# Patient Record
Sex: FEMALE | Race: WHITE | NOT HISPANIC OR LATINO | Employment: OTHER | URBAN - METROPOLITAN AREA
[De-identification: names, ages, dates, MRNs, and addresses within clinical notes are randomized per-mention and may not be internally consistent; named-entity substitution may affect disease eponyms.]

---

## 2017-01-05 RX ORDER — SIMVASTATIN 10 MG
TABLET ORAL
Qty: 90 TABLET | Refills: 0 | Status: SHIPPED | OUTPATIENT
Start: 2017-01-05 | End: 2017-01-30 | Stop reason: SDUPTHER

## 2017-01-10 DIAGNOSIS — R60.0 LOCALIZED EDEMA: ICD-10-CM

## 2017-01-10 RX ORDER — BUMETANIDE 2 MG/1
TABLET ORAL
Qty: 60 TABLET | Refills: 1 | Status: SHIPPED | OUTPATIENT
Start: 2017-01-10 | End: 2017-03-10 | Stop reason: SDUPTHER

## 2017-01-13 RX ORDER — ESCITALOPRAM OXALATE 20 MG/1
TABLET ORAL
Qty: 90 TABLET | Refills: 0 | Status: SHIPPED | OUTPATIENT
Start: 2017-01-13 | End: 2017-01-30 | Stop reason: SDUPTHER

## 2017-01-30 RX ORDER — SIMVASTATIN 10 MG
TABLET ORAL
Qty: 90 TABLET | Refills: 0 | Status: SHIPPED | OUTPATIENT
Start: 2017-01-30 | End: 2017-06-16 | Stop reason: SDUPTHER

## 2017-01-30 RX ORDER — CYCLOBENZAPRINE HCL 10 MG
TABLET ORAL
Qty: 30 TABLET | Refills: 0 | Status: SHIPPED | OUTPATIENT
Start: 2017-01-30 | End: 2017-05-08 | Stop reason: SDUPTHER

## 2017-01-30 RX ORDER — LEVOTHYROXINE SODIUM 0.1 MG/1
TABLET ORAL
Qty: 90 TABLET | Refills: 0 | Status: SHIPPED | OUTPATIENT
Start: 2017-01-30 | End: 2017-06-16 | Stop reason: SDUPTHER

## 2017-01-30 RX ORDER — ESCITALOPRAM OXALATE 20 MG/1
TABLET ORAL
Qty: 90 TABLET | Refills: 0 | Status: SHIPPED | OUTPATIENT
Start: 2017-01-30 | End: 2017-06-16 | Stop reason: SDUPTHER

## 2017-02-02 ENCOUNTER — TELEPHONE (OUTPATIENT)
Dept: ORTHOPEDIC SURGERY | Facility: CLINIC | Age: 72
End: 2017-02-02

## 2017-02-02 RX ORDER — MELOXICAM 15 MG/1
15 TABLET ORAL DAILY
Qty: 60 TABLET | Refills: 2 | Status: SHIPPED | OUTPATIENT
Start: 2017-02-02 | End: 2017-07-08 | Stop reason: HOSPADM

## 2017-02-02 NOTE — TELEPHONE ENCOUNTER
Pharmacy faxed a paper saying pt was requesting a 60 day supply of the meloxicam, can you resubmit for me please

## 2017-02-20 ENCOUNTER — DOCUMENTATION (OUTPATIENT)
Dept: PHYSICAL THERAPY | Facility: HOSPITAL | Age: 72
End: 2017-02-20

## 2017-02-20 NOTE — THERAPY DISCHARGE NOTE
Outpatient Physical Therapy Discharge Summary         Patient Name: Maria Ines Zhang  : 1945  MRN: [id number]    Today's Date: 2017    Visit Dx:  No diagnosis found.          PT OP Goals       17 1300          PT Short Term Goals    STG Date to Achieve 10/13/16  -CC      STG 1 Pt independent and able to tolerate HEP R shoulder  -CC      STG 1 Progress Not Met  -CC      STG 2 Improve R knee extension 3-0 degrees  -CC      STG 2 Progress Not Met  -CC      STG 3 Improved quad set recruitment R knee  -CC      STG 3 Progress Not Met  -CC      STG 4 Pt reports decrease disturbed sleep R knee by 25-50 %  -CC      STG 4 Progress Not Met  -CC      STG 5 Decrease soft tissue tenderness on palpation R knee by 25%  -CC      STG 5 Progress Not Met  -CC        User Key  (r) = Recorded By, (t) = Taken By, (c) = Cosigned By    Initials Name Provider Type    CC Lexis Abreu, PT Physical Therapist          OP PT Discharge Summary  Date of Discharge: 17  Reason for Discharge: other (comment) (Pt did not continue with PT appointments)  Outcomes Achieved: Other (Goals not met- pt only attended 2 sessions- cancelled f/u appointment and did not reschedule)  Discharge Destination: Unknown  Discharge Instructions: NA      Time Calculation:                    Lexis Abreu, PT  2017

## 2017-02-27 ENCOUNTER — OFFICE VISIT (OUTPATIENT)
Dept: ORTHOPEDIC SURGERY | Facility: CLINIC | Age: 72
End: 2017-02-27

## 2017-02-27 DIAGNOSIS — M75.01 ADHESIVE CAPSULITIS OF RIGHT SHOULDER: ICD-10-CM

## 2017-02-27 DIAGNOSIS — M75.21 BICEPS TENDINITIS, RIGHT: ICD-10-CM

## 2017-02-27 DIAGNOSIS — M75.50 SUBACROMIAL BURSITIS: Primary | ICD-10-CM

## 2017-02-27 PROCEDURE — 20610 DRAIN/INJ JOINT/BURSA W/O US: CPT | Performed by: NURSE PRACTITIONER

## 2017-02-27 RX ORDER — BUPIVACAINE HYDROCHLORIDE 5 MG/ML
2 INJECTION, SOLUTION EPIDURAL; INTRACAUDAL
Status: COMPLETED | OUTPATIENT
Start: 2017-02-27 | End: 2017-02-27

## 2017-02-27 RX ORDER — LIDOCAINE HYDROCHLORIDE 10 MG/ML
2 INJECTION, SOLUTION INFILTRATION; PERINEURAL
Status: COMPLETED | OUTPATIENT
Start: 2017-02-27 | End: 2017-02-27

## 2017-02-27 RX ORDER — TRIAMCINOLONE ACETONIDE 40 MG/ML
80 INJECTION, SUSPENSION INTRA-ARTICULAR; INTRAMUSCULAR
Status: COMPLETED | OUTPATIENT
Start: 2017-02-27 | End: 2017-02-27

## 2017-02-27 RX ADMIN — BUPIVACAINE HYDROCHLORIDE 2 ML: 5 INJECTION, SOLUTION EPIDURAL; INTRACAUDAL at 12:13

## 2017-02-27 RX ADMIN — LIDOCAINE HYDROCHLORIDE 2 ML: 10 INJECTION, SOLUTION INFILTRATION; PERINEURAL at 12:13

## 2017-02-27 RX ADMIN — TRIAMCINOLONE ACETONIDE 80 MG: 40 INJECTION, SUSPENSION INTRA-ARTICULAR; INTRAMUSCULAR at 12:13

## 2017-02-27 NOTE — PROGRESS NOTES
Subjective:     Patient ID: Maria Ines Zhang is a 71 y.o. female.    Chief Complaint: follow-up right shoulder pain    History of Present Illness   Patient is 71 y.o female who presents for follow-up right shoulder pain. Pain present lateral aspect with all motions including reaching up and out. Also increased pain noted with reaching out in front, reaching behind and with shoulder abduction. Was able to complete one visit with physical therapy before becoming ill in recent months. For the last two months symptoms have significantly increased. Decreased strength present at right upper extremity as well as decreased range of motion. Was last seen by this APRN on 12/06/2016, received corticosteroid injection at that time with mild symptom improvement. Denies known injury, denies presence of numbness and tingling. Increased pain at night when laying on right side. Symptoms relieved with rest. Denies all other concerns present at this time.      Social History     Occupational History   • Not on file.     Social History Main Topics   • Smoking status: Never Smoker   • Smokeless tobacco: Never Used   • Alcohol use No   • Drug use: No   • Sexual activity: Defer      Past Medical History   Diagnosis Date   • Acute sinusitis    • Anxiety    • Asthma    • Backache    • Benign essential hypertension    • Depression    • Dysuria    • Fatigue    • GERD (gastroesophageal reflux disease)    • Hyperlipidemia    • Hypertension    • Hypothyroidism    • Joint pain, knee    • Knee swelling    • Mitral valve insufficiency      nild   • Obesity    • Obstructive sleep apnea    • Osteoarthritis    • Patent foramen ovale    • Pulmonary embolism    • Pulmonary hypertension    • Sciatica    • Stroke syndrome      CRYPTOGENIC STROKE   • Venous stasis      Past Surgical History   Procedure Laterality Date   • Hysterectomy     • Thoracoscopy Right 02/25/2016     RT VATS WEDGE RESECTION   • Total knee arthroplasty Left    • Spine surgery     •  Back surgery  2011       Family History   Problem Relation Age of Onset   • Heart disease Mother    • Diabetes Mother    • Cancer Father    • Heart attack Brother    • Heart disease Brother    • No Known Problems Maternal Grandmother    • No Known Problems Maternal Grandfather    • No Known Problems Paternal Grandmother    • No Known Problems Paternal Grandfather          Review of Systems   Constitutional: Negative for chills, diaphoresis, fever and unexpected weight change.   HENT: Negative for hearing loss, nosebleeds, sore throat and tinnitus.    Eyes: Negative for pain and visual disturbance.   Respiratory: Negative for cough, shortness of breath and wheezing.    Cardiovascular: Negative for chest pain and palpitations.   Gastrointestinal: Negative for abdominal pain, diarrhea, nausea and vomiting.   Endocrine: Negative for cold intolerance, heat intolerance and polydipsia.   Genitourinary: Negative for difficulty urinating, dysuria and hematuria.   Musculoskeletal: Negative for arthralgias, joint swelling and myalgias.   Skin: Negative for rash and wound.   Allergic/Immunologic: Negative for environmental allergies.   Neurological: Negative for dizziness, syncope and numbness.   Hematological: Does not bruise/bleed easily.   Psychiatric/Behavioral: Negative for dysphoric mood and sleep disturbance. The patient is not nervous/anxious.    All other systems reviewed and are negative.          Objective:  Physical Exam    General: No acute distress.  Eyes: conjunctiva clear; pupils equally round and reactive  ENT: external ears and nose atraumatic; oropharynx clear  CV: no peripheral edema  Resp: normal respiratory effort  Skin: no rashes or wounds; normal turgor  Psych: mood and affect appropriate; recent and remote memory intact    There were no vitals filed for this visit.  There were no vitals filed for this visit.  There is no height or weight on file to calculate BMI.     Right Shoulder Exam     Tenderness    The patient is experiencing tenderness in the acromion.    Range of Motion   Forward Flexion: 110     Muscle Strength   Internal Rotation: 4/5   External Rotation: 4/5   Supraspinatus: 4/5   Subscapularis: 4/5   Biceps: 4/5     Tests   Apprehension: negative  Cross Arm: positive  Drop Arm: negative  Hawkin's test: positive  Impingement: positive  Sulcus: absent    Other   Erythema: absent  Scars: absent  Sensation: normal  Pulse: present          Assessment:       1. Subacromial bursitis, right    2. Biceps tendinitis, right    3. Adhesive capsulitis of right shoulder          Plan:  1. Discussed plan of care with patient. Wishes to proceed with corticosteroid injection right shoulder.  2. Will have her follow-up with physical therapy.  3. If not improving, will have her follow-up for repeat x-rays right shoulder.  May consider MRI right shoulder. Will plan to follow-up in four weeks. Patient verbalized understanding of all information and agrees with plan of care. Denies all other concerns present at this time.       Large Joint Arthrocentesis  Date/Time: 2/27/2017 12:13 PM  Consent given by: patient  Site marked: site marked  Timeout: Immediately prior to procedure a time out was called to verify the correct patient, procedure, equipment, support staff and site/side marked as required   Supporting Documentation  Indications: pain   Procedure Details  Location: shoulder - R subacromial bursa  Preparation: Patient was prepped and draped in the usual sterile fashion  Needle size: 22 G  Medications administered: 2 mL bupivacaine (PF) 0.5 %; 2 mL lidocaine 1 %; 80 mg triamcinolone acetonide 40 MG/ML  Patient tolerance: patient tolerated the procedure well with no immediate complications

## 2017-03-02 ENCOUNTER — HOSPITAL ENCOUNTER (OUTPATIENT)
Dept: PHYSICAL THERAPY | Facility: HOSPITAL | Age: 72
Setting detail: THERAPIES SERIES
Discharge: HOME | End: 2017-03-02

## 2017-03-02 DIAGNOSIS — M75.50 SUBACROMIAL BURSITIS: Primary | ICD-10-CM

## 2017-03-02 DIAGNOSIS — G89.29 CHRONIC RIGHT SHOULDER PAIN: ICD-10-CM

## 2017-03-02 DIAGNOSIS — M75.21 BICIPITAL TENDINITIS OF RIGHT SHOULDER: ICD-10-CM

## 2017-03-02 DIAGNOSIS — M75.01 ADHESIVE BURSITIS OF RIGHT SHOULDER: ICD-10-CM

## 2017-03-02 DIAGNOSIS — M25.511 CHRONIC RIGHT SHOULDER PAIN: ICD-10-CM

## 2017-03-02 PROCEDURE — G8985 CARRY GOAL STATUS: HCPCS

## 2017-03-02 PROCEDURE — 97161 PT EVAL LOW COMPLEX 20 MIN: CPT

## 2017-03-02 PROCEDURE — G8984 CARRY CURRENT STATUS: HCPCS

## 2017-03-02 PROCEDURE — G0283 ELEC STIM OTHER THAN WOUND: HCPCS

## 2017-03-02 NOTE — PROGRESS NOTES
Outpatient Physical Therapy Ortho Initial Evaluation  [facility]     Patient Name: Maria Ines Zhang  : 1945  MRN: [id number]  Today's Date: 3/2/2017      Visit Date: 2017    Patient Active Problem List   Diagnosis   • Essential hypertension   • HLD (hyperlipidemia)   • Acquired hypothyroidism   • Depression   • Edema   • Bronchiolitis obliterans organizing pneumonia   • Primary osteoarthritis of right knee   • Shoulder, capsulitis, adhesive   • Laceration of right lower extremity   • Hyperglycemia   • Abrasion of arm, right   • Biceps tendinitis   • Cough   • Bronchitis   • Subacromial bursitis, right        Past Medical History   Diagnosis Date   • Acute sinusitis    • Anxiety    • Asthma    • Backache    • Benign essential hypertension    • Depression    • Dysuria    • Fatigue    • GERD (gastroesophageal reflux disease)    • Hyperlipidemia    • Hypertension    • Hypothyroidism    • Joint pain, knee    • Knee swelling    • Mitral valve insufficiency      nild   • Obesity    • Obstructive sleep apnea    • Osteoarthritis    • Patent foramen ovale    • Pulmonary embolism    • Pulmonary hypertension    • Sciatica    • Stroke syndrome      CRYPTOGENIC STROKE   • Venous stasis         Past Surgical History   Procedure Laterality Date   • Hysterectomy     • Thoracoscopy Right 2016     RT VATS WEDGE RESECTION   • Total knee arthroplasty Left    • Spine surgery     • Back surgery  2011       Visit Dx:     ICD-10-CM ICD-9-CM   1. Subacromial bursitis M75.50 726.19   2. Bicipital tendinitis of right shoulder M75.21 726.12   3. Adhesive bursitis of right shoulder M75.01 726.0   4. Chronic right shoulder pain M25.511 719.41    G89.29 338.29             Patient History       17 1400          History    Chief Complaint Pain;Muscle weakness;Muscle tenderness;Joint stiffness;Difficulty with daily activities  -LN      Type of Pain Shoulder pain;Upper Extremity / Arm   Right  -LN      Date Current  "Problem(s) Began --   1 1/2 years ago  -LN      Brief Description of Current Complaint Patient reports that she fractured her right shoulder in 3 pieces 1 1/2 years ago after a fall. \"The doctor wanted to do surgery but my  was really sick at the time so I said I couldn't do surgery.\"  Patient began having increased pain this past Winter for some reason;  No new injury reported. She had been on steroids for pneumonia for about 11 months and came off them in November 2016, so patient thinks the steroids must have helped the shoulder from hurting.  No testing done recently.   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 Yes   ortho  -LN      How has patient tried to help current problem? aspercreme; Aleve; HP  -LN      Results of Clinical Tests no recent tests done  -LN      Related/Recent Hospitalizations No  -LN      Surgery CPT Code 1 n/a  -LN      History of Previous Related Injuries fall 1 1/2 years ago- fractured right shoulder  -LN      Pain     Pain Location Shoulder;Arm   right  -LN      Pain at Present 6  -LN      Pain at Best 2  -LN      Pain at Worst 10  -LN      Pain Frequency Constant/continuous  -LN      Pain Description Aching;Discomfort;Tightness;Throbbing;Sharp;Dull  -LN      What Performance Factors Make the Current Problem(s) WORSE? lying on right side, lifting right arm  -LN      What Performance Factors Make the Current Problem(s) BETTER? rest, HP, Aleve  -LN      Tolerance Time- Lying pain with lying on right side  -LN      Is your sleep disturbed? Yes  -LN      What position do you sleep in? Left " sidelying  -LN      Difficulties at work? some increased pain after doing computer work  -LN      Difficulties with ADL's? yes- difficulty with doing hair, putting on bra, shirt  -LN      Difficulties with recreational activities? yes  -LN      Fall Risk Assessment    Any falls in the past year: No  -LN      Services    Prior Rehab/Home Health Experiences Yes  -LN      When was the prior experience with Rehab/Home Health last year- for shoulder and knee ( needs TKA).  -LN      [facility]  -LN      Are you currently receiving Home Health services No  -LN      Do you plan to receive Home Health services in the near future No  -LN      Daily Activities    Primary Language English  -LN      Are you able to read Yes  -LN      Are you able to write Yes  -LN      How does patient learn best? Listening;Demonstration  -LN      Teaching needs identified Home Exercise Program   Risks and benefits of treatment explained to patient.  -LN      Patient is concerned about/has problems with Difficulty with self care (i.e. bathing, dressing, toileting:;Flexibility;Performing home management (household chores, shopping, care of dependents);Performing job responsibilities/community activities (work, school,;Performing sports, recreation, and play activities;Reaching over head;Repetitive movements of the hand, arm, shoulder  -LN      Does patient have problems with the following? None  -LN      Barriers to learning None  -LN      Pt Participated in POC and Goals Yes  -LN      Safety    Are you being hurt, hit, or frightened by anyone at home or in your life? No  -LN      Are you being neglected by a caregiver No  -LN        User Key  (r) = Recorded By, (t) = Taken By, (c) = Cosigned By    Initials Name Provider Type    LN Mago Garcia, PT Physical Therapist                PT Ortho       03/02/17 1400    Subjective Comments    Subjective Comments Patient  reports constant pain in right shoulder and upper arm; intensity varies depending on what she does.   -LN    Precautions and Contraindications    Precautions/Limitations no known precautions/limitations  -LN    Subjective Pain    Able to rate subjective pain? yes  -LN    Pre-Treatment Pain Level 6  -LN    Posture/Observations    Alignment Options Forward head;Rounded shoulders  -LN    Forward Head Mild  -LN    Rounded Shoulders Mild  -LN    Sensation    Sensation WNL? WNL  -LN    Additional Comments no c/o N/T in right UE.  -LN    Shoulder Girdle Palpation    Subacromial Space Right:;Tender  -LN    Supraspinatus Insertion Right:;Tender  -LN    AC Joint Right:;Tender  -LN    Long Head of Biceps Right:;Tender  -LN    Short Head of Biceps Right:;Tender  -LN    Deltoid Right:;Tender  -LN    Infraspinatus Right:;Tender  -LN    Teres Minor Right:;Tender  -LN    Pect Minor Right:;Tender  -LN    Upper Trap Right:;Tender;Guarded/taut  -LN    Shoulder Impingement/Rotator Cuff Special Tests    Full Can Test (RC Lesion) Right:;Positive   pain and weakness  -LN    Empty Can Test (RC Lesion) Right:;Positive   pain and weakness  -LN    Drop Arm Test (Full Thickness RC Lesion) --  -LN    Speed's Test (LH of Biceps Lesion) Right:;Negative   weak  -LN    Right Shoulder    Flexion AROM Deficit 110  -LN    Flexion PROM Deficit 124  -LN    ABduction AROM Deficit 80  -LN    ABduction PROM Deficit 125  -LN    External Rotation AROM Deficit 52  -LN    External Rotation PROM Deficit 62  -LN    Internal Rotation AROM Deficit 70  -LN    Internal Rotation PROM Deficit 70  -LN    Right Shoulder    Flexion Gross Movement (3-/5) fair minus  -LN    Extension Gross Movement (4/5) good  -LN    ABduction Gross Movement (3-/5) fair minus  -LN    ADduction Gross Movement (4/5) good  -LN    Int Rotation Gross Movement (4/5) good  -LN    Ext Rotation Gross Movement (4-/5) good minus  -LN    Right Elbow/Forearm    Elbow Extension Gross Movement (4+/5)  good plus  -LN    Upper Extremity Flexibility    Upper Trapezius Right:;Mildly limited  -LN      User Key  (r) = Recorded By, (t) = Taken By, (c) = Cosigned By    Initials Name Provider Type    BRAD Garcia, PT Physical Therapist                            Therapy Education       03/02/17 1743          Therapy Education    Given HEP;Symptoms/condition management;Pain management   Patient to use MH PRN at home.  -LN      Program New- to do exercises 2 x day at home as tolerated.  -LN      How Provided Verbal;Demonstration;Written  -LN      Provided to Patient  -LN      Level of Understanding Teach back education performed;Verbalized;Demonstrated  -LN        User Key  (r) = Recorded By, (t) = Taken By, (c) = Cosigned By    Initials Name Provider Type    BRAD Garcia, PT Physical Therapist                PT OP Goals       03/02/17 1400       PT Short Term Goals    STG Date to Achieve 03/16/17  -LN     STG 1 Patient to verbally report decreased right shoulder pain to <5/10 with ADLs and work/computer activities.   -LN     STG 2 Patient to have improved right shouder AROM to 120 degrees flexion, 100 degrees scaption/abduction, 80 degrees IR, and 65 degrees ER to allow for improved functional use of right UE with ADLs.   -LN     STG 3 Patient to have improved right shoulder PROM to 140 degrees flexion and scaption/abduction, 90 degrees IR, and 80 degrees ER.   -LN     STG 4 Patient able to tolerate lying on right side for short periods without c/o increased shoulder pain.   -LN     STG 5 Patient able to work on computer for 1 hour without c/o increased right shoulder pain.   -LN     Long Term Goals    LTG Date to Achieve 03/30/17  -LN     LTG 1 Patient to verbally report decreased right shoulder pain to <3/10.   -LN     LTG 2 Patient independent with HEP issued by therapist.   -LN     LTG 3 Right shoulder PROM WFL-WNL.  -LN     LTG 4 Right shoulder AROM WFL in all planes to allow for good  functional use of right UE with ADLs.   -LN     LTG 5 Patient to have improved right shoulder strength to 4+/5 all planes.   -LN     LTG 6 Patient able to use right arm to put on her bra and shirt without difficulty or pain.   -LN     Time Calculation    PT Goal Re-Cert Due Date 03/30/17  -LN       User Key  (r) = Recorded By, (t) = Taken By, (c) = Cosigned By    Initials Name Provider Type    LN Mago Garcia, PT Physical Therapist                PT Assessment/Plan       03/02/17 9801       PT Assessment    Functional Limitations Limitation in home management;Performance in self-care ADL;Performance in leisure activities;Limitations in functional capacity and performance;Performance in work activities;Limitations in community activities  -LN     Impairments Range of motion;Joint mobility;Pain;Muscle strength;Impaired flexibility  -LN     Assessment Comments Patient presents 1 1/2 years s/p right shoulder fracture without surgical repair with a 4 month history of increased pain without any new injury. Patient with decreased right shoulder ROM, decreased shoulder strength, pain & tenderness, disturbed sleep, and decreased functional use of right UE with ADLs. Patient with signs of Right shoulder bursitis/tendonitis with adhesive capsulitis & possible RC involvement.  She will benefit from PT for ROM & strengthening exercises and modalities for pain relief to increase functional use of right UE with ADLs/work/home activities.   -LN     Please refer to paper survey for additional self-reported information Yes  -LN     Rehab Potential Good  -LN     Patient/caregiver participated in establishment of treatment plan and goals Yes  -LN     Patient would benefit from skilled therapy intervention Yes  -LN     PT Plan    PT Frequency 2x/week  -LN     Predicted Duration of Therapy Intervention (days/wks) 4-6 weeks  -LN     Planned CPT's? PT EVAL LOW COMPLEXITY: 53946;PT THER PROC EA 15 MIN: 24156;PT HOT OR COLD PACK  TREAT MCARE;PT ELECTRICAL STIM UNATTEND: ;PT MANUAL THERAPY EA 15 MIN: 79811;PT ULTRASOUND EA 15 MIN: 64004  -LN     Physical Therapy Interventions (Optional Details) modalities;strengthening;stretching;ROM (Range of Motion);manual therapy techniques;joint mobilization;patient/family education;home exercise program;taping  -LN     PT Plan Comments Consider trial of Kinesiotaping to right shoulder.   -LN       User Key  (r) = Recorded By, (t) = Taken By, (c) = Cosigned By    Initials Name Provider Type    BRAD Garcia, PT Physical Therapist                Modalities       03/02/17 1400          Moist Heat    MH Applied Yes  -LN      Location right shoulder supine with IFC  -LN      Rx Minutes 15 mins  -LN      MH S/P Rx Yes  -LN      ELECTRICAL STIMULATION    Attended/Unattended Unattended  -LN      Stimulation Type IFC  -LN      Location/Electrode Placement/Other Right shoulder and UT   with MH  -LN      Rx Minutes 15 mins  -LN        User Key  (r) = Recorded By, (t) = Taken By, (c) = Cosigned By    Initials Name Provider Type    BRAD Garcia, PT Physical Therapist              Exercises       03/02/17 1400          Subjective Comments    Subjective Comments Patient reports constant pain in right shoulder and upper arm; intensity varies depending on what she does.   -LN      Subjective Pain    Able to rate subjective pain? yes  -LN      Pre-Treatment Pain Level 6  -LN      Exercise 1    Exercise Name 1 supine cane exercise for flexion  -LN      Reps 1 5   to do 10 reps at home  -LN      Exercise 2    Exercise Name 2 supine cane exercise for ER  -LN      Reps 2 5   to do 10 reps at home  -LN        User Key  (r) = Recorded By, (t) = Taken By, (c) = Cosigned By    Initials Name Provider Type    BRAD Gracia, PT Physical Therapist           Manual Rx (last 36 hours)      Manual Treatments       03/02/17 1400          Manual Rx 1    Manual Rx 1 Location right shoulder  -LN       Manual Rx 1 Type PROM- flexion, abduction/scaption, IR, & ER  -LN      Manual Rx 1 Duration 5-10 reps each to tolerance with patient supine.   -LN        User Key  (r) = Recorded By, (t) = Taken By, (c) = Cosigned By    Initials Name Provider Type    BRAD Garcia, PT Physical Therapist                            Outcome Measures       03/02/17 1700          Quick DASH    Open a tight or new jar. 2  -LN      Do heavy household chores (e.g., wash walls, wash floors) 5  -LN      Carry a shopping bag or briefcase 4  -LN      Wash your back 5  -LN      Use a knife to cut food 3  -LN      Recreational activities in which you take some force or impact through your arm, should or hand (e.g. golf, hammering, tennis, etc.) 5  -LN      During the past week, to what extent has your arm, shoulder, or hand problem interfered with your normal social activites with family, friends, neighbors or groups? 3  -LN      During the past week, were you limited in your work or other regular daily activities as a result of your arm, shoulder or hand problem? 3  -LN      Arm, Shoulder, or hand pain 4  -LN      Tingling (pins and needles) in your arm, shoulder, or hand 2  -LN      During the past week, how much difficulty have you had sleeping because of the pain in your arm, shoulder or hand? 5  -LN      Number of Questions Answered 11  -LN      Quick DASH Score 68.18  -LN      Work Module (Optional)    Using your usual technique for your work? 2  -LN      Doing your usual work because of arm, shoulder or hand pain? 3  -LN      Doing your work as well as you would like? 3  -LN      Spending your usual amount of time doing your work? 3  -LN      Work Module Score 43.75  -LN      Functional Assessment    Outcome Measure Options Quick DASH  -LN        User Key  (r) = Recorded By, (t) = Taken By, (c) = Cosigned By    Initials Name Provider Type    BRAD Garcia, MARIANNE Physical Therapist            Time Calculation:    Start Time: 1410  Stop Time: 1515  Time Calculation (min): 65 min     Therapy Charges for Today     Code Description Service Date Service Provider Modifiers Qty    92365747369 HC PT CARRY MOV HAND OBJ CURRENT 3/2/2017 Mago Garcia, PT GP, CL 1    40893000692 HC PT CARRY MOV HAND OBJ PROJECTED 3/2/2017 Mago Garcia, PT GP, CJ 1    22857477161 HC PT EVAL LOW COMPLEXITY 2 3/2/2017 Mago Garcia, PT GP 1    70241546938 HC ELECTRICAL STIM UNATTENDED 3/2/2017 Mago Garcia, PT  1          PT G-Codes  PT Professional Judgement Used?: Yes  Outcome Measure Options: Quick DASH  Score: score of 68.18; work module- 43.75  Functional Limitation: Carrying, moving and handling objects  Carrying, Moving and Handling Objects Current Status (): At least 60 percent but less than 80 percent impaired, limited or restricted  Carrying, Moving and Handling Objects Goal Status (): At least 20 percent but less than 40 percent impaired, limited or restricted         Mago Garcia, PT  3/2/2017

## 2017-03-06 RX ORDER — CLONAZEPAM 0.5 MG/1
TABLET ORAL
Qty: 60 TABLET | Refills: 1 | Status: SHIPPED | OUTPATIENT
Start: 2017-03-06 | End: 2017-06-16 | Stop reason: SDUPTHER

## 2017-03-07 ENCOUNTER — HOSPITAL ENCOUNTER (OUTPATIENT)
Dept: PHYSICAL THERAPY | Facility: HOSPITAL | Age: 72
Setting detail: THERAPIES SERIES
Discharge: HOME | End: 2017-03-07

## 2017-03-07 PROCEDURE — 97035 APP MDLTY 1+ULTRASOUND EA 15: CPT

## 2017-03-07 PROCEDURE — G0283 ELEC STIM OTHER THAN WOUND: HCPCS

## 2017-03-07 PROCEDURE — 97140 MANUAL THERAPY 1/> REGIONS: CPT

## 2017-03-07 NOTE — PROGRESS NOTES
Outpatient Physical Therapy Ortho Treatment Note  [facility]     Patient Name: Maria Ines Zhang  : 1945  MRN: [id number]  Today's Date: 3/7/2017      Visit Date: 2017    Visit Dx:  No diagnosis found.    Patient Active Problem List   Diagnosis   • Essential hypertension   • HLD (hyperlipidemia)   • Acquired hypothyroidism   • Depression   • Edema   • Bronchiolitis obliterans organizing pneumonia   • Primary osteoarthritis of right knee   • Shoulder, capsulitis, adhesive   • Laceration of right lower extremity   • Hyperglycemia   • Abrasion of arm, right   • Biceps tendinitis   • Cough   • Bronchitis   • Subacromial bursitis, right        Past Medical History   Diagnosis Date   • Acute sinusitis    • Anxiety    • Asthma    • Backache    • Benign essential hypertension    • Depression    • Dysuria    • Fatigue    • GERD (gastroesophageal reflux disease)    • Hyperlipidemia    • Hypertension    • Hypothyroidism    • Joint pain, knee    • Knee swelling    • Mitral valve insufficiency      nild   • Obesity    • Obstructive sleep apnea    • Osteoarthritis    • Patent foramen ovale    • Pulmonary embolism    • Pulmonary hypertension    • Sciatica    • Stroke syndrome      CRYPTOGENIC STROKE   • Venous stasis         Past Surgical History   Procedure Laterality Date   • Hysterectomy     • Thoracoscopy Right 2016     RT VATS WEDGE RESECTION   • Total knee arthroplasty Left    • Spine surgery     • Back surgery               PT Ortho       17 1100    Right Shoulder    Flexion AROM Deficit 127  -CC    ABduction AROM Deficit scaption 115   -CC      User Key  (r) = Recorded By, (t) = Taken By, (c) = Cosigned By    Initials Name Provider Type    LIZ Abreu, PT Physical Therapist                            PT Assessment/Plan       17 1130       PT Assessment    Assessment Comments Pt tolerated RX-added US to R shoulder at onset of session and issued additional  AArom TE  with cane. Pt with improved Arom at end of session - degrees and scaption 115 degrees. Pt appears motivated to participate in PT  program  -CC     PT Plan    PT Plan Comments Will see 2x/week per pt's schedule per POC.  -CC       User Key  (r) = Recorded By, (t) = Taken By, (c) = Cosigned By    Initials Name Provider Type    LIZ Abreu, PT Physical Therapist                Modalities       03/07/17 1100          Subjective Comments    Subjective Comments Pt relates doing HEP and R shoulder moving a little better since 1st therapy session  -CC      Subjective Pain    Able to rate subjective pain? yes  -CC      Pre-Treatment Pain Level 2  -CC      Moist Heat    Location right shoulder supine with IFC  -CC      Rx Minutes 15 mins  -CC      MH S/P Rx Yes  -CC      ELECTRICAL STIMULATION    Attended/Unattended Unattended  -CC      Stimulation Type IFC  -CC      Location/Electrode Placement/Other Right shoulder and UT   with MH  -CC      Rx Minutes 15 mins  -CC      Ultrasound 93398    Location R shoulder joint A-P  -CC      Rx Minutes  8 min  -CC      Frequency 1.0 MHz  -CC      Intensity - Wts/cm 1.2  -CC        User Key  (r) = Recorded By, (t) = Taken By, (c) = Cosigned By    Initials Name Provider Type    CC Lexis Abreu, PT Physical Therapist                Exercises       03/07/17 1100          Subjective Comments    Subjective Comments Pt relates doing HEP and R shoulder moving a little better since 1st therapy session  -CC      Subjective Pain    Able to rate subjective pain? yes  -CC      Pre-Treatment Pain Level 2  -CC      Exercise 1    Exercise Name 1 supine cane exercise for flexion  -CC      Reps 1 10  -CC      Exercise 2    Exercise Name 2 supine cane exercise for ER  -CC      Reps 2 10  -CC      Exercise 3    Exercise Name 3 Standing scaption with cane  -CC      Cueing 3 Demo  -CC      Equipment 3 Dowel  -CC      Reps 3 10  -CC      Exercise 4    Exercise Name 4 FF wall  climb  -CC      Cueing 4 Demo  -CC      Reps 4 10  -CC      Exercise 5    Exercise Name 5 Pulley FF/Scaption  -CC      Cueing 5 Demo  -CC      Reps 5 10  -CC        User Key  (r) = Recorded By, (t) = Taken By, (c) = Cosigned By    Initials Name Provider Type    LIZ Abreu PT Physical Therapist                        Manual Rx (last 36 hours)      Manual Treatments       03/07/17 1100          Manual Rx 1    Manual Rx 1 Location right shoulder  -CC      Manual Rx 1 Type PROM- flexion, abduction/scaption, IR, & ER  -CC      Manual Rx 1 Duration 10 reps each to tolerance with patient supine.   -CC      Manual Rx 2    Manual Rx 2 Location R shoulder joint  -CC      Manual Rx 2 Type A-P mobs /distraction   -CC      Manual Rx 2 Grade as tolerated  -CC        User Key  (r) = Recorded By, (t) = Taken By, (c) = Cosigned By    Initials Name Provider Type    LIZ Abreu PT Physical Therapist                    Therapy Education       03/07/17 1119          Therapy Education    Given --   Pt issued handout for new exercises including shoulder rolls and scap squeezes-pt to do for HEP  -CC        User Key  (r) = Recorded By, (t) = Taken By, (c) = Cosigned By    Initials Name Provider Type    LIZ Abreu PT Physical Therapist                Time Calculation:   Start Time: 1000  Stop Time: 1110  Time Calculation (min): 70 min    Therapy Charges for Today     Code Description Service Date Service Provider Modifiers Qty    41223266988 HC ELECTRICAL STIM UNATTENDED 3/7/2017 Lexis Abreu, PT  1    15259791079 HC PT HOT OR COLD PACK TREAT MCARE 3/7/2017 Lexis Abreu, PT GP 1    19730908670 HC PT MANUAL THERAPY EA 15 MIN 3/7/2017 Lexis Abreu, PT GP 1    33754147008 HC PT ULTRASOUND EA 15 MIN 3/7/2017 Lexis Abreu, PT GP 1                    Lexis Abreu, PT  3/7/2017

## 2017-03-09 ENCOUNTER — APPOINTMENT (OUTPATIENT)
Dept: PREADMISSION TESTING | Facility: HOSPITAL | Age: 72
End: 2017-03-09

## 2017-03-09 ENCOUNTER — HOSPITAL ENCOUNTER (OUTPATIENT)
Dept: PHYSICAL THERAPY | Facility: HOSPITAL | Age: 72
Setting detail: THERAPIES SERIES
Discharge: HOME | End: 2017-03-09

## 2017-03-09 ENCOUNTER — APPOINTMENT (OUTPATIENT)
Dept: PHYSICAL THERAPY | Facility: HOSPITAL | Age: 72
End: 2017-03-09

## 2017-03-09 DIAGNOSIS — M75.21 BICIPITAL TENDINITIS OF RIGHT SHOULDER: ICD-10-CM

## 2017-03-09 DIAGNOSIS — M25.511 CHRONIC RIGHT SHOULDER PAIN: ICD-10-CM

## 2017-03-09 DIAGNOSIS — M75.50 SUBACROMIAL BURSITIS: Primary | ICD-10-CM

## 2017-03-09 DIAGNOSIS — G89.29 CHRONIC RIGHT SHOULDER PAIN: ICD-10-CM

## 2017-03-09 DIAGNOSIS — M75.01 ADHESIVE BURSITIS OF RIGHT SHOULDER: ICD-10-CM

## 2017-03-09 PROCEDURE — 97110 THERAPEUTIC EXERCISES: CPT

## 2017-03-09 PROCEDURE — 97140 MANUAL THERAPY 1/> REGIONS: CPT

## 2017-03-09 PROCEDURE — 97035 APP MDLTY 1+ULTRASOUND EA 15: CPT

## 2017-03-09 PROCEDURE — G0283 ELEC STIM OTHER THAN WOUND: HCPCS

## 2017-03-09 NOTE — PROGRESS NOTES
Outpatient Physical Therapy Ortho Treatment Note  [facility]     Patient Name: Maria Ines Zhang  : 1945  MRN: [id number]  Today's Date: 3/9/2017      Visit Date: 2017    Visit Dx:    ICD-10-CM ICD-9-CM   1. Subacromial bursitis M75.50 726.19   2. Adhesive bursitis of right shoulder M75.01 726.0   3. Chronic right shoulder pain M25.511 719.41    G89.29 338.29   4. Bicipital tendinitis of right shoulder M75.21 726.12       Patient Active Problem List   Diagnosis   • Essential hypertension   • HLD (hyperlipidemia)   • Acquired hypothyroidism   • Depression   • Edema   • Bronchiolitis obliterans organizing pneumonia   • Primary osteoarthritis of right knee   • Shoulder, capsulitis, adhesive   • Laceration of right lower extremity   • Hyperglycemia   • Abrasion of arm, right   • Biceps tendinitis   • Cough   • Bronchitis   • Subacromial bursitis, right        Past Medical History   Diagnosis Date   • Acute sinusitis    • Anxiety    • Asthma    • Backache    • Benign essential hypertension    • Depression    • Dysuria    • Fatigue    • GERD (gastroesophageal reflux disease)    • Hyperlipidemia    • Hypertension    • Hypothyroidism    • Joint pain, knee    • Knee swelling    • Mitral valve insufficiency      nild   • Obesity    • Obstructive sleep apnea    • Osteoarthritis    • Patent foramen ovale    • Pulmonary embolism    • Pulmonary hypertension    • Sciatica    • Stroke syndrome      CRYPTOGENIC STROKE   • Venous stasis         Past Surgical History   Procedure Laterality Date   • Hysterectomy     • Thoracoscopy Right 2016     RT VATS WEDGE RESECTION   • Total knee arthroplasty Left    • Spine surgery     • Back surgery               PT Ortho       17 1000    Subjective Comments    Subjective Comments Pt reports her shoulder is feeling much better today  -    Subjective Pain    Able to rate subjective pain? yes  -    Pre-Treatment Pain Level 1  -    Initials Name Provider Type     CC Lexis Abreu, PT Physical Therapist     Luisito Navarro PTA Physical Therapy Assistant                            PT Assessment/Plan       03/09/17 1316       PT Assessment    Assessment Comments Pt with good tolerance to PROM and jt glides; decreased symtpoms reported following treatment; needing cues with ex's  -     PT Plan    PT Plan Comments Cont per POC and progress ex's as pt tolerates  -       User Key  (r) = Recorded By, (t) = Taken By, (c) = Cosigned By    Initials Name Provider Type     Luisito Navarro PTA Physical Therapy Assistant                Modalities       03/09/17 1000          Moist Heat    Location (R) shoulder w/ IFC - pt seated with pillow support  -      Rx Minutes 15 mins  -      MH S/P Rx Yes  -      ELECTRICAL STIMULATION    Attended/Unattended Unattended  -      Stimulation Type IFC  -      Location/Electrode Placement/Other (R) shoulder w/ MHP  -MH      Rx Minutes 15 mins  -      Ultrasound 54314    Location (R) shoulder - pt seated w/pillow support  -      Rx Minutes 8  -      Duty Cycle 100  -      Frequency 1.0 MHz  -      Intensity - Wts/cm 1.2  -        User Key  (r) = Recorded By, (t) = Taken By, (c) = Cosigned By    Initials Name Provider Type     Luisito Navarro PTA Physical Therapy Assistant                Exercises       03/09/17 1000          Subjective Comments    Subjective Comments Pt reports her shoulder is feeling much better today  -      Subjective Pain    Able to rate subjective pain? yes  -      Pre-Treatment Pain Level 1  -      Exercise 1    Exercise Name 1 supine cane exercise for flexion  -MH      Reps 1 10  -      Exercise 2    Exercise Name 2 seated cane exercise for ER  -MH      Cueing 2 Verbal  -MH      Reps 2 10  -MH      Exercise 3    Exercise Name 3 Standing scaption with cane  -      Cueing 3 Demo  -MH      Reps 3 10  -MH      Exercise 4    Exercise Name 4 FF wall climb  -      Cueing 4 Verbal   -MH      Reps 4 10  -MH      Exercise 5    Exercise Name 5 Pulley FF/Scaption  -MH      Cueing 5 Verbal  -MH      Time (Minutes) 5 2   each  -MH        User Key  (r) = Recorded By, (t) = Taken By, (c) = Cosigned By    Initials Name Provider Type     Luisito Navarro PTA Physical Therapy Assistant                        Manual Rx (last 36 hours)      Manual Treatments       03/09/17 1000          Manual Rx 1    Manual Rx 1 Location (R) shoulder  -MH      Manual Rx 1 Type PROM:  flex, abd, ER, IR  -MH      Manual Rx 1 Duration 10 each  -MH      Manual Rx 2    Manual Rx 2 Location (R) shoulder  -MH      Manual Rx 2 Type inf and post GH jt glides  -MH      Manual Rx 2 Duration 3 x 20 osc each  -MH        User Key  (r) = Recorded By, (t) = Taken By, (c) = Cosigned By    Initials Name Provider Type     Luisiot Navarro PTA Physical Therapy Assistant                    Therapy Education       03/09/17 1316          Therapy Education    Given HEP  -MH      Program Reinforced  -MH      How Provided Verbal  -MH      Provided to Patient  -MH      Level of Understanding Teach back education performed;Verbalized;Demonstrated  -MH        User Key  (r) = Recorded By, (t) = Taken By, (c) = Cosigned By    Initials Name Provider Type     Luisito Navarro PTA Physical Therapy Assistant                Time Calculation:   Start Time: 1000  Stop Time: 1112  Time Calculation (min): 72 min    Therapy Charges for Today     Code Description Service Date Service Provider Modifiers Qty    32305771190 HC PT MANUAL THERAPY EA 15 MIN 3/9/2017 Luisito Navarro, PTA GP 1    34334639098 HC PT THER PROC EA 15 MIN 3/9/2017 Luisito Navarro PTA GP 1    67033734402 HC PT ULTRASOUND EA 15 MIN 3/9/2017 Luisito Navarro PTA GP 1    67916934487 HC ELECTRICAL STIM UNATTENDED 3/9/2017 Luisito Navarro PTA  1                    Luisito Navarro PTA  3/9/2017

## 2017-03-10 DIAGNOSIS — R60.0 LOCALIZED EDEMA: ICD-10-CM

## 2017-03-10 RX ORDER — BUMETANIDE 2 MG/1
TABLET ORAL
Qty: 60 TABLET | Refills: 0 | Status: SHIPPED | OUTPATIENT
Start: 2017-03-10 | End: 2017-04-05 | Stop reason: SDUPTHER

## 2017-03-13 RX ORDER — AMLODIPINE BESYLATE 10 MG/1
TABLET ORAL
Qty: 90 TABLET | Refills: 0 | Status: SHIPPED | OUTPATIENT
Start: 2017-03-13 | End: 2017-06-07 | Stop reason: SDUPTHER

## 2017-03-14 ENCOUNTER — APPOINTMENT (OUTPATIENT)
Dept: PHYSICAL THERAPY | Facility: HOSPITAL | Age: 72
End: 2017-03-14

## 2017-03-14 ENCOUNTER — HOSPITAL ENCOUNTER (OUTPATIENT)
Dept: PHYSICAL THERAPY | Facility: HOSPITAL | Age: 72
Setting detail: THERAPIES SERIES
Discharge: HOME | End: 2017-03-14

## 2017-03-14 PROCEDURE — 97110 THERAPEUTIC EXERCISES: CPT

## 2017-03-14 PROCEDURE — 97140 MANUAL THERAPY 1/> REGIONS: CPT

## 2017-03-14 PROCEDURE — G0283 ELEC STIM OTHER THAN WOUND: HCPCS

## 2017-03-14 PROCEDURE — 97035 APP MDLTY 1+ULTRASOUND EA 15: CPT

## 2017-03-14 NOTE — PROGRESS NOTES
Outpatient Physical Therapy Ortho Treatment Note  [facility]     Patient Name: Maria Ines Zhang  : 1945  MRN: [id number]  Today's Date: 3/14/2017      Visit Date: 2017    Visit Dx:  No diagnosis found.    Patient Active Problem List   Diagnosis   • Essential hypertension   • HLD (hyperlipidemia)   • Acquired hypothyroidism   • Depression   • Edema   • Bronchiolitis obliterans organizing pneumonia   • Primary osteoarthritis of right knee   • Shoulder, capsulitis, adhesive   • Laceration of right lower extremity   • Hyperglycemia   • Abrasion of arm, right   • Biceps tendinitis   • Cough   • Bronchitis   • Subacromial bursitis, right        Past Medical History   Diagnosis Date   • Acute sinusitis    • Anxiety    • Asthma    • Backache    • Benign essential hypertension    • Depression    • Dysuria    • Fatigue    • GERD (gastroesophageal reflux disease)    • Hyperlipidemia    • Hypertension    • Hypothyroidism    • Joint pain, knee    • Knee swelling    • Mitral valve insufficiency      nild   • Obesity    • Obstructive sleep apnea    • Osteoarthritis    • Patent foramen ovale    • Pulmonary embolism    • Pulmonary hypertension    • Sciatica    • Stroke syndrome      CRYPTOGENIC STROKE   • Venous stasis         Past Surgical History   Procedure Laterality Date   • Hysterectomy     • Thoracoscopy Right 2016     RT VATS WEDGE RESECTION   • Total knee arthroplasty Left    • Spine surgery     • Back surgery                               PT Assessment/Plan       17 1628       PT Assessment    Assessment Comments Pt had more discomfort during session today and stiffer with AROM end of RX but likely due to flared sx in multiple joints with OA. Pt trial of TB exercises and if reports tolerated post session will add to HEP.  -CC     PT Plan    PT Plan Comments Cont per POC and progress TE for RC strengthening  -CC       User Key  (r) = Recorded By, (t) = Taken By, (c) = Cosigned By     Initials Name Provider Type     Lexis Abreu, MARIANNE Physical Therapist                Modalities       03/14/17 1300          Subjective Comments    Subjective Comments Pt having increase stiffness/soreness to R shoulder with colder weather today-reports her other joints are also more sore today  -CC      Subjective Pain    Able to rate subjective pain? yes  -CC      Pre-Treatment Pain Level 4  -CC      Moist Heat    Location (R) shoulder w/ IFC - pt seated with pillow support  -CC      Rx Minutes 15 mins  -CC      MH S/P Rx Yes  -CC      ELECTRICAL STIMULATION    Attended/Unattended Unattended  -CC      Stimulation Type IFC  -CC      Location/Electrode Placement/Other (R) shoulder w/ MHP  -CC      Rx Minutes 15 mins  -CC      Ultrasound 82296    Location (R) shoulder - pt seated w/pillow support  -CC      Rx Minutes 8  -CC      Duty Cycle 100  -CC      Frequency 1.0 MHz  -CC      Intensity - Wts/cm 1.2  -CC        User Key  (r) = Recorded By, (t) = Taken By, (c) = Cosigned By    Initials Name Provider Type    CC Lexis Abreu PT Physical Therapist                Exercises       03/14/17 1300          Subjective Comments    Subjective Comments Pt having increase stiffness/soreness to R shoulder with colder weather today-reports her other joints are also more sore today  -CC      Subjective Pain    Able to rate subjective pain? yes  -CC      Pre-Treatment Pain Level 4  -CC      Exercise 1    Exercise Name 1 supine cane exercise for flexion  -CC      Reps 1 10  -CC      Exercise 2    Exercise Name 2 supine cane exercise for ER  -CC      Cueing 2 Verbal  -CC      Reps 2 10  -CC      Exercise 3    Exercise Name 3 Standing scaption with cane  -CC      Cueing 3 Demo  -CC      Reps 3 10  -CC      Exercise 4    Exercise Name 4 FF wall climb  -CC      Cueing 4 Verbal  -CC      Reps 4 10  -CC      Exercise 5    Exercise Name 5 Pulley FF/Scaption  -CC      Cueing 5 Verbal  -CC      Sets 5 2  -CC      Reps  5 10  -CC      Time (Minutes) 5 --   each  -CC      Exercise 6    Exercise Name 6 Bilateral  rows vs TB  -CC      Cueing 6 Demo  -CC      Equipment 6 Theraband  -CC      Resistance 6 Red  -CC      Reps 6 10  -CC      Exercise 7    Exercise Name 7 ER/IR  -CC      Cueing 7 Demo  -CC      Equipment 7 Theraband  -CC      Resistance 7 Red  -CC      Reps 7 10  -CC      Time (Minutes) 7 --   ea  -CC      Exercise 8    Exercise Name 8 IR towel stretch  -CC      Cueing 8 Demo  -CC      Equipment 8 --   towel  -CC      Reps 8 10  -CC      Time (Seconds) 8 5 sec  -CC        User Key  (r) = Recorded By, (t) = Taken By, (c) = Cosigned By    Initials Name Provider Type    LIZ Abreu PT Physical Therapist                        Manual Rx (last 36 hours)      Manual Treatments       03/14/17 1300          Manual Rx 1    Manual Rx 1 Location (R) shoulder  -CC      Manual Rx 1 Type PROM:  flex, abd, ER, IR  -CC      Manual Rx 1 Duration 10 each  -CC      Manual Rx 2    Manual Rx 2 Location (R) shoulder  -CC      Manual Rx 2 Type inf and post GH jt glides  -CC      Manual Rx 2 Duration  10 sec x 10 osc each  -CC        User Key  (r) = Recorded By, (t) = Taken By, (c) = Cosigned By    Initials Name Provider Type    LIZ Abreu PT Physical Therapist                    Therapy Education       03/14/17 1620 03/14/17 1617       Therapy Education    Given --   Add IR towel stretch to HEP  -CC --   Add IR towel stretch to HEP  -CC       User Key  (r) = Recorded By, (t) = Taken By, (c) = Cosigned By    Initials Name Provider Type    LIZ Abreu PT Physical Therapist                Time Calculation:   Start Time: 1300  Stop Time: 1410  Time Calculation (min): 70 min    Therapy Charges for Today     Code Description Service Date Service Provider Modifiers Qty    97138276312  PT MANUAL THERAPY EA 15 MIN 3/14/2017 Lexis Abreu PT GP 1    82728130403  PT THER PROC EA 15 MIN 3/14/2017  Lexis Abreu, PT GP 1    58692975041 HC PT ULTRASOUND EA 15 MIN 3/14/2017 Lexis Abreu, PT GP 1    97716411208 HC ELECTRICAL STIM UNATTENDED 3/14/2017 Lexis bAreu, PT  1    31723935178 HC PT HOT OR COLD PACK TREAT MCARE 3/14/2017 Lexis Abreu, PT GP 1                    Lexis Abreu, PT  3/14/2017

## 2017-03-16 ENCOUNTER — APPOINTMENT (OUTPATIENT)
Dept: PHYSICAL THERAPY | Facility: HOSPITAL | Age: 72
End: 2017-03-16

## 2017-03-16 ENCOUNTER — HOSPITAL ENCOUNTER (OUTPATIENT)
Dept: PHYSICAL THERAPY | Facility: HOSPITAL | Age: 72
Setting detail: THERAPIES SERIES
Discharge: HOME | End: 2017-03-16

## 2017-03-16 DIAGNOSIS — M75.50 SUBACROMIAL BURSITIS: Primary | ICD-10-CM

## 2017-03-16 DIAGNOSIS — M75.01 ADHESIVE BURSITIS OF RIGHT SHOULDER: ICD-10-CM

## 2017-03-16 PROCEDURE — G0283 ELEC STIM OTHER THAN WOUND: HCPCS

## 2017-03-16 PROCEDURE — 97140 MANUAL THERAPY 1/> REGIONS: CPT

## 2017-03-16 PROCEDURE — 97110 THERAPEUTIC EXERCISES: CPT

## 2017-03-16 PROCEDURE — 97035 APP MDLTY 1+ULTRASOUND EA 15: CPT

## 2017-03-16 NOTE — PROGRESS NOTES
Outpatient Physical Therapy Ortho Treatment Note  [facility]     Patient Name: Maria Ines Zhang  : 1945  MRN: [id number]  Today's Date: 3/16/2017      Visit Date: 2017    Visit Dx:    ICD-10-CM ICD-9-CM   1. Subacromial bursitis M75.50 726.19   2. Adhesive bursitis of right shoulder M75.01 726.0       Patient Active Problem List   Diagnosis   • Essential hypertension   • HLD (hyperlipidemia)   • Acquired hypothyroidism   • Depression   • Edema   • Bronchiolitis obliterans organizing pneumonia   • Primary osteoarthritis of right knee   • Shoulder, capsulitis, adhesive   • Laceration of right lower extremity   • Hyperglycemia   • Abrasion of arm, right   • Biceps tendinitis   • Cough   • Bronchitis   • Subacromial bursitis, right        Past Medical History   Diagnosis Date   • Acute sinusitis    • Anxiety    • Asthma    • Backache    • Benign essential hypertension    • Depression    • Dysuria    • Fatigue    • GERD (gastroesophageal reflux disease)    • Hyperlipidemia    • Hypertension    • Hypothyroidism    • Joint pain, knee    • Knee swelling    • Mitral valve insufficiency      nild   • Obesity    • Obstructive sleep apnea    • Osteoarthritis    • Patent foramen ovale    • Pulmonary embolism    • Pulmonary hypertension    • Sciatica    • Stroke syndrome      CRYPTOGENIC STROKE   • Venous stasis         Past Surgical History   Procedure Laterality Date   • Hysterectomy     • Thoracoscopy Right 2016     RT VATS WEDGE RESECTION   • Total knee arthroplasty Left    • Spine surgery     • Back surgery               PT Ortho       17 1000    Subjective Comments    Subjective Comments Pt states she is feeling much better today  -    Subjective Pain    Able to rate subjective pain? yes  -    Pre-Treatment Pain Level 2  -      User Key  (r) = Recorded By, (t) = Taken By, (c) = Cosigned By    Initials Name Provider Type    TEX Navarro PTA Physical Therapy Assistant                             PT Assessment/Plan       03/16/17 1336       PT Assessment    Assessment Comments Pt tolerated today's treatment well with fatigue but no complaints of pain  -     PT Plan    PT Plan Comments Cont per POC and progress as pt tolerates  -       User Key  (r) = Recorded By, (t) = Taken By, (c) = Cosigned By    Initials Name Provider Type     Luisito Navarro PTA Physical Therapy Assistant                Modalities       03/16/17 1000          Moist Heat    Location (R) shoulder w/ IFC - pt seated with pillow support  -      Rx Minutes 15 mins  -      MH S/P Rx Yes  -      ELECTRICAL STIMULATION    Attended/Unattended Unattended  -      Stimulation Type IFC  -      Location/Electrode Placement/Other (R) shoulder w/ MHP  -MH      Rx Minutes 15 mins  -      Ultrasound 52745    Location (R) shoulder - pt seated w/pillow support  -      Rx Minutes 8  -MH      Duty Cycle 100  -      Frequency 1.0 MHz  -      Intensity - Wts/cm 1.2  -        User Key  (r) = Recorded By, (t) = Taken By, (c) = Cosigned By    Initials Name Provider Type     Luisito Navarro PTA Physical Therapy Assistant                Exercises       03/16/17 1000          Subjective Comments    Subjective Comments Pt states she is feeling much better today  -      Subjective Pain    Able to rate subjective pain? yes  -      Pre-Treatment Pain Level 2  -      Exercise 1    Exercise Name 1 supine cane exercise for flexion  -      Reps 1 10  -MH      Exercise 2    Exercise Name 2 seated cane exercise for ER  -      Cueing 2 Verbal  -      Reps 2 10  -MH      Exercise 3    Exercise Name 3 Standing scaption with cane  -      Cueing 3 Verbal  -      Reps 3 10  -MH      Exercise 4    Exercise Name 4 FF wall climb  -      Reps 4 10  -MH      Exercise 5    Exercise Name 5 Pulley FF/Scaption  -      Cueing 5 Verbal  -      Sets 5 2  -MH      Reps 5 10   each  -      Time (Minutes) 5 --  -       Exercise 6    Exercise Name 6 Bilateral  rows vs TB  -MH      Cueing 6 Demo  -MH      Equipment 6 Theraband  -MH      Resistance 6 Red  -MH      Reps 6 15  -MH      Exercise 7    Exercise Name 7 ER/IR  -MH      Cueing 7 Demo  -MH      Equipment 7 Theraband  -MH      Resistance 7 Red  -MH      Reps 7 15   each  -MH      Exercise 8    Exercise Name 8 IR towel stretch  -MH      Cueing 8 Verbal   tactile  -MH      Equipment 8 --   towel  -MH      Reps 8 5  -MH      Time (Seconds) 8 10  -MH        User Key  (r) = Recorded By, (t) = Taken By, (c) = Cosigned By    Initials Name Provider Type     Luisito Navarro PTA Physical Therapy Assistant                        Manual Rx (last 36 hours)      Manual Treatments       03/16/17 1000          Manual Rx 1    Manual Rx 1 Location (R) shoulder  -MH      Manual Rx 1 Type PROM:  flex, abd, ER, IR  -MH      Manual Rx 1 Duration 10 each  -MH      Manual Rx 2    Manual Rx 2 Location (R) shoulder  -MH      Manual Rx 2 Type inf and post GH jt glides  -MH      Manual Rx 2 Duration  10 sec x 10 osc each  -MH        User Key  (r) = Recorded By, (t) = Taken By, (c) = Cosigned By    Initials Name Provider Type     Luisito Navarro PTA Physical Therapy Assistant                    Therapy Education       03/16/17 1336          Therapy Education    Given HEP  -MH      Program Reinforced  -MH      How Provided Verbal  -MH      Provided to Patient  -MH      Level of Understanding Teach back education performed;Verbalized;Demonstrated  -MH        User Key  (r) = Recorded By, (t) = Taken By, (c) = Cosigned By    Initials Name Provider Type     Luisito Navarro PTA Physical Therapy Assistant                Time Calculation:   Start Time: 1006  Stop Time: 1112  Time Calculation (min): 66 min    Therapy Charges for Today     Code Description Service Date Service Provider Modifiers Qty    96032249365 HC PT MANUAL THERAPY EA 15 MIN 3/16/2017 Luisito Navarro PTA GP 1    51350577455 HC PT THER PROC  EA 15 MIN 3/16/2017 Luisito Navarro, PTA GP 1    83298624775 HC PT ULTRASOUND EA 15 MIN 3/16/2017 Luisito Navarro, PTA GP 1    10270992403 HC ELECTRICAL STIM UNATTENDED 3/16/2017 Luisito Navarro, PTA  1                    Luisito Navarro, PTA  3/16/2017

## 2017-03-17 ENCOUNTER — APPOINTMENT (OUTPATIENT)
Dept: PHYSICAL THERAPY | Facility: HOSPITAL | Age: 72
End: 2017-03-17

## 2017-03-22 ENCOUNTER — HOSPITAL ENCOUNTER (OUTPATIENT)
Dept: PHYSICAL THERAPY | Facility: HOSPITAL | Age: 72
Setting detail: THERAPIES SERIES
Discharge: HOME | End: 2017-03-22

## 2017-03-22 PROCEDURE — G0283 ELEC STIM OTHER THAN WOUND: HCPCS

## 2017-03-22 PROCEDURE — 97035 APP MDLTY 1+ULTRASOUND EA 15: CPT

## 2017-03-22 PROCEDURE — 97140 MANUAL THERAPY 1/> REGIONS: CPT

## 2017-03-22 PROCEDURE — 97110 THERAPEUTIC EXERCISES: CPT

## 2017-03-22 NOTE — PROGRESS NOTES
Outpatient Physical Therapy Ortho Treatment Note  [facility]     Patient Name: Maria Ines Zhang  : 1945  MRN: [id number]  Today's Date: 3/22/2017      Visit Date: 2017    Visit Dx:  No diagnosis found.    Patient Active Problem List   Diagnosis   • Essential hypertension   • HLD (hyperlipidemia)   • Acquired hypothyroidism   • Depression   • Edema   • Bronchiolitis obliterans organizing pneumonia   • Primary osteoarthritis of right knee   • Shoulder, capsulitis, adhesive   • Laceration of right lower extremity   • Hyperglycemia   • Abrasion of arm, right   • Biceps tendinitis   • Cough   • Bronchitis   • Subacromial bursitis, right        Past Medical History:   Diagnosis Date   • Acute sinusitis    • Anxiety    • Asthma    • Backache    • Benign essential hypertension    • Depression    • Dysuria    • Fatigue    • GERD (gastroesophageal reflux disease)    • Hyperlipidemia    • Hypertension    • Hypothyroidism    • Joint pain, knee    • Knee swelling    • Mitral valve insufficiency     nild   • Obesity    • Obstructive sleep apnea    • Osteoarthritis    • Patent foramen ovale    • Pulmonary embolism    • Pulmonary hypertension    • Sciatica    • Stroke syndrome     CRYPTOGENIC STROKE   • Venous stasis         Past Surgical History:   Procedure Laterality Date   • BACK SURGERY     • HYSTERECTOMY     • SPINE SURGERY     • THORACOSCOPY Right 2016    RT VATS WEDGE RESECTION   • TOTAL KNEE ARTHROPLASTY Left              PT Ortho       17 1000    Right Shoulder    Flexion AROM Deficit post    -CC    ABduction AROM Deficit post    -CC      User Key  (r) = Recorded By, (t) = Taken By, (c) = Cosigned By    Initials Name Provider Type    LIZ Abreu, PT Physical Therapist                            PT Assessment/Plan       17 1331       PT Assessment    Assessment Comments Pt progressing toward goals with gains Arom FF/scaption 130 degrees/120 post  stretching today- also improving with tolerance to TE -increase reps using red TB. Palpated less tenderness R shoulder.   -CC     PT Plan    PT Plan Comments Continue per POC and progress TE as tolerates  -CC       User Key  (r) = Recorded By, (t) = Taken By, (c) = Cosigned By    Initials Name Provider Type    LIZ Abreu PT Physical Therapist                Modalities       03/22/17 1000          Moist Heat    Location (R) shoulder w/ IFC - pt seated with pillow support  -CC      Rx Minutes 15 mins  -CC      [facility] S/P Rx Yes  -CC      ELECTRICAL STIMULATION    Attended/Unattended Unattended  -CC      Stimulation Type IFC  -CC      Location/Electrode Placement/Other (R) shoulder w/ MHP  -CC      Rx Minutes 15 mins  -CC      Ultrasound 51156    Location (R) shoulder - pt seated w/pillow support  -CC      Rx Minutes 8  -CC      Duty Cycle 100  -CC      Frequency 1.0 MHz  -CC      Intensity - Wts/cm 1.2  -CC        User Key  (r) = Recorded By, (t) = Taken By, (c) = Cosigned By    Initials Name Provider Type    LIZ Abreu, PT Physical Therapist                Exercises       03/22/17 1000          Subjective Comments    Subjective Comments Pt continues to report less pain in use R shoulder  -CC      Subjective Pain    Able to rate subjective pain? yes  -CC      Pre-Treatment Pain Level 2  -CC      Exercise 1    Exercise Name 1 supine cane exercise for flexion  -CC      Reps 1 10  -CC      Exercise 2    Exercise Name 2 seated cane exercise for ER  -CC      Cueing 2 Verbal  -CC      Reps 2 10  -CC      Exercise 3    Exercise Name 3 Standing scaption with cane  -CC      Cueing 3 Verbal  -CC      Reps 3 10  -CC      Exercise 4    Exercise Name 4 FF wall climb  -CC      Reps 4 10  -CC      Exercise 5    Exercise Name 5 Pulley FF/Scaption  -CC      Cueing 5 Verbal  -CC      Sets 5 2  -CC      Reps 5 10   each  -CC      Exercise 6    Exercise Name 6 Bilateral  rows vs TB  -CC      Cueing 6 Demo   -CC      Equipment 6 Theraband  -CC      Resistance 6 Red  -CC      Sets 6 2  -CC      Reps 6 10  -CC      Exercise 7    Exercise Name 7 ER/IR  -CC      Cueing 7 Demo  -CC      Equipment 7 Theraband  -CC      Resistance 7 Red  -CC      Sets 7 2  -CC      Reps 7 10   each  -CC      Exercise 8    Exercise Name 8 IR towel stretch  -CC      Cueing 8 Verbal   tactile  -CC      Equipment 8 --   towel  -CC      Reps 8 5  -CC      Time (Seconds) 8 10  -CC      Exercise 9    Exercise Name 9 Scaption up  -CC      Cueing 9 Demo  -CC      Reps 9 10   raise UE to 90 degrees  -CC        User Key  (r) = Recorded By, (t) = Taken By, (c) = Cosigned By    Initials Name Provider Type    LIZ Abreu PT Physical Therapist                        Manual Rx (last 36 hours)      Manual Treatments       03/22/17 1000          Manual Rx 1    Manual Rx 1 Location (R) shoulder  -CC      Manual Rx 1 Type PROM:  flex, abd, ER, IR  -CC      Manual Rx 1 Duration 10 each  -CC      Manual Rx 2    Manual Rx 2 Location (R) shoulder  -CC      Manual Rx 2 Type inf and post GH jt glides  -CC      Manual Rx 2 Duration  10 sec x 10 osc each  -CC        User Key  (r) = Recorded By, (t) = Taken By, (c) = Cosigned By    Initials Name Provider Type    LIZ Abreu PT Physical Therapist                    Therapy Education       03/22/17 1324          Therapy Education    Given --   Issued handout for shoulder strengthening along with red TB for HEP  -CC        User Key  (r) = Recorded By, (t) = Taken By, (c) = Cosigned By    Initials Name Provider Type    LIZ Abreu PT Physical Therapist                Time Calculation:   Start Time: 1000  Stop Time: 1100  Time Calculation (min): 60 min    Therapy Charges for Today     Code Description Service Date Service Provider Modifiers Qty    13694122097 HC PT HOT OR COLD PACK TREAT MCARE 3/22/2017 Lexis Abreu PT GP 2    71208028366 HC ELECTRICAL STIM UNATTENDED  3/22/2017 Lexis Abreu, PT  1    19204595815 HC PT THER PROC EA 15 MIN 3/22/2017 Lexis Abreu, PT GP 1    11404574081 HC PT MANUAL THERAPY EA 15 MIN 3/22/2017 Lexis Abreu, PT GP 1    65215081326 HC PT ULTRASOUND EA 15 MIN 3/22/2017 Lexis Abreu, PT GP 1                    Lexis Abreu, PT  3/22/2017

## 2017-03-24 ENCOUNTER — APPOINTMENT (OUTPATIENT)
Dept: PHYSICAL THERAPY | Facility: HOSPITAL | Age: 72
End: 2017-03-24

## 2017-03-24 ENCOUNTER — DOCUMENTATION (OUTPATIENT)
Dept: PHYSICAL THERAPY | Facility: HOSPITAL | Age: 72
End: 2017-03-24

## 2017-03-28 ENCOUNTER — HOSPITAL ENCOUNTER (OUTPATIENT)
Dept: PHYSICAL THERAPY | Facility: HOSPITAL | Age: 72
Setting detail: THERAPIES SERIES
Discharge: HOME | End: 2017-03-28

## 2017-03-28 ENCOUNTER — APPOINTMENT (OUTPATIENT)
Dept: PHYSICAL THERAPY | Facility: HOSPITAL | Age: 72
End: 2017-03-28

## 2017-03-28 DIAGNOSIS — M25.511 CHRONIC RIGHT SHOULDER PAIN: ICD-10-CM

## 2017-03-28 DIAGNOSIS — M75.01 ADHESIVE BURSITIS OF RIGHT SHOULDER: ICD-10-CM

## 2017-03-28 DIAGNOSIS — M75.50 SUBACROMIAL BURSITIS: Primary | ICD-10-CM

## 2017-03-28 DIAGNOSIS — G89.29 CHRONIC RIGHT SHOULDER PAIN: ICD-10-CM

## 2017-03-28 PROCEDURE — 97140 MANUAL THERAPY 1/> REGIONS: CPT

## 2017-03-28 PROCEDURE — 97110 THERAPEUTIC EXERCISES: CPT

## 2017-03-28 PROCEDURE — G0283 ELEC STIM OTHER THAN WOUND: HCPCS

## 2017-03-28 NOTE — PROGRESS NOTES
Outpatient Physical Therapy Ortho Treatment Note  [facility]     Patient Name: Maria Ines Zhang  : 1945  MRN: [id number]  Today's Date: 3/28/2017      Visit Date: 2017    Visit Dx:    ICD-10-CM ICD-9-CM   1. Subacromial bursitis M75.50 726.19   2. Adhesive bursitis of right shoulder M75.01 726.0   3. Chronic right shoulder pain M25.511 719.41    G89.29 338.29       Patient Active Problem List   Diagnosis   • Essential hypertension   • HLD (hyperlipidemia)   • Acquired hypothyroidism   • Depression   • Edema   • Bronchiolitis obliterans organizing pneumonia   • Primary osteoarthritis of right knee   • Shoulder, capsulitis, adhesive   • Laceration of right lower extremity   • Hyperglycemia   • Abrasion of arm, right   • Biceps tendinitis   • Cough   • Bronchitis   • Subacromial bursitis, right        Past Medical History:   Diagnosis Date   • Acute sinusitis    • Anxiety    • Asthma    • Backache    • Benign essential hypertension    • Depression    • Dysuria    • Fatigue    • GERD (gastroesophageal reflux disease)    • Hyperlipidemia    • Hypertension    • Hypothyroidism    • Joint pain, knee    • Knee swelling    • Mitral valve insufficiency     nild   • Obesity    • Obstructive sleep apnea    • Osteoarthritis    • Patent foramen ovale    • Pulmonary embolism    • Pulmonary hypertension    • Sciatica    • Stroke syndrome     CRYPTOGENIC STROKE   • Venous stasis         Past Surgical History:   Procedure Laterality Date   • BACK SURGERY     • HYSTERECTOMY     • SPINE SURGERY     • THORACOSCOPY Right 2016    RT VATS WEDGE RESECTION   • TOTAL KNEE ARTHROPLASTY Left              PT Ortho       17 1000    Subjective Comments    Subjective Comments Pt reports overall her shoulder is feeling better but still feels some popping with movement; reports increased ability to hang things in the closet and using her (R) UE while showering  -    Subjective Pain    Able to rate subjective pain?  "yes  -    Pre-Treatment Pain Level 0  -    Subjective Pain Comment Reports her shoulder \"just feels a little sore\"  -      User Key  (r) = Recorded By, (t) = Taken By, (c) = Cosigned By    Initials Name Provider Type     Luisito Navarro PTA Physical Therapy Assistant                            PT Assessment/Plan       03/28/17 1332       PT Assessment    Assessment Comments Pt tolerated PROM and increased reps of theraband ex's without complaints; theraband ex's completed in sitting today due to complaints of knee pain  -     PT Plan    PT Plan Comments Cont per POC and progress as pt tolerates  -       User Key  (r) = Recorded By, (t) = Taken By, (c) = Cosigned By    Initials Name Provider Type     Luisito Navarro PTA Physical Therapy Assistant                Modalities       03/28/17 1000          Moist Heat    Location (R) shoulder w/ IFC - pt seated with pillow support  -      Rx Minutes 15 mins  -[facility] Prior to Rx Yes   Also rec'd MHP 10 min pre Rx (R) shoulder - 12 min  -[facility] S/P Rx Yes  -      ELECTRICAL STIMULATION    Attended/Unattended Unattended  -      Stimulation Type IFC  -      Location/Electrode Placement/Other (R) shoulder w/ MHP  -      Rx Minutes 15 mins  -        User Key  (r) = Recorded By, (t) = Taken By, (c) = Cosigned By    Initials Name Provider Type     Luisito Navarro PTA Physical Therapy Assistant                Exercises       03/28/17 1000          Subjective Comments    Subjective Comments Pt reports overall her shoulder is feeling better but still feels some popping with movement; reports increased ability to hang things in the closet and using her (R) UE while showering  -      Subjective Pain    Able to rate subjective pain? yes  -      Pre-Treatment Pain Level 0  -      Subjective Pain Comment Reports her shoulder \"just feels a little sore\"  -      Exercise 1    Exercise Name 1 supine cane exercise for flexion  -      Reps 1 10  -  "     Exercise 2    Exercise Name 2 seated cane exercise for ER  -MH      Cueing 2 Verbal  -MH      Reps 2 10  -MH      Exercise 3    Exercise Name 3 Standing scaption with cane  -MH      Cueing 3 Verbal  -MH      Reps 3 10  -MH      Exercise 4    Exercise Name 4 FF wall climb  -MH      Exercise 5    Exercise Name 5 Pulley FF/Scaption  -MH      Cueing 5 Verbal  -MH      Reps 5 20   each  -MH      Exercise 6    Exercise Name 6 Bilateral  rows vs TB  -MH      Equipment 6 Theraband  -MH      Resistance 6 Red  -MH      Reps 6 30  -MH      Exercise 7    Exercise Name 7 ER/IR  -MH      Equipment 7 Theraband  -MH      Resistance 7 Red  -MH      Reps 7 30   each  -MH      Exercise 8    Exercise Name 8 IR towel stretch  -MH      Cueing 8 Verbal  -MH      Reps 8 5  -MH      Time (Seconds) 8 10  -MH      Exercise 9    Exercise Name 9 Scaption up  -MH      Cueing 9 Demo  -MH      Reps 9 15   raise UE to 90 degrees  -MH      Exercise 10    Exercise Name 10 Scaption down  -MH      Cueing 10 Demo   verbal  -MH      Reps 10 15   < 90 degrees  -MH        User Key  (r) = Recorded By, (t) = Taken By, (c) = Cosigned By    Initials Name Provider Type    MH Luisito Navarro PTA Physical Therapy Assistant                        Manual Rx (last 36 hours)      Manual Treatments       03/28/17 1000          Manual Rx 1    Manual Rx 1 Location (R) shoulder  -MH      Manual Rx 1 Type PROM:  flex, abd, ER, IR  -MH      Manual Rx 1 Duration 10 each  -MH      Manual Rx 2    Manual Rx 2 Location (R) shoulder  -MH      Manual Rx 2 Type inf and post GH jt glides  -MH      Manual Rx 2 Duration  10 sec x 10 osc each  -MH        User Key  (r) = Recorded By, (t) = Taken By, (c) = Cosigned By    Initials Name Provider Type    MH Luisito Navarro PTA Physical Therapy Assistant                    Therapy Education       03/28/17 1332          Therapy Education    Given HEP  -MH      Program Reinforced  -MH      How Provided Verbal  -MH      Provided to Patient   -      Level of Understanding Demonstrated;Verbalized;Teach back education performed  -        User Key  (r) = Recorded By, (t) = Taken By, (c) = Cosigned By    Initials Name Provider Type     Luisito Navarro PTA Physical Therapy Assistant                Time Calculation:   Start Time: 1020  Stop Time: 1141  Time Calculation (min): 81 min    Therapy Charges for Today     Code Description Service Date Service Provider Modifiers Qty    13520275227 HC PT HOT OR COLD PACK TREAT MCARE 3/28/2017 Luisito Navarro, EVETTE GP 1    13977988641 HC ELECTRICAL STIM UNATTENDED 3/28/2017 Luisito Navarro PTA  1    36579153806 HC PT MANUAL THERAPY EA 15 MIN 3/28/2017 Luisito Navarro PTA GP 1    29223314336 HC PT THER PROC EA 15 MIN 3/28/2017 Luisito Navarro PTA GP 1                    Luisito Navarro PTA  3/28/2017

## 2017-03-30 ENCOUNTER — DOCUMENTATION (OUTPATIENT)
Dept: PHYSICAL THERAPY | Facility: HOSPITAL | Age: 72
End: 2017-03-30

## 2017-03-30 ENCOUNTER — APPOINTMENT (OUTPATIENT)
Dept: PHYSICAL THERAPY | Facility: HOSPITAL | Age: 72
End: 2017-03-30

## 2017-03-31 ENCOUNTER — TRANSCRIBE ORDERS (OUTPATIENT)
Dept: ADMINISTRATIVE | Facility: HOSPITAL | Age: 72
End: 2017-03-31

## 2017-03-31 ENCOUNTER — LAB (OUTPATIENT)
Dept: LAB | Facility: HOSPITAL | Age: 72
End: 2017-03-31
Attending: ORTHOPAEDIC SURGERY

## 2017-03-31 ENCOUNTER — HOSPITAL ENCOUNTER (OUTPATIENT)
Dept: CARDIOLOGY | Facility: HOSPITAL | Age: 72
Discharge: HOME | End: 2017-03-31
Attending: ORTHOPAEDIC SURGERY | Admitting: ORTHOPAEDIC SURGERY

## 2017-03-31 ENCOUNTER — HOSPITAL ENCOUNTER (OUTPATIENT)
Dept: PHYSICAL THERAPY | Facility: HOSPITAL | Age: 72
Setting detail: THERAPIES SERIES
Discharge: HOME | End: 2017-03-31

## 2017-03-31 DIAGNOSIS — Z01.818 PRE-OP TESTING: ICD-10-CM

## 2017-03-31 DIAGNOSIS — M75.50 SUBACROMIAL BURSITIS: Primary | ICD-10-CM

## 2017-03-31 DIAGNOSIS — G89.29 CHRONIC RIGHT SHOULDER PAIN: ICD-10-CM

## 2017-03-31 DIAGNOSIS — M20.40 HAMMER TOE, UNSPECIFIED LATERALITY: Primary | ICD-10-CM

## 2017-03-31 DIAGNOSIS — M20.40 HAMMER TOE, UNSPECIFIED LATERALITY: ICD-10-CM

## 2017-03-31 DIAGNOSIS — M25.511 CHRONIC RIGHT SHOULDER PAIN: ICD-10-CM

## 2017-03-31 DIAGNOSIS — M75.01 ADHESIVE BURSITIS OF RIGHT SHOULDER: ICD-10-CM

## 2017-03-31 LAB
ALBUMIN SERPL-MCNC: 4.1 G/DL (ref 3.5–5.2)
ALBUMIN/GLOB SERPL: 1.4 G/DL
ALP SERPL-CCNC: 74 U/L (ref 39–117)
ALT SERPL W P-5'-P-CCNC: 18 U/L (ref 1–33)
ANION GAP SERPL CALCULATED.3IONS-SCNC: 14.4 MMOL/L
AST SERPL-CCNC: 21 U/L (ref 1–32)
BASOPHILS # BLD AUTO: 0 10*3/MM3 (ref 0–0.2)
BASOPHILS NFR BLD AUTO: 0 % (ref 0–1.5)
BILIRUB SERPL-MCNC: 0.3 MG/DL (ref 0.1–1.2)
BUN BLD-MCNC: 14 MG/DL (ref 8–23)
BUN/CREAT SERPL: 19.7 (ref 7–25)
CALCIUM SPEC-SCNC: 9.1 MG/DL (ref 8.6–10.5)
CHLORIDE SERPL-SCNC: 102 MMOL/L (ref 98–107)
CO2 SERPL-SCNC: 24.6 MMOL/L (ref 22–29)
CREAT BLD-MCNC: 0.71 MG/DL (ref 0.57–1)
DEPRECATED RDW RBC AUTO: 54.8 FL (ref 37–54)
EOSINOPHIL # BLD AUTO: 0 10*3/MM3 (ref 0–0.7)
EOSINOPHIL NFR BLD AUTO: 0 % (ref 0.3–6.2)
ERYTHROCYTE [DISTWIDTH] IN BLOOD BY AUTOMATED COUNT: 16.3 % (ref 11.7–13)
GFR SERPL CREATININE-BSD FRML MDRD: 82 ML/MIN/1.73
GLOBULIN UR ELPH-MCNC: 2.9 GM/DL
GLUCOSE BLD-MCNC: 125 MG/DL (ref 65–99)
HCT VFR BLD AUTO: 39.2 % (ref 35.6–45.5)
HGB BLD-MCNC: 12.1 G/DL (ref 11.9–15.5)
IMM GRANULOCYTES # BLD: 0.02 10*3/MM3 (ref 0–0.03)
IMM GRANULOCYTES NFR BLD: 0.3 % (ref 0–0.5)
LYMPHOCYTES # BLD AUTO: 0.98 10*3/MM3 (ref 0.9–4.8)
LYMPHOCYTES NFR BLD AUTO: 14.6 % (ref 19.6–45.3)
MCH RBC QN AUTO: 27.9 PG (ref 26.9–32)
MCHC RBC AUTO-ENTMCNC: 30.9 G/DL (ref 32.4–36.3)
MCV RBC AUTO: 90.5 FL (ref 80.5–98.2)
MONOCYTES # BLD AUTO: 0.16 10*3/MM3 (ref 0.2–1.2)
MONOCYTES NFR BLD AUTO: 2.4 % (ref 5–12)
NEUTROPHILS # BLD AUTO: 5.55 10*3/MM3 (ref 1.9–8.1)
NEUTROPHILS NFR BLD AUTO: 82.7 % (ref 42.7–76)
PLATELET # BLD AUTO: 390 10*3/MM3 (ref 140–500)
PMV BLD AUTO: 9.8 FL (ref 6–12)
POTASSIUM BLD-SCNC: 4.2 MMOL/L (ref 3.5–5.2)
PROT SERPL-MCNC: 7 G/DL (ref 6–8.5)
RBC # BLD AUTO: 4.33 10*6/MM3 (ref 3.9–5.2)
SODIUM BLD-SCNC: 141 MMOL/L (ref 136–145)
WBC NRBC COR # BLD: 6.71 10*3/MM3 (ref 4.5–10.7)

## 2017-03-31 PROCEDURE — 93010 ELECTROCARDIOGRAM REPORT: CPT | Performed by: INTERNAL MEDICINE

## 2017-03-31 PROCEDURE — 36415 COLL VENOUS BLD VENIPUNCTURE: CPT

## 2017-03-31 PROCEDURE — 93005 ELECTROCARDIOGRAM TRACING: CPT | Performed by: ORTHOPAEDIC SURGERY

## 2017-03-31 PROCEDURE — 97140 MANUAL THERAPY 1/> REGIONS: CPT

## 2017-03-31 PROCEDURE — 85025 COMPLETE CBC W/AUTO DIFF WBC: CPT

## 2017-03-31 PROCEDURE — 80053 COMPREHEN METABOLIC PANEL: CPT

## 2017-03-31 PROCEDURE — 97110 THERAPEUTIC EXERCISES: CPT

## 2017-04-04 ENCOUNTER — APPOINTMENT (OUTPATIENT)
Dept: PHYSICAL THERAPY | Facility: HOSPITAL | Age: 72
End: 2017-04-04

## 2017-04-04 ENCOUNTER — DOCUMENTATION (OUTPATIENT)
Dept: PHYSICAL THERAPY | Facility: HOSPITAL | Age: 72
End: 2017-04-04

## 2017-04-05 DIAGNOSIS — R60.0 LOCALIZED EDEMA: ICD-10-CM

## 2017-04-05 RX ORDER — BUMETANIDE 2 MG/1
TABLET ORAL
Qty: 60 TABLET | Refills: 2 | Status: SHIPPED | OUTPATIENT
Start: 2017-04-05 | End: 2017-06-16 | Stop reason: SDUPTHER

## 2017-04-06 ENCOUNTER — DOCUMENTATION (OUTPATIENT)
Dept: PHYSICAL THERAPY | Facility: HOSPITAL | Age: 72
End: 2017-04-06

## 2017-04-06 ENCOUNTER — APPOINTMENT (OUTPATIENT)
Dept: PHYSICAL THERAPY | Facility: HOSPITAL | Age: 72
End: 2017-04-06

## 2017-04-13 ENCOUNTER — HOSPITAL ENCOUNTER (OUTPATIENT)
Dept: OTHER | Facility: HOSPITAL | Age: 72
Setting detail: SPECIMEN
Discharge: HOME | End: 2017-04-13
Attending: ORTHOPAEDIC SURGERY | Admitting: ORTHOPAEDIC SURGERY

## 2017-05-03 ENCOUNTER — APPOINTMENT (OUTPATIENT)
Dept: LAB | Facility: HOSPITAL | Age: 72
End: 2017-05-03
Attending: ORTHOPAEDIC SURGERY

## 2017-05-03 ENCOUNTER — TRANSCRIBE ORDERS (OUTPATIENT)
Dept: ADMINISTRATIVE | Facility: HOSPITAL | Age: 72
End: 2017-05-03

## 2017-05-03 DIAGNOSIS — L08.9 RIGHT FOOT INFECTION: Primary | ICD-10-CM

## 2017-05-03 PROCEDURE — 87186 SC STD MICRODIL/AGAR DIL: CPT | Performed by: ORTHOPAEDIC SURGERY

## 2017-05-03 PROCEDURE — 87070 CULTURE OTHR SPECIMN AEROBIC: CPT | Performed by: ORTHOPAEDIC SURGERY

## 2017-05-03 PROCEDURE — 87075 CULTR BACTERIA EXCEPT BLOOD: CPT | Performed by: ORTHOPAEDIC SURGERY

## 2017-05-03 PROCEDURE — 87205 SMEAR GRAM STAIN: CPT | Performed by: ORTHOPAEDIC SURGERY

## 2017-05-05 LAB
BACTERIA SPEC AEROBE CULT: ABNORMAL
GRAM STN SPEC: ABNORMAL

## 2017-05-08 LAB — BACTERIA SPEC ANAEROBE CULT: NORMAL

## 2017-05-08 RX ORDER — CYCLOBENZAPRINE HCL 10 MG
TABLET ORAL
Qty: 30 TABLET | Refills: 0 | Status: SHIPPED | OUTPATIENT
Start: 2017-05-08 | End: 2017-06-09 | Stop reason: SDUPTHER

## 2017-06-07 RX ORDER — AMLODIPINE BESYLATE 10 MG/1
TABLET ORAL
Qty: 90 TABLET | Refills: 0 | Status: SHIPPED | OUTPATIENT
Start: 2017-06-07 | End: 2017-06-16

## 2017-06-09 RX ORDER — CYCLOBENZAPRINE HCL 10 MG
TABLET ORAL
Qty: 30 TABLET | Refills: 0 | Status: SHIPPED | OUTPATIENT
Start: 2017-06-09 | End: 2017-06-16 | Stop reason: SDUPTHER

## 2017-06-16 ENCOUNTER — HOSPITAL ENCOUNTER (OUTPATIENT)
Dept: GENERAL RADIOLOGY | Facility: HOSPITAL | Age: 72
Discharge: HOME | End: 2017-06-16

## 2017-06-16 ENCOUNTER — HOSPITAL ENCOUNTER (OUTPATIENT)
Dept: GENERAL RADIOLOGY | Facility: HOSPITAL | Age: 72
Discharge: HOME | End: 2017-06-16
Admitting: ORTHOPAEDIC SURGERY

## 2017-06-16 ENCOUNTER — APPOINTMENT (OUTPATIENT)
Dept: PREADMISSION TESTING | Facility: HOSPITAL | Age: 72
End: 2017-06-16

## 2017-06-16 VITALS
BODY MASS INDEX: 39.49 KG/M2 | HEART RATE: 80 BPM | RESPIRATION RATE: 16 BRPM | WEIGHT: 245.75 LBS | HEIGHT: 66 IN | DIASTOLIC BLOOD PRESSURE: 89 MMHG | OXYGEN SATURATION: 95 % | TEMPERATURE: 98.6 F | SYSTOLIC BLOOD PRESSURE: 178 MMHG

## 2017-06-16 LAB
ALBUMIN SERPL-MCNC: 3.9 G/DL (ref 3.5–5.2)
ALBUMIN/GLOB SERPL: 1.3 G/DL
ALP SERPL-CCNC: 68 U/L (ref 39–117)
ALT SERPL W P-5'-P-CCNC: 16 U/L (ref 1–33)
ANION GAP SERPL CALCULATED.3IONS-SCNC: 11.4 MMOL/L
APTT PPP: 26.4 SECONDS (ref 22.7–35.4)
AST SERPL-CCNC: 17 U/L (ref 1–32)
BACTERIA UR QL AUTO: ABNORMAL /HPF
BASOPHILS # BLD AUTO: 0.01 10*3/MM3 (ref 0–0.2)
BASOPHILS NFR BLD AUTO: 0.2 % (ref 0–1.5)
BILIRUB SERPL-MCNC: 0.3 MG/DL (ref 0.1–1.2)
BILIRUB UR QL STRIP: NEGATIVE
BUN BLD-MCNC: 13 MG/DL (ref 8–23)
BUN/CREAT SERPL: 18.8 (ref 7–25)
CALCIUM SPEC-SCNC: 9 MG/DL (ref 8.6–10.5)
CHLORIDE SERPL-SCNC: 98 MMOL/L (ref 98–107)
CLARITY UR: CLEAR
CO2 SERPL-SCNC: 27.6 MMOL/L (ref 22–29)
COLOR UR: YELLOW
CREAT BLD-MCNC: 0.69 MG/DL (ref 0.57–1)
DEPRECATED RDW RBC AUTO: 50.2 FL (ref 37–54)
EOSINOPHIL # BLD AUTO: 0.06 10*3/MM3 (ref 0–0.7)
EOSINOPHIL NFR BLD AUTO: 1 % (ref 0.3–6.2)
ERYTHROCYTE [DISTWIDTH] IN BLOOD BY AUTOMATED COUNT: 15 % (ref 11.7–13)
GFR SERPL CREATININE-BSD FRML MDRD: 84 ML/MIN/1.73
GLOBULIN UR ELPH-MCNC: 3.1 GM/DL
GLUCOSE BLD-MCNC: 101 MG/DL (ref 65–99)
GLUCOSE UR STRIP-MCNC: NEGATIVE MG/DL
HCT VFR BLD AUTO: 37.3 % (ref 35.6–45.5)
HGB BLD-MCNC: 11.7 G/DL (ref 11.9–15.5)
HGB UR QL STRIP.AUTO: NEGATIVE
HYALINE CASTS UR QL AUTO: ABNORMAL /LPF
IMM GRANULOCYTES # BLD: 0.02 10*3/MM3 (ref 0–0.03)
IMM GRANULOCYTES NFR BLD: 0.3 % (ref 0–0.5)
INR PPP: 0.97 (ref 0.9–1.1)
KETONES UR QL STRIP: NEGATIVE
LEUKOCYTE ESTERASE UR QL STRIP.AUTO: ABNORMAL
LYMPHOCYTES # BLD AUTO: 1.92 10*3/MM3 (ref 0.9–4.8)
LYMPHOCYTES NFR BLD AUTO: 32.4 % (ref 19.6–45.3)
MCH RBC QN AUTO: 28.5 PG (ref 26.9–32)
MCHC RBC AUTO-ENTMCNC: 31.4 G/DL (ref 32.4–36.3)
MCV RBC AUTO: 91 FL (ref 80.5–98.2)
MONOCYTES # BLD AUTO: 0.5 10*3/MM3 (ref 0.2–1.2)
MONOCYTES NFR BLD AUTO: 8.4 % (ref 5–12)
NEUTROPHILS # BLD AUTO: 3.41 10*3/MM3 (ref 1.9–8.1)
NEUTROPHILS NFR BLD AUTO: 57.7 % (ref 42.7–76)
NITRITE UR QL STRIP: NEGATIVE
PH UR STRIP.AUTO: 6.5 [PH] (ref 5–8)
PLATELET # BLD AUTO: 323 10*3/MM3 (ref 140–500)
PMV BLD AUTO: 10.5 FL (ref 6–12)
POTASSIUM BLD-SCNC: 4.2 MMOL/L (ref 3.5–5.2)
PROT SERPL-MCNC: 7 G/DL (ref 6–8.5)
PROT UR QL STRIP: NEGATIVE
PROTHROMBIN TIME: 12.5 SECONDS (ref 11.7–14.2)
RBC # BLD AUTO: 4.1 10*6/MM3 (ref 3.9–5.2)
RBC # UR: ABNORMAL /HPF
REF LAB TEST METHOD: ABNORMAL
SODIUM BLD-SCNC: 137 MMOL/L (ref 136–145)
SP GR UR STRIP: 1.01 (ref 1–1.03)
SQUAMOUS #/AREA URNS HPF: ABNORMAL /HPF
UROBILINOGEN UR QL STRIP: ABNORMAL
WBC NRBC COR # BLD: 5.92 10*3/MM3 (ref 4.5–10.7)
WBC UR QL AUTO: ABNORMAL /HPF

## 2017-06-16 PROCEDURE — 80053 COMPREHEN METABOLIC PANEL: CPT | Performed by: ORTHOPAEDIC SURGERY

## 2017-06-16 PROCEDURE — 73560 X-RAY EXAM OF KNEE 1 OR 2: CPT

## 2017-06-16 PROCEDURE — 81001 URINALYSIS AUTO W/SCOPE: CPT | Performed by: ORTHOPAEDIC SURGERY

## 2017-06-16 PROCEDURE — 85730 THROMBOPLASTIN TIME PARTIAL: CPT | Performed by: ORTHOPAEDIC SURGERY

## 2017-06-16 PROCEDURE — 71020 HC CHEST PA AND LATERAL: CPT

## 2017-06-16 PROCEDURE — 36415 COLL VENOUS BLD VENIPUNCTURE: CPT

## 2017-06-16 PROCEDURE — 87086 URINE CULTURE/COLONY COUNT: CPT | Performed by: ORTHOPAEDIC SURGERY

## 2017-06-16 PROCEDURE — 85610 PROTHROMBIN TIME: CPT | Performed by: ORTHOPAEDIC SURGERY

## 2017-06-16 PROCEDURE — 85025 COMPLETE CBC W/AUTO DIFF WBC: CPT | Performed by: ORTHOPAEDIC SURGERY

## 2017-06-16 RX ORDER — HYDROCODONE BITARTRATE AND ACETAMINOPHEN 7.5; 325 MG/1; MG/1
1 TABLET ORAL EVERY 6 HOURS PRN
COMMUNITY
End: 2017-07-08 | Stop reason: HOSPADM

## 2017-06-16 RX ORDER — ESCITALOPRAM OXALATE 20 MG/1
20 TABLET ORAL DAILY
COMMUNITY
End: 2017-11-06 | Stop reason: SDUPTHER

## 2017-06-16 RX ORDER — BUMETANIDE 2 MG/1
2 TABLET ORAL AS NEEDED
COMMUNITY
End: 2017-06-28 | Stop reason: ALTCHOICE

## 2017-06-16 RX ORDER — SIMVASTATIN 10 MG
10 TABLET ORAL NIGHTLY
COMMUNITY
End: 2017-06-28 | Stop reason: ALTCHOICE

## 2017-06-16 RX ORDER — CYCLOBENZAPRINE HCL 10 MG
10 TABLET ORAL 3 TIMES DAILY PRN
COMMUNITY
End: 2017-08-03 | Stop reason: SDUPTHER

## 2017-06-16 RX ORDER — LEVOTHYROXINE SODIUM 88 UG/1
88 TABLET ORAL DAILY
COMMUNITY
End: 2017-10-16 | Stop reason: SDUPTHER

## 2017-06-16 RX ORDER — CLONAZEPAM 0.5 MG/1
0.5 TABLET ORAL NIGHTLY
COMMUNITY
End: 2018-05-14 | Stop reason: SDUPTHER

## 2017-06-16 RX ORDER — BUDESONIDE AND FORMOTEROL FUMARATE DIHYDRATE 160; 4.5 UG/1; UG/1
2 AEROSOL RESPIRATORY (INHALATION)
COMMUNITY
End: 2017-06-28 | Stop reason: ALTCHOICE

## 2017-06-16 NOTE — DISCHARGE INSTRUCTIONS
PLEASE ARRIVE AT 8:00 AM ON 7/6/2017      Take the following medications the morning of surgery with a small sip of water:  SYMBICORT, NEXIUM, AND NORCO IF NEEDED FOR PAIN        General Instructions:  • Do not eat solid food after midnight the night before surgery.  • You may drink clear liquids day of surgery but must stop at least one hour before your hospital arrival time.  • It is beneficial for you to have a clear drink that contains carbohydrates the day of surgery.  We suggest a 20 ounce bottle of Gatorade or Powerade for non-diabetic patients or a 20 ounce bottle of G2 or Powerade Zero for diabetic patients. (Pediatric patients, are not advised to drink a 20 ounce carbohydrate drink)    Clear liquids are liquids you can see through.  Nothing red in color.     Plain water                               Sports drinks  Sodas                                   Gelatin (Jell-O)  Fruit juices without pulp such as white grape juice and apple juice  Popsicles that contain no fruit or yogurt  Tea or coffee (no cream or milk added)  Gatorade / Powerade  G2 / Powerade Zero    • Infants may have breast milk up to four hours before surgery.  • Infants drinking formula may drink formula up to six hours before surgery.   • Patients who avoid smoking, chewing tobacco and alcohol for 4 weeks prior to surgery have a reduced risk of post-operative complications.  Quit smoking as many days before surgery as you can.  • Do not smoke, use chewing tobacco or drink alcohol the day of surgery.   • If applicable bring your C-PAP/ BI-PAP machine.  • Bring any papers given to you in the doctor’s office.  • Wear clean comfortable clothes and socks.  • Do not wear contact lenses or make-up.  Bring a case for your glasses.   • Bring crutches or walker if applicable.  • Leave all other valuables and jewelry at home.  • The Pre-Admission Testing nurse will instruct you to bring medications if unable to obtain an accurate list in  Pre-Admission Testing.          Preventing a Surgical Site Infection:  • For 2 to 3 days before surgery, avoid shaving with a razor because the razor can irritate skin and make it easier to develop an infection.  • The night prior to surgery sleep in a clean bed with clean clothing.  Do not allow pets to sleep with you.  • Shower on the morning of surgery using a fresh bar of anti-bacterial soap (such as Dial) and clean washcloth.  Dry with a clean towel and dress in clean clothing.  • Ask your surgeon if you will be receiving antibiotics prior to surgery.  • Make sure you, your family, and all healthcare providers clean their hands with soap and water or an alcohol based hand  before caring for you or your wound.    Day of surgery:  Upon arrival, a Pre-op nurse and Anesthesiologist will review your health history, obtain vital signs, and answer questions you may have.  The only belongings needed at this time will be your home medications and if applicable your C-PAP/BI-PAP machine.  If you are staying overnight your family can leave the rest of your belongings in the car and bring them to your room later.  A Pre-op nurse will start an IV and you may receive medication in preparation for surgery, including something to help you relax.  Your family will be able to see you in the Pre-op area.  While you are in surgery your family should notify the waiting room  if they leave the waiting room area and provide a contact phone number.    Please be aware that surgery does come with discomfort.  We want to make every effort to control your discomfort so please discuss any uncontrolled symptoms with your nurse.   Your doctor will most likely have prescribed pain medications.      If you are going home after surgery you will receive individualized written care instructions before being discharged.  A responsible adult must drive you to and from the hospital on the day of your surgery and stay with you  for 24 hours.    If you are staying overnight following surgery, you will be transported to your hospital room following the recovery period.  [facility] has all private rooms.    If you have any questions please call Pre-Admission Testing at [phone].  Deductibles and co-payments are collected on the day of service. Please be prepared to pay the required co-pay, deductible or deposit on the day of service as defined by your plan.    2% CHLORAHEXIDINE GLUCONATE* CLOTH  Preparing or “prepping” skin before surgery can reduce the risk of infection at the surgical site. To make the process easier, [facility] has chosen disposable cloths moistened with a rinse-free, 2% Chlorhexidine Gluconate (CHG) antiseptic solution. The steps below outline the prepping process and should be carefully followed.        Use the prep cloth on the area that is circled in the diagram             Directions Night before Surgery  1) Shower using a fresh bar of anti-bacterial soap (such as Dial) and clean washcloth.  Use a clean towel to completely dry your skin.  2) Do not use any lotions, oils or creams on your skin.  3) Open the package and remove 1 cloth, wipe your skin for 30 seconds in a circular motion.  Allow to dry for 3 minutes.  4) Repeat #3 with second cloth.  5) Do not touch your eyes, ears, or mouth with the prep cloth.  6) Allow the wet prep solution to air dry.  7) Discard the prep cloth and wash your hands with soap and water.   8) Dress in clean bed clothes and sleep on fresh clean bed sheets.   9) You may experience some temporary itching after the prep.    Directions Day of Surgery  1) Repeat steps 1,2,3,4,5,6,7, and 9.   2) Dress in clean clothes before coming to the hospital.    BACTROBAN NASAL OINTMENT  There are many germs normally in your nose. Bactroban is an ointment that will help reduce these germs. Please follow these instructions for Bactroban use:        ____The day before surgery  in the morning  Date________    ____The day before surgery in the evening              Date________    ____The day of surgery in the morning    Date________    **Squirt ½ package of Bactroban Ointment onto a cotton applicator and apply to inside of 1st nostril.  Squirt the remaining Bactroban and apply to the inside of the other nostril.

## 2017-06-18 LAB — BACTERIA SPEC AEROBE CULT: NO GROWTH

## 2017-06-19 RX ORDER — SIMVASTATIN 10 MG
TABLET ORAL
Qty: 90 TABLET | Refills: 0 | Status: SHIPPED | OUTPATIENT
Start: 2017-06-19 | End: 2017-11-20 | Stop reason: SDUPTHER

## 2017-06-26 RX ORDER — BUDESONIDE AND FORMOTEROL FUMARATE DIHYDRATE 160; 4.5 UG/1; UG/1
AEROSOL RESPIRATORY (INHALATION)
Qty: 10.2 G | Refills: 4 | Status: ON HOLD | OUTPATIENT
Start: 2017-06-26 | End: 2018-01-10 | Stop reason: SDUPTHER

## 2017-06-28 ENCOUNTER — OFFICE VISIT (OUTPATIENT)
Dept: CARDIOLOGY | Facility: CLINIC | Age: 72
End: 2017-06-28

## 2017-06-28 VITALS
SYSTOLIC BLOOD PRESSURE: 132 MMHG | HEART RATE: 74 BPM | WEIGHT: 241 LBS | HEIGHT: 66 IN | DIASTOLIC BLOOD PRESSURE: 72 MMHG | BODY MASS INDEX: 38.73 KG/M2

## 2017-06-28 DIAGNOSIS — I26.99 OTHER PULMONARY EMBOLISM WITHOUT ACUTE COR PULMONALE, UNSPECIFIED CHRONICITY (HCC): Primary | ICD-10-CM

## 2017-06-28 PROCEDURE — 93000 ELECTROCARDIOGRAM COMPLETE: CPT | Performed by: INTERNAL MEDICINE

## 2017-06-28 PROCEDURE — 99213 OFFICE O/P EST LOW 20 MIN: CPT | Performed by: INTERNAL MEDICINE

## 2017-07-03 RX ORDER — LEVOTHYROXINE SODIUM 0.1 MG/1
TABLET ORAL
Qty: 90 TABLET | Refills: 0 | Status: ON HOLD | OUTPATIENT
Start: 2017-07-03 | End: 2017-07-06 | Stop reason: DRUGHIGH

## 2017-07-06 ENCOUNTER — HOSPITAL ENCOUNTER (INPATIENT)
Facility: HOSPITAL | Age: 72
LOS: 2 days | Discharge: HOME HEALTH | End: 2017-07-08
Attending: ORTHOPAEDIC SURGERY | Admitting: ORTHOPAEDIC SURGERY

## 2017-07-06 ENCOUNTER — APPOINTMENT (OUTPATIENT)
Dept: GENERAL RADIOLOGY | Facility: HOSPITAL | Age: 72
End: 2017-07-06

## 2017-07-06 ENCOUNTER — ANESTHESIA EVENT (OUTPATIENT)
Dept: PERIOP | Facility: HOSPITAL | Age: 72
End: 2017-07-06

## 2017-07-06 ENCOUNTER — ANESTHESIA (OUTPATIENT)
Dept: PERIOP | Facility: HOSPITAL | Age: 72
End: 2017-07-06

## 2017-07-06 DIAGNOSIS — R26.2 DIFFICULTY WALKING: Primary | ICD-10-CM

## 2017-07-06 PROBLEM — M17.9 OSTEOARTHRITIS, KNEE: Status: ACTIVE | Noted: 2017-07-06

## 2017-07-06 PROCEDURE — 94799 UNLISTED PULMONARY SVC/PX: CPT

## 2017-07-06 PROCEDURE — 25010000002 HYDROMORPHONE PER 4 MG: Performed by: NURSE ANESTHETIST, CERTIFIED REGISTERED

## 2017-07-06 PROCEDURE — 25010000002 FENTANYL CITRATE (PF) 100 MCG/2ML SOLUTION: Performed by: NURSE ANESTHETIST, CERTIFIED REGISTERED

## 2017-07-06 PROCEDURE — 0SRC0J9 REPLACEMENT OF RIGHT KNEE JOINT WITH SYNTHETIC SUBSTITUTE, CEMENTED, OPEN APPROACH: ICD-10-PCS | Performed by: ORTHOPAEDIC SURGERY

## 2017-07-06 PROCEDURE — 25010000002 VANCOMYCIN 10 G RECONSTITUTED SOLUTION: Performed by: ORTHOPAEDIC SURGERY

## 2017-07-06 PROCEDURE — 25010000003 CEFAZOLIN IN DEXTROSE 2-4 GM/100ML-% SOLUTION: Performed by: NURSE ANESTHETIST, CERTIFIED REGISTERED

## 2017-07-06 PROCEDURE — C1713 ANCHOR/SCREW BN/BN,TIS/BN: HCPCS | Performed by: ORTHOPAEDIC SURGERY

## 2017-07-06 PROCEDURE — 25010000002 KETOROLAC TROMETHAMINE PER 15 MG: Performed by: ORTHOPAEDIC SURGERY

## 2017-07-06 PROCEDURE — 73560 X-RAY EXAM OF KNEE 1 OR 2: CPT

## 2017-07-06 PROCEDURE — 25010000002 ONDANSETRON PER 1 MG: Performed by: NURSE ANESTHETIST, CERTIFIED REGISTERED

## 2017-07-06 PROCEDURE — 25010000003 CEFAZOLIN IN DEXTROSE 2-4 GM/100ML-% SOLUTION: Performed by: ORTHOPAEDIC SURGERY

## 2017-07-06 PROCEDURE — C1776 JOINT DEVICE (IMPLANTABLE): HCPCS | Performed by: ORTHOPAEDIC SURGERY

## 2017-07-06 PROCEDURE — 25010000002 MIDAZOLAM PER 1 MG: Performed by: ANESTHESIOLOGY

## 2017-07-06 PROCEDURE — 25010000002 DEXAMETHASONE PER 1 MG: Performed by: NURSE ANESTHETIST, CERTIFIED REGISTERED

## 2017-07-06 PROCEDURE — 25010000002 SUCCINYLCHOLINE PER 20 MG: Performed by: NURSE ANESTHETIST, CERTIFIED REGISTERED

## 2017-07-06 PROCEDURE — 97110 THERAPEUTIC EXERCISES: CPT

## 2017-07-06 PROCEDURE — 25010000002 NEOSTIGMINE PER 0.5 MG: Performed by: NURSE ANESTHETIST, CERTIFIED REGISTERED

## 2017-07-06 PROCEDURE — 97161 PT EVAL LOW COMPLEX 20 MIN: CPT

## 2017-07-06 PROCEDURE — 25010000002 PROPOFOL 10 MG/ML EMULSION: Performed by: NURSE ANESTHETIST, CERTIFIED REGISTERED

## 2017-07-06 DEVICE — STEM TIB/KN PERSONA CMT 5D SZD RT: Type: IMPLANTABLE DEVICE | Site: KNEE | Status: FUNCTIONAL

## 2017-07-06 DEVICE — COMP FEM/KN PERSONA CR CMT COCR STD SZ6 RT: Type: IMPLANTABLE DEVICE | Site: KNEE | Status: FUNCTIONAL

## 2017-07-06 DEVICE — IMPLANTABLE DEVICE: Type: IMPLANTABLE DEVICE | Site: KNEE | Status: FUNCTIONAL

## 2017-07-06 DEVICE — CMT BONE PALACOS 120001: Type: IMPLANTABLE DEVICE | Site: KNEE | Status: FUNCTIONAL

## 2017-07-06 DEVICE — CAP TOTL KN CMT PRIMARY: Type: IMPLANTABLE DEVICE | Site: KNEE | Status: FUNCTIONAL

## 2017-07-06 RX ORDER — CEFAZOLIN SODIUM 2 G/100ML
2 INJECTION, SOLUTION INTRAVENOUS EVERY 8 HOURS
Status: DISCONTINUED | OUTPATIENT
Start: 2017-07-06 | End: 2017-07-06

## 2017-07-06 RX ORDER — ONDANSETRON 2 MG/ML
INJECTION INTRAMUSCULAR; INTRAVENOUS AS NEEDED
Status: DISCONTINUED | OUTPATIENT
Start: 2017-07-06 | End: 2017-07-06 | Stop reason: SURG

## 2017-07-06 RX ORDER — DIPHENHYDRAMINE HCL 25 MG
25 CAPSULE ORAL EVERY 6 HOURS PRN
Status: DISCONTINUED | OUTPATIENT
Start: 2017-07-06 | End: 2017-07-08 | Stop reason: HOSPADM

## 2017-07-06 RX ORDER — MIDAZOLAM HYDROCHLORIDE 1 MG/ML
1 INJECTION INTRAMUSCULAR; INTRAVENOUS
Status: DISCONTINUED | OUTPATIENT
Start: 2017-07-06 | End: 2017-07-06 | Stop reason: HOSPADM

## 2017-07-06 RX ORDER — SUCCINYLCHOLINE CHLORIDE 20 MG/ML
INJECTION INTRAMUSCULAR; INTRAVENOUS AS NEEDED
Status: DISCONTINUED | OUTPATIENT
Start: 2017-07-06 | End: 2017-07-06 | Stop reason: SURG

## 2017-07-06 RX ORDER — MIDAZOLAM HYDROCHLORIDE 1 MG/ML
2 INJECTION INTRAMUSCULAR; INTRAVENOUS
Status: DISCONTINUED | OUTPATIENT
Start: 2017-07-06 | End: 2017-07-06 | Stop reason: HOSPADM

## 2017-07-06 RX ORDER — DOCUSATE SODIUM 100 MG/1
100 CAPSULE, LIQUID FILLED ORAL 2 TIMES DAILY PRN
Status: DISCONTINUED | OUTPATIENT
Start: 2017-07-06 | End: 2017-07-08 | Stop reason: HOSPADM

## 2017-07-06 RX ORDER — FENTANYL CITRATE 50 UG/ML
INJECTION, SOLUTION INTRAMUSCULAR; INTRAVENOUS AS NEEDED
Status: DISCONTINUED | OUTPATIENT
Start: 2017-07-06 | End: 2017-07-06 | Stop reason: SURG

## 2017-07-06 RX ORDER — HYDROCODONE BITARTRATE AND ACETAMINOPHEN 7.5; 325 MG/1; MG/1
1 TABLET ORAL ONCE AS NEEDED
Status: DISCONTINUED | OUTPATIENT
Start: 2017-07-06 | End: 2017-07-06 | Stop reason: HOSPADM

## 2017-07-06 RX ORDER — BISACODYL 5 MG/1
10 TABLET, DELAYED RELEASE ORAL DAILY PRN
Status: DISCONTINUED | OUTPATIENT
Start: 2017-07-06 | End: 2017-07-08 | Stop reason: HOSPADM

## 2017-07-06 RX ORDER — ESCITALOPRAM OXALATE 20 MG/1
20 TABLET ORAL DAILY
Status: DISCONTINUED | OUTPATIENT
Start: 2017-07-06 | End: 2017-07-08 | Stop reason: HOSPADM

## 2017-07-06 RX ORDER — OXYCODONE HYDROCHLORIDE AND ACETAMINOPHEN 5; 325 MG/1; MG/1
1 TABLET ORAL EVERY 4 HOURS PRN
Status: DISCONTINUED | OUTPATIENT
Start: 2017-07-06 | End: 2017-07-08 | Stop reason: HOSPADM

## 2017-07-06 RX ORDER — SODIUM CHLORIDE 0.9 % (FLUSH) 0.9 %
1-10 SYRINGE (ML) INJECTION AS NEEDED
Status: DISCONTINUED | OUTPATIENT
Start: 2017-07-06 | End: 2017-07-08 | Stop reason: HOSPADM

## 2017-07-06 RX ORDER — ALBUTEROL SULFATE 90 UG/1
2 AEROSOL, METERED RESPIRATORY (INHALATION) EVERY 4 HOURS PRN
Status: DISCONTINUED | OUTPATIENT
Start: 2017-07-06 | End: 2017-07-06 | Stop reason: CLARIF

## 2017-07-06 RX ORDER — PROMETHAZINE HYDROCHLORIDE 25 MG/1
25 TABLET ORAL ONCE AS NEEDED
Status: DISCONTINUED | OUTPATIENT
Start: 2017-07-06 | End: 2017-07-06 | Stop reason: HOSPADM

## 2017-07-06 RX ORDER — TRANEXAMIC ACID 100 MG/ML
INJECTION, SOLUTION INTRAVENOUS AS NEEDED
Status: DISCONTINUED | OUTPATIENT
Start: 2017-07-06 | End: 2017-07-06 | Stop reason: HOSPADM

## 2017-07-06 RX ORDER — PANTOPRAZOLE SODIUM 40 MG/1
40 TABLET, DELAYED RELEASE ORAL EVERY MORNING
Status: DISCONTINUED | OUTPATIENT
Start: 2017-07-07 | End: 2017-07-08 | Stop reason: HOSPADM

## 2017-07-06 RX ORDER — SODIUM CHLORIDE 0.9 % (FLUSH) 0.9 %
1-10 SYRINGE (ML) INJECTION AS NEEDED
Status: DISCONTINUED | OUTPATIENT
Start: 2017-07-06 | End: 2017-07-06 | Stop reason: HOSPADM

## 2017-07-06 RX ORDER — ROCURONIUM BROMIDE 10 MG/ML
INJECTION, SOLUTION INTRAVENOUS AS NEEDED
Status: DISCONTINUED | OUTPATIENT
Start: 2017-07-06 | End: 2017-07-06 | Stop reason: SURG

## 2017-07-06 RX ORDER — FERROUS SULFATE 325(65) MG
325 TABLET ORAL
Status: DISCONTINUED | OUTPATIENT
Start: 2017-07-07 | End: 2017-07-08 | Stop reason: HOSPADM

## 2017-07-06 RX ORDER — ASPIRIN 325 MG
325 TABLET, DELAYED RELEASE (ENTERIC COATED) ORAL DAILY
Status: DISCONTINUED | OUTPATIENT
Start: 2017-07-06 | End: 2017-07-07

## 2017-07-06 RX ORDER — FAMOTIDINE 10 MG/ML
20 INJECTION, SOLUTION INTRAVENOUS ONCE
Status: COMPLETED | OUTPATIENT
Start: 2017-07-06 | End: 2017-07-06

## 2017-07-06 RX ORDER — PROMETHAZINE HYDROCHLORIDE 25 MG/ML
6.25 INJECTION, SOLUTION INTRAMUSCULAR; INTRAVENOUS ONCE AS NEEDED
Status: DISCONTINUED | OUTPATIENT
Start: 2017-07-06 | End: 2017-07-06 | Stop reason: HOSPADM

## 2017-07-06 RX ORDER — FENTANYL CITRATE 50 UG/ML
50 INJECTION, SOLUTION INTRAMUSCULAR; INTRAVENOUS
Status: DISCONTINUED | OUTPATIENT
Start: 2017-07-06 | End: 2017-07-06 | Stop reason: HOSPADM

## 2017-07-06 RX ORDER — BUDESONIDE AND FORMOTEROL FUMARATE DIHYDRATE 160; 4.5 UG/1; UG/1
2 AEROSOL RESPIRATORY (INHALATION)
Status: DISCONTINUED | OUTPATIENT
Start: 2017-07-06 | End: 2017-07-08 | Stop reason: HOSPADM

## 2017-07-06 RX ORDER — LISINOPRIL 5 MG/1
5 TABLET ORAL DAILY
Status: DISCONTINUED | OUTPATIENT
Start: 2017-07-06 | End: 2017-07-08 | Stop reason: HOSPADM

## 2017-07-06 RX ORDER — CEFAZOLIN SODIUM 2 G/100ML
2 INJECTION, SOLUTION INTRAVENOUS EVERY 8 HOURS
Status: COMPLETED | OUTPATIENT
Start: 2017-07-06 | End: 2017-07-07

## 2017-07-06 RX ORDER — PROMETHAZINE HYDROCHLORIDE 25 MG/1
12.5 TABLET ORAL ONCE AS NEEDED
Status: DISCONTINUED | OUTPATIENT
Start: 2017-07-06 | End: 2017-07-06 | Stop reason: HOSPADM

## 2017-07-06 RX ORDER — DEXAMETHASONE SODIUM PHOSPHATE 10 MG/ML
INJECTION INTRAMUSCULAR; INTRAVENOUS AS NEEDED
Status: DISCONTINUED | OUTPATIENT
Start: 2017-07-06 | End: 2017-07-06 | Stop reason: SURG

## 2017-07-06 RX ORDER — HYDRALAZINE HYDROCHLORIDE 20 MG/ML
5 INJECTION INTRAMUSCULAR; INTRAVENOUS
Status: DISCONTINUED | OUTPATIENT
Start: 2017-07-06 | End: 2017-07-06 | Stop reason: HOSPADM

## 2017-07-06 RX ORDER — LABETALOL HYDROCHLORIDE 5 MG/ML
5 INJECTION, SOLUTION INTRAVENOUS
Status: DISCONTINUED | OUTPATIENT
Start: 2017-07-06 | End: 2017-07-06 | Stop reason: HOSPADM

## 2017-07-06 RX ORDER — SODIUM CHLORIDE 9 MG/ML
INJECTION, SOLUTION INTRAVENOUS AS NEEDED
Status: DISCONTINUED | OUTPATIENT
Start: 2017-07-06 | End: 2017-07-06 | Stop reason: HOSPADM

## 2017-07-06 RX ORDER — OXYCODONE HYDROCHLORIDE AND ACETAMINOPHEN 5; 325 MG/1; MG/1
2 TABLET ORAL EVERY 4 HOURS PRN
Status: DISCONTINUED | OUTPATIENT
Start: 2017-07-06 | End: 2017-07-08 | Stop reason: HOSPADM

## 2017-07-06 RX ORDER — ONDANSETRON 4 MG/1
4 TABLET, ORALLY DISINTEGRATING ORAL EVERY 6 HOURS PRN
Status: DISCONTINUED | OUTPATIENT
Start: 2017-07-06 | End: 2017-07-08 | Stop reason: HOSPADM

## 2017-07-06 RX ORDER — EPHEDRINE SULFATE 50 MG/ML
INJECTION, SOLUTION INTRAVENOUS AS NEEDED
Status: DISCONTINUED | OUTPATIENT
Start: 2017-07-06 | End: 2017-07-06 | Stop reason: SURG

## 2017-07-06 RX ORDER — ONDANSETRON 4 MG/1
4 TABLET, FILM COATED ORAL EVERY 6 HOURS PRN
Status: DISCONTINUED | OUTPATIENT
Start: 2017-07-06 | End: 2017-07-08 | Stop reason: HOSPADM

## 2017-07-06 RX ORDER — ONDANSETRON 2 MG/ML
4 INJECTION INTRAMUSCULAR; INTRAVENOUS EVERY 6 HOURS PRN
Status: DISCONTINUED | OUTPATIENT
Start: 2017-07-06 | End: 2017-07-08 | Stop reason: HOSPADM

## 2017-07-06 RX ORDER — PROPOFOL 10 MG/ML
VIAL (ML) INTRAVENOUS AS NEEDED
Status: DISCONTINUED | OUTPATIENT
Start: 2017-07-06 | End: 2017-07-06 | Stop reason: SURG

## 2017-07-06 RX ORDER — LEVOTHYROXINE SODIUM 88 UG/1
88 TABLET ORAL EVERY MORNING
Status: DISCONTINUED | OUTPATIENT
Start: 2017-07-07 | End: 2017-07-08 | Stop reason: HOSPADM

## 2017-07-06 RX ORDER — NALOXONE HCL 0.4 MG/ML
0.2 VIAL (ML) INJECTION AS NEEDED
Status: DISCONTINUED | OUTPATIENT
Start: 2017-07-06 | End: 2017-07-06 | Stop reason: HOSPADM

## 2017-07-06 RX ORDER — GLYCOPYRROLATE 0.2 MG/ML
INJECTION INTRAMUSCULAR; INTRAVENOUS AS NEEDED
Status: DISCONTINUED | OUTPATIENT
Start: 2017-07-06 | End: 2017-07-06 | Stop reason: SURG

## 2017-07-06 RX ORDER — ACETAMINOPHEN 325 MG/1
325 TABLET ORAL EVERY 4 HOURS PRN
Status: DISCONTINUED | OUTPATIENT
Start: 2017-07-06 | End: 2017-07-08 | Stop reason: HOSPADM

## 2017-07-06 RX ORDER — CEFAZOLIN SODIUM 2 G/100ML
INJECTION, SOLUTION INTRAVENOUS AS NEEDED
Status: DISCONTINUED | OUTPATIENT
Start: 2017-07-06 | End: 2017-07-06 | Stop reason: SURG

## 2017-07-06 RX ORDER — ACETAMINOPHEN 325 MG/1
650 TABLET ORAL EVERY 4 HOURS PRN
Status: DISCONTINUED | OUTPATIENT
Start: 2017-07-06 | End: 2017-07-08 | Stop reason: HOSPADM

## 2017-07-06 RX ORDER — ATORVASTATIN CALCIUM 10 MG/1
10 TABLET, FILM COATED ORAL DAILY
Status: DISCONTINUED | OUTPATIENT
Start: 2017-07-06 | End: 2017-07-08 | Stop reason: HOSPADM

## 2017-07-06 RX ORDER — ONDANSETRON 2 MG/ML
4 INJECTION INTRAMUSCULAR; INTRAVENOUS ONCE AS NEEDED
Status: DISCONTINUED | OUTPATIENT
Start: 2017-07-06 | End: 2017-07-06 | Stop reason: HOSPADM

## 2017-07-06 RX ORDER — ACETAMINOPHEN 500 MG
1000 TABLET ORAL ONCE
Status: COMPLETED | OUTPATIENT
Start: 2017-07-06 | End: 2017-07-06

## 2017-07-06 RX ORDER — BISACODYL 10 MG
10 SUPPOSITORY, RECTAL RECTAL DAILY PRN
Status: DISCONTINUED | OUTPATIENT
Start: 2017-07-06 | End: 2017-07-08 | Stop reason: HOSPADM

## 2017-07-06 RX ORDER — SODIUM CHLORIDE, SODIUM LACTATE, POTASSIUM CHLORIDE, CALCIUM CHLORIDE 600; 310; 30; 20 MG/100ML; MG/100ML; MG/100ML; MG/100ML
9 INJECTION, SOLUTION INTRAVENOUS CONTINUOUS
Status: DISCONTINUED | OUTPATIENT
Start: 2017-07-06 | End: 2017-07-08 | Stop reason: HOSPADM

## 2017-07-06 RX ORDER — EPHEDRINE SULFATE 50 MG/ML
5 INJECTION, SOLUTION INTRAVENOUS ONCE AS NEEDED
Status: DISCONTINUED | OUTPATIENT
Start: 2017-07-06 | End: 2017-07-06 | Stop reason: HOSPADM

## 2017-07-06 RX ORDER — PROMETHAZINE HYDROCHLORIDE 12.5 MG/1
6.25 SUPPOSITORY RECTAL ONCE AS NEEDED
Status: DISCONTINUED | OUTPATIENT
Start: 2017-07-06 | End: 2017-07-06 | Stop reason: HOSPADM

## 2017-07-06 RX ORDER — KETOROLAC TROMETHAMINE 30 MG/ML
30 INJECTION, SOLUTION INTRAMUSCULAR; INTRAVENOUS EVERY 6 HOURS
Status: COMPLETED | OUTPATIENT
Start: 2017-07-06 | End: 2017-07-07

## 2017-07-06 RX ORDER — PROMETHAZINE HYDROCHLORIDE 25 MG/ML
6.25 INJECTION, SOLUTION INTRAMUSCULAR; INTRAVENOUS
Status: DISCONTINUED | OUTPATIENT
Start: 2017-07-06 | End: 2017-07-06 | Stop reason: HOSPADM

## 2017-07-06 RX ORDER — HYDROMORPHONE HCL 110MG/55ML
PATIENT CONTROLLED ANALGESIA SYRINGE INTRAVENOUS AS NEEDED
Status: DISCONTINUED | OUTPATIENT
Start: 2017-07-06 | End: 2017-07-06 | Stop reason: SURG

## 2017-07-06 RX ORDER — LABETALOL HYDROCHLORIDE 5 MG/ML
INJECTION, SOLUTION INTRAVENOUS AS NEEDED
Status: DISCONTINUED | OUTPATIENT
Start: 2017-07-06 | End: 2017-07-06 | Stop reason: SURG

## 2017-07-06 RX ORDER — DIPHENHYDRAMINE HYDROCHLORIDE 50 MG/ML
12.5 INJECTION INTRAMUSCULAR; INTRAVENOUS
Status: DISCONTINUED | OUTPATIENT
Start: 2017-07-06 | End: 2017-07-06 | Stop reason: HOSPADM

## 2017-07-06 RX ORDER — FLUMAZENIL 0.1 MG/ML
0.2 INJECTION INTRAVENOUS AS NEEDED
Status: DISCONTINUED | OUTPATIENT
Start: 2017-07-06 | End: 2017-07-06 | Stop reason: HOSPADM

## 2017-07-06 RX ORDER — ALBUTEROL SULFATE 2.5 MG/3ML
2.5 SOLUTION RESPIRATORY (INHALATION) EVERY 4 HOURS PRN
Status: DISCONTINUED | OUTPATIENT
Start: 2017-07-06 | End: 2017-07-08 | Stop reason: HOSPADM

## 2017-07-06 RX ORDER — SODIUM CHLORIDE, SODIUM LACTATE, POTASSIUM CHLORIDE, CALCIUM CHLORIDE 600; 310; 30; 20 MG/100ML; MG/100ML; MG/100ML; MG/100ML
100 INJECTION, SOLUTION INTRAVENOUS CONTINUOUS
Status: DISCONTINUED | OUTPATIENT
Start: 2017-07-06 | End: 2017-07-08 | Stop reason: HOSPADM

## 2017-07-06 RX ORDER — CLONAZEPAM 1 MG/1
0.5 TABLET ORAL NIGHTLY
Status: DISCONTINUED | OUTPATIENT
Start: 2017-07-06 | End: 2017-07-08 | Stop reason: HOSPADM

## 2017-07-06 RX ORDER — NALOXONE HCL 0.4 MG/ML
0.1 VIAL (ML) INJECTION
Status: DISCONTINUED | OUTPATIENT
Start: 2017-07-06 | End: 2017-07-08 | Stop reason: HOSPADM

## 2017-07-06 RX ORDER — HYDROMORPHONE HYDROCHLORIDE 1 MG/ML
0.5 INJECTION, SOLUTION INTRAMUSCULAR; INTRAVENOUS; SUBCUTANEOUS
Status: DISCONTINUED | OUTPATIENT
Start: 2017-07-06 | End: 2017-07-06 | Stop reason: HOSPADM

## 2017-07-06 RX ADMIN — HYDROMORPHONE HYDROCHLORIDE 0.5 MG: 1 INJECTION, SOLUTION INTRAMUSCULAR; INTRAVENOUS; SUBCUTANEOUS at 14:49

## 2017-07-06 RX ADMIN — NEOSTIGMINE METHYLSULFATE 3 MG: 1 INJECTION INTRAMUSCULAR; INTRAVENOUS; SUBCUTANEOUS at 13:49

## 2017-07-06 RX ADMIN — FENTANYL CITRATE 50 MCG: 50 INJECTION INTRAMUSCULAR; INTRAVENOUS at 14:19

## 2017-07-06 RX ADMIN — FENTANYL CITRATE 50 MCG: 50 INJECTION INTRAMUSCULAR; INTRAVENOUS at 14:37

## 2017-07-06 RX ADMIN — SODIUM CHLORIDE, POTASSIUM CHLORIDE, SODIUM LACTATE AND CALCIUM CHLORIDE 9 ML/HR: 600; 310; 30; 20 INJECTION, SOLUTION INTRAVENOUS at 09:54

## 2017-07-06 RX ADMIN — FENTANYL CITRATE 50 MCG: 50 INJECTION INTRAMUSCULAR; INTRAVENOUS at 12:35

## 2017-07-06 RX ADMIN — SODIUM CHLORIDE, POTASSIUM CHLORIDE, SODIUM LACTATE AND CALCIUM CHLORIDE 100 ML/HR: 600; 310; 30; 20 INJECTION, SOLUTION INTRAVENOUS at 16:30

## 2017-07-06 RX ADMIN — CLONAZEPAM 0.5 MG: 1 TABLET ORAL at 20:41

## 2017-07-06 RX ADMIN — SODIUM CHLORIDE, POTASSIUM CHLORIDE, SODIUM LACTATE AND CALCIUM CHLORIDE: 600; 310; 30; 20 INJECTION, SOLUTION INTRAVENOUS at 13:20

## 2017-07-06 RX ADMIN — DEXAMETHASONE SODIUM PHOSPHATE 8 MG: 10 INJECTION INTRAMUSCULAR; INTRAVENOUS at 13:02

## 2017-07-06 RX ADMIN — KETOROLAC TROMETHAMINE 30 MG: 30 INJECTION, SOLUTION INTRAMUSCULAR at 20:41

## 2017-07-06 RX ADMIN — PROPOFOL 100 MG: 10 INJECTION, EMULSION INTRAVENOUS at 12:50

## 2017-07-06 RX ADMIN — CEFAZOLIN SODIUM 2 G: 2 INJECTION, SOLUTION INTRAVENOUS at 21:38

## 2017-07-06 RX ADMIN — ONDANSETRON 4 MG: 2 INJECTION INTRAMUSCULAR; INTRAVENOUS at 13:19

## 2017-07-06 RX ADMIN — OXYCODONE HYDROCHLORIDE AND ACETAMINOPHEN 2 TABLET: 5; 325 TABLET ORAL at 21:29

## 2017-07-06 RX ADMIN — OXYCODONE HYDROCHLORIDE AND ACETAMINOPHEN 2 TABLET: 5; 325 TABLET ORAL at 17:22

## 2017-07-06 RX ADMIN — HYDROMORPHONE HYDROCHLORIDE 0.5 MG: 1 INJECTION, SOLUTION INTRAMUSCULAR; INTRAVENOUS; SUBCUTANEOUS at 14:19

## 2017-07-06 RX ADMIN — FAMOTIDINE 20 MG: 10 INJECTION INTRAVENOUS at 10:23

## 2017-07-06 RX ADMIN — LABETALOL HYDROCHLORIDE 10 MG: 5 INJECTION, SOLUTION INTRAVENOUS at 12:54

## 2017-07-06 RX ADMIN — MIDAZOLAM 1 MG: 1 INJECTION INTRAMUSCULAR; INTRAVENOUS at 10:23

## 2017-07-06 RX ADMIN — CEFAZOLIN SODIUM 2 G: 2 INJECTION, SOLUTION INTRAVENOUS at 13:36

## 2017-07-06 RX ADMIN — VANCOMYCIN HYDROCHLORIDE 1500 MG: 10 INJECTION, POWDER, LYOPHILIZED, FOR SOLUTION INTRAVENOUS at 09:55

## 2017-07-06 RX ADMIN — ASPIRIN 325 MG: 325 TABLET, COATED ORAL at 17:22

## 2017-07-06 RX ADMIN — ESCITALOPRAM 20 MG: 20 TABLET, FILM COATED ORAL at 17:22

## 2017-07-06 RX ADMIN — HYDROMORPHONE HYDROCHLORIDE 0.5 MG: 2 INJECTION, SOLUTION INTRAMUSCULAR; INTRAVENOUS; SUBCUTANEOUS at 12:59

## 2017-07-06 RX ADMIN — ACETAMINOPHEN 1000 MG: 500 TABLET ORAL at 09:54

## 2017-07-06 RX ADMIN — LABETALOL HYDROCHLORIDE 5 MG: 5 INJECTION, SOLUTION INTRAVENOUS at 14:13

## 2017-07-06 RX ADMIN — FENTANYL CITRATE 50 MCG: 50 INJECTION INTRAMUSCULAR; INTRAVENOUS at 12:24

## 2017-07-06 RX ADMIN — MIDAZOLAM 1 MG: 1 INJECTION INTRAMUSCULAR; INTRAVENOUS at 11:59

## 2017-07-06 RX ADMIN — EPHEDRINE SULFATE 10 MG: 50 INJECTION INTRAMUSCULAR; INTRAVENOUS; SUBCUTANEOUS at 13:25

## 2017-07-06 RX ADMIN — FENTANYL CITRATE 50 MCG: 50 INJECTION INTRAMUSCULAR; INTRAVENOUS at 12:50

## 2017-07-06 RX ADMIN — BUDESONIDE AND FORMOTEROL FUMARATE DIHYDRATE 2 PUFF: 160; 4.5 AEROSOL RESPIRATORY (INHALATION) at 20:46

## 2017-07-06 RX ADMIN — ATORVASTATIN CALCIUM 10 MG: 10 TABLET, FILM COATED ORAL at 17:22

## 2017-07-06 RX ADMIN — KETOROLAC TROMETHAMINE 30 MG: 30 INJECTION, SOLUTION INTRAMUSCULAR at 14:19

## 2017-07-06 RX ADMIN — GLYCOPYRROLATE 0.4 MG: 0.2 INJECTION INTRAMUSCULAR; INTRAVENOUS at 13:48

## 2017-07-06 RX ADMIN — SODIUM CHLORIDE, POTASSIUM CHLORIDE, SODIUM LACTATE AND CALCIUM CHLORIDE 9 ML/HR: 600; 310; 30; 20 INJECTION, SOLUTION INTRAVENOUS at 10:23

## 2017-07-06 RX ADMIN — SUCCINYLCHOLINE CHLORIDE 160 MG: 20 INJECTION, SOLUTION INTRAMUSCULAR; INTRAVENOUS; PARENTERAL at 12:24

## 2017-07-06 RX ADMIN — LISINOPRIL 5 MG: 5 TABLET ORAL at 17:22

## 2017-07-06 RX ADMIN — DOCUSATE SODIUM 100 MG: 100 CAPSULE, LIQUID FILLED ORAL at 17:22

## 2017-07-06 RX ADMIN — FENTANYL CITRATE 50 MCG: 50 INJECTION INTRAMUSCULAR; INTRAVENOUS at 14:56

## 2017-07-06 RX ADMIN — ROCURONIUM BROMIDE 30 MG: 10 INJECTION INTRAVENOUS at 12:37

## 2017-07-06 RX ADMIN — FENTANYL CITRATE 50 MCG: 50 INJECTION INTRAMUSCULAR; INTRAVENOUS at 15:23

## 2017-07-06 RX ADMIN — PROPOFOL 200 MG: 10 INJECTION, EMULSION INTRAVENOUS at 12:24

## 2017-07-06 NOTE — ANESTHESIA PROCEDURE NOTES
Airway  Urgency: elective    Airway not difficult    General Information and Staff    Patient location during procedure: OR  Anesthesiologist: BESS JURADO  CRNA: LOUIS LEMON    Indications and Patient Condition  Indications for airway management: airway protection    Preoxygenated: yes  MILS maintained throughout  Mask difficulty assessment: 1 - vent by mask    Final Airway Details  Final airway type: endotracheal airway      Successful airway: ETT  Cuffed: yes   Successful intubation technique: direct laryngoscopy  Facilitating devices/methods: intubating stylet  Endotracheal tube insertion site: oral  Blade: Joyce  Blade size: #2  ETT size: 7.0 mm  Cormack-Lehane Classification: grade I - full view of glottis  Placement verified by: chest auscultation and capnometry   Measured from: lips  ETT to lips (cm): 20  Number of attempts at approach: 1    Additional Comments  Smooth iv induction with no complications

## 2017-07-06 NOTE — ANESTHESIA PREPROCEDURE EVALUATION
Anesthesia Evaluation     Patient summary reviewed and Nursing notes reviewed   NPO Solid Status: > 8 hours  NPO Liquid Status: > 2 hours     Airway   Mallampati: II  TM distance: >3 FB  Neck ROM: full  no difficulty expected  Dental      Comment: Multiple upper front crowns    Pulmonary     breath sounds clear to auscultation  (+) pneumonia resolved , pulmonary embolism, asthma, sleep apnea on CPAP, decreased breath sounds (clear  but decreased on R),   (-) COPD  Cardiovascular - normal exam    ECG reviewed  Rhythm: regular  Rate: normal    (+) hypertension, valvular problems/murmurs, hyperlipidemia    ROS comment: PFO per chart  Pulmonary HTN    Neuro/Psych  (+) CVA, numbness, psychiatric history Anxiety and Depression,    GI/Hepatic/Renal/Endo    (+) obesity,  GERD, hypothyroidism,   (-) morbid obesity    Musculoskeletal     (+) joint swelling,   Abdominal   (+) obese,    Substance History      OB/GYN          Other   (+) arthritis                                     Anesthesia Plan    ASA 3     general     intravenous induction   Anesthetic plan and risks discussed with patient.

## 2017-07-06 NOTE — PERIOPERATIVE NURSING NOTE
DR MOE HERE, PT REMINDED HIM SHE'S HAD 3 PULMONARY EMBOLISMS IN THE PAST. HE STATES YES, HE HAS MADE NOTE OF IT IN HER CHART.

## 2017-07-06 NOTE — PERIOPERATIVE NURSING NOTE
CALLED OR DESK, CALLED PACU SPOKE WITH ORLY, CALLED INFECTION CONTROL- SPOKE WITH MARLENE LYLES, LET ALL OF THEM KNOW PT IS IN CONTACT ISOLATION.

## 2017-07-06 NOTE — ANESTHESIA POSTPROCEDURE EVALUATION
Patient: Maria Ines Zhang    Procedure Summary     Date Anesthesia Start Anesthesia Stop Room / Location    07/06/17 1211 1410  [facility] /  [facility]       Procedure Diagnosis Surgeon Provider    TOTAL KNEE ARTHROPLASTY (Right Knee) No diagnosis on file. MD Fabio Ames MD          Anesthesia Type: general  Last vitals  /75 (07/06/17 1445)    Temp 36.9 °C (98.4 °F) (07/06/17 1402)    Pulse 61 (07/06/17 1445)   Resp 16 (07/06/17 1445)    SpO2 100 % (07/06/17 1445)      Post Anesthesia Care and Evaluation    Patient location during evaluation: PACU  Patient participation: complete - patient participated  Level of consciousness: awake and alert  Pain management: adequate  Airway patency: patent  Anesthetic complications: No anesthetic complications  PONV Status: none  Cardiovascular status: acceptable  Respiratory status: acceptable  Hydration status: acceptable

## 2017-07-06 NOTE — THERAPY EVALUATION
Acute Care - Physical Therapy Initial Evaluation  [facility]     Patient Name: Maria Ines Zhang  : 1945  MRN: [id number]  Today's Date: 2017   Onset of Illness/Injury or Date of Surgery Date: 17  Date of Referral to PT: 17  Referring Physician: Dragan Barraza      Admit Date: 2017     Visit Dx:    ICD-10-CM ICD-9-CM   1. Difficulty walking R26.2 719.7     Patient Active Problem List   Diagnosis   • Essential hypertension   • HLD (hyperlipidemia)   • Acquired hypothyroidism   • Depression   • Edema   • Bronchiolitis obliterans organizing pneumonia   • Primary osteoarthritis of right knee   • Shoulder, capsulitis, adhesive   • Laceration of right lower extremity   • Hyperglycemia   • Abrasion of arm, right   • Biceps tendinitis   • Cough   • Bronchitis   • Subacromial bursitis, right   • Osteoarthritis, knee     Past Medical History:   Diagnosis Date   • Acute sinusitis    • Anxiety    • Asthma    • Backache    • Benign essential hypertension    • Bruises easily    • Depression    • Dysuria    • Fatigue    • GERD (gastroesophageal reflux disease)    • Hyperlipidemia    • Hypertension    • Hypothyroidism    • Joint pain, knee    • Knee swelling    • Mitral valve insufficiency     nild   • MRSA infection within last 3 months 2017    RIGHT FOOT   • Obesity    • Obstructive sleep apnea    • Osteoarthritis    • Patent foramen ovale    • Pulmonary embolism     3 SEPARATE OCCASIONS   • Pulmonary hypertension    • Sciatica    • Stroke syndrome     CRYPTOGENIC STROKE   • Venous stasis      Past Surgical History:   Procedure Laterality Date   • BACK SURGERY     • CATARACT EXTRACTION W/ INTRAOCULAR LENS IMPLANT Bilateral 2016   • HYSTERECTOMY     • SPINE SURGERY     • THORACOSCOPY Right 2016    RT VATS WEDGE RESECTION   • TOE AMPUTATION Right 2017    SECOND TOE   • TOTAL KNEE ARTHROPLASTY Left           PT ASSESSMENT (last 72 hours)      PT Evaluation       17 [id number]  07/06/17 0946    Rehab Evaluation    Document Type evaluation  -MS     Subjective Information agree to therapy;complains of;weakness;fatigue;pain  -MS     Patient Effort, Rehab Treatment adequate  -MS     Symptoms Noted Comment Pt. very groggy this PM. Difficulty keeping her eyes open during therapy session.  -MS     General Information    Onset of Illness/Injury or Date of Surgery Date 07/06/17  -MS     Referring Physician Dragan Barraza  -MS     Pertinent History Of Current Problem Pt. s/p Right TKR  -MS     Precautions/Limitations fall precautions   Contact Isolation  -MS     Prior Level of Function independent:  -MS     Equipment Currently Used at Home  cane, straight;bipap/ cpap;oxygen  -SS    Plans/Goals Discussed With patient and family;agreed upon  -MS     Risks Reviewed patient and family:  -MS     Benefits Reviewed patient and family:  -MS     Barriers to Rehab none identified  -MS     Living Environment    Lives With  spouse  -SS    Living Arrangements  house  -SS    Home Accessibility  bed and bath on same level;stairs to enter home  -SS    Number of Stairs to Enter Home  0  -SS    Stair Railings at Home  none  -SS    Type of Financial/Environmental Concern  none  -SS    Transportation Available  car  -SS    Clinical Impression    Date of Referral to PT 07/06/17  -MS     Criteria for Skilled Therapeutic Interventions Met treatment indicated  -MS     Rehab Potential good, to achieve stated therapy goals  -MS     Pain Assessment    Pain Assessment 0-10  -MS     Pain Score 3  -MS     Post Pain Score 3  -MS     Pain Type Acute pain;Surgical pain  -MS     Pain Location Knee  -MS     Pain Orientation Right  -MS     Cognitive Assessment/Intervention    Current Cognitive/Communication Assessment functional  -MS     Orientation Status oriented to;person;place  -MS     Follows Commands/Answers Questions 100% of the time;able to follow single-step instructions;needs cueing;needs increased time;needs repetition    Pt. is groggy; Slow to respond at times  -MS     Personal Safety Interventions fall prevention program maintained;gait belt;nonskid shoes/slippers when out of bed;supervised activity  -MS     ROM (Range of Motion)    General ROM --   BUE/LLE (WFL's)  -MS     MMT (Manual Muscle Testing)    General MMT Assessment --   BUE/LLE (3+/5)  -MS     Mobility Assessment/Training    Extremity Weight-Bearing Status right lower extremity  -MS     Right Lower Extremity Weight-Bearing weight-bearing as tolerated  -MS     Bed Mobility, Assessment/Treatment    Bed Mob, Supine to Sit, [place] minimum assist (75% patient effort);2 person assist required  -MS     Transfer Assessment/Treatment    Transfers, Sit-Stand [place] minimum assist (75% patient effort);2 person assist required  -MS     Transfers, Stand-Sit [place] minimum assist (75% patient effort);2 person assist required  -MS     Transfers, Sit-Stand-Sit, Assist Device rolling walker  -MS     Gait Assessment/Treatment    Gait, [place] Level minimum assist (75% patient effort);2 person assist required  -MS     Gait, Assistive Device rolling walker  -MS     Gait, Distance (Feet) 5  -MS     Gait, Gait Deviations right:;antalgic;jesús decreased;forward flexed posture;narrow base;step length decreased  -MS     Gait, Safety Issues balance decreased during turns;step length decreased;supplemental O2  -MS     Gait, Comment Right knee sandra easily with wt. bearing this PM.  Pt. requires mod. verbal/tactile cues for posture correction, walker guidance, and for proper gait sequencing with use of walker.  -MS     Therapy Exercises    Exercise Protocols total knee  -MS     Total Knee Exercises right:;10 reps;completed protocol  -MS     Positioning and Restraints    Pre-Treatment Position in bed  -MS     Post Treatment Position chair  -MS     In Chair notified nsg;reclined;sitting;call light within reach;encouraged to call for assist;exit alarm on;with  family/caregiver   All lines intact. Ice pack to Right knee.  -MS       User Key  (r) = Recorded By, (t) = Taken By, (c) = Cosigned By    Initials Name Provider Type    SS Augusta Reno, RN Registered Nurse    MS Josesito Padgett, PT Physical Therapist          Physical Therapy Education     Title: PT OT SLP Therapies (Done)     Topic: Physical Therapy (Done)     Point: Mobility training (Done)    Learning Progress Summary    Learner Readiness Method Response Comment Documented by Status   Patient Acceptance E,D VU,NR  MS 07/06/17 1622 Done               Point: Home exercise program (Done)    Learning Progress Summary    Learner Readiness Method Response Comment Documented by Status   Patient Acceptance E,D MARY,NR  MS 07/06/17 1622 Done               Point: Body mechanics (Done)    Learning Progress Summary    Learner Readiness Method Response Comment Documented by Status   Patient Acceptance E,D MARY,NR  MS 07/06/17 1622 Done               Point: Precautions (Done)    Learning Progress Summary    Learner Readiness Method Response Comment Documented by Status   Patient Acceptance E,D MARY,NR  MS 07/06/17 1622 Done                      User Key     Initials Effective Dates Name Provider Type Discipline    MS 12/01/15 -  Josesito Padgett, MARIANNE Physical Therapist PT                PT Recommendation and Plan  Anticipated Equipment Needs At Discharge: front wheeled walker  Anticipated Discharge Disposition: home with assist, home with home health  Planned Therapy Interventions: balance training, bed mobility training, gait training, home exercise program, patient/family education, postural re-education, ROM (Range of Motion), strengthening, transfer training  PT Frequency: 2 times/day  Plan of Care Review  Plan Of Care Reviewed With: patient  Outcome Summary/Follow up Plan: Pt. will benefit from skilled inpt. P.T. to address her functional deficits and to assist pt. in regaining her independence with functional  mobility.          IP PT Goals       07/06/17 1623          Bed Mobility PT LTG    Bed Mobility PT LTG, Date Established 07/06/17  -MS      Bed Mobility PT LTG, Time to Achieve 3 days  -MS      Bed Mobility PT LTG, Activity Type all bed mobility  -MS      Bed Mobility PT LTG, [place] Level independent  -MS      Transfer Training PT LTG    Transfer Training PT LTG, Date Established 07/06/17  -MS      Transfer Training PT LTG, Time to Achieve 3 days  -MS      Transfer Training PT LTG, Activity Type all transfers  -MS      Transfer Training PT LTG, [place] Level independent  -MS      Transfer Training PT LTG, Assist Device walker, rolling  -MS      Gait Training PT LTG    Gait Training Goal PT LTG, Date Established 07/06/17  -MS      Gait Training Goal PT LTG, Time to Achieve 3 days  -MS      Gait Training Goal PT LTG, [place] Level independent  -MS      Gait Training Goal PT LTG, Assist Device walker, rolling  -MS      Gait Training Goal PT LTG, Distance to Achieve 100 feet  -MS      Range of Motion PT LTG    Range of Motion Goal PT LTG, Date Established 07/06/17  -MS      Range of Motion Goal PT LTG, Time to Achieve 3 days  -MS      Range fo Motion Goal PT LTG, Joint R knee  -MS      Range of Motion Goal PT LTG, AROM Measure (5, 85)  -MS        User Key  (r) = Recorded By, (t) = Taken By, (c) = Cosigned By    Initials Name Provider Type    MS Josesito Padgett, PT Physical Therapist                Outcome Measures       07/06/17 1600          How much help from another person do you currently need...    Turning from your back to your side while in flat bed without using bedrails? 3  -MS      Moving from lying on back to sitting on the side of a flat bed without bedrails? 2  -MS      Moving to and from a bed to a chair (including a wheelchair)? 3  -MS      Standing up from a chair using your arms (e.g., wheelchair, bedside chair)? 3  -MS      Climbing 3-5 steps with a railing? 2  -MS      To walk in  hospital room? 3  -MS      AM-PAC 6 Clicks Score 16  -MS      Functional Assessment    Outcome Measure Options AM-PAC 6 Clicks Basic Mobility (PT)  -MS        User Key  (r) = Recorded By, (t) = Taken By, (c) = Cosigned By    Initials Name Provider Type    MS Josesito Padgett, PT Physical Therapist           Time Calculation:         PT Charges       07/06/17 1625          Time Calculation    Start Time 1558  -MS      Stop Time 1614  -MS      Time Calculation (min) 16 min  -MS      PT Received On 07/06/17  -MS      PT - Next Appointment 07/07/17  -MS      PT Goal Re-Cert Due Date 07/08/17  -MS        User Key  (r) = Recorded By, (t) = Taken By, (c) = Cosigned By    Initials Name Provider Type    MS Josesito Padgett PT Physical Therapist          Therapy Charges for Today     Code Description Service Date Service Provider Modifiers Qty    22711236423 HC PT EVAL LOW COMPLEXITY 1 7/6/2017 Josesito Padgett, PT GP 1    99204179549 HC PT THER PROC EA 15 MIN 7/6/2017 Josesito Padgett, PT GP 1    18049882946 HC PT THER SUPP EA 15 MIN 7/6/2017 Josesito Padgett, PT GP 1          PT G-Codes  Outcome Measure Options: AM-PAC 6 Clicks Basic Mobility (PT)      Josesito Padgett, PT  7/6/2017

## 2017-07-06 NOTE — OP NOTE
OP NOTE   Dragan De La Vega M.D. - [facility]      DATE OF PROCEDURE: 7/6/2017     PREOPERATIVE DIAGNOSIS: DJD Right Knee    POSTOPERATIVE DIAGNOSIS: DJD Right Knee     ANESTHESIA: General, supplemented by a periarticular Exparel/bupivacaine injection.      SURGEON: Dragan De La Vega MD     ASSISTANT: Babatunde Holliday MD; note that as part of the surgical procedure, I utilized service of an assistant surgeon, specifically Babatunde Holliday MD. Assistant surgeon participated in crucial portion of the operation. Use of the assistant surgeon greatly reduced overall operative time, thus significantly reducing overall morbidity for the patient.      PROCEDURE PERFORMED: Right total knee replacement.    IMPLANTS:      Implant Name Type Inv. Item Serial No.  Lot No. LRB No. Used Action   CMT BONE PALACOS [id number] - [id number] Implant CMT BONE PALACOS [id number]  [employer] [id number] Right 2 Implanted   STEM TIB PERSONA CMT 5D SZD RT - [id number] Implant STEM TIB PERSONA CMT 5D SZD RT  [employer] [facility] [id number] Right 1 Implanted   PAT PERSONA ALLPOLY CMT 35MM - [id number] Implant PAT PERSONA ALLPOLY CMT 35MM  [employer] [id number] Right 1 Implanted   COMP FEM PERSONA CR CMT COCR STD SZ6 RT - [id number] Implant COMP FEM PERSONA CR CMT COCR STD SZ6 RT  [employer] [facility] [id number] Right 1 Implanted   MEDIAL CONGRUENT       [employer] [id number] Right 1 Implanted       TOURNIQUET TIME: Was 42 minutes.      MEDICATIONS: Note that we gave 1 g Topical tranexamic acid when the cement was mixed.      ESTIMATED BLOOD LOSS: *No blood loss documented*     COMPLICATIONS: None.      QUALITY MEASURES: I have documented the patient's current medications including dosage, frequency, and route of administration in medical record today. Conservative alternatives were discussed with the patient as part of the decision-making process prior to the procedure. The patient was evaluated immediately preoperatively for  cardiovascular and thromboembolic risk factors. She has a history of PE in the past.  Prophylactic IV antibiotics were administered before the tourniquet went up.      DESCRIPTION OF PROCEDURE: After prep and drape, Right knee was approached via midline longitudinal anterior incision. Medial parapatellar arthrotomy was extended to the vastus medialis obliquus which was split in line with the fibers near the medial border, in keeping with minimally invasive technique. Patella was osteotomized proper depth for a 35 patella implant. [place] holes are created. Patella was subluxed and protected. Intramedullary instrumentation was used on each side of the joint; careful and thorough canal content irrigation. I cut the femur 10 mm depth, 5° valgus controlling rotation. It was sized to a 6 implant. Anterior, posterior, and chamfer cuts are made. Tibia is cut 10 mm depth, 5° of posterior slope. Meniscectomies are completed. Trial reduction is performed. Rotation is marked and keel slot was created.      Bony surface was thoroughly cleaned and dried. I injected the posterior capsule and medial lateral gutter with Exparel solution containing 20 mL Exparel, 25 mL 0.25% bupivacaine with epinephrine, 20 mL saline. Care was taken to protect popliteal neurovascular structures and the peroneal nerve. I cemented the size D tibial baseplate, size 6 femur, and a 35 patella.  Trial reduction revealed a 10 mm MC polyethylene insert best balanced stability and range of motion, and is locked in the tibial tray.      Tourniquet was released; hemostasis obtained. Wound was closed in layers with #1 Vicryl on the capsule, 2-0 Vicryl in subcutaneous tissue, and staples in the skin over a 1/8-inch drain. Note that I injected my capsule with my Exparel solution during closure.         Dragan De La Vega MD  7/6/2017  1:51 PM

## 2017-07-06 NOTE — PERIOPERATIVE NURSING NOTE
AFTER REVIEW OF PT'S LABWORK DONE ON 6/16/2017, IT IS NOTED THE PT HAD A UTI. PT STATES SHE WAS PLACED ON AN ORAL ANTIBIOTIC PER DR MOE'S OFFICE. PT STATES SHE FINISHED THE MED 3 DAYS AGO.

## 2017-07-07 LAB
ANION GAP SERPL CALCULATED.3IONS-SCNC: 13.1 MMOL/L
BUN BLD-MCNC: 14 MG/DL (ref 8–23)
BUN/CREAT SERPL: 17.1 (ref 7–25)
CALCIUM SPEC-SCNC: 8.1 MG/DL (ref 8.6–10.5)
CHLORIDE SERPL-SCNC: 99 MMOL/L (ref 98–107)
CO2 SERPL-SCNC: 24.9 MMOL/L (ref 22–29)
CREAT BLD-MCNC: 0.82 MG/DL (ref 0.57–1)
GFR SERPL CREATININE-BSD FRML MDRD: 69 ML/MIN/1.73
GLUCOSE BLD-MCNC: 177 MG/DL (ref 65–99)
HCT VFR BLD AUTO: 30.3 % (ref 35.6–45.5)
HGB BLD-MCNC: 9.7 G/DL (ref 11.9–15.5)
POTASSIUM BLD-SCNC: 4.1 MMOL/L (ref 3.5–5.2)
SODIUM BLD-SCNC: 137 MMOL/L (ref 136–145)

## 2017-07-07 PROCEDURE — 94799 UNLISTED PULMONARY SVC/PX: CPT

## 2017-07-07 PROCEDURE — 25010000003 CEFAZOLIN IN DEXTROSE 2-4 GM/100ML-% SOLUTION: Performed by: ORTHOPAEDIC SURGERY

## 2017-07-07 PROCEDURE — 25010000002 KETOROLAC TROMETHAMINE PER 15 MG: Performed by: ORTHOPAEDIC SURGERY

## 2017-07-07 PROCEDURE — 85014 HEMATOCRIT: CPT | Performed by: ORTHOPAEDIC SURGERY

## 2017-07-07 PROCEDURE — 97110 THERAPEUTIC EXERCISES: CPT

## 2017-07-07 PROCEDURE — 85018 HEMOGLOBIN: CPT | Performed by: ORTHOPAEDIC SURGERY

## 2017-07-07 PROCEDURE — 80048 BASIC METABOLIC PNL TOTAL CA: CPT | Performed by: ORTHOPAEDIC SURGERY

## 2017-07-07 RX ADMIN — OXYCODONE HYDROCHLORIDE AND ACETAMINOPHEN 2 TABLET: 5; 325 TABLET ORAL at 10:23

## 2017-07-07 RX ADMIN — OXYCODONE HYDROCHLORIDE AND ACETAMINOPHEN 2 TABLET: 5; 325 TABLET ORAL at 01:25

## 2017-07-07 RX ADMIN — CLONAZEPAM 0.5 MG: 1 TABLET ORAL at 21:54

## 2017-07-07 RX ADMIN — PANTOPRAZOLE SODIUM 40 MG: 40 TABLET, DELAYED RELEASE ORAL at 06:13

## 2017-07-07 RX ADMIN — BUDESONIDE AND FORMOTEROL FUMARATE DIHYDRATE 2 PUFF: 160; 4.5 AEROSOL RESPIRATORY (INHALATION) at 07:21

## 2017-07-07 RX ADMIN — RIVAROXABAN 15 MG: 15 TABLET, FILM COATED ORAL at 10:23

## 2017-07-07 RX ADMIN — LISINOPRIL 5 MG: 5 TABLET ORAL at 08:24

## 2017-07-07 RX ADMIN — FERROUS SULFATE TAB 325 MG (65 MG ELEMENTAL FE) 325 MG: 325 (65 FE) TAB at 08:24

## 2017-07-07 RX ADMIN — LEVOTHYROXINE SODIUM 88 MCG: 88 TABLET ORAL at 06:24

## 2017-07-07 RX ADMIN — OXYCODONE HYDROCHLORIDE AND ACETAMINOPHEN 2 TABLET: 5; 325 TABLET ORAL at 05:31

## 2017-07-07 RX ADMIN — APIXABAN 2.5 MG: 2.5 TABLET, FILM COATED ORAL at 08:24

## 2017-07-07 RX ADMIN — KETOROLAC TROMETHAMINE 30 MG: 30 INJECTION, SOLUTION INTRAMUSCULAR at 03:51

## 2017-07-07 RX ADMIN — OXYCODONE HYDROCHLORIDE AND ACETAMINOPHEN 2 TABLET: 5; 325 TABLET ORAL at 14:52

## 2017-07-07 RX ADMIN — ATORVASTATIN CALCIUM 10 MG: 10 TABLET, FILM COATED ORAL at 21:56

## 2017-07-07 RX ADMIN — ESCITALOPRAM 20 MG: 20 TABLET, FILM COATED ORAL at 08:24

## 2017-07-07 RX ADMIN — KETOROLAC TROMETHAMINE 30 MG: 30 INJECTION, SOLUTION INTRAMUSCULAR at 08:24

## 2017-07-07 RX ADMIN — OXYCODONE HYDROCHLORIDE AND ACETAMINOPHEN 2 TABLET: 5; 325 TABLET ORAL at 18:52

## 2017-07-07 RX ADMIN — CEFAZOLIN SODIUM 2 G: 2 INJECTION, SOLUTION INTRAVENOUS at 05:30

## 2017-07-07 RX ADMIN — BUDESONIDE AND FORMOTEROL FUMARATE DIHYDRATE 2 PUFF: 160; 4.5 AEROSOL RESPIRATORY (INHALATION) at 23:09

## 2017-07-07 RX ADMIN — OXYCODONE HYDROCHLORIDE AND ACETAMINOPHEN 2 TABLET: 5; 325 TABLET ORAL at 23:13

## 2017-07-07 NOTE — THERAPY TREATMENT NOTE
Acute Care - Physical Therapy Treatment Note  [facility]     Patient Name: Maria Ines Zhang  : 1945  MRN: [id number]  Today's Date: 2017  Onset of Illness/Injury or Date of Surgery Date: 17  Date of Referral to PT: 17  Referring Physician: Dragan Barraza    Admit Date: 2017    Visit Dx:    ICD-10-CM ICD-9-CM   1. Difficulty walking R26.2 719.7     Patient Active Problem List   Diagnosis   • Essential hypertension   • HLD (hyperlipidemia)   • Acquired hypothyroidism   • Depression   • Edema   • Bronchiolitis obliterans organizing pneumonia   • Primary osteoarthritis of right knee   • Shoulder, capsulitis, adhesive   • Laceration of right lower extremity   • Hyperglycemia   • Abrasion of arm, right   • Biceps tendinitis   • Cough   • Bronchitis   • Subacromial bursitis, right   • Osteoarthritis, knee               Adult Rehabilitation Note       17 1537 17 0915       Rehab Assessment/Intervention    Discipline physical therapist  -PC physical therapist  -MS     Document Type therapy note (daily note)  -PC therapy note (daily note)  -MS     Subjective Information agree to therapy;complains of;pain  -PC agree to therapy;complains of;fatigue;pain  -MS     Patient Effort, Rehab Treatment good  -PC good  -MS     Symptoms Noted Comment  Pt. reports only minor pain this AM with overall fatigue. Eager to work with P.T.  -MS     Precautions/Limitations fall precautions   contact isolation  -PC fall precautions   Contact Isolation  -MS     Equipment Issued to Patient  front wheeled walker  -MS     Recorded by [PC] Marta Saab, PT [MS] Josesito Padgett PT     Pain Assessment    Pain Assessment 0-10  -PC 0-10  -MS     Pain Score 5  -PC 1  -MS     Post Pain Score  1  -MS     Pain Type Acute pain;Surgical pain  -PC Acute pain;Surgical pain  -MS     Pain Location Knee  -PC Knee  -MS     Pain Orientation Right  -PC Right  -MS     Recorded by [PC] Marta Saab, PT [MS] Josesito BLANCO  Lorin PT     Cognitive Assessment/Intervention    Current Cognitive/Communication Assessment functional  -PC functional  -MS     Orientation Status oriented x 4  -PC oriented x 4  -MS     Follows Commands/Answers Questions  100% of the time  -MS     Personal Safety  WNL/WFL  -MS     Personal Safety Interventions  fall prevention program maintained;gait belt;nonskid shoes/slippers when out of bed;supervised activity  -MS     Recorded by [PC] Marta Saab, PT [MS] Josesito Padgett PT     ROM (Range of Motion)    General ROM Detail  Right knee AROM (4, 100)  -MS     Recorded by  [MS] Josesito Padgett PT     Mobility Assessment/Training    Extremity Weight-Bearing Status right lower extremity  -PC right lower extremity  -MS     Right Lower Extremity Weight-Bearing weight-bearing as tolerated  -PC weight-bearing as tolerated  -MS     Recorded by [PC] Marta Saab, PT [MS] Josesito Padgett PT     Bed Mobility, Assessment/Treatment    Bed Mobility, Comment  Pt. up in chair this AM.  -MS     Recorded by  [MS] Josesito Padgett PT     Transfer Assessment/Treatment    Transfers, Sit-Stand [place] contact guard assist  -PC contact guard assist  -MS     Transfers, Stand-Sit [place] contact guard assist  -PC contact guard assist  -MS     Transfers, Sit-Stand-Sit, Assist Device rolling walker  -PC rolling walker  -MS     Recorded by [PC] Marta Saab PT [MS] Josesito Padgett PT     Gait Assessment/Treatment    Gait, [place] Level  contact guard assist  -MS     Gait, Assistive Device rolling walker  -PC rolling walker  -MS     Gait, Distance (Feet)  90  -MS     Gait, Gait Deviations  right:;antalgic;jesús decreased;forward flexed posture  -MS     Gait, Comment  Pt. requires verbal/tactile cues for posture correction. Pt. able to begin reciprocating her gait pattern this AM with use of RWX.  -MS     Recorded by [PC] Marta Saab, PT [MS] Josesito Padgett PT     Therapy Exercises    Exercise  Protocols total knee  -PC total knee  -MS     Total Knee Exercises right:;10 reps;completed protocol  -PC right:;10 reps;completed protocol  -MS     Recorded by [PC] Marta Saab, PT [MS] Josesito Padgett PT     Positioning and Restraints    Pre-Treatment Position in bed  -PC sitting in chair/recliner  -MS     Post Treatment Position  chair  -MS     In Chair  notified nsg;reclined;sitting;call light within reach;encouraged to call for assist;exit alarm on;with family/caregiver   Ice pack to Right knee.  -MS     Recorded by [PC] Marta Saab, PT [MS] Josesito Padgett PT       User Key  (r) = Recorded By, (t) = Taken By, (c) = Cosigned By    Initials Name Effective Dates     Marta Saab, PT 12/01/15 -     MS Josesito Padgett, PT 12/01/15 -                 IP PT Goals       07/06/17 1623          Bed Mobility PT LTG    Bed Mobility PT LTG, Date Established 07/06/17  -MS      Bed Mobility PT LTG, Time to Achieve 3 days  -MS      Bed Mobility PT LTG, Activity Type all bed mobility  -MS      Bed Mobility PT LTG, [place] Level independent  -MS      Transfer Training PT LTG    Transfer Training PT LTG, Date Established 07/06/17  -MS      Transfer Training PT LTG, Time to Achieve 3 days  -MS      Transfer Training PT LTG, Activity Type all transfers  -MS      Transfer Training PT LTG, [place] Level independent  -MS      Transfer Training PT LTG, Assist Device walker, rolling  -MS      Gait Training PT LTG    Gait Training Goal PT LTG, Date Established 07/06/17  -MS      Gait Training Goal PT LTG, Time to Achieve 3 days  -MS      Gait Training Goal PT LTG, [place] Level independent  -MS      Gait Training Goal PT LTG, Assist Device walker, rolling  -MS      Gait Training Goal PT LTG, Distance to Achieve 100 feet  -MS      Range of Motion PT LTG    Range of Motion Goal PT LTG, Date Established 07/06/17  -MS      Range of Motion Goal PT LTG, Time to Achieve 3 days  -MS      Range fo Motion Goal PT LTG,  Joint R knee  -MS      Range of Motion Goal PT LTG, AROM Measure (5, 85)  -MS        User Key  (r) = Recorded By, (t) = Taken By, (c) = Cosigned By    Initials Name Provider Type    MS Josesito Padgett, PT Physical Therapist          Physical Therapy Education     Title: PT OT SLP Therapies (Done)     Topic: Physical Therapy (Done)     Point: Mobility training (Done)    Learning Progress Summary    Learner Readiness Method Response Comment Documented by Status   Patient Acceptance E,D VU,NR  MS 07/07/17 0918 Done    Acceptance E,D VU,NR  MS 07/06/17 1622 Done               Point: Home exercise program (Done)    Learning Progress Summary    Learner Readiness Method Response Comment Documented by Status   Patient Acceptance E,D VU,NR  MS 07/07/17 0918 Done    Acceptance E,D VU,NR  MS 07/06/17 1622 Done               Point: Body mechanics (Done)    Learning Progress Summary    Learner Readiness Method Response Comment Documented by Status   Patient Acceptance E,D VU,NR  MS 07/07/17 0918 Done    Acceptance E,D VU,NR  MS 07/06/17 1622 Done               Point: Precautions (Done)    Learning Progress Summary    Learner Readiness Method Response Comment Documented by Status   Patient Acceptance E,D VU,NR  MS 07/07/17 0918 Done    Acceptance E,D VU,NR  MS 07/06/17 1622 Done                      User Key     Initials Effective Dates Name Provider Type Discipline    MS 12/01/15 -  Josesito Padgett, PT Physical Therapist PT                    PT Recommendation and Plan  Anticipated Equipment Needs At Discharge: front wheeled walker  Anticipated Discharge Disposition: home with assist, home with home health  Planned Therapy Interventions: balance training, bed mobility training, gait training, home exercise program, patient/family education, postural re-education, ROM (Range of Motion), strengthening, transfer training  PT Frequency: 2 times/day             Outcome Measures       07/07/17 0900 07/06/17 1600       How much help  from another person do you currently need...    Turning from your back to your side while in flat bed without using bedrails? 3  -MS 3  -MS     Moving from lying on back to sitting on the side of a flat bed without bedrails? 3  -MS 2  -MS     Moving to and from a bed to a chair (including a wheelchair)? 3  -MS 3  -MS     Standing up from a chair using your arms (e.g., wheelchair, bedside chair)? 3  -MS 3  -MS     Climbing 3-5 steps with a railing? 3  -MS 2  -MS     To walk in hospital room? 3  -MS 3  -MS     AM-PAC 6 Clicks Score 18  -MS 16  -MS     Functional Assessment    Outcome Measure Options AM-PAC 6 Clicks Basic Mobility (PT)  -MS AM-PAC 6 Clicks Basic Mobility (PT)  -MS       User Key  (r) = Recorded By, (t) = Taken By, (c) = Cosigned By    Initials Name Provider Type    MS Josesito Padgett, PT Physical Therapist           Time Calculation:         PT Charges       07/07/17 1554 07/07/17 0920       Time Calculation    Start Time 1526  -PC 0854  -MS     Stop Time 1550  -PC 0914  -MS     Time Calculation (min) 24 min  -PC 20 min  -MS     PT Received On 07/07/17  -PC 07/07/17  -MS     PT - Next Appointment 07/08/17  -PC 07/07/17  -MS       User Key  (r) = Recorded By, (t) = Taken By, (c) = Cosigned By    Initials Name Provider Type     Marta Saab, PT Physical Therapist    MS Josesito Padgett, PT Physical Therapist          Therapy Charges for Today     Code Description Service Date Service Provider Modifiers Qty    62715338386  PT THER PROC EA 15 MIN 7/7/2017 Marta Saab, PT GP 2          PT G-Codes  Outcome Measure Options: AM-PAC 6 Clicks Basic Mobility (PT)    Marta Saab PT  7/7/2017

## 2017-07-07 NOTE — PROGRESS NOTES
Discharge Planning Assessment  [facility]     Patient Name: Maria Ines Zhang  MRN: [id number]  Today's Date: 7/7/2017    Admit Date: 7/6/2017          Discharge Needs Assessment       07/07/17 0943    Living Environment    Lives With spouse    Living Arrangements house    Transportation Available car;family or friend will provide    Discharge Needs Assessment    Concerns To Be Addressed basic needs concerns    Readmission Within The Last 30 Days no previous admission in last 30 days    Anticipated Changes Related to Illness none    Equipment Currently Used at Home cane, straight;bipap/ cpap;oxygen    Discharge Facility/Level Of Care Needs home with home health            Discharge Plan       07/07/17 0943    Case Management/Social Work Plan    Plan [facility]    Patient/Family In Agreement With Plan yes    Additional Comments Spoke with pt, verified correct information on facesheet and explained the role of CCP. Pt would like to d/c home with [facility], referral given to Sarah with [facility] who states they are able to accept. Plan will be to d/c home with HH and family support.        Discharge Placement     Facility/Agency Request Status Selected? Address Phone Number Fax Number    [facility] Accepted    Yes [address] [address] [phone]                Demographic Summary     None            Functional Status     None            Psychosocial     None            Abuse/Neglect     None            Legal     None            Substance Abuse     None            Patient Forms     None          Celeste Dawn RN

## 2017-07-07 NOTE — THERAPY TREATMENT NOTE
Acute Care - Physical Therapy Treatment Note  [facility]     Patient Name: Maria Ines Zhang  : 1945  MRN: [id number]  Today's Date: 2017  Onset of Illness/Injury or Date of Surgery Date: 17  Date of Referral to PT: 17  Referring Physician: Dragan Barraza    Admit Date: 2017    Visit Dx:    ICD-10-CM ICD-9-CM   1. Difficulty walking R26.2 719.7     Patient Active Problem List   Diagnosis   • Essential hypertension   • HLD (hyperlipidemia)   • Acquired hypothyroidism   • Depression   • Edema   • Bronchiolitis obliterans organizing pneumonia   • Primary osteoarthritis of right knee   • Shoulder, capsulitis, adhesive   • Laceration of right lower extremity   • Hyperglycemia   • Abrasion of arm, right   • Biceps tendinitis   • Cough   • Bronchitis   • Subacromial bursitis, right   • Osteoarthritis, knee               Adult Rehabilitation Note       17 0915          Rehab Assessment/Intervention    Discipline physical therapist  -MS      Document Type therapy note (daily note)  -MS      Subjective Information agree to therapy;complains of;fatigue;pain  -MS      Patient Effort, Rehab Treatment good  -MS      Symptoms Noted Comment Pt. reports only minor pain this AM with overall fatigue. Eager to work with P.T.  -MS      Precautions/Limitations fall precautions   Contact Isolation  -MS      Equipment Issued to Patient front wheeled walker  -MS      Recorded by [MS] Josesito Padgett, PT      Pain Assessment    Pain Assessment 0-10  -MS      Pain Score 1  -MS      Post Pain Score 1  -MS      Pain Type Acute pain;Surgical pain  -MS      Pain Location Knee  -MS      Pain Orientation Right  -MS      Recorded by [MS] Josesito Padgett, PT      Cognitive Assessment/Intervention    Current Cognitive/Communication Assessment functional  -MS      Orientation Status oriented x 4  -MS      Follows Commands/Answers Questions 100% of the time  -MS      Personal Safety WNL/WFL  -MS      Personal  Safety Interventions fall prevention program maintained;gait belt;nonskid shoes/slippers when out of bed;supervised activity  -MS      Recorded by [MS] Josesito Padgett PT      ROM (Range of Motion)    General ROM Detail Right knee AROM (4, 100)  -MS      Recorded by [MS] Josesito Padgett PT      Mobility Assessment/Training    Extremity Weight-Bearing Status right lower extremity  -MS      Right Lower Extremity Weight-Bearing weight-bearing as tolerated  -MS      Recorded by [MS] Josesito Padgett PT      Bed Mobility, Assessment/Treatment    Bed Mobility, Comment Pt. up in chair this AM.  -MS      Recorded by [MS] Josesito Padgett PT      Transfer Assessment/Treatment    Transfers, Sit-Stand [place] contact guard assist  -MS      Transfers, Stand-Sit [place] contact guard assist  -MS      Transfers, Sit-Stand-Sit, Assist Device rolling walker  -MS      Recorded by [MS] Josesito Padgett PT      Gait Assessment/Treatment    Gait, [place] Level contact guard assist  -MS      Gait, Assistive Device rolling walker  -MS      Gait, Distance (Feet) 90  -MS      Gait, Gait Deviations right:;antalgic;jesús decreased;forward flexed posture  -MS      Gait, Comment Pt. requires verbal/tactile cues for posture correction. Pt. able to begin reciprocating her gait pattern this AM with use of RWX.  -MS      Recorded by [MS] Josesito Padgett PT      Therapy Exercises    Exercise Protocols total knee  -MS      Total Knee Exercises right:;10 reps;completed protocol  -MS      Recorded by [MS] Josesito Padgett PT      Positioning and Restraints    Pre-Treatment Position sitting in chair/recliner  -MS      Post Treatment Position chair  -MS      In Chair notified nsg;reclined;sitting;call light within reach;encouraged to call for assist;exit alarm on;with family/caregiver   Ice pack to Right knee.  -MS      Recorded by [MS] Josesito Padgett PT        User Key  (r) = Recorded By, (t) = Taken By, (c) = Cosigned By     Initials Name Effective Dates    MS Josesito Padgett, PT 12/01/15 -                 IP PT Goals       07/06/17 1623          Bed Mobility PT LTG    Bed Mobility PT LTG, Date Established 07/06/17  -MS      Bed Mobility PT LTG, Time to Achieve 3 days  -MS      Bed Mobility PT LTG, Activity Type all bed mobility  -MS      Bed Mobility PT LTG, [place] Level independent  -MS      Transfer Training PT LTG    Transfer Training PT LTG, Date Established 07/06/17  -MS      Transfer Training PT LTG, Time to Achieve 3 days  -MS      Transfer Training PT LTG, Activity Type all transfers  -MS      Transfer Training PT LTG, [place] Level independent  -MS      Transfer Training PT LTG, Assist Device walker, rolling  -MS      Gait Training PT LTG    Gait Training Goal PT LTG, Date Established 07/06/17  -MS      Gait Training Goal PT LTG, Time to Achieve 3 days  -MS      Gait Training Goal PT LTG, [place] Level independent  -MS      Gait Training Goal PT LTG, Assist Device walker, rolling  -MS      Gait Training Goal PT LTG, Distance to Achieve 100 feet  -MS      Range of Motion PT LTG    Range of Motion Goal PT LTG, Date Established 07/06/17  -MS      Range of Motion Goal PT LTG, Time to Achieve 3 days  -MS      Range fo Motion Goal PT LTG, Joint R knee  -MS      Range of Motion Goal PT LTG, AROM Measure (5, 85)  -MS        User Key  (r) = Recorded By, (t) = Taken By, (c) = Cosigned By    Initials Name Provider Type    MS Josesito Padgett, PT Physical Therapist          Physical Therapy Education     Title: PT OT SLP Therapies (Done)     Topic: Physical Therapy (Done)     Point: Mobility training (Done)    Learning Progress Summary    Learner Readiness Method Response Comment Documented by Status   Patient Acceptance DEIRDRE MADDOX NR  MS 07/07/17 0918 Done    Acceptance DEIRDRE MADDOX NR  MS 07/06/17 1622 Done               Point: Home exercise program (Done)    Learning Progress Summary    Learner Readiness Method Response  Comment Documented by Status   Patient Acceptance EDEIRDRE VU,NR  MS 07/07/17 0918 Done    Acceptance ED VU,NR  MS 07/06/17 1622 Done               Point: Body mechanics (Done)    Learning Progress Summary    Learner Readiness Method Response Comment Documented by Status   Patient Acceptance EDEIRDRE,NR  MS 07/07/17 0918 Done    Acceptance EDEIRDRE VU,NR  MS 07/06/17 1622 Done               Point: Precautions (Done)    Learning Progress Summary    Learner Readiness Method Response Comment Documented by Status   Patient Acceptance ED VU,NR  MS 07/07/17 0918 Done    Acceptance EDEIRDRE VU,NR  MS 07/06/17 1622 Done                      User Key     Initials Effective Dates Name Provider Type Discipline    MS 12/01/15 -  Josesito Padgett, PT Physical Therapist PT                    PT Recommendation and Plan  Anticipated Equipment Needs At Discharge: front wheeled walker  Anticipated Discharge Disposition: home with assist, home with home health  Planned Therapy Interventions: balance training, bed mobility training, gait training, home exercise program, patient/family education, postural re-education, ROM (Range of Motion), strengthening, transfer training  PT Frequency: 2 times/day  Plan of Care Review  Plan Of Care Reviewed With: patient  Progress: improving  Outcome Summary/Follow up Plan: Improved tolerance to functional activity this AM with an increase in gait distance and decreased assist required for overall functional mobility.  Pt. able to initiate swing through gait pattern with use of RWX this AM as well.          Outcome Measures       07/07/17 0900 07/06/17 1600       How much help from another person do you currently need...    Turning from your back to your side while in flat bed without using bedrails? 3  -MS 3  -MS     Moving from lying on back to sitting on the side of a flat bed without bedrails? 3  -MS 2  -MS     Moving to and from a bed to a chair (including a wheelchair)? 3  -MS 3  -MS     Standing up from a  chair using your arms (e.g., wheelchair, bedside chair)? 3  -MS 3  -MS     Climbing 3-5 steps with a railing? 3  -MS 2  -MS     To walk in hospital room? 3  -MS 3  -MS     AM-PAC 6 Clicks Score 18  -MS 16  -MS     Functional Assessment    Outcome Measure Options AM-PAC 6 Clicks Basic Mobility (PT)  -MS AM-PAC 6 Clicks Basic Mobility (PT)  -MS       User Key  (r) = Recorded By, (t) = Taken By, (c) = Cosigned By    Initials Name Provider Type    MS Josesito Padgett, PT Physical Therapist           Time Calculation:         PT Charges       07/07/17 0920          Time Calculation    Start Time 0854  -MS      Stop Time 0914  -MS      Time Calculation (min) 20 min  -MS      PT Received On 07/07/17  -MS      PT - Next Appointment 07/07/17  -MS        User Key  (r) = Recorded By, (t) = Taken By, (c) = Cosigned By    Initials Name Provider Type    MS Josesito Padgett, PT Physical Therapist          Therapy Charges for Today     Code Description Service Date Service Provider Modifiers Qty    77894903870 HC PT EVAL LOW COMPLEXITY 1 7/6/2017 Josesito Padgett, PT GP 1    24579523039 HC PT THER PROC EA 15 MIN 7/6/2017 Josesito Padgett, PT GP 1    02215191380 HC PT THER SUPP EA 15 MIN 7/6/2017 Josesito Padgett, PT GP 1    96933584432 HC PT THER PROC EA 15 MIN 7/7/2017 Josesito Padgett, PT GP 1          PT G-Codes  Outcome Measure Options: AM-PAC 6 Clicks Basic Mobility (PT)    Josesito Padgett, PT  7/7/2017

## 2017-07-07 NOTE — PROGRESS NOTES
"/57 (BP Location: Right arm, Patient Position: Lying)  Pulse 79  Temp 98.5 °F (36.9 °C) (Oral)   Resp 18  Ht 66\" (167.6 cm)  Wt 238 lb 3 oz (108 kg)  SpO2 100%  BMI 38.44 kg/m2    Lab Results (last 24 hours)     Procedure Component Value Units Date/Time    Hemoglobin & Hematocrit, Blood [[id number]]  (Abnormal) Collected:  07/07/17 0356    Specimen:  Blood Updated:  07/07/17 0430     Hemoglobin 9.7 (L) g/dL      Hematocrit 30.3 (L) %     Basic Metabolic Panel [[id number]]  (Abnormal) Collected:  07/07/17 0356    Specimen:  Blood Updated:  07/07/17 0455     Glucose 177 (H) mg/dL      BUN 14 mg/dL      Creatinine 0.82 mg/dL      Sodium 137 mmol/L      Potassium 4.1 mmol/L      Chloride 99 mmol/L      CO2 24.9 mmol/L      Calcium 8.1 (L) mg/dL      eGFR Non African Amer 69 mL/min/1.73      BUN/Creatinine Ratio 17.1     Anion Gap 13.1 mmol/L     Narrative:       GFR Normal >60  Chronic Kidney Disease <60  Kidney Failure <15          Imaging Results (last 24 hours)     Procedure Component Value Units Date/Time    XR Knee 1 or 2 View Right [[id number]] Collected:  07/06/17 1456     Updated:  07/06/17 1500    Narrative:       XR KNEE 1 OR 2 VW RIGHT-     INDICATIONS: Postoperative evaluation.     TECHNIQUE:     Frontal and lateral views of the right knee     COMPARISON: 06/16/2017     FINDINGS:      Intact appearing knee arthroplasty hardware is seen with adjacent  surgical soft tissue gas, drain, overlying skin staples. No acute  fracture is identified.          Impression:          Postsurgical changes.           This report was finalized on 7/6/2017 2:56 PM by Dr. Louie Chavez MD.             Patient Care Team:  Joao Hardy MD as PCP - General  Andreina Heck MD as Consulting Physician (Pulmonary Disease)  Kelly Wynne MD as Consulting Physician (Cardiology)    SUBJECTIVE    PHYSICAL EXAM     Active Problems:    Osteoarthritis, knee  History of pulmonary embolism    PLAN / " DISPOSITION:  Start Xarelto today  HH discharge in am  Dragan De La Vega MD  07/07/17  6:38 AM

## 2017-07-08 VITALS
OXYGEN SATURATION: 99 % | BODY MASS INDEX: 38.28 KG/M2 | RESPIRATION RATE: 18 BRPM | HEIGHT: 66 IN | WEIGHT: 238.19 LBS | HEART RATE: 80 BPM | SYSTOLIC BLOOD PRESSURE: 125 MMHG | DIASTOLIC BLOOD PRESSURE: 60 MMHG | TEMPERATURE: 98.3 F

## 2017-07-08 PROCEDURE — 97116 GAIT TRAINING THERAPY: CPT | Performed by: PHYSICAL THERAPIST

## 2017-07-08 PROCEDURE — 97110 THERAPEUTIC EXERCISES: CPT | Performed by: PHYSICAL THERAPIST

## 2017-07-08 PROCEDURE — 94799 UNLISTED PULMONARY SVC/PX: CPT

## 2017-07-08 RX ORDER — OXYCODONE HYDROCHLORIDE AND ACETAMINOPHEN 5; 325 MG/1; MG/1
1-2 TABLET ORAL EVERY 4 HOURS PRN
Qty: 100 TABLET | Refills: 0 | Status: ON HOLD | OUTPATIENT
Start: 2017-07-08 | End: 2017-07-19

## 2017-07-08 RX ADMIN — BUDESONIDE AND FORMOTEROL FUMARATE DIHYDRATE 2 PUFF: 160; 4.5 AEROSOL RESPIRATORY (INHALATION) at 08:06

## 2017-07-08 RX ADMIN — LEVOTHYROXINE SODIUM 88 MCG: 88 TABLET ORAL at 06:30

## 2017-07-08 RX ADMIN — APIXABAN 2.5 MG: 2.5 TABLET, FILM COATED ORAL at 09:00

## 2017-07-08 RX ADMIN — RIVAROXABAN 15 MG: 15 TABLET, FILM COATED ORAL at 09:00

## 2017-07-08 RX ADMIN — PANTOPRAZOLE SODIUM 40 MG: 40 TABLET, DELAYED RELEASE ORAL at 06:30

## 2017-07-08 RX ADMIN — OXYCODONE HYDROCHLORIDE AND ACETAMINOPHEN 2 TABLET: 5; 325 TABLET ORAL at 07:44

## 2017-07-08 RX ADMIN — FERROUS SULFATE TAB 325 MG (65 MG ELEMENTAL FE) 325 MG: 325 (65 FE) TAB at 09:00

## 2017-07-08 RX ADMIN — ESCITALOPRAM 20 MG: 20 TABLET, FILM COATED ORAL at 09:00

## 2017-07-08 RX ADMIN — OXYCODONE HYDROCHLORIDE AND ACETAMINOPHEN 2 TABLET: 5; 325 TABLET ORAL at 03:27

## 2017-07-08 NOTE — THERAPY TREATMENT NOTE
Acute Care - Physical Therapy Treatment Note  [facility]     Patient Name: Maria Ines Zhang  : 1945  MRN: [id number]  Today's Date: 2017  Onset of Illness/Injury or Date of Surgery Date: 17  Date of Referral to PT: 17  Referring Physician: Dragan Barraza    Admit Date: 2017    Visit Dx:    ICD-10-CM ICD-9-CM   1. Difficulty walking R26.2 719.7     Patient Active Problem List   Diagnosis   • Essential hypertension   • HLD (hyperlipidemia)   • Acquired hypothyroidism   • Depression   • Edema   • Bronchiolitis obliterans organizing pneumonia   • Primary osteoarthritis of right knee   • Shoulder, capsulitis, adhesive   • Laceration of right lower extremity   • Hyperglycemia   • Abrasion of arm, right   • Biceps tendinitis   • Cough   • Bronchitis   • Subacromial bursitis, right   • Osteoarthritis, knee               Adult Rehabilitation Note       17 1000 17 1537 17 0915    Rehab Assessment/Intervention    Discipline physical therapist  -LC physical therapist  -PC physical therapist  -MS    Document Type therapy note (daily note)  -LC therapy note (daily note)  -PC therapy note (daily note)  -MS    Subjective Information agree to therapy;complains of;pain  -LC agree to therapy;complains of;pain  -PC agree to therapy;complains of;fatigue;pain  -MS    Patient Effort, Rehab Treatment good  -LC good  -PC good  -MS    Symptoms Noted Comment   Pt. reports only minor pain this AM with overall fatigue. Eager to work with P.T.  -MS    Precautions/Limitations fall precautions   contact isolation  -LC fall precautions   contact isolation  -PC fall precautions   Contact Isolation  -MS    Equipment Issued to Patient   front wheeled walker  -MS    Recorded by [LC] Felipe Henning, PT DPT [PC] Marta Saab, PT [MS] Josesito Padgett, PT    Pain Assessment    Pain Assessment 0-10  -LC 0-10  -PC 0-10  -MS    Pain Score 4  -LC 5  -PC 1  -MS    Post Pain Score   1  -MS    Pain Type  Acute pain;Surgical pain  -LC Acute pain;Surgical pain  -PC Acute pain;Surgical pain  -MS    Pain Location Knee  -LC Knee  -PC Knee  -MS    Pain Orientation Right  -LC Right  -PC Right  -MS    Recorded by [LC] Felipe Henning, PT DPT [PC] Marta Saab, PT [MS] Josesito Padgett, PT    Cognitive Assessment/Intervention    Current Cognitive/Communication Assessment functional  -LC functional  -PC functional  -MS    Orientation Status oriented x 4  -LC oriented x 4  -PC oriented x 4  -MS    Follows Commands/Answers Questions   100% of the time  -MS    Personal Safety   WNL/WFL  -MS    Personal Safety Interventions   fall prevention program maintained;gait belt;nonskid shoes/slippers when out of bed;supervised activity  -MS    Recorded by [LC] Felipe Henning, PT DPT [PC] Matra Saab, PT [MS] Josesito Padgett, PT    ROM (Range of Motion)    General ROM Detail   Right knee AROM (4, 100)  -MS    Recorded by   [MS] Josesito Padgett PT    Mobility Assessment/Training    Extremity Weight-Bearing Status  right lower extremity  -PC right lower extremity  -MS    Right Lower Extremity Weight-Bearing  weight-bearing as tolerated  -PC weight-bearing as tolerated  -MS    Recorded by  [PC] Marta Saab, PT [MS] Josesito Padgett, PT    Bed Mobility, Assessment/Treatment    Bed Mobility, Comment up in chair  -  Pt. up in chair this AM.  -MS    Recorded by [LC] Felipe Henning, PT DPT  [MS] Josesito Padgett, PT    Transfer Assessment/Treatment    Transfers, Sit-Stand [place] contact guard assist  - contact guard assist  -PC contact guard assist  -MS    Transfers, Stand-Sit [place] contact guard assist  - contact guard assist  -PC contact guard assist  -MS    Transfers, Sit-Stand-Sit, Assist Device rolling walker  -LC rolling walker  -PC rolling walker  -MS    Recorded by [LC] Felipe Henning, PT DPT [PC] Marta Saab, PT [MS] Josesito Padgett, PT    Gait Assessment/Treatment    Gait, [place] Level stand by  assist  -LC  contact guard assist  -MS    Gait, Assistive Device rolling walker  -LC rolling walker  -PC rolling walker  -MS    Gait, Distance (Feet) 150  -LC  90  -MS    Gait, Gait Deviations right:;antalgic  -LC  right:;antalgic;jesús decreased;forward flexed posture  -MS    Gait, Comment   Pt. requires verbal/tactile cues for posture correction. Pt. able to begin reciprocating her gait pattern this AM with use of RWX.  -MS    Recorded by [LC] Felipe Henning, PT DPT [PC] Marta Saab, PT [MS] Josesito Padgett, PT    Therapy Exercises    Exercise Protocols total knee  -LC total knee  -PC total knee  -MS    Total Knee Exercises right:;10 reps;completed protocol  -LC right:;10 reps;completed protocol  -PC right:;10 reps;completed protocol  -MS    Recorded by [LC] Felipe Henning, PT DPT [PC] Marta Saab, PT [MS] Josesito Padgett, PT    Positioning and Restraints    Pre-Treatment Position sitting in chair/recliner  -LC in bed  -PC sitting in chair/recliner  -MS    Post Treatment Position chair  -  chair  -MS    In Chair encouraged to call for assist;with family/caregiver;notified nsg;sitting  -  notified nsg;reclined;sitting;call light within reach;encouraged to call for assist;exit alarm on;with family/caregiver   Ice pack to Right knee.  -MS    Recorded by [LC] Felipe Henning, PT DPT [PC] Marta Saab, PT [MS] Josesito Padgett, PT      User Key  (r) = Recorded By, (t) = Taken By, (c) = Cosigned By    Initials Name Effective Dates     Marta Saab, PT 12/01/15 -     MS Josesito Padgett, PT 12/01/15 -     LC Felipe Henning, PT DPT 08/02/16 -                 IP PT Goals       07/06/17 1623          Bed Mobility PT LTG    Bed Mobility PT LTG, Date Established 07/06/17  -MS      Bed Mobility PT LTG, Time to Achieve 3 days  -MS      Bed Mobility PT LTG, Activity Type all bed mobility  -MS      Bed Mobility PT LTG, [place] Level independent  -MS      Transfer Training PT LTG    Transfer Training PT  LTG, Date Established 07/06/17  -MS      Transfer Training PT LTG, Time to Achieve 3 days  -MS      Transfer Training PT LTG, Activity Type all transfers  -MS      Transfer Training PT LTG, [place] Level independent  -MS      Transfer Training PT LTG, Assist Device walker, rolling  -MS      Gait Training PT LTG    Gait Training Goal PT LTG, Date Established 07/06/17  -MS      Gait Training Goal PT LTG, Time to Achieve 3 days  -MS      Gait Training Goal PT LTG, [place] Level independent  -MS      Gait Training Goal PT LTG, Assist Device walker, rolling  -MS      Gait Training Goal PT LTG, Distance to Achieve 100 feet  -MS      Range of Motion PT LTG    Range of Motion Goal PT LTG, Date Established 07/06/17  -MS      Range of Motion Goal PT LTG, Time to Achieve 3 days  -MS      Range fo Motion Goal PT LTG, Joint R knee  -MS      Range of Motion Goal PT LTG, AROM Measure (5, 85)  -MS        User Key  (r) = Recorded By, (t) = Taken By, (c) = Cosigned By    Initials Name Provider Type    MS Josesito BLANCO Lorin, PT Physical Therapist          Physical Therapy Education     Title: PT OT SLP Therapies (Done)     Topic: Physical Therapy (Done)     Point: Mobility training (Done)    Learning Progress Summary    Learner Readiness Method Response Comment Documented by Status   Patient Acceptance E,D VU,DU safety during ambulation, therex  07/08/17 1037 Done    Acceptance TBTAN VU   07/07/17 1809 Done    Acceptance E,D VU,NR  MS 07/07/17 0918 Done    Acceptance E,D VU,NR  MS 07/06/17 1622 Done   Family Acceptance E,D VU,DU safety during ambulation, therex  07/08/17 1037 Done               Point: Home exercise program (Done)    Learning Progress Summary    Learner Readiness Method Response Comment Documented by Status   Patient Acceptance TB,E VU  KB 07/07/17 1809 Done    Acceptance E,D VU,NR  MS 07/07/17 0918 Done    Acceptance E,D VU,NR  MS 07/06/17 1622 Done               Point: Body mechanics (Done)    Learning  Progress Summary    Learner Readiness Method Response Comment Documented by Status   Patient Acceptance TB,E VU  KB 07/07/17 1809 Done    Acceptance E,D VU,NR  MS 07/07/17 0918 Done    Acceptance E,D VU,NR  MS 07/06/17 1622 Done               Point: Precautions (Done)    Learning Progress Summary    Learner Readiness Method Response Comment Documented by Status   Patient Acceptance TB,E VU  KB 07/07/17 1809 Done    Acceptance E,D VU,NR  MS 07/07/17 0918 Done    Acceptance E,D VU,NR  MS 07/06/17 1622 Done                      User Key     Initials Effective Dates Name Provider Type Discipline    MS 12/01/15 -  Josesito Padgett, PT Physical Therapist PT     08/02/16 -  Felipe Henning, PT DPT Physical Therapist PT     05/11/17 -  Sherrie Nicole, RN Extern Registered Nurse Nurse                    PT Recommendation and Plan  Anticipated Equipment Needs At Discharge: front wheeled walker  Anticipated Discharge Disposition: home with assist, home with home health  Planned Therapy Interventions: balance training, bed mobility training, gait training, home exercise program, patient/family education, postural re-education, ROM (Range of Motion), strengthening, transfer training  PT Frequency: 2 times/day  Plan of Care Review  Plan Of Care Reviewed With: patient, family  Progress: improving  Outcome Summary/Follow up Plan: Pt transferred sit to stand with CGA  x 1 and RW. She ambulated 150 ft with RW and SBA.Pt returned to sit in chair and performed TKA therex protocol 2 x 10 reps.          Outcome Measures       07/07/17 0900 07/06/17 1600       How much help from another person do you currently need...    Turning from your back to your side while in flat bed without using bedrails? 3  -MS 3  -MS     Moving from lying on back to sitting on the side of a flat bed without bedrails? 3  -MS 2  -MS     Moving to and from a bed to a chair (including a wheelchair)? 3  -MS 3  -MS     Standing up from a chair using your arms  (e.g., wheelchair, bedside chair)? 3  -MS 3  -MS     Climbing 3-5 steps with a railing? 3  -MS 2  -MS     To walk in hospital room? 3  -MS 3  -MS     AM-PAC 6 Clicks Score 18  -MS 16  -MS     Functional Assessment    Outcome Measure Options AM-PAC 6 Clicks Basic Mobility (PT)  -MS AM-PAC 6 Clicks Basic Mobility (PT)  -MS       User Key  (r) = Recorded By, (t) = Taken By, (c) = Cosigned By    Initials Name Provider Type    MS Josesito BLACNO Lorin, PT Physical Therapist           Time Calculation:         PT Charges       07/08/17 1039          Time Calculation    Start Time 1011  -LC      Stop Time 1035  -      Time Calculation (min) 24 min  -LC      PT Received On 07/08/17  -      PT - Next Appointment 07/09/17  -      Time Calculation- PT    Total Timed Code Minutes- PT 24 minute(s)  -        User Key  (r) = Recorded By, (t) = Taken By, (c) = Cosigned By    Initials Name Provider Type     Felipe Henning, PT DPT Physical Therapist          Therapy Charges for Today     Code Description Service Date Service Provider Modifiers Qty    19278118287 HC PT THER PROC EA 15 MIN 7/8/2017 Felipe Henning PT DPT GP 1    [id number] HC GAIT TRAINING EA 15 MIN 7/8/2017 Felipe Henning, PT DPT GP 1          PT G-Codes  Outcome Measure Options: AM-PAC 6 Clicks Basic Mobility (PT)    Felipe Henning, PT DPT  7/8/2017

## 2017-07-08 NOTE — PROGRESS NOTES
"/60 (BP Location: Left arm, Patient Position: Lying)  Pulse 80  Temp 98.3 °F (36.8 °C) (Oral)   Resp 18  Ht 66\" (167.6 cm)  Wt 238 lb 3 oz (108 kg)  SpO2 99%  BMI 38.44 kg/m2    Lab Results (last 24 hours)     ** No results found for the last 24 hours. **          Imaging Results (last 24 hours)     ** No results found for the last 24 hours. **          Patient Care Team:  Joao Hardy MD as PCP - General  Andreina Heck MD as Consulting Physician (Pulmonary Disease)  Kelly Wynne MD as Consulting Physician (Cardiology)    SUBJECTIVE  Doing well  PHYSICAL EXAM   Wound fine  Active Problems:    Osteoarthritis, knee      PLAN / DISPOSITION:   discharge today  Dragan De La Vega MD  07/08/17  8:31 AM      "

## 2017-07-08 NOTE — DISCHARGE INSTR - LAB
Total Knee Joint Replacement Discharge Instructions:     I. ACTIVITIES:     1. Exercises:  · Complete exercise program as taught by the hospital physical therapist 2 times per day  · Exercise program will be advanced by the physical therapist  · During the day be up ambulating every 2 hours (while awake) for short distances  · Complete the ankle pump exercises at least 10 times per hour (while awake)  · Elevate legs most of the day the first week post operatively and thereafter elevate legs when in bed and for at least 30 minutes during the day. Caution must be taken to avoid pillow placement under the bend of the knee as this can led to flexion contractures of the knee.  · Use cold packs 20-30 minutes approximately 5 times per day. This should be done before and after completing your exercises and at any time you are experiencing pain/ stiffness in your operative extremity.     2. Activities of Daily Living:  · No tub baths, hot tubs, or swimming pools for 4 weeks  · May shower and let water run over the incision on post-operative day #7 if no drainage. Do not scrub or rub the incision. Simply let the water run over the incision and pat dry.     II. Precautions:     · Everyone that comes near you should wash their hands  · No elective dental, genital-urinary, or colon procedures or surgical procedures for 12 weeks after surgery unless absolutely necessary.  · If dental work or surgical procedure is deemed absolutely necessary during the first 12 weeks, you will need to contact your surgeon as you will need to take antibiotics 1 hour prior to any dental work (including teeth cleanings).  · Please discuss with your surgeon prophylactic antibiotics as the length of time this intervention will be necessary for you varies with each patient’s health history and situation.  · Avoid sick people. If you must be around someone who is ill, they should wear a mask.  · Avoid visits to the Emergency Room or Urgent Care unless  you are having a life threatening event.      III. INCISION CARE:     · Wash your hands prior to dressing changes  · Change the dressing as needed to keep incision clean and dry. Utilize dry gauze and paper tape. Avoid touching the side of the gauze that goes against the incision with your hands.  · No creams or ointments to the incision  · May remove dressing once the incision is free of drainage  · Do not touch or pick at the incision  · Check incision every day and notify surgeon immediately if any of the following signs or symptoms are noted:  ¨ Increase in redness  ¨ Increase in swelling around the incision and of the entire extremity  ¨ Increase in pain  ¨ Drainage oozing from the incision  ¨ Pulling apart of the edges of the incision  ¨ Increase in overall body temperature (greater than 100.5 degrees)  · Your surgeon will instruct you regarding suture or staple removal     IV. Medications:      1. Anticoagulants: You will be discharged on an anticoagulant. This is a prophylactic medication that helps prevent blood clots during your post-operative period. The type and length of dosage varies based on your individual needs, procedure performed, and surgeon’s preference.     · While taking the anticoagulant, you should avoid taking any additional aspirin, ibuprofen (Advil or Motrin), Aleve (Naprosyn) or other non-steroidal anti-inflammatory medications.   · Notify surgeon immediately if any bo bleeding is noted in the urine, stool, emesis, or from the nose or the incision. Blood in the stool will often appear as black rather than red. Blood in urine may appear as pink. Blood in emesis may appear as brown/black like coffee grounds.  · You will need to apply pressure for longer periods of time to any cuts or abrasions to stop bleeding  · Avoid alcohol while taking anticoagulants     2. Stool Softeners: You will be at greater risk of constipation after surgery due to being less mobile and the pain medications.    · Take stool softeners as instructed by your surgeon while on pain medications. Bran cereal is most effective. Over the counter Colace 100 mg 1-2 capsules twice daily.   · Drink plenty of fluids, and eat fruits and vegetables during your recovery time     3. Pain Medications utilized after surgery are narcotics and the law requires that the following information be given to all patients that are prescribed narcotics:  · CLASSIFICATION: Pain medications are called Opioids and are narcotics  · LEGALITIES: It is illegal to share narcotics with others and to drive within 24 hours of taking narcotics  · POTENTIAL SIDE EFFECTS: Potential side effects of opioids include: nausea, vomiting, itching, dizziness, drowsiness, dry mouth, constipation, and difficulty urinating.  · POTENTIAL ADVERSE EFFECTS:   ¨ Opioid tolerance can develop with use of pain medications and this simply means that it requires more and more of the medication to control pain; however, this is seen more in patients that use opioids for longer periods of time.  ¨ Opioid dependence can develop with use of Opioids and this simply means that to stop the medication can cause withdrawal symptoms; however, this is seen with patients that use Opioids for longer periods of time.  ¨ Opioid addiction can develop with use of Opioids and the incidence of this is very unlikely in patients who take the medications as ordered and stop the medications as instructed.  ¨ Opioid overdose can be dangerous, but is unlikely when the medication is taken as ordered and stopped when ordered. It is important not to mix opioids with alcohol or with and type of sedative such as Benadryl as this can lead to over sedation and respiratory difficulty.  · DOSAGE:   ¨ Pain medications will need to be taken consistently for the first week to decrease pain and promote adequate pain relief and participation in physical therapy.  ¨ After the initial surgical pain begins to resolve, you may  begin to decrease the pain medication. By the end of 6-8 weeks, you should be off of pain medications.  ¨ Refills will not be given by the office during evening hours, on weekends, or after 6-8 weeks post-op.  ¨ To seek refills on pain medications during the initial 6 week post-operative period, you must call the office 48 hours in advance to request the refill. The office will then notify you when to  the prescription. DO NOT wait until you are out of the medication to request a refill.     V. FOLLOW-UP VISITS:  · You will need to follow up in the office with your surgeon in 3 weeks. Please call this number [phone] to schedule this appointment.  If you have any concerns or suspected complications prior to your follow up visit, please call your surgeons office. Do not wait until your appointment time if you suspect complications. These will need to be addressed in the office promptly.        Discharge Medications:   1) Percocet 5/325 1-2 po q 4-6 hours for pain control  2) Eliquis 2.5 mg every 12 hours for 3 weeks then restart ASA.

## 2017-07-11 NOTE — PROGRESS NOTES
Continued Stay Note  [facility]     Patient Name: Maria Ines Zhang  MRN: [id number]  Today's Date: 7/11/2017    Admit Date: 7/6/2017          Discharge Plan       07/11/17 1434    Case Management/Social Work Plan    Plan Home with [facility] to follow.    Patient/Family In Agreement With Plan yes    Final Note    Final Note Patient was DC'd home 7/8 with [facility] to follow.              Discharge Codes       07/11/17 1434    Discharge Codes    Discharge Codes 06  Discharged/transferred to home under care of organized home health service organization in anticipation of skilled care        Expected Discharge Date and Time     Expected Discharge Date Expected Discharge Time    Jul 8, 2017             Becky S. [place], RN

## 2017-07-11 NOTE — PROGRESS NOTES
Subjective:     Encounter Date:06/28/2017      Patient ID: Maria Ines Zhang is a 71 y.o. female.    Chief Complaint: history of stroke and PE    History of Present Illness    Dear Dr. Hardy,    I had the pleasure of seeing Maria Ines Zhang in cardiac follow-up.  As you well know, she is a govind 71-year-old woman with history of prior cryptogenic stroke.  She was thought to have a small PFO but I could not detect this on catheterization.  She had a DVT and pulmonary embolus that was treated with rivaroxaban.  She has since discontinued this medicine.    She comes in for preop surgical assessment.  She will have a knee arthroscopy on July 6 by Dr. De La Vega.    She's done quite well from a cardiac standpoint.  She has no complaints.  She does have some chronic dyspnea which is due to lung disease.    Review of Systems   All other systems reviewed and are negative.        ECG 12 Lead  Date/Time: 7/11/2017 2:40 PM  Performed by: ROBBI RICHARDS  Authorized by: ROBBI RICHARDS   Comparison: compared with previous ECG   Similar to previous ECG  Rhythm: sinus rhythm  BPM: 74  Clinical impression: normal ECG               Objective:     Physical Exam   Constitutional: She is oriented to person, place, and time. She appears well-developed and well-nourished.   HENT:   Head: Normocephalic and atraumatic.   Neck: Normal range of motion. Neck supple.   Cardiovascular: Normal rate, regular rhythm and normal heart sounds.    Pulmonary/Chest: Effort normal and breath sounds normal.   Abdominal: Soft. Bowel sounds are normal.   Musculoskeletal: Normal range of motion.   Neurological: She is alert and oriented to person, place, and time.   Skin: Skin is warm and dry.   Psychiatric: She has a normal mood and affect. Her behavior is normal. Thought content normal.   Vitals reviewed.      Lab Review:       Assessment:          Diagnosis Plan   1. Other pulmonary embolism without acute cor pulmonale, unspecified chronicity            Plan:        There was a pleasure to see Maria Ines Zhang in cardiac follow-up today.  She is doing quite well from a cardiac standpoint.  We talked about the risks of recurrent DVT and pulmonary embolus after knee surgery.  We have both decided that prophylactic placement of an IVC filter is not warranted.  She will see me again at her previously scheduled appointment or sooner if she develops any difficulties.

## 2017-07-14 RX ORDER — ESCITALOPRAM OXALATE 20 MG/1
TABLET ORAL
Qty: 90 TABLET | Refills: 0 | Status: SHIPPED | OUTPATIENT
Start: 2017-07-14 | End: 2017-07-19 | Stop reason: HOSPADM

## 2017-07-16 ENCOUNTER — HOSPITAL ENCOUNTER (INPATIENT)
Facility: HOSPITAL | Age: 72
LOS: 2 days | Discharge: HOME HEALTH | End: 2017-07-19
Attending: EMERGENCY MEDICINE | Admitting: INTERNAL MEDICINE

## 2017-07-16 DIAGNOSIS — R79.89 ELEVATED LACTIC ACID LEVEL: ICD-10-CM

## 2017-07-16 DIAGNOSIS — R26.89 IMPAIRED GAIT AND MOBILITY: ICD-10-CM

## 2017-07-16 DIAGNOSIS — R50.9 FEVER IN ADULT: Primary | ICD-10-CM

## 2017-07-16 DIAGNOSIS — M17.11 PRIMARY OSTEOARTHRITIS OF RIGHT KNEE: ICD-10-CM

## 2017-07-16 DIAGNOSIS — D62 ACUTE BLOOD LOSS ANEMIA: ICD-10-CM

## 2017-07-16 DIAGNOSIS — R11.2 NON-INTRACTABLE VOMITING WITH NAUSEA, UNSPECIFIED VOMITING TYPE: ICD-10-CM

## 2017-07-16 PROBLEM — N39.0 UTI (URINARY TRACT INFECTION): Status: ACTIVE | Noted: 2017-07-16

## 2017-07-16 LAB
ALBUMIN SERPL-MCNC: 3.9 G/DL (ref 3.5–5.2)
ALBUMIN/GLOB SERPL: 1.1 G/DL
ALP SERPL-CCNC: 76 U/L (ref 39–117)
ALT SERPL W P-5'-P-CCNC: 12 U/L (ref 1–33)
ANION GAP SERPL CALCULATED.3IONS-SCNC: 15.9 MMOL/L
AST SERPL-CCNC: 16 U/L (ref 1–32)
B PERT DNA SPEC QL NAA+PROBE: NOT DETECTED
BACTERIA UR QL AUTO: ABNORMAL /HPF
BASOPHILS # BLD AUTO: 0.02 10*3/MM3 (ref 0–0.2)
BASOPHILS NFR BLD AUTO: 0.2 % (ref 0–1.5)
BILIRUB SERPL-MCNC: 0.3 MG/DL (ref 0.1–1.2)
BILIRUB UR QL STRIP: NEGATIVE
BUN BLD-MCNC: 10 MG/DL (ref 8–23)
BUN/CREAT SERPL: 15.2 (ref 7–25)
C PNEUM DNA NPH QL NAA+NON-PROBE: NOT DETECTED
CALCIUM SPEC-SCNC: 9.6 MG/DL (ref 8.6–10.5)
CHLORIDE SERPL-SCNC: 98 MMOL/L (ref 98–107)
CLARITY UR: CLEAR
CO2 SERPL-SCNC: 24.1 MMOL/L (ref 22–29)
COLOR UR: ABNORMAL
CREAT BLD-MCNC: 0.66 MG/DL (ref 0.57–1)
D-LACTATE SERPL-SCNC: 1.2 MMOL/L (ref 0.5–2)
D-LACTATE SERPL-SCNC: 2.7 MMOL/L (ref 0.5–2)
DEPRECATED RDW RBC AUTO: 47.3 FL (ref 37–54)
EOSINOPHIL # BLD AUTO: 0.1 10*3/MM3 (ref 0–0.7)
EOSINOPHIL NFR BLD AUTO: 1.2 % (ref 0.3–6.2)
ERYTHROCYTE [DISTWIDTH] IN BLOOD BY AUTOMATED COUNT: 14.6 % (ref 11.7–13)
FLUAV H1 2009 PAND RNA NPH QL NAA+PROBE: NOT DETECTED
FLUAV H1 HA GENE NPH QL NAA+PROBE: NOT DETECTED
FLUAV H3 RNA NPH QL NAA+PROBE: NOT DETECTED
FLUAV SUBTYP SPEC NAA+PROBE: NOT DETECTED
FLUBV RNA ISLT QL NAA+PROBE: NOT DETECTED
GFR SERPL CREATININE-BSD FRML MDRD: 89 ML/MIN/1.73
GLOBULIN UR ELPH-MCNC: 3.5 GM/DL
GLUCOSE BLD-MCNC: 104 MG/DL (ref 65–99)
GLUCOSE UR STRIP-MCNC: NEGATIVE MG/DL
HADV DNA SPEC NAA+PROBE: NOT DETECTED
HCOV 229E RNA SPEC QL NAA+PROBE: NOT DETECTED
HCOV HKU1 RNA SPEC QL NAA+PROBE: NOT DETECTED
HCOV NL63 RNA SPEC QL NAA+PROBE: NOT DETECTED
HCOV OC43 RNA SPEC QL NAA+PROBE: NOT DETECTED
HCT VFR BLD AUTO: 29.4 % (ref 35.6–45.5)
HGB BLD-MCNC: 9.3 G/DL (ref 11.9–15.5)
HGB UR QL STRIP.AUTO: NEGATIVE
HMPV RNA NPH QL NAA+NON-PROBE: NOT DETECTED
HOLD SPECIMEN: NORMAL
HPIV1 RNA SPEC QL NAA+PROBE: NOT DETECTED
HPIV2 RNA SPEC QL NAA+PROBE: NOT DETECTED
HPIV3 RNA NPH QL NAA+PROBE: NOT DETECTED
HPIV4 P GENE NPH QL NAA+PROBE: NOT DETECTED
HYALINE CASTS UR QL AUTO: ABNORMAL /LPF
IMM GRANULOCYTES # BLD: 0.02 10*3/MM3 (ref 0–0.03)
IMM GRANULOCYTES NFR BLD: 0.2 % (ref 0–0.5)
KETONES UR QL STRIP: ABNORMAL
LEUKOCYTE ESTERASE UR QL STRIP.AUTO: ABNORMAL
LIPASE SERPL-CCNC: 22 U/L (ref 13–60)
LYMPHOCYTES # BLD AUTO: 1.79 10*3/MM3 (ref 0.9–4.8)
LYMPHOCYTES NFR BLD AUTO: 21.8 % (ref 19.6–45.3)
M PNEUMO IGG SER IA-ACNC: NOT DETECTED
MCH RBC QN AUTO: 28 PG (ref 26.9–32)
MCHC RBC AUTO-ENTMCNC: 31.6 G/DL (ref 32.4–36.3)
MCV RBC AUTO: 88.6 FL (ref 80.5–98.2)
MONOCYTES # BLD AUTO: 0.61 10*3/MM3 (ref 0.2–1.2)
MONOCYTES NFR BLD AUTO: 7.4 % (ref 5–12)
NEUTROPHILS # BLD AUTO: 5.68 10*3/MM3 (ref 1.9–8.1)
NEUTROPHILS NFR BLD AUTO: 69.2 % (ref 42.7–76)
NITRITE UR QL STRIP: NEGATIVE
PH UR STRIP.AUTO: 7.5 [PH] (ref 5–8)
PLATELET # BLD AUTO: 510 10*3/MM3 (ref 140–500)
PMV BLD AUTO: 9.3 FL (ref 6–12)
POTASSIUM BLD-SCNC: 3.6 MMOL/L (ref 3.5–5.2)
PROT SERPL-MCNC: 7.4 G/DL (ref 6–8.5)
PROT UR QL STRIP: ABNORMAL
RBC # BLD AUTO: 3.32 10*6/MM3 (ref 3.9–5.2)
RBC # UR: ABNORMAL /HPF
REF LAB TEST METHOD: ABNORMAL
RENAL EPI CELLS #/AREA URNS HPF: ABNORMAL /HPF
RHINOVIRUS RNA SPEC NAA+PROBE: NOT DETECTED
RSV RNA NPH QL NAA+NON-PROBE: NOT DETECTED
SODIUM BLD-SCNC: 138 MMOL/L (ref 136–145)
SP GR UR STRIP: 1.02 (ref 1–1.03)
SQUAMOUS #/AREA URNS HPF: ABNORMAL /HPF
UROBILINOGEN UR QL STRIP: ABNORMAL
WBC NRBC COR # BLD: 8.22 10*3/MM3 (ref 4.5–10.7)
WBC UR QL AUTO: ABNORMAL /HPF
WHOLE BLOOD HOLD SPECIMEN: NORMAL

## 2017-07-16 PROCEDURE — 83605 ASSAY OF LACTIC ACID: CPT | Performed by: PHYSICIAN ASSISTANT

## 2017-07-16 PROCEDURE — G0378 HOSPITAL OBSERVATION PER HR: HCPCS

## 2017-07-16 PROCEDURE — 87581 M.PNEUMON DNA AMP PROBE: CPT | Performed by: INTERNAL MEDICINE

## 2017-07-16 PROCEDURE — 99284 EMERGENCY DEPT VISIT MOD MDM: CPT

## 2017-07-16 PROCEDURE — 83605 ASSAY OF LACTIC ACID: CPT | Performed by: EMERGENCY MEDICINE

## 2017-07-16 PROCEDURE — 94799 UNLISTED PULMONARY SVC/PX: CPT

## 2017-07-16 PROCEDURE — 87086 URINE CULTURE/COLONY COUNT: CPT | Performed by: EMERGENCY MEDICINE

## 2017-07-16 PROCEDURE — 87798 DETECT AGENT NOS DNA AMP: CPT | Performed by: INTERNAL MEDICINE

## 2017-07-16 PROCEDURE — 80053 COMPREHEN METABOLIC PANEL: CPT | Performed by: EMERGENCY MEDICINE

## 2017-07-16 PROCEDURE — 85025 COMPLETE CBC W/AUTO DIFF WBC: CPT | Performed by: EMERGENCY MEDICINE

## 2017-07-16 PROCEDURE — 83690 ASSAY OF LIPASE: CPT | Performed by: EMERGENCY MEDICINE

## 2017-07-16 PROCEDURE — 94640 AIRWAY INHALATION TREATMENT: CPT

## 2017-07-16 PROCEDURE — 25010000002 ONDANSETRON PER 1 MG: Performed by: PHYSICIAN ASSISTANT

## 2017-07-16 PROCEDURE — 87486 CHLMYD PNEUM DNA AMP PROBE: CPT | Performed by: INTERNAL MEDICINE

## 2017-07-16 PROCEDURE — 81001 URINALYSIS AUTO W/SCOPE: CPT | Performed by: EMERGENCY MEDICINE

## 2017-07-16 PROCEDURE — 25010000002 CEFTRIAXONE PER 250 MG: Performed by: INTERNAL MEDICINE

## 2017-07-16 PROCEDURE — 87633 RESP VIRUS 12-25 TARGETS: CPT | Performed by: INTERNAL MEDICINE

## 2017-07-16 RX ORDER — ACETAMINOPHEN 325 MG/1
650 TABLET ORAL EVERY 6 HOURS PRN
Status: DISCONTINUED | OUTPATIENT
Start: 2017-07-16 | End: 2017-07-19 | Stop reason: HOSPADM

## 2017-07-16 RX ORDER — ACETAMINOPHEN 500 MG
1000 TABLET ORAL ONCE
Status: COMPLETED | OUTPATIENT
Start: 2017-07-16 | End: 2017-07-16

## 2017-07-16 RX ORDER — BUDESONIDE AND FORMOTEROL FUMARATE DIHYDRATE 160; 4.5 UG/1; UG/1
2 AEROSOL RESPIRATORY (INHALATION)
Status: DISCONTINUED | OUTPATIENT
Start: 2017-07-16 | End: 2017-07-19 | Stop reason: HOSPADM

## 2017-07-16 RX ORDER — ALBUTEROL SULFATE 2.5 MG/3ML
2.5 SOLUTION RESPIRATORY (INHALATION) EVERY 6 HOURS PRN
Status: DISCONTINUED | OUTPATIENT
Start: 2017-07-16 | End: 2017-07-19 | Stop reason: HOSPADM

## 2017-07-16 RX ORDER — ATORVASTATIN CALCIUM 10 MG/1
10 TABLET, FILM COATED ORAL DAILY
Status: DISCONTINUED | OUTPATIENT
Start: 2017-07-17 | End: 2017-07-19 | Stop reason: HOSPADM

## 2017-07-16 RX ORDER — ESCITALOPRAM OXALATE 20 MG/1
20 TABLET ORAL DAILY
Status: DISCONTINUED | OUTPATIENT
Start: 2017-07-17 | End: 2017-07-19 | Stop reason: HOSPADM

## 2017-07-16 RX ORDER — CYCLOBENZAPRINE HCL 10 MG
10 TABLET ORAL 3 TIMES DAILY PRN
Status: DISCONTINUED | OUTPATIENT
Start: 2017-07-16 | End: 2017-07-19 | Stop reason: HOSPADM

## 2017-07-16 RX ORDER — LEVOTHYROXINE SODIUM 88 UG/1
88 TABLET ORAL DAILY
Status: DISCONTINUED | OUTPATIENT
Start: 2017-07-17 | End: 2017-07-19 | Stop reason: HOSPADM

## 2017-07-16 RX ORDER — SODIUM CHLORIDE 0.9 % (FLUSH) 0.9 %
1-10 SYRINGE (ML) INJECTION AS NEEDED
Status: DISCONTINUED | OUTPATIENT
Start: 2017-07-16 | End: 2017-07-19 | Stop reason: HOSPADM

## 2017-07-16 RX ORDER — PANTOPRAZOLE SODIUM 40 MG/1
40 TABLET, DELAYED RELEASE ORAL EVERY MORNING
Status: DISCONTINUED | OUTPATIENT
Start: 2017-07-17 | End: 2017-07-19 | Stop reason: HOSPADM

## 2017-07-16 RX ORDER — SODIUM CHLORIDE 9 MG/ML
50 INJECTION, SOLUTION INTRAVENOUS CONTINUOUS
Status: DISCONTINUED | OUTPATIENT
Start: 2017-07-16 | End: 2017-07-18

## 2017-07-16 RX ORDER — ONDANSETRON 2 MG/ML
4 INJECTION INTRAMUSCULAR; INTRAVENOUS ONCE
Status: COMPLETED | OUTPATIENT
Start: 2017-07-16 | End: 2017-07-16

## 2017-07-16 RX ORDER — UREA 10 %
54 LOTION (ML) TOPICAL DAILY
Status: DISCONTINUED | OUTPATIENT
Start: 2017-07-17 | End: 2017-07-19 | Stop reason: HOSPADM

## 2017-07-16 RX ORDER — SODIUM CHLORIDE 0.9 % (FLUSH) 0.9 %
10 SYRINGE (ML) INJECTION AS NEEDED
Status: DISCONTINUED | OUTPATIENT
Start: 2017-07-16 | End: 2017-07-19 | Stop reason: HOSPADM

## 2017-07-16 RX ORDER — LISINOPRIL 5 MG/1
5 TABLET ORAL DAILY
Status: DISCONTINUED | OUTPATIENT
Start: 2017-07-17 | End: 2017-07-19 | Stop reason: HOSPADM

## 2017-07-16 RX ORDER — OXYCODONE HYDROCHLORIDE AND ACETAMINOPHEN 5; 325 MG/1; MG/1
2 TABLET ORAL EVERY 4 HOURS PRN
Status: DISCONTINUED | OUTPATIENT
Start: 2017-07-16 | End: 2017-07-19 | Stop reason: HOSPADM

## 2017-07-16 RX ORDER — CEFTRIAXONE SODIUM 1 G/50ML
1 INJECTION, SOLUTION INTRAVENOUS EVERY 24 HOURS
Status: DISCONTINUED | OUTPATIENT
Start: 2017-07-16 | End: 2017-07-19 | Stop reason: HOSPADM

## 2017-07-16 RX ORDER — CLONAZEPAM 0.5 MG/1
0.5 TABLET ORAL NIGHTLY
Status: DISCONTINUED | OUTPATIENT
Start: 2017-07-16 | End: 2017-07-19 | Stop reason: HOSPADM

## 2017-07-16 RX ADMIN — SODIUM CHLORIDE 2000 ML: 9 INJECTION, SOLUTION INTRAVENOUS at 15:33

## 2017-07-16 RX ADMIN — OXYCODONE HYDROCHLORIDE AND ACETAMINOPHEN 1 TABLET: 5; 325 TABLET ORAL at 22:24

## 2017-07-16 RX ADMIN — CYCLOBENZAPRINE HYDROCHLORIDE 10 MG: 10 TABLET, FILM COATED ORAL at 22:25

## 2017-07-16 RX ADMIN — SODIUM CHLORIDE 50 ML/HR: 9 INJECTION, SOLUTION INTRAVENOUS at 18:19

## 2017-07-16 RX ADMIN — CEFTRIAXONE SODIUM 1 G: 1 INJECTION, SOLUTION INTRAVENOUS at 21:56

## 2017-07-16 RX ADMIN — ONDANSETRON 4 MG: 2 INJECTION INTRAMUSCULAR; INTRAVENOUS at 14:08

## 2017-07-16 RX ADMIN — ACETAMINOPHEN 650 MG: 325 TABLET ORAL at 21:15

## 2017-07-16 RX ADMIN — ACETAMINOPHEN 1000 MG: 500 TABLET ORAL at 15:31

## 2017-07-16 RX ADMIN — BUDESONIDE AND FORMOTEROL FUMARATE DIHYDRATE 2 PUFF: 160; 4.5 AEROSOL RESPIRATORY (INHALATION) at 23:11

## 2017-07-16 RX ADMIN — SODIUM CHLORIDE 1000 ML: 9 INJECTION, SOLUTION INTRAVENOUS at 14:08

## 2017-07-16 RX ADMIN — CLONAZEPAM 0.5 MG: 0.5 TABLET ORAL at 21:14

## 2017-07-16 NOTE — ED PROVIDER NOTES
EMERGENCY DEPARTMENT ENCOUNTER    CHIEF COMPLAINT  Chief Complaint: nausea and vomiting  History given by:patient, family  History limited by:nothing  Room Number: 19/19  PMD: Joao Hardy MD      HPI:  Pt is a 71 y.o. female who presents with nausea and vomiting onset 2 days ago. Pt also c/o fever, chills, diaphoresis at night, mild dysuria but denies difficulty ambulating, chest pain, SOA, missing any doses of Xarelto, diarrhea, abd pain. Per family pt had UTI prior to R knee replacement surgery 10 days ago but was not given abx.    Duration: 2 days  Timing:constant  Location:n/a  Radiation:n/a  Quality:not described  Intensity/Severity:moderate  Progression:unchanged  Associated Symptoms:fever, chills, diaphoresis, mild dysuria  Aggravating Factors:none  Alleviating Factors:none  Previous Episodes:none  Treatment before arrival:none    MEDICAL RECORD REVIEW    Pt had total knee replacement 7/6/17 by Dr. De La Vega. Pt c/o vomiting and fever and has hx of htn, MR insufficiency. Pt has had a previous PE on Xarelto.    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   • Primary osteoarthritis of right knee 05/19/2016   • Shoulder, capsulitis, adhesive 05/19/2016   • Laceration of right lower extremity 07/08/2016   • Hyperglycemia 07/08/2016   • Abrasion of arm, right 08/24/2016   • Biceps tendinitis 08/31/2016   • Cough 11/02/2016   • Bronchitis 11/02/2016   • Subacromial bursitis, right 12/06/2016   • Osteoarthritis, knee 07/06/2017     Resolved Ambulatory Problems     Diagnosis Date Noted   • Follow-up examination, following other surgery 03/21/2016   • Acute cystitis without hematuria 04/27/2016     Past Medical History:   Diagnosis Date   • Acute sinusitis    • Anxiety     • Asthma    • Backache    • Benign essential hypertension    • Bruises easily    • Depression    • Dysuria    • Fatigue    • GERD (gastroesophageal reflux disease)    • Hyperlipidemia    • Hypertension    • Hypothyroidism    • Joint pain, knee    • Knee swelling    • Mitral valve insufficiency    • MRSA infection within last 3 months 05/2017   • Obesity    • Obstructive sleep apnea    • Osteoarthritis    • Patent foramen ovale    • Pulmonary embolism    • Pulmonary hypertension    • Sciatica    • Stroke syndrome    • Venous stasis        PAST SURGICAL HISTORY  Past Surgical History:   Procedure Laterality Date   • BACK SURGERY  2011   • CATARACT EXTRACTION W/ INTRAOCULAR LENS IMPLANT Bilateral 2016   • HYSTERECTOMY     • MA TOTAL KNEE ARTHROPLASTY Right 7/6/2017    Procedure: TOTAL KNEE ARTHROPLASTY;  Surgeon: Dragan De La Vega MD;  Location: [facility] OR;  Service: Orthopedics   • SPINE SURGERY     • THORACOSCOPY Right 02/25/2016    RT VATS WEDGE RESECTION   • TOE AMPUTATION Right 05/2017    SECOND TOE   • TOTAL KNEE ARTHROPLASTY Left        FAMILY HISTORY  Family History   Problem Relation Age of Onset   • Heart disease Mother    • Diabetes Mother    • Cancer Father    • Heart attack Brother    • Heart disease Brother    • No Known Problems Maternal Grandmother    • No Known Problems Maternal Grandfather    • No Known Problems Paternal Grandmother    • No Known Problems Paternal Grandfather    • Malig Hyperthermia Neg Hx        SOCIAL HISTORY  Social History     Social History   • Marital status:      Spouse name: N/A   • Number of children: N/A   • Years of education: N/A     Occupational History   • Not on file.     Social History Main Topics   • Smoking status: Never Smoker   • Smokeless tobacco: Never Used   • Alcohol use No   • Drug use: No   • Sexual activity: Defer     Other Topics Concern   • Not on file     Social History Narrative       ALLERGIES  Fish allergy; Shellfish allergy; and Sulfa  antibiotics    REVIEW OF SYSTEMS  Review of Systems   Constitutional: Positive for chills, diaphoresis and fever.   HENT: Negative for sore throat.    Respiratory: Negative for shortness of breath.    Cardiovascular: Negative for chest pain.   Gastrointestinal: Positive for nausea and vomiting.   Genitourinary: Positive for dysuria (mild).   Musculoskeletal: Negative for back pain.   Skin: Negative for rash.   Neurological: Negative for dizziness.   Psychiatric/Behavioral: The patient is not nervous/anxious.        PHYSICAL EXAM  ED Triage Vitals   Temp Heart Rate Resp BP SpO2   07/16/17 1243 07/16/17 1243 07/16/17 1243 07/16/17 1259 07/16/17 1243   98.6 °F (37 °C) 96 18 192/79 100 %      Temp src Heart Rate Source Patient Position BP Location FiO2 (%)   07/16/17 1243 07/16/17 1243 07/16/17 1259 07/16/17 1259 --   Tympanic Monitor Sitting Right arm        Physical Exam   Constitutional: She is oriented to person, place, and time and well-developed, well-nourished, and in no distress. No distress.   HENT:   Head: Normocephalic and atraumatic.   Mouth/Throat: Oropharynx is clear and moist.   Eyes: EOM are normal.   Neck: Normal range of motion. Neck supple.   Cardiovascular: Normal rate and regular rhythm.    Pulmonary/Chest: Effort normal and breath sounds normal. No respiratory distress. She has no wheezes. She exhibits no tenderness.   Abdominal: Soft. She exhibits no distension. There is tenderness (mild) in the right upper quadrant. There is no rebound.   Musculoskeletal: Normal range of motion. She exhibits no edema.   Lymphadenopathy:     She has no cervical adenopathy.   Neurological: She is alert and oriented to person, place, and time.   Skin: Skin is warm and dry. No rash noted. No pallor.   Incision over R patella is stapled closed. No dehiscence, redness, bleeding, or drainage.    Psychiatric: Mood, memory, affect and judgment normal.   Nursing note and vitals reviewed.      LAB RESULTS  Recent Results  (from the past 24 hour(s))   Comprehensive Metabolic Panel    Collection Time: 07/16/17  1:49 PM   Result Value Ref Range    Glucose 104 (H) 65 - 99 mg/dL    BUN 10 8 - 23 mg/dL    Creatinine 0.66 0.57 - 1.00 mg/dL    Sodium 138 136 - 145 mmol/L    Potassium 3.6 3.5 - 5.2 mmol/L    Chloride 98 98 - 107 mmol/L    CO2 24.1 22.0 - 29.0 mmol/L    Calcium 9.6 8.6 - 10.5 mg/dL    Total Protein 7.4 6.0 - 8.5 g/dL    Albumin 3.90 3.50 - 5.20 g/dL    ALT (SGPT) 12 1 - 33 U/L    AST (SGOT) 16 1 - 32 U/L    Alkaline Phosphatase 76 39 - 117 U/L    Total Bilirubin 0.3 0.1 - 1.2 mg/dL    eGFR Non African Amer 89 >60 mL/min/1.73    Globulin 3.5 gm/dL    A/G Ratio 1.1 g/dL    BUN/Creatinine Ratio 15.2 7.0 - 25.0    Anion Gap 15.9 mmol/L   Lipase    Collection Time: 07/16/17  1:49 PM   Result Value Ref Range    Lipase 22 13 - 60 U/L   Light Blue Top    Collection Time: 07/16/17  1:49 PM   Result Value Ref Range    Extra Tube hold for add-on    Gold Top - SST    Collection Time: 07/16/17  1:49 PM   Result Value Ref Range    Extra Tube Hold for add-ons.    CBC Auto Differential    Collection Time: 07/16/17  1:49 PM   Result Value Ref Range    WBC 8.22 4.50 - 10.70 10*3/mm3    RBC 3.32 (L) 3.90 - 5.20 10*6/mm3    Hemoglobin 9.3 (L) 11.9 - 15.5 g/dL    Hematocrit 29.4 (L) 35.6 - 45.5 %    MCV 88.6 80.5 - 98.2 fL    MCH 28.0 26.9 - 32.0 pg    MCHC 31.6 (L) 32.4 - 36.3 g/dL    RDW 14.6 (H) 11.7 - 13.0 %    RDW-SD 47.3 37.0 - 54.0 fl    MPV 9.3 6.0 - 12.0 fL    Platelets 510 (H) 140 - 500 10*3/mm3    Neutrophil % 69.2 42.7 - 76.0 %    Lymphocyte % 21.8 19.6 - 45.3 %    Monocyte % 7.4 5.0 - 12.0 %    Eosinophil % 1.2 0.3 - 6.2 %    Basophil % 0.2 0.0 - 1.5 %    Immature Grans % 0.2 0.0 - 0.5 %    Neutrophils, Absolute 5.68 1.90 - 8.10 10*3/mm3    Lymphocytes, Absolute 1.79 0.90 - 4.80 10*3/mm3    Monocytes, Absolute 0.61 0.20 - 1.20 10*3/mm3    Eosinophils, Absolute 0.10 0.00 - 0.70 10*3/mm3    Basophils, Absolute 0.02 0.00 - 0.20 10*3/mm3     Immature Grans, Absolute 0.02 0.00 - 0.03 10*3/mm3   Urinalysis With / Culture If Indicated    Collection Time: 07/16/17  2:04 PM   Result Value Ref Range    Color, UA Dark Yellow (A) Yellow, Straw    Appearance, UA Clear Clear    pH, UA 7.5 5.0 - 8.0    Specific Gravity, UA 1.022 1.005 - 1.030    Glucose, UA Negative Negative    Ketones, UA 15 mg/dL (1+) (A) Negative    Bilirubin, UA Negative Negative    Blood, UA Negative Negative    Protein, UA 30 mg/dL (1+) (A) Negative    Leuk Esterase, UA Moderate (2+) (A) Negative    Nitrite, UA Negative Negative    Urobilinogen, UA 1.0 E.U./dL 0.2 - 1.0 E.U./dL   Urinalysis, Microscopic Only    Collection Time: 07/16/17  2:04 PM   Result Value Ref Range    RBC, UA None Seen None Seen, 0-2 /HPF    WBC, UA 13-20 (A) None Seen, 0-2 /HPF    Bacteria, UA None Seen None Seen /HPF    Squamous Epithelial Cells, UA 3-6 (A) None Seen, 0-2 /HPF    Renal Epithelial Cells, UA 3-6 (A) 0 - 2 /HPF    Hyaline Casts, UA None Seen None Seen /LPF    Methodology Manual Light Microscopy    Lactic Acid, Plasma    Collection Time: 07/16/17  2:07 PM   Result Value Ref Range    Lactate 2.7 (C) 0.5 - 2.0 mmol/L     I ordered the above labs and reviewed the results      COURSE & MEDICAL DECISION MAKING  Pertinent Labs that were ordered and reviewed are noted above.  Results were reviewed/discussed with the patient and they were also made aware of online assess.  Pt also made aware that some labs, such as cultures, will not be resulted during ER visit and follow up with PMD is necessary.       PROGRESS AND CONSULTS    Progress Notes:    1438 Hgb and hematocrit are slightly reduced from 9 days ago, but pt is s/p surgery. Placed call to Dr. De La Vega, orthopedic surgery.    1508 Call returned from Dr. Wang, orthopedic surgery, who recommends no additional imaging and agrees to consult. He will come see pt in ED.    1518 Placed call to A to admit pt.    1524 Rechecked pt who is resting comfortably.  "Dr. Wang is at bedside. Nausea has improved but pt is febrile. D/w pt and family plan to admit pt for further evaluation and tx. Pt and family understand and agree with the plan. All questions answered. Ordered tylenol for fever.    1547 Call returned from Dr. Vargas ([facility]) who will admit pt t med/surg.    1626 Reviewed pt's history and workup with Dr. Benito.  After a bedside evaluation; Dr Benito agrees with the plan of care    Based on the patient's lab findings and presenting symptoms, the doctor and I feel it is appropriate to admit the patient for further management, evaluation, and treatment.  I have discussed this with the admitting team.  I have also discussed this with the patient/family.  They are in agreement with admission.        MEDICATIONS GIVEN IN ER  Medications   sodium chloride 0.9 % flush 10 mL (not administered)   sodium chloride 0.9 % bolus 1,000 mL (0 mL Intravenous Stopped 7/16/17 1526)   ondansetron (ZOFRAN) injection 4 mg (4 mg Intravenous Given 7/16/17 1408)   sodium chloride 0.9 % bolus 2,000 mL (2,000 mL Intravenous New Bag 7/16/17 1533)   acetaminophen (TYLENOL) tablet 1,000 mg (1,000 mg Oral Given 7/16/17 1531)       BP (!) 192/79 (BP Location: Right arm, Patient Position: Sitting)  Pulse 96  Temp 100.3 °F (37.9 °C) (Tympanic)   Resp 18  Ht 66\" (167.6 cm)  Wt 230 lb (104 kg)  SpO2 100%  BMI 37.12 kg/m2      DIAGNOSIS  Final diagnoses:   Fever in adult   Non-intractable vomiting with nausea, unspecified vomiting type   Elevated lactic acid level       I personally scribed for Lolis Lockwood PA-C on 7/16/2017 at 4:02 PM.  Electronically signed by Josesito Schmitt on 7/16/2017 at time 4:02 PM     Documentation assistance provided by carrington Schmitt.  Information recorded by the scribe was done at my direction and has been verified and validated by me.      Josesito Schmitt  07/16/17 1626       Lolis Lockwood PA-C  07/16/17 1748       Lolis Lockwood PA-C  08/28/17 " [id number]

## 2017-07-16 NOTE — ED TRIAGE NOTES
Chief Complaint   Patient presents with   • Vomiting     for two days   • Chills     Pt had low grade temp at home 100. Pt has had night sweats and chills for three nights.

## 2017-07-16 NOTE — ED TRIAGE NOTES
Pt states had right knee replacement nine days ago, c/o chills, night sweats, n/v onset Friday. States had UTI prior to having knee replacement. States took 1000mg Tylenol at 0930 this am

## 2017-07-16 NOTE — ED PROVIDER NOTES
Pt presents complaining of nausea and vomiting onset 2 days ago. Pt recently had R knee replacement surgery. Pt reports vomiting, fever, loss of appetite, diaphoresis, chills.  Pt denies chest pain, SOA, cold, burning with urination, cough, diarrhea, rash, tick bites, worsening knee tenderness, and exposure to sick people/spoiled food.    Physical Exam:   Pt's  lungs are CTAB, heart is RRR with no obvious murmur,   abdomen is nontender, negative Bonilla's, positive bowel sounds,  RLE approx 25-30 cm vertical surgical incision ant knee with staples in place that looks clean dry and  Intact, mild surrounding erythema and warmth appropriate post operative, no obvious cellulitis, distal ROM, pulse, sensations intact, well healed surgical scar in R foot.     I supervised care provided by the midlevel provider.    We have discussed this patient's history, physical exam, and treatment plan.   I have reviewed the note and personally saw and examined the patient and agree with the plan of care.    Documentation assistance provided by carrington Bernstein for Dr. Benito.  Information recorded by the carrington was done at my direction and has been verified and validated by me.     Yokasta Bernstein  07/16/17 7105       Thalia Benito MD  07/17/17 1129

## 2017-07-17 LAB
ALBUMIN SERPL-MCNC: 2.9 G/DL (ref 3.5–5.2)
ALBUMIN/GLOB SERPL: 1 G/DL
ALP SERPL-CCNC: 62 U/L (ref 39–117)
ALT SERPL W P-5'-P-CCNC: 9 U/L (ref 1–33)
ANION GAP SERPL CALCULATED.3IONS-SCNC: 11.2 MMOL/L
AST SERPL-CCNC: 14 U/L (ref 1–32)
BILIRUB SERPL-MCNC: <0.2 MG/DL (ref 0.1–1.2)
BUN BLD-MCNC: 7 MG/DL (ref 8–23)
BUN/CREAT SERPL: 12.1 (ref 7–25)
CALCIUM SPEC-SCNC: 8.6 MG/DL (ref 8.6–10.5)
CHLORIDE SERPL-SCNC: 109 MMOL/L (ref 98–107)
CO2 SERPL-SCNC: 23.8 MMOL/L (ref 22–29)
CREAT BLD-MCNC: 0.58 MG/DL (ref 0.57–1)
DEPRECATED RDW RBC AUTO: 49.1 FL (ref 37–54)
ERYTHROCYTE [DISTWIDTH] IN BLOOD BY AUTOMATED COUNT: 14.7 % (ref 11.7–13)
GFR SERPL CREATININE-BSD FRML MDRD: 103 ML/MIN/1.73
GLOBULIN UR ELPH-MCNC: 3 GM/DL
GLUCOSE BLD-MCNC: 102 MG/DL (ref 65–99)
HCT VFR BLD AUTO: 25 % (ref 35.6–45.5)
HCT VFR BLD AUTO: 26.3 % (ref 35.6–45.5)
HGB BLD-MCNC: 7.6 G/DL (ref 11.9–15.5)
HGB BLD-MCNC: 8.3 G/DL (ref 11.9–15.5)
MCH RBC QN AUTO: 27.4 PG (ref 26.9–32)
MCHC RBC AUTO-ENTMCNC: 30.4 G/DL (ref 32.4–36.3)
MCV RBC AUTO: 90.3 FL (ref 80.5–98.2)
PLATELET # BLD AUTO: 445 10*3/MM3 (ref 140–500)
PMV BLD AUTO: 9.4 FL (ref 6–12)
POTASSIUM BLD-SCNC: 3.9 MMOL/L (ref 3.5–5.2)
PROT SERPL-MCNC: 5.9 G/DL (ref 6–8.5)
RBC # BLD AUTO: 2.77 10*6/MM3 (ref 3.9–5.2)
SODIUM BLD-SCNC: 144 MMOL/L (ref 136–145)
WBC NRBC COR # BLD: 5.76 10*3/MM3 (ref 4.5–10.7)

## 2017-07-17 PROCEDURE — 85027 COMPLETE CBC AUTOMATED: CPT | Performed by: INTERNAL MEDICINE

## 2017-07-17 PROCEDURE — 25010000002 CEFTRIAXONE PER 250 MG: Performed by: INTERNAL MEDICINE

## 2017-07-17 PROCEDURE — 94799 UNLISTED PULMONARY SVC/PX: CPT

## 2017-07-17 PROCEDURE — 85014 HEMATOCRIT: CPT | Performed by: HOSPITALIST

## 2017-07-17 PROCEDURE — 85018 HEMOGLOBIN: CPT | Performed by: HOSPITALIST

## 2017-07-17 PROCEDURE — 80053 COMPREHEN METABOLIC PANEL: CPT | Performed by: INTERNAL MEDICINE

## 2017-07-17 RX ORDER — CALCIUM CARBONATE 200(500)MG
2 TABLET,CHEWABLE ORAL 4 TIMES DAILY PRN
Status: DISCONTINUED | OUTPATIENT
Start: 2017-07-17 | End: 2017-07-19 | Stop reason: HOSPADM

## 2017-07-17 RX ADMIN — OXYCODONE HYDROCHLORIDE AND ACETAMINOPHEN 2 TABLET: 5; 325 TABLET ORAL at 23:12

## 2017-07-17 RX ADMIN — BUDESONIDE AND FORMOTEROL FUMARATE DIHYDRATE 2 PUFF: 160; 4.5 AEROSOL RESPIRATORY (INHALATION) at 20:25

## 2017-07-17 RX ADMIN — Medication 2 TABLET: at 23:12

## 2017-07-17 RX ADMIN — PANTOPRAZOLE SODIUM 40 MG: 40 TABLET, DELAYED RELEASE ORAL at 06:10

## 2017-07-17 RX ADMIN — CYCLOBENZAPRINE HYDROCHLORIDE 10 MG: 10 TABLET, FILM COATED ORAL at 21:01

## 2017-07-17 RX ADMIN — LEVOTHYROXINE SODIUM 88 MCG: 88 TABLET ORAL at 09:35

## 2017-07-17 RX ADMIN — ESCITALOPRAM 20 MG: 20 TABLET, FILM COATED ORAL at 09:21

## 2017-07-17 RX ADMIN — ATORVASTATIN CALCIUM 10 MG: 10 TABLET, FILM COATED ORAL at 09:21

## 2017-07-17 RX ADMIN — CEFTRIAXONE SODIUM 1 G: 1 INJECTION, SOLUTION INTRAVENOUS at 20:19

## 2017-07-17 RX ADMIN — BUDESONIDE AND FORMOTEROL FUMARATE DIHYDRATE 2 PUFF: 160; 4.5 AEROSOL RESPIRATORY (INHALATION) at 09:37

## 2017-07-17 RX ADMIN — CLONAZEPAM 0.5 MG: 0.5 TABLET ORAL at 20:27

## 2017-07-17 RX ADMIN — Medication 54 MG: at 09:21

## 2017-07-17 RX ADMIN — OXYCODONE HYDROCHLORIDE AND ACETAMINOPHEN 1 TABLET: 5; 325 TABLET ORAL at 12:11

## 2017-07-17 RX ADMIN — OXYCODONE HYDROCHLORIDE AND ACETAMINOPHEN 1 TABLET: 5; 325 TABLET ORAL at 19:11

## 2017-07-17 RX ADMIN — LISINOPRIL 5 MG: 5 TABLET ORAL at 09:21

## 2017-07-17 RX ADMIN — SODIUM CHLORIDE 50 ML/HR: 9 INJECTION, SOLUTION INTRAVENOUS at 14:23

## 2017-07-17 RX ADMIN — OXYCODONE HYDROCHLORIDE AND ACETAMINOPHEN 2 TABLET: 5; 325 TABLET ORAL at 02:22

## 2017-07-17 RX ADMIN — CYCLOBENZAPRINE HYDROCHLORIDE 10 MG: 10 TABLET, FILM COATED ORAL at 12:11

## 2017-07-17 RX ADMIN — OXYCODONE HYDROCHLORIDE AND ACETAMINOPHEN 2 TABLET: 5; 325 TABLET ORAL at 06:10

## 2017-07-17 RX ADMIN — FLUCONAZOLE 250 MG: 150 TABLET ORAL at 20:18

## 2017-07-17 NOTE — PLAN OF CARE
Problem: Patient Care Overview (Adult)  Goal: Plan of Care Review  Outcome: Ongoing (interventions implemented as appropriate)    07/17/17 0342   Coping/Psychosocial Response Interventions   Plan Of Care Reviewed With patient   Patient Care Overview   Progress improving   Outcome Evaluation   Outcome Summary/Follow up Plan Afebrile. Pain well controlled. Slept with CPAP with O2 3L. IV Ceftriaxone initiated. Up with assist to the bathroom with walker . Voided adequately.        Goal: Adult Individualization and Mutuality  Outcome: Ongoing (interventions implemented as appropriate)  Goal: Discharge Needs Assessment  Outcome: Ongoing (interventions implemented as appropriate)    Problem: Pain, Acute (Adult)  Goal: Identify Related Risk Factors and Signs and Symptoms  Outcome: Outcome(s) achieved Date Met:  07/17/17  Goal: Acceptable Pain Control/Comfort Level  Outcome: Ongoing (interventions implemented as appropriate)

## 2017-07-17 NOTE — H&P
Internal medicine history and physical    INTERNAL MEDICINE   [facility]       Patient Identification:  Name: Maria Ines Zhang  Age: 71 y.o.  Sex: female  :  1945  MRN: [id number]                   Primary Care Physician: Joao Hardy MD                                   Chief Complaint:  Feeling poorly and weak since Friday.    History of Present Illness:   Patient is a 71-year-old female with past medical history as listed below underwent uneventful right total knee replacement on 2017.  Postoperatively she did fine and was discharged on 2017 and participating in physical therapy without any problem.  She says that every day is a better day for her and her knee feels great.  In that background Friday afternoon she started feeling weak had nausea and vomiting and then progressively getting weaker since then.  Yesterday she didn't have any energy and today she couldn't even get up.  Also having low-grade temperature decrease appetite and night sweats and chills.  On Friday night she does remember going to the bathroom quite often but not too much since then.  She says that her appetite is not good.  She recalls having urinary tract infection found in the preop evaluation 10 days prior to surgery but no treatment was offered in 6 weeks prior to that she had another episode of infection in the urine and has not felt great since then.  She denies any flank pain.      Past Medical History:  Past Medical History:   Diagnosis Date   • Acute sinusitis    • Anxiety    • Asthma    • Backache    • Benign essential hypertension    • Bruises easily    • Depression    • Dysuria    • Fatigue    • GERD (gastroesophageal reflux disease)    • Hyperlipidemia    • Hypertension    • Hypothyroidism    • Joint pain, knee    • Knee swelling    • Mitral valve insufficiency     nild   • MRSA infection within last 3 months 2017    RIGHT FOOT   • Obesity    • Obstructive sleep apnea    • Osteoarthritis     • Patent foramen ovale    • Pulmonary embolism     3 SEPARATE OCCASIONS   • Pulmonary hypertension    • Sciatica    • Stroke syndrome     CRYPTOGENIC STROKE   • Venous stasis      Past Surgical History:  Past Surgical History:   Procedure Laterality Date   • BACK SURGERY  2011   • CATARACT EXTRACTION W/ INTRAOCULAR LENS IMPLANT Bilateral 2016   • HYSTERECTOMY     • OK TOTAL KNEE ARTHROPLASTY Right 7/6/2017    Procedure: TOTAL KNEE ARTHROPLASTY;  Surgeon: Dragan De La Vega MD;  Location: [facility] OR;  Service: Orthopedics   • SPINE SURGERY     • THORACOSCOPY Right 02/25/2016    RT VATS WEDGE RESECTION   • TOE AMPUTATION Right 05/2017    SECOND TOE   • TOTAL KNEE ARTHROPLASTY Left       Home Meds:  Prescriptions Prior to Admission   Medication Sig Dispense Refill Last Dose   • albuterol (PROVENTIL HFA;VENTOLIN HFA) 108 (90 BASE) MCG/ACT inhaler Inhale 2 puffs every 4 (four) hours as needed for wheezing.   7/6/2017 at 0700   • clonazePAM (KlonoPIN) 0.5 MG tablet Take 0.5 mg by mouth Every Night.   7/5/2017 at 2100   • cyclobenzaprine (FLEXERIL) 10 MG tablet Take 10 mg by mouth 3 (Three) Times a Day As Needed for Muscle Spasms.   Past Month at Unknown time   • escitalopram (LEXAPRO) 20 MG tablet Take 20 mg by mouth Daily.   7/5/2017 at 2100   • esomeprazole (NexIUM) 20 MG capsule Take 40 mg by mouth every morning before breakfast.   7/6/2017 at 0700   • levothyroxine (SYNTHROID, LEVOTHROID) 88 MCG tablet Take 88 mcg by mouth Daily.   7/6/2017 at 0700   • lisinopril (PRINIVIL,ZESTRIL) 5 MG tablet Take 1 tablet by mouth Daily. (Patient taking differently: Take 10 mg by mouth Daily With Breakfast.) 30 tablet 11 7/5/2017 at 0900   • Magnesium 250 MG tablet Take 250 mg by mouth Daily.   More than a month at Unknown time   • oxyCODONE-acetaminophen (PERCOCET) 5-325 MG per tablet Take 1-2 tablets by mouth Every 4 (Four) Hours As Needed for Moderate Pain (4-6) for up to 8 days. 100 tablet 0 7/15/2017 at Unknown time   •  rivaroxaban (XARELTO) 20 MG tablet Take 20 mg by mouth Daily.      • simvastatin (ZOCOR) 10 MG tablet TAKE ONE TABLET BY MOUTH DAILY 90 tablet 0 7/5/2017 at 0900   • SYMBICORT 160-4.5 MCG/ACT inhaler INHALE TWO PUFFS BY MOUTH TWICE A DAY (RINSE MOUTH AFTER USE) 10.2 g 4 7/6/2017 at 0700   • Vitamin Mixture (ROCKY-C PO) Take 500 mg by mouth Daily.   Past Month at Unknown time   • apixaban (ELIQUIS) 2.5 MG tablet tablet Take 1 tablet by mouth Every 12 (Twelve) Hours for 21 days. 50 tablet 0    • escitalopram (LEXAPRO) 20 MG tablet TAKE ONE TABLET BY MOUTH DAILY 90 tablet 0      Current Meds:     Current Facility-Administered Medications:   •  acetaminophen (TYLENOL) tablet 650 mg, 650 mg, Oral, Q6H PRN, Lore Vargas MD  •  sodium chloride 0.9 % flush 10 mL, 10 mL, Intravenous, PRN, Thalia Benito MD  •  sodium chloride 0.9 % infusion, 50 mL/hr, Intravenous, Continuous, Lore Vargas MD, Last Rate: 50 mL/hr at 07/16/17 1819, 50 mL/hr at 07/16/17 1819  Allergies:  Allergies   Allergen Reactions   • Fish Allergy Nausea And Vomiting   • Shellfish Allergy Nausea And Vomiting   • Sulfa Antibiotics      Social History:   Social History   Substance Use Topics   • Smoking status: Never Smoker   • Smokeless tobacco: Never Used   • Alcohol use No      Family History:  Family History   Problem Relation Age of Onset   • Heart disease Mother    • Diabetes Mother    • Cancer Father    • Heart attack Brother    • Heart disease Brother    • No Known Problems Maternal Grandmother    • No Known Problems Maternal Grandfather    • No Known Problems Paternal Grandmother    • No Known Problems Paternal Grandfather    • Malig Hyperthermia Neg Hx           Review of Systems  See history of present illness and past medical history.  Constitutional: Positive for chills, diaphoresis and fever.   HENT: Negative for sore throat.   Respiratory: Negative for shortness of breath.   Cardiovascular: Negative for chest pain.   Gastrointestinal: Positive  "for nausea and vomiting.   Genitourinary: Positive for dysuria (mild).   Musculoskeletal: Negative for back pain.   Skin: Negative for rash.   Neurological: Negative for dizziness.   Psychiatric/Behavioral: The patient is not nervous/anxious.   Vitals:   /63 (BP Location: Right arm, Patient Position: Lying)  Pulse 84  Temp 97.1 °F (36.2 °C) (Oral)   Resp 16  Ht 66\" (167.6 cm)  Wt 239 lb (108 kg)  SpO2 96%  BMI 38.58 kg/m2  I/O:   Intake/Output Summary (Last 24 hours) at 07/16/17 2041  Last data filed at 07/16/17 2002   Gross per 24 hour   Intake             2000 ml   Output              200 ml   Net             1800 ml     Exam:  General Appearance:    Alert, cooperative, no distress, appears stated age   Head:    Normocephalic, without obvious abnormality, atraumatic   Eyes:    PERRL, conjunctiva/corneas clear, EOM's intact, both eyes   Ears:    Normal external ear canals, both ears   Nose:   Nares normal, septum midline, mucosa normal, no drainage    or sinus tenderness   Throat:   Lips, tongue, gums normal; oral mucosa pink and moist   Neck:   Supple, symmetrical, trachea midline, no adenopathy;     thyroid:  no enlargement/tenderness/nodules; no carotid    bruit or JVD   Back:     Symmetric, no curvature, ROM normal, no CVA tenderness   Lungs:     Clear to auscultation bilaterally, respirations unlabored   Chest Wall:    No tenderness or deformity    Heart:    Regular rate and rhythm, S1 and S2 normal, no murmur, rub   or gallop   Abdomen:     Soft, non-tender, bowel sounds active all four quadrants,     no masses, no hepatomegaly, no splenomegaly   Extremities:   Right knee surgical site is clean stapled in place no erythema or redness or tenderness noted    Pulses:   Pulses palpable in all extremities; symmetric all extremities   Skin:   Skin color normal, Skin is warm and dry,  no rashes or palpable lesions   Neurologic:   CNII-XII intact, motor strength grossly intact, sensation grossly intact " to light touch, no focal deficits noted       Data Review:      I reviewed the patient's new clinical results.    Results from last 7 days  Lab Units 07/16/17  1349   WBC 10*3/mm3 8.22   HEMOGLOBIN g/dL 9.3*   PLATELETS 10*3/mm3 510*       Results from last 7 days  Lab Units 07/16/17  1349   SODIUM mmol/L 138   POTASSIUM mmol/L 3.6   CHLORIDE mmol/L 98   CO2 mmol/L 24.1   BUN mg/dL 10   CREATININE mg/dL 0.66   CALCIUM mg/dL 9.6   GLUCOSE mg/dL 104*   Urinalysis reviewed and consistent with probable urinary tract infection    Assessment:  Active Hospital Problems (** Indicates Principal Problem)    Diagnosis Date Noted   • **Fever in adult [R50.9] 07/16/2017   • UTI (urinary tract infection) [N39.0] 07/16/2017   • Primary osteoarthritis of right knee [M17.11] 05/19/2016   • Essential hypertension [I10] 04/27/2016   • HLD (hyperlipidemia) [E78.5] 04/27/2016   • Acquired hypothyroidism [E03.9] 04/27/2016   Postop anemia    Resolved Hospital Problems    Diagnosis Date Noted Date Resolved   No resolved problems to display.       Plan:  Admit the patient, follow-up on urine culture results, empirically start her on IV Rocephin and continue her home regimen directed towards high blood pressure hypothyroidism and DVT prophylaxis in the setting of recent knee surgery.    Lore Vargas MD   7/16/2017  8:41 PM  Much of this encounter note is an electronic transcription/translation of spoken language to printed text. The electronic translation of spoken language may permit erroneous, or at times, nonsensical words or phrases to be inadvertently transcribed; Although I have reviewed the note for such errors, some may still exist

## 2017-07-17 NOTE — PROGRESS NOTES
Continued Stay Note  [facility]     Patient Name: Maria Ines Zhang  MRN: [id number]  Today's Date: 7/17/2017    Admit Date: 7/16/2017          Discharge Plan       07/17/17 1651    Case Management/Social Work Plan    Additional Comments Sarah with [facility] notified of admit.              Discharge Codes     None            Loida Quan RN

## 2017-07-17 NOTE — PROGRESS NOTES
LOS: 0 days     Name: Maria Ines Zhang  Age/Sex: 71 y.o. female  :  1945        PCP: Joao Hardy MD    Subjective   Failed multiple abx outpatient.  Feeling better no fever over night  General: No Fever or Chills, Cardiac: No Chest Pain or Palpitations, Resp: No Cough or SOA, GI: No Nausea, Vomiting, or Diarrhea and Other: No bleeding      atorvastatin 10 mg Oral Daily   budesonide-formoterol 2 puff Inhalation BID - RT   ceftriaxone 1 g Intravenous Q24H   clonazePAM 0.5 mg Oral Nightly   escitalopram 20 mg Oral Daily   levothyroxine 88 mcg Oral Daily   lisinopril 5 mg Oral Daily   magnesium (as) gluconate 54 mg Oral Daily   pantoprazole 40 mg Oral QAM   rivaroxaban 20 mg Oral Daily       sodium chloride 50 mL/hr Last Rate: 50 mL/hr (17 0740)       Objective   Vital Signs  Temp:  [97.1 °F (36.2 °C)-100.3 °F (37.9 °C)] 98.1 °F (36.7 °C)  Heart Rate:  [76-98] 76  Resp:  [16-18] 16  BP: (119-192)/(60-85) 119/60  Body mass index is 38.58 kg/(m^2).    Intake/Output Summary (Last 24 hours) at 17 0752  Last data filed at 17 0223   Gross per 24 hour   Intake             2450 ml   Output              800 ml   Net             1650 ml       Physical Exam   Constitutional: She is oriented to person, place, and time. She appears well-developed and well-nourished.   HENT:   Head: Normocephalic and atraumatic.   Mouth/Throat: No oropharyngeal exudate.   Eyes: EOM are normal. No scleral icterus.   Neck: Neck supple. No JVD present.   Cardiovascular: Normal rate, regular rhythm and normal heart sounds.    Pulmonary/Chest: Effort normal and breath sounds normal.   Abdominal: Soft. Bowel sounds are normal.   Musculoskeletal: Normal range of motion.   Neurological: She is alert and oriented to person, place, and time.   Skin: Skin is warm and dry.   Psychiatric: She has a normal mood and affect. Her behavior is normal.   Nursing note and vitals reviewed.        Results Review:       I reviewed the  patient's new clinical results.    Results from last 7 days  Lab Units 07/17/17  0616 07/16/17  1349   WBC 10*3/mm3 5.76 8.22   HEMOGLOBIN g/dL 7.6* 9.3*   PLATELETS 10*3/mm3 445 510*     Results from last 7 days  Lab Units 07/17/17  0616 07/16/17  1349   SODIUM mmol/L 144 138   POTASSIUM mmol/L 3.9 3.6   CHLORIDE mmol/L 109* 98   CO2 mmol/L 23.8 24.1   BUN mg/dL 7* 10   CREATININE mg/dL 0.58 0.66   CALCIUM mg/dL 8.6 9.6   Estimated Creatinine Clearance: 82.6 mL/min (by C-G formula based on Cr of 0.58).      Results from last 7 days  Lab Units 07/16/17  1404   NITRITE UA  Negative   WBC UA /HPF 13-20*   BACTERIA UA /HPF None Seen   SQUAM EPITHEL UA /HPF 3-6*   URINECX  Growth present, too young to evaluate       Assessment/Plan   Principal Problem:    Fever in adult  Active Problems:    Essential hypertension    HLD (hyperlipidemia)    Acquired hypothyroidism    Primary osteoarthritis of right knee    UTI (urinary tract infection)      PLAN  - decreased hgb is likely dilutional   - follow up the UA and cx continue empiric abx  - ok to continue xarelto but will check stool for blood      Disposition        Marcos Obregon MD  [facility]  07/17/17  7:52 AM

## 2017-07-18 PROBLEM — A41.9 SEPSIS (HCC): Status: ACTIVE | Noted: 2017-07-18

## 2017-07-18 PROBLEM — D62 ACUTE BLOOD LOSS ANEMIA: Status: ACTIVE | Noted: 2017-07-18

## 2017-07-18 LAB
ANION GAP SERPL CALCULATED.3IONS-SCNC: 11 MMOL/L
BACTERIA SPEC AEROBE CULT: NORMAL
BASOPHILS # BLD AUTO: 0.02 10*3/MM3 (ref 0–0.2)
BASOPHILS NFR BLD AUTO: 0.4 % (ref 0–1.5)
BUN BLD-MCNC: 7 MG/DL (ref 8–23)
BUN/CREAT SERPL: 11.5 (ref 7–25)
CALCIUM SPEC-SCNC: 8.2 MG/DL (ref 8.6–10.5)
CHLORIDE SERPL-SCNC: 106 MMOL/L (ref 98–107)
CO2 SERPL-SCNC: 25 MMOL/L (ref 22–29)
CREAT BLD-MCNC: 0.61 MG/DL (ref 0.57–1)
DEPRECATED RDW RBC AUTO: 49.9 FL (ref 37–54)
EOSINOPHIL # BLD AUTO: 0.14 10*3/MM3 (ref 0–0.7)
EOSINOPHIL NFR BLD AUTO: 2.5 % (ref 0.3–6.2)
ERYTHROCYTE [DISTWIDTH] IN BLOOD BY AUTOMATED COUNT: 14.9 % (ref 11.7–13)
GFR SERPL CREATININE-BSD FRML MDRD: 97 ML/MIN/1.73
GLUCOSE BLD-MCNC: 94 MG/DL (ref 65–99)
HCT VFR BLD AUTO: 24.5 % (ref 35.6–45.5)
HGB BLD-MCNC: 7.6 G/DL (ref 11.9–15.5)
IMM GRANULOCYTES # BLD: 0 10*3/MM3 (ref 0–0.03)
IMM GRANULOCYTES NFR BLD: 0 % (ref 0–0.5)
LYMPHOCYTES # BLD AUTO: 2.1 10*3/MM3 (ref 0.9–4.8)
LYMPHOCYTES NFR BLD AUTO: 36.8 % (ref 19.6–45.3)
MCH RBC QN AUTO: 28.3 PG (ref 26.9–32)
MCHC RBC AUTO-ENTMCNC: 31 G/DL (ref 32.4–36.3)
MCV RBC AUTO: 91.1 FL (ref 80.5–98.2)
MONOCYTES # BLD AUTO: 0.58 10*3/MM3 (ref 0.2–1.2)
MONOCYTES NFR BLD AUTO: 10.2 % (ref 5–12)
NEUTROPHILS # BLD AUTO: 2.86 10*3/MM3 (ref 1.9–8.1)
NEUTROPHILS NFR BLD AUTO: 50.1 % (ref 42.7–76)
PLATELET # BLD AUTO: 448 10*3/MM3 (ref 140–500)
PMV BLD AUTO: 9.1 FL (ref 6–12)
POTASSIUM BLD-SCNC: 3.9 MMOL/L (ref 3.5–5.2)
RBC # BLD AUTO: 2.69 10*6/MM3 (ref 3.9–5.2)
SODIUM BLD-SCNC: 142 MMOL/L (ref 136–145)
WBC NRBC COR # BLD: 5.7 10*3/MM3 (ref 4.5–10.7)

## 2017-07-18 PROCEDURE — 25010000002 CEFTRIAXONE PER 250 MG: Performed by: INTERNAL MEDICINE

## 2017-07-18 PROCEDURE — 97161 PT EVAL LOW COMPLEX 20 MIN: CPT

## 2017-07-18 PROCEDURE — 80048 BASIC METABOLIC PNL TOTAL CA: CPT | Performed by: HOSPITALIST

## 2017-07-18 PROCEDURE — 94799 UNLISTED PULMONARY SVC/PX: CPT

## 2017-07-18 PROCEDURE — 85025 COMPLETE CBC W/AUTO DIFF WBC: CPT | Performed by: HOSPITALIST

## 2017-07-18 RX ORDER — POLYETHYLENE GLYCOL 3350 17 G/17G
17 POWDER, FOR SOLUTION ORAL DAILY
Status: DISCONTINUED | OUTPATIENT
Start: 2017-07-18 | End: 2017-07-19 | Stop reason: HOSPADM

## 2017-07-18 RX ADMIN — SODIUM CHLORIDE 50 ML/HR: 9 INJECTION, SOLUTION INTRAVENOUS at 11:53

## 2017-07-18 RX ADMIN — ATORVASTATIN CALCIUM 10 MG: 10 TABLET, FILM COATED ORAL at 08:17

## 2017-07-18 RX ADMIN — CEFTRIAXONE SODIUM 1 G: 1 INJECTION, SOLUTION INTRAVENOUS at 20:44

## 2017-07-18 RX ADMIN — RIVAROXABAN 20 MG: 20 TABLET, FILM COATED ORAL at 08:17

## 2017-07-18 RX ADMIN — BUDESONIDE AND FORMOTEROL FUMARATE DIHYDRATE 2 PUFF: 160; 4.5 AEROSOL RESPIRATORY (INHALATION) at 08:27

## 2017-07-18 RX ADMIN — OXYCODONE HYDROCHLORIDE AND ACETAMINOPHEN 1 TABLET: 5; 325 TABLET ORAL at 20:44

## 2017-07-18 RX ADMIN — POLYETHYLENE GLYCOL 3350 17 G: 17 POWDER, FOR SOLUTION ORAL at 20:44

## 2017-07-18 RX ADMIN — OXYCODONE HYDROCHLORIDE AND ACETAMINOPHEN 1 TABLET: 5; 325 TABLET ORAL at 15:52

## 2017-07-18 RX ADMIN — BUDESONIDE AND FORMOTEROL FUMARATE DIHYDRATE 2 PUFF: 160; 4.5 AEROSOL RESPIRATORY (INHALATION) at 20:28

## 2017-07-18 RX ADMIN — ESCITALOPRAM 20 MG: 20 TABLET, FILM COATED ORAL at 08:17

## 2017-07-18 RX ADMIN — Medication 54 MG: at 08:17

## 2017-07-18 RX ADMIN — LISINOPRIL 5 MG: 5 TABLET ORAL at 08:17

## 2017-07-18 RX ADMIN — OXYCODONE HYDROCHLORIDE AND ACETAMINOPHEN 1 TABLET: 5; 325 TABLET ORAL at 10:57

## 2017-07-18 RX ADMIN — PANTOPRAZOLE SODIUM 40 MG: 40 TABLET, DELAYED RELEASE ORAL at 07:42

## 2017-07-18 RX ADMIN — CLONAZEPAM 0.5 MG: 0.5 TABLET ORAL at 20:44

## 2017-07-18 RX ADMIN — LEVOTHYROXINE SODIUM 88 MCG: 88 TABLET ORAL at 07:45

## 2017-07-18 NOTE — THERAPY DISCHARGE NOTE
Acute Care - Physical Therapy Initial Eval/Discharge  [facility]     Patient Name: Maria Ines Zhang  : 1945  MRN: [id number]  Today's Date: 2017   Onset of Illness/Injury or Date of Surgery Date: (P) 17 (POD 11 R TKR)     Referring Physician: Obregon (P)      Admit Date: 2017    Visit Dx:    ICD-10-CM ICD-9-CM   1. Fever in adult R50.9 780.60   2. Non-intractable vomiting with nausea, unspecified vomiting type R11.2 787.01   3. Elevated lactic acid level E87.2 276.2   4. Impaired gait and mobility R26.89 781.2     Patient Active Problem List   Diagnosis   • Essential hypertension   • HLD (hyperlipidemia)   • Acquired hypothyroidism   • Depression   • Edema   • Bronchiolitis obliterans organizing pneumonia   • Primary osteoarthritis of right knee   • Shoulder, capsulitis, adhesive   • Laceration of right lower extremity   • Hyperglycemia   • Abrasion of arm, right   • Biceps tendinitis   • Cough   • Bronchitis   • Subacromial bursitis, right   • Osteoarthritis, knee   • Fever in adult   • UTI (urinary tract infection)     Past Medical History:   Diagnosis Date   • Acute sinusitis    • Anxiety    • Asthma    • Backache    • Benign essential hypertension    • Bruises easily    • Depression    • Dysuria    • Fatigue    • GERD (gastroesophageal reflux disease)    • Hyperlipidemia    • Hypertension    • Hypothyroidism    • Joint pain, knee    • Knee swelling    • Mitral valve insufficiency     nild   • MRSA infection within last 3 months 2017    RIGHT FOOT   • Obesity    • Obstructive sleep apnea    • Osteoarthritis    • Patent foramen ovale    • Pulmonary embolism     3 SEPARATE OCCASIONS   • Pulmonary hypertension    • Sciatica    • Stroke syndrome     CRYPTOGENIC STROKE   • Venous stasis      Past Surgical History:   Procedure Laterality Date   • BACK SURGERY     • CATARACT EXTRACTION W/ INTRAOCULAR LENS IMPLANT Bilateral 2016   • HYSTERECTOMY     • MI TOTAL KNEE ARTHROPLASTY Right  7/6/2017    Procedure: TOTAL KNEE ARTHROPLASTY;  Surgeon: Dragan De La Vega MD;  Location: [facility] MAIN OR;  Service: Orthopedics   • SPINE SURGERY     • THORACOSCOPY Right 02/25/2016    RT VATS WEDGE RESECTION   • TOE AMPUTATION Right 05/2017    SECOND TOE   • TOTAL KNEE ARTHROPLASTY Left           PT ASSESSMENT (last 72 hours)      PT Evaluation       07/18/17 1000 07/16/17 1747    Rehab Evaluation    Document Type (P)  evaluation;discharge summary  -KS     Subjective Information (P)  agree to therapy;no complaints  -KS     Patient Effort, Rehab Treatment (P)  excellent  -KS     Symptoms Noted During/After Treatment (P)  none  -KS     General Information    Patient Profile Review (P)  yes  -KS     Onset of Illness/Injury or Date of Surgery Date (P)  07/16/17   POD 11 R TKR  -KS     Referring Physician (P)  Sabas  -KS     General Observations (P)  pt sitting in chair w/ daughter in room  -KS     Pertinent History Of Current Problem (P)  UTI and POD 11 R TKR  -KS     Precautions/Limitations (P)  fall precautions  -KS     Prior Level of Function (P)  independent:  -KS     Equipment Currently Used at Home (P)  walker, rolling  -KS     Plans/Goals Discussed With (P)  patient and family  -KS     Risks Reviewed (P)  patient and family:  -KS     Benefits Reviewed (P)  patient and family:  -KS     Living Environment    Lives With (P)  spouse  -KS spouse  -TE    Living Arrangements (P)  house  -KS house  -TE    Home Accessibility (P)  no concerns  -KS no concerns  -TE    Stair Railings at Home  none  -TE    Type of Financial/Environmental Concern  none  -TE    Transportation Available  none  -TE    Clinical Impression    Criteria for Skilled Therapeutic Interventions Met (P)  yes  -KS     Pathology/Pathophysiology Noted (Describe Specifically for Each System) (P)  musculoskeletal  -KS     Impairments Found (describe specific impairments) (P)  gait, locomotion, and balance  -KS     Rehab Potential (P)  good, to achieve  stated therapy goals  -KS     Pain Assessment    Pain Assessment (P)  0-10  -KS     Pain Score (P)  2  -KS     Vision Assessment/Intervention    Visual Impairment (P)  WFL  -KS     Cognitive Assessment/Intervention    Current Cognitive/Communication Assessment (P)  functional  -KS     Orientation Status (P)  oriented x 4  -KS     Follows Commands/Answers Questions (P)  able to follow multi-step instructions;100% of the time  -KS     ROM (Range of Motion)    General ROM (P)  no range of motion deficits identified  -KS     General ROM Detail (P)  except R knee  -KS     MMT (Manual Muscle Testing)    General MMT Assessment (P)  no strength deficits identified  -KS     General MMT Assessment Detail (P)  except L knee  -KS     Bed Mobility, Assessment/Treatment    Bed Mobility, Comment (P)  in chair  -KS     Transfer Assessment/Treatment    Transfer, Comment (P)  in chair, has been safely amb w/ assist FWW  -KS     Gait Assessment/Treatment    Gait, Comment (P)  in chair, has been amb w/ assist and FWW  -KS     Therapy Exercises    Exercise Protocols (P)  total knee  -KS     Total Knee Exercises (P)  right:;10 reps;completed protocol  -KS     Positioning and Restraints    Pre-Treatment Position (P)  sitting in chair/recliner  -KS     Post Treatment Position (P)  chair  -KS     In Chair (P)  reclined;call light within reach;encouraged to call for assist;with family/caregiver  -KS       07/16/17 0886       General Information    Equipment Currently Used at Home walker, rolling;raised toilet  -TE       User Key  (r) = Recorded By, (t) = Taken By, (c) = Cosigned By    Initials Name Provider Type    SAMMY Mckeon RN Registered Nurse    LATISHA Mclaughlin, PT Student PT Student          Physical Therapy Education     Title: PT OT SLP Therapies (Done)     Topic: Physical Therapy (Done)     Point: Mobility training (Done)    Learning Progress Summary    Learner Readiness Method Response Comment Documented by Status    Patient Acceptance E [facility] 07/18/17 1044 Done               Point: Home exercise program (Done)    Learning Progress Summary    Learner Readiness Method Response Comment Documented by Status   Patient Acceptance E [facility] 07/18/17 1044 Done               Point: Body mechanics (Done)    Learning Progress Summary    Learner Readiness Method Response Comment Documented by Status   Patient Acceptance E [facility] 07/18/17 1044 Done               Point: Precautions (Done)    Learning Progress Summary    Learner Readiness Method Response Comment Documented by Status   Patient Acceptance E [facility] 07/18/17 1044 Done                      User Key     Initials Effective Dates Name Provider Type Discipline    KS 05/08/17 -  Adele Mclaughlin, PT Student PT Student PT                PT Recommendation and Plan  Anticipated Discharge Disposition: (P) home with home health  Plan of Care Review  Plan Of Care Reviewed With: (P) patient, daughter  Outcome Summary/Follow up Plan: (P) pt presents w/ impaired gait and mobility POD 11 R TKR, current admit UTI. Denies any s/s, educated and performed TKR ex protocol, has been safely amb w/ FWW and assist, does not voice any concerns to return home w/ HHC. PT will sign off.               Outcome Measures       07/18/17 1000          How much help from another person do you currently need...    Turning from your back to your side while in flat bed without using bedrails? (P)  4  -KS      Moving from lying on back to sitting on the side of a flat bed without bedrails? (P)  4  -KS      Moving to and from a bed to a chair (including a wheelchair)? (P)  4  -KS      Standing up from a chair using your arms (e.g., wheelchair, bedside chair)? (P)  3  -KS      Climbing 3-5 steps with a railing? (P)  3  -KS      To walk in hospital room? (P)  3  -KS      AM-PAC 6 Clicks Score (P)  21  -KS      Functional Assessment    Outcome Measure Options (P)  AM-PAC 6 Clicks Basic Mobility (PT)  -KS        User  Key  (r) = Recorded By, (t) = Taken By, (c) = Cosigned By    Initials Name Provider Type    LATISHA Mclaughlin PT Student PT Student           Time Calculation:         PT Charges       07/18/17 1047          Time Calculation    Start Time (P)  1010  -KS      Stop Time (P)  1030  -KS      Time Calculation (min) (P)  20 min  -KS      PT Received On (P)  07/18/17  -KS        User Key  (r) = Recorded By, (t) = Taken By, (c) = Cosigned By    Initials Name Provider Type    LATISHA Mclaughlin PT Student PT Student          Therapy Charges for Today     Code Description Service Date Service Provider Modifiers Qty    37936590007 HC PT EVAL LOW COMPLEXITY 2 7/18/2017 Adele Mclaughlin PT Student GP 1          PT G-Codes  Outcome Measure Options: (P) AM-PAC 6 Clicks Basic Mobility (PT)    PT Discharge Summary  Anticipated Discharge Disposition: (P) home with home health  Reason for Discharge: (P) Independent    Adele Mclaughlin PT Student  7/18/2017

## 2017-07-18 NOTE — PLAN OF CARE
Problem: Patient Care Overview (Adult)  Goal: Plan of Care Review  Outcome: Ongoing (interventions implemented as appropriate)    07/17/17 1959   Coping/Psychosocial Response Interventions   Plan Of Care Reviewed With patient   Outcome Evaluation   Outcome Summary/Follow up Plan pain well controlled. vss. ambulating w/ walker. Encouraged patient to continue PT exercises she was doing at home.        Goal: Adult Individualization and Mutuality  Outcome: Ongoing (interventions implemented as appropriate)  Goal: Discharge Needs Assessment  Outcome: Ongoing (interventions implemented as appropriate)    Problem: Pain, Acute (Adult)  Goal: Acceptable Pain Control/Comfort Level  Outcome: Ongoing (interventions implemented as appropriate)

## 2017-07-18 NOTE — PLAN OF CARE
Problem: Patient Care Overview (Adult)  Goal: Plan of Care Review  Outcome: Ongoing (interventions implemented as appropriate)    07/18/17 0427   Coping/Psychosocial Response Interventions   Plan Of Care Reviewed With patient   Patient Care Overview   Progress improving   Outcome Evaluation   Outcome Summary/Follow up Plan ivf's and antibiotic continue. rested quietly after po percocet and flexeril given for discomfort. ambulating with walker and assist.        Goal: Adult Individualization and Mutuality  Outcome: Ongoing (interventions implemented as appropriate)  Goal: Discharge Needs Assessment  Outcome: Ongoing (interventions implemented as appropriate)    Problem: Pain, Acute (Adult)  Goal: Acceptable Pain Control/Comfort Level  Outcome: Ongoing (interventions implemented as appropriate)

## 2017-07-18 NOTE — PLAN OF CARE
Problem: Patient Care Overview (Adult)  Goal: Plan of Care Review    07/18/17 1044   Coping/Psychosocial Response Interventions   Plan Of Care Reviewed With patient;daughter   Outcome Evaluation   Outcome Summary/Follow up Plan pt presents w/ impaired gait and mobility POD 11 R TKR, current admit UTI. Denies any s/s, educated and performed TKR ex protocol, has been safely amb w/ FWW and assist, does not voice any concerns to return home w/ HHC. PT will sign off.

## 2017-07-18 NOTE — PROGRESS NOTES
"/51 (BP Location: Left arm, Patient Position: Lying)  Pulse 102  Temp 99 °F (37.2 °C) (Oral)   Resp 18  Ht 66\" (167.6 cm)  Wt 239 lb (108 kg)  SpO2 98%  BMI 38.58 kg/m2    Lab Results (last 24 hours)     Procedure Component Value Units Date/Time    Hemoglobin & Hematocrit, Blood [[id number]]  (Abnormal) Collected:  07/17/17 1228    Specimen:  Blood Updated:  07/17/17 1255     Hemoglobin 8.3 (L) g/dL      Hematocrit 26.3 (L) %           Imaging Results (last 24 hours)     ** No results found for the last 24 hours. **          Patient Care Team:  Joao Hardy MD as PCP - General (Family Medicine)  Andreina Heck MD as PCP - Claims Attributed    SUBJECTIVE  TKR is doing well  PHYSICAL EXAM   Wound fine  Principal Problem:    Fever in adult  Active Problems:    Essential hypertension    HLD (hyperlipidemia)    Acquired hypothyroidism    Primary osteoarthritis of right knee    UTI (urinary tract infection)      PLAN / DISPOSITION:  Discharge when medically stable  Dragan De La Vega MD  07/18/17  7:37 AM      "

## 2017-07-18 NOTE — PROGRESS NOTES
Discharge Planning Assessment  [facility]     Patient Name: Maria Ines Zhang  MRN: [id number]  Today's Date: 7/18/2017    Admit Date: 7/16/2017          Discharge Needs Assessment       07/18/17 1540    Living Environment    Lives With spouse    Living Arrangements house    Home Accessibility no concerns    Stair Railings at Home none    Type of Financial/Environmental Concern none    Transportation Available car;family or friend will provide    Living Environment    Provides Primary Care For no one    Quality Of Family Relationships supportive    Able to Return to Prior Living Arrangements yes    Discharge Needs Assessment    Concerns To Be Addressed basic needs concerns;utilization management concerns;discharge planning concerns    Readmission Within The Last 30 Days current reason for admission unrelated to previous admission    Anticipated Changes Related to Illness none    Equipment Currently Used at Home commode;walker, rolling;oxygen;bipap/ cpap    Equipment Needed After Discharge none    Discharge Facility/Level Of Care Needs home with home health            Discharge Plan       07/18/17 1526    Case Management/Social Work Plan    Plan Home with [facility]    Patient/Family In Agreement With Plan yes    Additional Comments I met with pt and her dtr Олег Rey ([phone]), pt confirmed the address and pharmacy are correct, she said she ambulates with a walker after a total knee earlier this month, uses oxygen at night from [place], also has a BSC, CPAP and grab bars.  Pt said Dr Hardy is no longer her PCP and she needs a new one, she said todays attending suggested Dr Lala, I spoke with Kandice at Dr Lala office, she said due to pt has been followed by Dr Joao Hardy in the office she can not make a new pt appointment for pt with Dr Lala.  I updated pt and called Rina with A appointment Liaison, she made a new pt appointment with Dr Firo Blunt for Aug 22 at 0900, pt wanted me to make  her  Rob a new pt appointment with the same dr, I made his appointment for Aug 25 at 1300, pt's dtr provided with the appointment reminders and I noted on the reminder to Arrive 15 minutes prior to appointment with picture ID and insurance cards. Pt said she was recently started on Xarelto, she said she has never used a coupon for a free 30 day supply, coupon given. Pt said she is current with [facility] and plans to continue with them at discharge.  At pt's request her IMM Letter was signed by her dtr Олег Rey, copy of letter given to her.        Discharge Placement     Facility/Agency Request Status Selected? Address Phone Number Fax Number    [facility] Pending - No Request Sent     [address] [address] [phone]                Demographic Summary       07/18/17 1539    Referral Information    Admission Type inpatient    Arrived From admitted as an inpatient;home or self-care    Referral Source admission list    Record Reviewed medical record    Contact Information    Permission Granted to Share Information With facility ;family/designee            Functional Status       07/18/17 1539    Functional Status Current    Ambulation 3-->assistive equipment and person    Transferring 3-->assistive equipment and person    Toileting 3-->assistive equipment and person    Bathing 2-->assistive person    Dressing 2-->assistive person    Eating 0-->independent    Communication 0-->understands/communicates without difficulty    Swallowing (if score 2 or more for any item, consult Rehab Services) 0-->swallows foods/liquids without difficulty    Change in Functional Status Since Onset of Current Illness/Injury no         Patient Forms       07/18/17 1542    Patient Forms    Provider Choice List Delivered    Method of delivery In person    Important Message from Medicare (IMM) Delivered    Delivered to Support person     Method of delivery In person          Loida Quan, RN

## 2017-07-18 NOTE — PROGRESS NOTES
LOS: 1 day     Name: Maria Ines Zhang  Age/Sex: 71 y.o. female  :  1945        PCP: Joao Hardy MD    Subjective   Failed multiple abx outpatient.  Feeling better no fever over night.  Up ambulating and doing much better.  General: No Fever or Chills, Cardiac: No Chest Pain or Palpitations, Resp: No Cough or SOA, GI: No Nausea, Vomiting, or Diarrhea and Other: No bleeding      atorvastatin 10 mg Oral Daily   budesonide-formoterol 2 puff Inhalation BID - RT   ceftriaxone 1 g Intravenous Q24H   clonazePAM 0.5 mg Oral Nightly   escitalopram 20 mg Oral Daily   levothyroxine 88 mcg Oral Daily   lisinopril 5 mg Oral Daily   magnesium (as) gluconate 54 mg Oral Daily   pantoprazole 40 mg Oral QAM   rivaroxaban 20 mg Oral Daily       sodium chloride 50 mL/hr Last Rate: 50 mL/hr (17)       Objective   Vital Signs  Temp:  [97.1 °F (36.2 °C)-99 °F (37.2 °C)] 99 °F (37.2 °C)  Heart Rate:  [] 102  Resp:  [16-18] 18  BP: (111-141)/(51-71) 111/51  Body mass index is 38.58 kg/(m^2).    Intake/Output Summary (Last 24 hours) at 17 0651  Last data filed at 17 2100   Gross per 24 hour   Intake          2051.67 ml   Output             1700 ml   Net           351.67 ml       Physical Exam   Constitutional: She is oriented to person, place, and time. She appears well-developed and well-nourished.   HENT:   Head: Normocephalic and atraumatic.   Mouth/Throat: No oropharyngeal exudate.   Eyes: EOM are normal. No scleral icterus.   Neck: Neck supple. No JVD present.   Cardiovascular: Normal rate, regular rhythm and normal heart sounds.    Pulmonary/Chest: Effort normal and breath sounds normal.   Abdominal: Soft. Bowel sounds are normal.   Musculoskeletal: Normal range of motion.   Neurological: She is alert and oriented to person, place, and time.   Skin: Skin is warm and dry.   Psychiatric: She has a normal mood and affect. Her behavior is normal.   Nursing note and vitals  reviewed.        Results Review:       I reviewed the patient's new clinical results.    Results from last 7 days  Lab Units 07/17/17  1228 07/17/17  0616 07/16/17  1349   WBC 10*3/mm3  --  5.76 8.22   HEMOGLOBIN g/dL 8.3* 7.6* 9.3*   PLATELETS 10*3/mm3  --  445 510*       Results from last 7 days  Lab Units 07/17/17  0616 07/16/17  1349   SODIUM mmol/L 144 138   POTASSIUM mmol/L 3.9 3.6   CHLORIDE mmol/L 109* 98   CO2 mmol/L 23.8 24.1   BUN mg/dL 7* 10   CREATININE mg/dL 0.58 0.66   CALCIUM mg/dL 8.6 9.6   Estimated Creatinine Clearance: 82.6 mL/min (by C-G formula based on Cr of 0.58).      Results from last 7 days  Lab Units 07/16/17  1404   NITRITE UA  Negative   WBC UA /HPF 13-20*   BACTERIA UA /HPF None Seen   SQUAM EPITHEL UA /HPF 3-6*   URINECX  Growth present, too young to evaluate       Assessment/Plan   Principal Problem:    Fever in adult  Active Problems:    Essential hypertension    HLD (hyperlipidemia)    Acquired hypothyroidism    Primary osteoarthritis of right knee    UTI (urinary tract infection)      PLAN  - decreased hgb is likely dilutional But could also be related to acute blood loss.  I mentioned it's probably some form of multifactorial anemia.  She probably has some blood loss from surgery as well as maybe some GI blood loss.  Were checking fecal occult blood to evaluate in the meantime will continue Xarelto.  If hemoglobin remains low tomorrow we'll consider transfusion at that time.  - He is failed outpatient treatment with 2 rounds of antibiotics outside the hospital over the last month and a half.  She also was septic on presentation with elevated lactic acid and heart rate.  She's rebounded wonderfully on IV antibiotics and looks amazing today.  - follow up the UA and cx continue empiric abx  - ok to continue xarelto but will check stool for blood    Disposition  Probably home tomorrow or Thursday      Marcos Obregon MD  [facility]  07/18/17  6:51  AM

## 2017-07-19 VITALS
WEIGHT: 239 LBS | BODY MASS INDEX: 38.41 KG/M2 | OXYGEN SATURATION: 95 % | HEIGHT: 66 IN | RESPIRATION RATE: 16 BRPM | HEART RATE: 76 BPM | DIASTOLIC BLOOD PRESSURE: 65 MMHG | SYSTOLIC BLOOD PRESSURE: 135 MMHG | TEMPERATURE: 98.4 F

## 2017-07-19 LAB
ANION GAP SERPL CALCULATED.3IONS-SCNC: 10.8 MMOL/L
ANISOCYTOSIS BLD QL: ABNORMAL
BASOPHILS # BLD MANUAL: 0.06 10*3/MM3 (ref 0–0.2)
BASOPHILS NFR BLD AUTO: 1 % (ref 0–1.5)
BUN BLD-MCNC: 7 MG/DL (ref 8–23)
BUN/CREAT SERPL: 11.5 (ref 7–25)
CALCIUM SPEC-SCNC: 8.5 MG/DL (ref 8.6–10.5)
CHLORIDE SERPL-SCNC: 105 MMOL/L (ref 98–107)
CO2 SERPL-SCNC: 26.2 MMOL/L (ref 22–29)
CREAT BLD-MCNC: 0.61 MG/DL (ref 0.57–1)
DEPRECATED RDW RBC AUTO: 49.6 FL (ref 37–54)
EOSINOPHIL # BLD MANUAL: 0.13 10*3/MM3 (ref 0–0.7)
EOSINOPHIL NFR BLD MANUAL: 2 % (ref 0.3–6.2)
ERYTHROCYTE [DISTWIDTH] IN BLOOD BY AUTOMATED COUNT: 14.6 % (ref 11.7–13)
GFR SERPL CREATININE-BSD FRML MDRD: 97 ML/MIN/1.73
GLUCOSE BLD-MCNC: 93 MG/DL (ref 65–99)
HCT VFR BLD AUTO: 24.5 % (ref 35.6–45.5)
HEMOCCULT STL QL: NEGATIVE
HGB BLD-MCNC: 7.4 G/DL (ref 11.9–15.5)
LYMPHOCYTES # BLD MANUAL: 1.15 10*3/MM3 (ref 0.9–4.8)
LYMPHOCYTES NFR BLD MANUAL: 18 % (ref 19.6–45.3)
LYMPHOCYTES NFR BLD MANUAL: 4 % (ref 5–12)
MCH RBC QN AUTO: 27.9 PG (ref 26.9–32)
MCHC RBC AUTO-ENTMCNC: 30.2 G/DL (ref 32.4–36.3)
MCV RBC AUTO: 92.5 FL (ref 80.5–98.2)
MONOCYTES # BLD AUTO: 0.26 10*3/MM3 (ref 0.2–1.2)
NEUTROPHILS # BLD AUTO: 4.74 10*3/MM3 (ref 1.9–8.1)
NEUTROPHILS NFR BLD MANUAL: 74 % (ref 42.7–76)
PLAT MORPH BLD: NORMAL
PLATELET # BLD AUTO: 455 10*3/MM3 (ref 140–500)
PMV BLD AUTO: 9.5 FL (ref 6–12)
POTASSIUM BLD-SCNC: 3.8 MMOL/L (ref 3.5–5.2)
RBC # BLD AUTO: 2.65 10*6/MM3 (ref 3.9–5.2)
SCAN SLIDE: NORMAL
SODIUM BLD-SCNC: 142 MMOL/L (ref 136–145)
SPHEROCYTES BLD QL SMEAR: ABNORMAL
VARIANT LYMPHS NFR BLD MANUAL: 1 % (ref 0–5)
WBC MORPH BLD: NORMAL
WBC NRBC COR # BLD: 6.4 10*3/MM3 (ref 4.5–10.7)

## 2017-07-19 PROCEDURE — 85007 BL SMEAR W/DIFF WBC COUNT: CPT | Performed by: HOSPITALIST

## 2017-07-19 PROCEDURE — 94799 UNLISTED PULMONARY SVC/PX: CPT

## 2017-07-19 PROCEDURE — 80048 BASIC METABOLIC PNL TOTAL CA: CPT | Performed by: HOSPITALIST

## 2017-07-19 PROCEDURE — 85025 COMPLETE CBC W/AUTO DIFF WBC: CPT | Performed by: HOSPITALIST

## 2017-07-19 PROCEDURE — 82270 OCCULT BLOOD FECES: CPT | Performed by: HOSPITALIST

## 2017-07-19 RX ORDER — CEPHALEXIN 500 MG/1
500 CAPSULE ORAL 3 TIMES DAILY
Qty: 30 CAPSULE | Refills: 0 | Status: SHIPPED | OUTPATIENT
Start: 2017-07-19 | End: 2017-07-29

## 2017-07-19 RX ORDER — OXYCODONE HYDROCHLORIDE AND ACETAMINOPHEN 5; 325 MG/1; MG/1
1-2 TABLET ORAL EVERY 4 HOURS PRN
Qty: 100 TABLET | Refills: 0 | Status: SHIPPED | OUTPATIENT
Start: 2017-07-19 | End: 2017-07-27

## 2017-07-19 RX ORDER — LANOLIN ALCOHOL/MO/W.PET/CERES
325 CREAM (GRAM) TOPICAL
Qty: 30 TABLET | Refills: 11 | Status: SHIPPED | OUTPATIENT
Start: 2017-07-19 | End: 2017-08-03

## 2017-07-19 RX ADMIN — PANTOPRAZOLE SODIUM 40 MG: 40 TABLET, DELAYED RELEASE ORAL at 06:38

## 2017-07-19 RX ADMIN — RIVAROXABAN 20 MG: 20 TABLET, FILM COATED ORAL at 08:09

## 2017-07-19 RX ADMIN — CYCLOBENZAPRINE HYDROCHLORIDE 10 MG: 10 TABLET, FILM COATED ORAL at 00:26

## 2017-07-19 RX ADMIN — ESCITALOPRAM 20 MG: 20 TABLET, FILM COATED ORAL at 08:09

## 2017-07-19 RX ADMIN — OXYCODONE HYDROCHLORIDE AND ACETAMINOPHEN 1 TABLET: 5; 325 TABLET ORAL at 09:58

## 2017-07-19 RX ADMIN — OXYCODONE HYDROCHLORIDE AND ACETAMINOPHEN 1 TABLET: 5; 325 TABLET ORAL at 00:26

## 2017-07-19 RX ADMIN — Medication 54 MG: at 08:10

## 2017-07-19 RX ADMIN — LISINOPRIL 5 MG: 5 TABLET ORAL at 08:09

## 2017-07-19 RX ADMIN — BUDESONIDE AND FORMOTEROL FUMARATE DIHYDRATE 2 PUFF: 160; 4.5 AEROSOL RESPIRATORY (INHALATION) at 08:21

## 2017-07-19 RX ADMIN — ATORVASTATIN CALCIUM 10 MG: 10 TABLET, FILM COATED ORAL at 08:09

## 2017-07-19 RX ADMIN — LEVOTHYROXINE SODIUM 88 MCG: 88 TABLET ORAL at 08:10

## 2017-07-19 NOTE — PLAN OF CARE
Problem: Patient Care Overview (Adult)  Goal: Plan of Care Review  Outcome: Ongoing (interventions implemented as appropriate)    07/19/17 0453   Coping/Psychosocial Response Interventions   Plan Of Care Reviewed With patient   Patient Care Overview   Progress improving   Outcome Evaluation   Outcome Summary/Follow up Plan VSS, C/O PAIN MEDICATED, UP WITH ASSIST, VOIDING W/O DIFFICULTY, FOR D/C HOME IN AM WITH HOME HEALTH.       Goal: Adult Individualization and Mutuality  Outcome: Ongoing (interventions implemented as appropriate)  Goal: Discharge Needs Assessment  Outcome: Ongoing (interventions implemented as appropriate)    Problem: Pain, Acute (Adult)  Goal: Acceptable Pain Control/Comfort Level  Outcome: Ongoing (interventions implemented as appropriate)

## 2017-07-19 NOTE — DISCHARGE SUMMARY
Date of Admission: 7/16/2017  Date of Discharge:  7/19/2017    PCP: Jeanine Blunt MD      DISCHARGE DIAGNOSIS  Principal Problem:    UTI (urinary tract infection)  Active Problems:    Essential hypertension    HLD (hyperlipidemia)    Acquired hypothyroidism    Primary osteoarthritis of right knee    Fever in adult    Sepsis    Acute blood loss anemia      SECONDARY DIAGNOSES  Past Medical History:   Diagnosis Date   • Acute sinusitis    • Anxiety    • Asthma    • Backache    • Benign essential hypertension    • Bruises easily    • Depression    • Dysuria    • Fatigue    • GERD (gastroesophageal reflux disease)    • Hyperlipidemia    • Hypertension    • Hypothyroidism    • Joint pain, knee    • Knee swelling    • Mitral valve insufficiency     nild   • MRSA infection within last 3 months 05/2017    RIGHT FOOT   • Obesity    • Obstructive sleep apnea    • Osteoarthritis    • Patent foramen ovale    • Pulmonary embolism     3 SEPARATE OCCASIONS   • Pulmonary hypertension    • Sciatica    • Stroke syndrome     CRYPTOGENIC STROKE   • Venous stasis        CONSULTS   Consults     Date and Time Order Name Status Description    7/16/2017 1515 [facility] (on-call MD unless specified) Completed     7/16/2017 1439 Ortho (on-call MD unless specified) Completed           PROCEDURES PERFORMED      HOSPITAL COURSE  Patient is a 71 y.o. female presented to [facility] complaining of lethargy and fevers.  Please see the admitting history and physical for further details.  She had some urinary symptoms and the previous weeks and had been treated twice in the outpatient setting for urinary tract infection with 2 different antibiotics.  She continued to have symptoms and spiked fevers at home.  She was septic on admission with an elevated lactic acid borderline blood pressure and elevated heart rate.  She was bolused aggressively.  Her hemodynamics stabilized and lactic acid normalized.  She was started empirically on  "Rocephin and transitioned to oral antibiotics.  Her urine culture was polymicrobial and at this point was treated empirically with Keflex.  Her symptoms have resolved and she received 3 doses of IV antibiotics here in the hospital.  Given her recent knee replacement surgery will plan to treat for 7 total days.  Her hemoglobin also dropped on admission after IV fluids.  There was some questionable history of some dark stools in the days leading up to her admission.  Her Xarelto was held and hemoglobin remained stable.  Her Xarelto was resumed after Hemoccult stool was negative.  Hemoglobin at the time of discharge is 7.6.  I have placed her on oral iron therapy and I think her primary care doctor can follow the CBC at her follow-up appointment.  I counseled her extensively on the symptoms of ongoing anemia if they worsen to return to the hospital for further evaluation.  She was seen by her orthopedist regarding her knee replacement he thinks things are progressing well and recommended discharge home with home health.  Otherwise the day discharge remained team on an empty stable is agreeable for discharge home today to continue and bikes in the outpatient setting.      CONDITION ON DISCHARGE  Stable.      VITAL SIGNS  /65 (BP Location: Right arm, Patient Position: Lying)  Pulse 76  Temp 98.4 °F (36.9 °C) (Oral)   Resp 16  Ht 66\" (167.6 cm)  Wt 239 lb (108 kg)  SpO2 95%  BMI 38.58 kg/m2  Objective      DISCHARGE DISPOSITION   Home or Self Care      DISCHARGE MEDICATIONS   Maria Ines Zhang   Home Medication Instructions RICHARD:[id number]    Printed on:07/19/17 6638   Medication Information                      albuterol (PROVENTIL HFA;VENTOLIN HFA) 108 (90 BASE) MCG/ACT inhaler  Inhale 2 puffs every 4 (four) hours as needed for wheezing.             cephalexin (KEFLEX) 500 MG capsule  Take 1 capsule by mouth 3 (Three) Times a Day for 10 days.             clonazePAM (KlonoPIN) 0.5 MG tablet  Take 0.5 mg by " mouth Every Night.             cyclobenzaprine (FLEXERIL) 10 MG tablet  Take 10 mg by mouth 3 (Three) Times a Day As Needed for Muscle Spasms.             escitalopram (LEXAPRO) 20 MG tablet  Take 20 mg by mouth Daily.             esomeprazole (NexIUM) 20 MG capsule  Take 40 mg by mouth every morning before breakfast.             ferrous sulfate 325 (65 FE) MG EC tablet  Take 1 tablet by mouth Daily With Breakfast.             levothyroxine (SYNTHROID, LEVOTHROID) 88 MCG tablet  Take 88 mcg by mouth Daily.             lisinopril (PRINIVIL,ZESTRIL) 5 MG tablet  Take 1 tablet by mouth Daily.             Magnesium 250 MG tablet  Take 250 mg by mouth Daily.             oxyCODONE-acetaminophen (PERCOCET) 5-325 MG per tablet  Take 1-2 tablets by mouth Every 4 (Four) Hours As Needed for Moderate Pain  for up to 8 days.             rivaroxaban (XARELTO) 20 MG tablet  Take 1 tablet by mouth Daily for 8 days.             simvastatin (ZOCOR) 10 MG tablet  TAKE ONE TABLET BY MOUTH DAILY             SYMBICORT 160-4.5 MCG/ACT inhaler  INHALE TWO PUFFS BY MOUTH TWICE A DAY (RINSE MOUTH AFTER USE)             Vitamin Mixture (ROCKY-C PO)  Take 500 mg by mouth Daily.               Diet Instructions     Diet: Regular; Thin Liquids, No Restrictions       Discharge Diet:  Regular   Fluid Consistency:  Thin Liquids, No Restrictions              Activity Instructions     Activity as Tolerated                 Future Appointments  Date Time Provider Department Center   8/22/2017 9:00 AM Jeanine Blunt MD [facility] None     Additional Instructions for the Follow-ups that You Need to Schedule     Additional Discharge Follow-Up (Specify Provider)    As directed    To:  Dr De La Vega   Follow Up:  2 Weeks       Discharge Follow-up with PCP    As directed    Follow Up Details:  as directed             Follow-up Information     Follow up with Jeanine Blunt MD .    Specialties:  Internal Medicine, Pediatrics    Why:  as directed    Contact  information:    [address] [facility] [address]  [place] [address] [phone]          Follow up with Andreina Heck MD .    Specialties:  Pulmonary Disease, Sleep Medicine    Why:  as directed    Contact information:    [address] [facility] [address]  [facility] [id number] [phone]            TEST  RESULTS PENDING AT DISCHARGE         Marcos Obregon MD  [facility]  07/19/17  1:15 PM      Time: greater than 30 minutes.

## 2017-07-20 ENCOUNTER — TELEPHONE (OUTPATIENT)
Dept: INTERNAL MEDICINE | Facility: CLINIC | Age: 72
End: 2017-07-20

## 2017-07-20 NOTE — TELEPHONE ENCOUNTER
Spoke to patient, moved up nwpt/hos follow up apt for 8/3/17. Patient confirmed apt and voiced understanding.

## 2017-07-24 ENCOUNTER — LAB REQUISITION (OUTPATIENT)
Dept: LAB | Facility: HOSPITAL | Age: 72
End: 2017-07-24

## 2017-07-24 ENCOUNTER — TELEPHONE (OUTPATIENT)
Dept: INTERNAL MEDICINE | Facility: CLINIC | Age: 72
End: 2017-07-24

## 2017-07-24 DIAGNOSIS — A49.02 METHICILLIN RESISTANT STAPHYLOCOCCUS AUREUS INFECTION: ICD-10-CM

## 2017-07-24 LAB
BASOPHILS # BLD AUTO: 0.02 10*3/MM3 (ref 0–0.2)
BASOPHILS NFR BLD AUTO: 0.3 % (ref 0–2)
DEPRECATED RDW RBC AUTO: 46.8 FL (ref 37–54)
EOSINOPHIL # BLD AUTO: 0.07 10*3/MM3 (ref 0.1–0.3)
EOSINOPHIL NFR BLD AUTO: 1 % (ref 0–4)
ERYTHROCYTE [DISTWIDTH] IN BLOOD BY AUTOMATED COUNT: 14.7 % (ref 11.5–14.5)
HCT VFR BLD AUTO: 29.8 % (ref 37–47)
HGB BLD-MCNC: 9.3 G/DL (ref 12–16)
IMM GRANULOCYTES # BLD: 0.02 10*3/MM3 (ref 0–0.03)
IMM GRANULOCYTES NFR BLD: 0.3 % (ref 0–0.5)
LYMPHOCYTES # BLD AUTO: 2 10*3/MM3 (ref 0.6–4.8)
LYMPHOCYTES NFR BLD AUTO: 29.9 % (ref 20–45)
MCH RBC QN AUTO: 27.3 PG (ref 27–31)
MCHC RBC AUTO-ENTMCNC: 31.2 G/DL (ref 31–37)
MCV RBC AUTO: 87.4 FL (ref 81–99)
MONOCYTES # BLD AUTO: 0.69 10*3/MM3 (ref 0–1)
MONOCYTES NFR BLD AUTO: 10.3 % (ref 3–8)
NEUTROPHILS # BLD AUTO: 3.89 10*3/MM3 (ref 1.5–8.3)
NEUTROPHILS NFR BLD AUTO: 58.2 % (ref 45–70)
NRBC BLD MANUAL-RTO: 0 /100 WBC (ref 0–0)
PLATELET # BLD AUTO: 630 10*3/MM3 (ref 140–500)
PMV BLD AUTO: 9.4 FL (ref 7.4–10.4)
RBC # BLD AUTO: 3.41 10*6/MM3 (ref 4.2–5.4)
RBC MORPH BLD: NORMAL
SMALL PLATELETS BLD QL SMEAR: NORMAL
WBC MORPH BLD: NORMAL
WBC NRBC COR # BLD: 6.69 10*3/MM3 (ref 4.8–10.8)

## 2017-07-24 PROCEDURE — 85007 BL SMEAR W/DIFF WBC COUNT: CPT | Performed by: INTERNAL MEDICINE

## 2017-07-24 PROCEDURE — 85025 COMPLETE CBC W/AUTO DIFF WBC: CPT | Performed by: INTERNAL MEDICINE

## 2017-07-24 RX ORDER — CYCLOBENZAPRINE HCL 10 MG
TABLET ORAL
Qty: 30 TABLET | Refills: 0 | OUTPATIENT
Start: 2017-07-24

## 2017-07-24 NOTE — TELEPHONE ENCOUNTER
----- Message from Digna Carrion sent at 7/24/2017  4:30 PM EDT -----  PATIENT WILL BE NEW IN OUR OFFICE ON 8/3/17 TO SEE DR RAMIREZ, AND MATTI FROM [facility] CALLED TO LET US KNOW THAT SHE WAS DISCHARGED AS OF TODAY.  WE ARE NOT WHO ORDERED HOME HEALTH SHE JUST WANTED US TO BE AWARE OF THE SITUATION.  HER BP TODAY /92, WHICH SHE SAID WAS DUE TO FRUSTRATION OVER DISABILITY PAPER WORK.

## 2017-08-03 ENCOUNTER — OFFICE VISIT (OUTPATIENT)
Dept: INTERNAL MEDICINE | Facility: CLINIC | Age: 72
End: 2017-08-03

## 2017-08-03 VITALS
DIASTOLIC BLOOD PRESSURE: 88 MMHG | HEART RATE: 87 BPM | WEIGHT: 235.6 LBS | HEIGHT: 66 IN | SYSTOLIC BLOOD PRESSURE: 168 MMHG | BODY MASS INDEX: 37.86 KG/M2 | OXYGEN SATURATION: 98 %

## 2017-08-03 DIAGNOSIS — R82.90 ABNORMAL URINE: Primary | ICD-10-CM

## 2017-08-03 DIAGNOSIS — Z78.0 POST-MENOPAUSAL: ICD-10-CM

## 2017-08-03 DIAGNOSIS — Z09 HOSPITAL DISCHARGE FOLLOW-UP: Primary | ICD-10-CM

## 2017-08-03 DIAGNOSIS — R11.0 NAUSEA: ICD-10-CM

## 2017-08-03 DIAGNOSIS — R30.0 DYSURIA: ICD-10-CM

## 2017-08-03 DIAGNOSIS — K59.03 DRUG-INDUCED CONSTIPATION: ICD-10-CM

## 2017-08-03 DIAGNOSIS — D64.9 NORMOCYTIC ANEMIA: ICD-10-CM

## 2017-08-03 DIAGNOSIS — E78.5 HYPERLIPIDEMIA, UNSPECIFIED HYPERLIPIDEMIA TYPE: ICD-10-CM

## 2017-08-03 DIAGNOSIS — E03.9 ACQUIRED HYPOTHYROIDISM: ICD-10-CM

## 2017-08-03 DIAGNOSIS — I10 UNCONTROLLED HYPERTENSION: ICD-10-CM

## 2017-08-03 DIAGNOSIS — Z11.59 NEED FOR HEPATITIS C SCREENING TEST: ICD-10-CM

## 2017-08-03 DIAGNOSIS — Z12.31 ENCOUNTER FOR SCREENING MAMMOGRAM FOR MALIGNANT NEOPLASM OF BREAST: ICD-10-CM

## 2017-08-03 PROBLEM — I26.99 PULMONARY EMBOLISM (HCC): Status: ACTIVE | Noted: 2017-08-03

## 2017-08-03 PROBLEM — M43.10 SPONDYLOLISTHESIS: Status: ACTIVE | Noted: 2017-08-03

## 2017-08-03 PROBLEM — R50.9 FEVER IN ADULT: Status: RESOLVED | Noted: 2017-07-16 | Resolved: 2017-08-03

## 2017-08-03 PROBLEM — I87.8 VENOUS STASIS: Status: ACTIVE | Noted: 2017-08-03

## 2017-08-03 PROBLEM — N39.0 UTI (URINARY TRACT INFECTION): Status: RESOLVED | Noted: 2017-07-16 | Resolved: 2017-08-03

## 2017-08-03 PROBLEM — F41.9 ANXIETY: Status: ACTIVE | Noted: 2017-08-03

## 2017-08-03 PROBLEM — M17.9 OSTEOARTHRITIS, KNEE: Status: RESOLVED | Noted: 2017-07-06 | Resolved: 2017-08-03

## 2017-08-03 PROBLEM — K21.9 GASTROESOPHAGEAL REFLUX DISEASE: Status: ACTIVE | Noted: 2017-08-03

## 2017-08-03 PROBLEM — I63.9 CRYPTOGENIC STROKE (HCC): Status: ACTIVE | Noted: 2017-08-03

## 2017-08-03 PROBLEM — M47.816 OSTEOARTHRITIS OF LUMBAR SPINE: Status: ACTIVE | Noted: 2017-08-03

## 2017-08-03 PROBLEM — A41.9 SEPSIS (HCC): Status: RESOLVED | Noted: 2017-07-18 | Resolved: 2017-08-03

## 2017-08-03 LAB
BILIRUB BLD-MCNC: NEGATIVE MG/DL
CLARITY, POC: CLEAR
COLOR UR: YELLOW
GLUCOSE UR STRIP-MCNC: NEGATIVE MG/DL
KETONES UR QL: ABNORMAL
LEUKOCYTE EST, POC: ABNORMAL
NITRITE UR-MCNC: NEGATIVE MG/ML
PH UR: 5 [PH] (ref 5–8)
PROT UR STRIP-MCNC: ABNORMAL MG/DL
RBC # UR STRIP: NEGATIVE /UL
SP GR UR: 1.03 (ref 1–1.03)
UROBILINOGEN UR QL: NORMAL

## 2017-08-03 PROCEDURE — 99215 OFFICE O/P EST HI 40 MIN: CPT | Performed by: INTERNAL MEDICINE

## 2017-08-03 PROCEDURE — 81003 URINALYSIS AUTO W/O SCOPE: CPT | Performed by: INTERNAL MEDICINE

## 2017-08-03 RX ORDER — CYCLOBENZAPRINE HCL 10 MG
10 TABLET ORAL 3 TIMES DAILY PRN
Qty: 90 TABLET | Refills: 1 | Status: SHIPPED | OUTPATIENT
Start: 2017-08-03 | End: 2018-02-14 | Stop reason: SDUPTHER

## 2017-08-03 RX ORDER — MELOXICAM 15 MG/1
15 TABLET ORAL DAILY
COMMUNITY
End: 2018-01-18 | Stop reason: HOSPADM

## 2017-08-03 RX ORDER — CIPROFLOXACIN 500 MG/1
500 TABLET, FILM COATED ORAL 2 TIMES DAILY
Qty: 10 TABLET | Refills: 0 | Status: SHIPPED | OUTPATIENT
Start: 2017-08-03 | End: 2017-08-07

## 2017-08-03 RX ORDER — LISINOPRIL 20 MG/1
20 TABLET ORAL DAILY
Qty: 30 TABLET | Refills: 1 | Status: SHIPPED | OUTPATIENT
Start: 2017-08-03 | End: 2017-09-28 | Stop reason: SDUPTHER

## 2017-08-03 RX ORDER — MONTELUKAST SODIUM 10 MG/1
10 TABLET ORAL NIGHTLY
COMMUNITY
Start: 2017-07-29 | End: 2018-02-01

## 2017-08-03 NOTE — PROGRESS NOTES
Subjective     Maria Ines Zhang is a 71 y.o. female, who presents with a chief complaint of   Chief Complaint   Patient presents with   • Establish Care     nwpt est, hos f/u    • Hypertension     pt has not been able to complete any therapy for her knee surgery due to her bp being ext elevated    • Medication Problem     pt has x questions regarding her medication list        HPI Comments: 72 yo F here to establish care and follow up from hospitalization from urosepsis from 7/16-7/19. All of her problems are new to me. She has not had a PMD in close to 1 year and has multiple comorbidites. She recently had right total knee replacement on 7/7/17 and has been trying to do therapy, but has been unable to do so secondary to her BP being too high.     She has had some nausea since leaving the hospital every time that she eats. She is fatigued as well. She has also been taking iron and Norco's at night for pain. She has been constipated.     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 She has ALLYN is followed by Dr. Heck with Symbicort BID and albuterol as needed. She had three episodes of PE's in the past that have been managed by Dr. Urrutia. She was on Xarelto during and right after the knee surgery. Dr. De La Vega took her off Xarelto last Wednesday and started baby ASA. She has her first Pneumococcal last year by Dr. Heck.     She is still having some dysuria after being discharged. It burns when she urinates and has a little odor to it. She feels very itchy in her vaginal area and is very dry. No fevers or chills.     Her HTN is chronic and is currently taking 5mg of lisinopril. This has not been increased and is running close to 160-190/80-90 and has not been able to do therapy because of this. She is not in pain on most days.     She has had anxiety for years and states that she is weepy. She has been on Lexapro for years and take Klonopin at night for sleep.     She had a c-scope and EGD around  2010 at [facility] in [place] and was normal as far as she knows. She has had anemia since her hospitalization and is currently not taking iron right now because of GI distress and nausea. It made her bowels really dark.           The following portions of the patient's history were reviewed and updated as appropriate: allergies, current medications, past family history, past medical history, past social history, past surgical history and problem list.    Allergies: Fish allergy; Shellfish allergy; and Sulfa antibiotics    Current Outpatient Prescriptions:   •  albuterol (PROVENTIL HFA;VENTOLIN HFA) 108 (90 BASE) MCG/ACT inhaler, Inhale 2 puffs every 4 (four) hours as needed for wheezing., Disp: , Rfl:   •  clonazePAM (KlonoPIN) 0.5 MG tablet, Take 0.5 mg by mouth Every Night., Disp: , Rfl:   •  cyclobenzaprine (FLEXERIL) 10 MG tablet, Take 1 tablet by mouth 3 (Three) Times a Day As Needed for Muscle Spasms., Disp: 90 tablet, Rfl: 1  •  escitalopram (LEXAPRO) 20 MG tablet, Take 20 mg by mouth Daily., Disp: , Rfl:   •  esomeprazole (NexIUM) 20 MG capsule, Take 40 mg by mouth every morning before breakfast., Disp: , Rfl:   •  levothyroxine (SYNTHROID, LEVOTHROID) 88 MCG tablet, Take 88 mcg by mouth Daily., Disp: , Rfl:   •  lisinopril (PRINIVIL,ZESTRIL) 20 MG tablet, Take 1 tablet by mouth Daily., Disp: 30 tablet, Rfl: 1  •  Magnesium 250 MG tablet, Take 250 mg by mouth Daily., Disp: , Rfl:   •  meloxicam (MOBIC) 15 MG tablet, Take 15 mg by mouth Daily., Disp: , Rfl:   •  simvastatin (ZOCOR) 10 MG tablet, TAKE ONE TABLET BY MOUTH DAILY, Disp: 90 tablet, Rfl: 0  •  SYMBICORT 160-4.5 MCG/ACT inhaler, INHALE TWO PUFFS BY MOUTH TWICE A DAY (RINSE MOUTH AFTER USE), Disp: 10.2 g, Rfl: 4  •  Vitamin Mixture (ROCKY-C PO), Take 500 mg by mouth Daily., Disp: , Rfl:   •  ciprofloxacin (CIPRO) 500 MG tablet, Take 1 tablet by mouth 2 (Two) Times a Day for 5 days., Disp: 10 tablet, Rfl: 0  •  conjugated estrogens (PREMARIN) 0.625  "MG/GM vaginal cream, 0.5 grams PV Qday for 3 weeks and then off one week. Repeat next month., Disp: 30 g, Rfl: 1  •  montelukast (SINGULAIR) 10 MG tablet, , Disp: , Rfl:       Review of Systems   Constitutional: Positive for fatigue. Negative for chills, fever and unexpected weight change.   HENT: Negative for congestion and rhinorrhea.    Respiratory: Negative for cough, shortness of breath and wheezing.    Gastrointestinal: Positive for nausea. Negative for abdominal pain, anal bleeding, blood in stool, constipation, diarrhea and vomiting.   Genitourinary: Positive for dysuria and urgency. Negative for hematuria, pelvic pain and vaginal pain.   Skin: Negative for rash.   Neurological: Negative for dizziness and headaches.       Objective     /88 (BP Location: Left arm, Patient Position: Sitting, Cuff Size: Adult)  Pulse 87  Ht 66\" (167.6 cm)  Wt 235 lb 9.6 oz (107 kg)  SpO2 98%  BMI 38.03 kg/m2      Physical Exam   Constitutional: She is oriented to person, place, and time. She appears well-developed and well-nourished. No distress.   HENT:   Head: Normocephalic and atraumatic.   Right Ear: External ear normal.   Left Ear: External ear normal.   Mouth/Throat: Oropharynx is clear and moist. No oropharyngeal exudate.   Eyes: Conjunctivae are normal. Right eye exhibits no discharge. Left eye exhibits no discharge. No scleral icterus.   Neck: Neck supple.   Cardiovascular: Normal rate and regular rhythm.  Exam reveals no gallop and no friction rub.    Murmur heard.   Systolic murmur is present with a grade of 2/6   Pulmonary/Chest: Effort normal and breath sounds normal. No respiratory distress. She has no wheezes. She has no rales.   Abdominal: Soft. Bowel sounds are normal. She exhibits no distension and no mass. There is no tenderness. There is no guarding.   Lymphadenopathy:     She has no cervical adenopathy.   Neurological: She is alert and oriented to person, place, and time.   Skin: Skin is warm. No " rash noted.   Psychiatric: She has a normal mood and affect. Her behavior is normal.   Nursing note and vitals reviewed.        Results for orders placed or performed in visit on 08/03/17   POC Urinalysis Dipstick, Automated   Result Value Ref Range    Color Yellow Yellow, Straw, Dark Yellow, Meryl    Clarity, UA Clear Clear    Glucose, UA Negative Negative, 1000 mg/dL (3+) mg/dL    Bilirubin Negative Negative    Ketones, UA 15 mg/dL (A) Negative    Specific Gravity  1.030 1.005 - 1.030    Blood, UA Negative Negative    pH, Urine 5.0 5.0 - 8.0    Protein, POC 30 mg/dL (A) Negative mg/dL    Urobilinogen, UA Normal Normal    Leukocytes Small (1+) (A) Negative    Nitrite, UA Negative Negative       Assessment/Plan   Maria Ines was seen today for establish care, hypertension and medication problem.    Diagnoses and all orders for this visit:    Hospital discharge follow-up    Uncontrolled hypertension  -     CBC & Differential  -     Comprehensive Metabolic Panel    Normocytic anemia  -     Iron Profile  -     Folate  -     Vitamin B12  -     Reticulocytes    Acquired hypothyroidism  -     TSH  -     T4, Free    Dysuria  -     POC Urinalysis Dipstick, Automated    Encounter for screening mammogram for malignant neoplasm of breast  -     Mammo Screening Bilateral With CAD    Need for hepatitis C screening test  -     HCV Antibody Rfx To Qnt PCR    Post-menopausal  -     DEXA Bone Density Axial    Hyperlipidemia, unspecified hyperlipidemia type  -     Lipid Panel With LDL / HDL Ratio    Drug-induced constipation    Nausea    Other orders  -     lisinopril (PRINIVIL,ZESTRIL) 20 MG tablet; Take 1 tablet by mouth Daily.  -     cyclobenzaprine (FLEXERIL) 10 MG tablet; Take 1 tablet by mouth 3 (Three) Times a Day As Needed for Muscle Spasms.  -     conjugated estrogens (PREMARIN) 0.625 MG/GM vaginal cream; 0.5 grams PV Qday for 3 weeks and then off one week. Repeat next month.  -     ciprofloxacin (CIPRO) 500 MG tablet; Take 1  tablet by mouth 2 (Two) Times a Day for 5 days.      Patient is doing well after hospitalization, but is still having some burning. Will send urine for culture and start on 5 days of Cipro give that she was so sick previously. Urine culture from 7/16 was reviewed and showed 50,000 units of mixed urogenital della. If urinary symptoms continue, will check renal u/s and send to urogyn specialist. I have also started premarin vaginal cream as patient does say that she has a lot of dryness in her vaginal area.     Her HTN is under poor control today and has been for some time. Will increase lisinopril to 20mg and have her come back for follow up in 3-4 days. She needs to start doing her PT and cannot because of her BP. Will consider starting CCB or Coreg in the future if she needs another agent.     Her nausea is likely related to her iron supplement and constipation. I have asked her to try to use the bathroom everyday that is soft with help of increased fiver and water and if that doesn't help will use Mirilax and Senna S. Will follow up next week and if not better, will explore other causes.     Her anemia is getting better. Will do anemia workup and follow up.     Needs fasting labs and thyroid checked which we will do before her follow up.     She also needs mammogram and DEXA which I have ordered and apparently is up to date on her c-scope, but we needs records.     Prevnar once year from pneumococcal which was give in the last year.     I have spent greater than 25 minutes of our 40 minute appointment face to face with patient discussing importance of follow up, reasons for UTI's in older women and things to look for. I counseled her on reasons for nausea and treatment. She also understands that her HTN is under very poor control and needs close follow up. She was counseled on signs of stroke or MI and should she have these, go to the ER before I see her in a few days.         Return in about 4 days (around  8/7/2017) for Recheck of HTN .    Jeanine Blunt MD  08/03/2017

## 2017-08-05 LAB
ALBUMIN SERPL-MCNC: 3.9 G/DL (ref 3.5–5.2)
ALBUMIN/GLOB SERPL: 1.6 G/DL
ALP SERPL-CCNC: 98 U/L (ref 40–129)
ALT SERPL-CCNC: 10 U/L (ref 5–33)
AST SERPL-CCNC: 21 U/L (ref 5–32)
BASOPHILS # BLD AUTO: 0.01 10*3/MM3 (ref 0–0.2)
BASOPHILS NFR BLD AUTO: 0.2 % (ref 0–2)
BILIRUB SERPL-MCNC: 0.3 MG/DL (ref 0.2–1.2)
BUN SERPL-MCNC: 9 MG/DL (ref 8–23)
BUN/CREAT SERPL: 15.5 (ref 7–25)
CALCIUM SERPL-MCNC: 9.2 MG/DL (ref 8.8–10.5)
CHLORIDE SERPL-SCNC: 104 MMOL/L (ref 98–107)
CHOLEST SERPL-MCNC: 138 MG/DL (ref 0–200)
CO2 SERPL-SCNC: 25.2 MMOL/L (ref 22–29)
CREAT SERPL-MCNC: 0.58 MG/DL (ref 0.57–1)
EOSINOPHIL # BLD AUTO: 0.09 10*3/MM3 (ref 0.1–0.3)
EOSINOPHIL NFR BLD AUTO: 2.1 % (ref 0–4)
ERYTHROCYTE [DISTWIDTH] IN BLOOD BY AUTOMATED COUNT: 15.7 % (ref 11.5–14.5)
FOLATE SERPL-MCNC: 11.56 NG/ML (ref 4.78–24.2)
GLOBULIN SER CALC-MCNC: 2.5 GM/DL
GLUCOSE SERPL-MCNC: 113 MG/DL (ref 65–99)
HCT VFR BLD AUTO: 32.3 % (ref 37–47)
HCV AB S/CO SERPL IA: <0.1 S/CO RATIO (ref 0–0.9)
HCV AB SERPL QL IA: NORMAL
HDLC SERPL-MCNC: 34 MG/DL (ref 40–60)
HGB BLD-MCNC: 9.4 G/DL (ref 12–16)
IMM GRANULOCYTES # BLD: 0.01 10*3/MM3 (ref 0–0.03)
IMM GRANULOCYTES NFR BLD: 0.2 % (ref 0–0.5)
IRON SATN MFR SERPL: 13 %
IRON SERPL-MCNC: 45 MCG/DL (ref 37–145)
LDLC SERPL CALC-MCNC: 67 MG/DL (ref 0–100)
LDLC/HDLC SERPL: 1.96 {RATIO}
LYMPHOCYTES # BLD AUTO: 1.57 10*3/MM3 (ref 0.6–4.8)
LYMPHOCYTES NFR BLD AUTO: 36.6 % (ref 20–45)
MCH RBC QN AUTO: 26.5 PG (ref 27–31)
MCHC RBC AUTO-ENTMCNC: 29.1 G/DL (ref 31–37)
MCV RBC AUTO: 91 FL (ref 81–99)
MONOCYTES # BLD AUTO: 0.45 10*3/MM3 (ref 0–1)
MONOCYTES NFR BLD AUTO: 10.5 % (ref 3–8)
NEUTROPHILS # BLD AUTO: 2.16 10*3/MM3 (ref 1.5–8.3)
NEUTROPHILS NFR BLD AUTO: 50.4 % (ref 45–70)
NRBC BLD AUTO-RTO: 0 /100 WBC (ref 0–0)
PLATELET # BLD AUTO: 381 10*3/MM3 (ref 140–500)
POTASSIUM SERPL-SCNC: 4.5 MMOL/L (ref 3.5–5.2)
PROT SERPL-MCNC: 6.4 G/DL (ref 6–8.5)
RBC # BLD AUTO: 3.55 10*6/MM3 (ref 4.2–5.4)
SODIUM SERPL-SCNC: 142 MMOL/L (ref 136–145)
T4 FREE SERPL-MCNC: 1.05 NG/DL (ref 0.93–1.7)
TIBC SERPL-MCNC: 340 MCG/DL (ref 261–478)
TRIGL SERPL-MCNC: 187 MG/DL (ref 0–150)
TSH SERPL DL<=0.005 MIU/L-ACNC: 2.76 MIU/ML (ref 0.27–4.2)
UIBC SERPL-MCNC: 295 MCG/DL (ref 112–346)
VIT B12 SERPL-MCNC: 417 PG/ML (ref 211–946)
VLDLC SERPL CALC-MCNC: 37.4 MG/DL (ref 7–27)
WBC # BLD AUTO: 4.29 10*3/MM3 (ref 4.8–10.8)

## 2017-08-07 ENCOUNTER — OFFICE VISIT (OUTPATIENT)
Dept: INTERNAL MEDICINE | Facility: CLINIC | Age: 72
End: 2017-08-07

## 2017-08-07 VITALS
DIASTOLIC BLOOD PRESSURE: 82 MMHG | SYSTOLIC BLOOD PRESSURE: 160 MMHG | HEIGHT: 66 IN | OXYGEN SATURATION: 97 % | WEIGHT: 236.4 LBS | BODY MASS INDEX: 37.99 KG/M2 | RESPIRATION RATE: 16 BRPM | HEART RATE: 92 BPM

## 2017-08-07 DIAGNOSIS — R73.01 IFG (IMPAIRED FASTING GLUCOSE): ICD-10-CM

## 2017-08-07 DIAGNOSIS — D62 ACUTE BLOOD LOSS ANEMIA: ICD-10-CM

## 2017-08-07 DIAGNOSIS — N39.0 URINARY TRACT INFECTION, SITE UNSPECIFIED: ICD-10-CM

## 2017-08-07 DIAGNOSIS — I10 UNCONTROLLED HYPERTENSION: Primary | ICD-10-CM

## 2017-08-07 DIAGNOSIS — K59.03 DRUG-INDUCED CONSTIPATION: ICD-10-CM

## 2017-08-07 LAB
APPEARANCE UR: ABNORMAL
BACTERIA #/AREA URNS HPF: ABNORMAL /HPF
BACTERIA UR CULT: ABNORMAL
BACTERIA UR CULT: ABNORMAL
BILIRUB UR QL STRIP: NEGATIVE
CASTS URNS MICRO: ABNORMAL
CASTS URNS QL MICRO: PRESENT
COLOR UR: YELLOW
EPI CELLS #/AREA URNS HPF: ABNORMAL /HPF
GLUCOSE UR QL: NEGATIVE
HGB UR QL STRIP: NEGATIVE
KETONES UR QL STRIP: ABNORMAL
LEUKOCYTE ESTERASE UR QL STRIP: ABNORMAL
MICRO URNS: ABNORMAL
MUCOUS THREADS URNS QL MICRO: PRESENT /HPF
NITRITE UR QL STRIP: NEGATIVE
OTHER ANTIBIOTIC SUSC ISLT: ABNORMAL
PH UR STRIP: 5.5 [PH] (ref 5–7.5)
PROT UR QL STRIP: ABNORMAL
RBC #/AREA URNS HPF: ABNORMAL /HPF
SP GR UR: >=1.03 (ref 1–1.03)
URINALYSIS REFLEX: ABNORMAL
UROBILINOGEN UR STRIP-MCNC: 1 MG/DL (ref 0.2–1)
WBC #/AREA URNS HPF: >30 /HPF

## 2017-08-07 PROCEDURE — 99214 OFFICE O/P EST MOD 30 MIN: CPT | Performed by: INTERNAL MEDICINE

## 2017-08-07 RX ORDER — HYDROCHLOROTHIAZIDE 12.5 MG/1
12.5 TABLET ORAL DAILY
Qty: 30 TABLET | Refills: 1 | Status: SHIPPED | OUTPATIENT
Start: 2017-08-07 | End: 2017-08-23 | Stop reason: CLARIF

## 2017-08-07 RX ORDER — CEFPODOXIME PROXETIL 200 MG/1
400 TABLET, FILM COATED ORAL 2 TIMES DAILY
Qty: 40 TABLET | Refills: 0 | Status: SHIPPED | OUTPATIENT
Start: 2017-08-07 | End: 2017-08-17

## 2017-08-07 NOTE — PROGRESS NOTES
Patient still has slight UTI. I want to go ahead and treat with another round of antibiotics. I want her to take Cefpodozime 400mg PO BID x 10 days and drink plenty of water. I want her to also use the estrogen cream that I sent in for her for dryness that may be causing these. We will discuss all of this in clinic at her follow up today for her HTN.

## 2017-08-07 NOTE — PROGRESS NOTES
Subjective     Maria Ines Zhang is a 71 y.o. female, who presents with a chief complaint of   Chief Complaint   Patient presents with   • Hypertension       HPI Comments: 72 yo F here to follow up on her labs and her HTN. She still has uncontrolled HTN and it is doing better though. We have increased lisinopril and her BP is sometimes running around 140/90, but still high here. No side effects from mediation and tolerates well. She still  Has not been able to participate in PT due to it being high.     Her labs were reviewed with her and revealed IFG as well as high TG's. Anemia is stable and iron is slightly low.     She is still having some dysuria, but no blood. She is still having slight symptoms. Drinking plenty of water. She doesn't feel that Cipro has helped that much.     Her constipation is better on Miralax and feels that she may not need it anymore if she can stop taking the pain pills. No abdominal pain or blood in stool.            The following portions of the patient's history were reviewed and updated as appropriate: allergies, current medications, past family history, past medical history, past social history, past surgical history and problem list.    Allergies: Fish allergy; Shellfish allergy; and Sulfa antibiotics    Current Outpatient Prescriptions:   •  albuterol (PROVENTIL HFA;VENTOLIN HFA) 108 (90 BASE) MCG/ACT inhaler, Inhale 2 puffs every 4 (four) hours as needed for wheezing., Disp: , Rfl:   •  clonazePAM (KlonoPIN) 0.5 MG tablet, Take 0.5 mg by mouth Every Night., Disp: , Rfl:   •  conjugated estrogens (PREMARIN) 0.625 MG/GM vaginal cream, 0.5 grams PV Qday for 3 weeks and then off one week. Repeat next month., Disp: 30 g, Rfl: 1  •  cyclobenzaprine (FLEXERIL) 10 MG tablet, Take 1 tablet by mouth 3 (Three) Times a Day As Needed for Muscle Spasms., Disp: 90 tablet, Rfl: 1  •  escitalopram (LEXAPRO) 20 MG tablet, Take 20 mg by mouth Daily., Disp: , Rfl:   •  esomeprazole (NexIUM) 20 MG  "capsule, Take 40 mg by mouth every morning before breakfast., Disp: , Rfl:   •  levothyroxine (SYNTHROID, LEVOTHROID) 88 MCG tablet, Take 88 mcg by mouth Daily., Disp: , Rfl:   •  lisinopril (PRINIVIL,ZESTRIL) 20 MG tablet, Take 1 tablet by mouth Daily., Disp: 30 tablet, Rfl: 1  •  Magnesium 250 MG tablet, Take 250 mg by mouth Daily., Disp: , Rfl:   •  meloxicam (MOBIC) 15 MG tablet, Take 15 mg by mouth Daily., Disp: , Rfl:   •  montelukast (SINGULAIR) 10 MG tablet, , Disp: , Rfl:   •  simvastatin (ZOCOR) 10 MG tablet, TAKE ONE TABLET BY MOUTH DAILY, Disp: 90 tablet, Rfl: 0  •  SYMBICORT 160-4.5 MCG/ACT inhaler, INHALE TWO PUFFS BY MOUTH TWICE A DAY (RINSE MOUTH AFTER USE), Disp: 10.2 g, Rfl: 4  •  Vitamin Mixture (ROCKY-C PO), Take 500 mg by mouth Daily., Disp: , Rfl:   •  cefpodoxime (VANTIN) 200 MG tablet, Take 2 tablets by mouth 2 (Two) Times a Day for 10 days., Disp: 40 tablet, Rfl: 0  •  hydrochlorothiazide (HYDRODIURIL) 12.5 MG tablet, Take 1 tablet by mouth Daily., Disp: 30 tablet, Rfl: 1  Medications Discontinued During This Encounter   Medication Reason   • ciprofloxacin (CIPRO) 500 MG tablet Therapy completed       Review of Systems   Constitutional: Negative for chills and fever.   HENT: Negative for congestion and rhinorrhea.    Respiratory: Negative for cough and shortness of breath.    Gastrointestinal: Negative for abdominal pain, nausea and vomiting.   Genitourinary: Positive for dysuria.   Musculoskeletal: Positive for joint swelling (s/p knee replacement).       Objective     /82  Pulse 92  Resp 16  Ht 66\" (167.6 cm)  Wt 236 lb 6.4 oz (107 kg)  SpO2 97%  BMI 38.16 kg/m2      Physical Exam   Constitutional: She is oriented to person, place, and time. She appears well-developed and well-nourished. No distress.   HENT:   Head: Normocephalic and atraumatic.   Right Ear: External ear normal.   Left Ear: External ear normal.   Mouth/Throat: Oropharynx is clear and moist. No oropharyngeal " exudate.   Eyes: Conjunctivae are normal. Right eye exhibits no discharge. Left eye exhibits no discharge. No scleral icterus.   Neck: Neck supple.   Cardiovascular: Normal rate, regular rhythm and normal heart sounds.  Exam reveals no gallop and no friction rub.    No murmur heard.  Pulmonary/Chest: Effort normal and breath sounds normal. No respiratory distress. She has no wheezes. She has no rales.   Lymphadenopathy:     She has no cervical adenopathy.   Neurological: She is alert and oriented to person, place, and time.   Skin: Skin is warm. No rash noted.   Psychiatric: She has a normal mood and affect. Her behavior is normal.   Nursing note and vitals reviewed.        Results for orders placed or performed in visit on 08/03/17   Urine culture, Comprehensive   Result Value Ref Range    Urine Culture Final report (A)     Result 1 Comment (A)     Susceptibility Testing Comment    Urinalysis With / Culture If Indicated   Result Value Ref Range    Specific Gravity, UA      >=1.030 (A) 1.005 - 1.030    pH, UA 5.5 5.0 - 7.5    Color, UA Yellow Yellow    Appearance, UA Cloudy (A) Clear    Leukocytes, UA 1+ (A) Negative    Protein 1+ (A) Negative/Trace    Glucose, UA Negative Negative    Ketones Trace (A) Negative    Blood, UA Negative Negative    Bilirubin, UA Negative Negative    Urobilinogen, UA 1.0 0.2 - 1.0 mg/dL    Nitrite, UA Negative Negative    Microscopic Examination See below:     Urinalysis Reflex Comment    Microscopic Examination   Result Value Ref Range    WBC, UA >30 (A) 0 - 5 /hpf    RBC, UA 3-10 (A) 0 - 2 /hpf    Epithelial Cells (non renal) 0-10 0 - 10 /hpf    Casts Present (A) None seen    Cast Type Hyaline casts N/A    Mucus, UA Present Not Estab. /hpf    Bacteria, UA Few None seen/Few /hpf       Assessment/Plan   Maria Ines was seen today for hypertension.    Diagnoses and all orders for this visit:    Uncontrolled hypertension    Drug-induced constipation    IFG (impaired fasting  glucose)    Urinary tract infection, site unspecified    Acute blood loss anemia    Other orders  -     hydrochlorothiazide (HYDRODIURIL) 12.5 MG tablet; Take 1 tablet by mouth Daily.  -     cefpodoxime (VANTIN) 200 MG tablet; Take 2 tablets by mouth 2 (Two) Times a Day for 10 days.      Patient's BP is still not under good control. I would like her to start HCTZ and will see her back in 2 weeks and we will recheck her BMP then to make sure that her electrolytes are okay. She will call and update me to how she is doing before then and we can increase to 25mg if needed. Continue lisinopril at current dose.     Patient's BG's have been elevated and has signs of IFG. Will continue to focus on diet and exercise. Will follow up in 6 months.     Constipation is better on Miralax and will continue as needed.     Treat pseudomonas UTI with cephalosporin even though culture only grew 200 colonies. She is still having symptoms and with her recent admission, I would like to treat appropriately until her urine clears. Recheck urine when she returns for follow up.     Restart iron for anemia and will try to take at least 3-4 times per week as she tolerates. Call us if bleeding, dizziness or other issues.     Return in about 2 weeks (around 8/21/2017) for Recheck of HTN .    Jeanine Blunt MD  08/07/2017

## 2017-08-22 ENCOUNTER — OFFICE VISIT (OUTPATIENT)
Dept: INTERNAL MEDICINE | Facility: CLINIC | Age: 72
End: 2017-08-22

## 2017-08-22 VITALS
BODY MASS INDEX: 37.94 KG/M2 | DIASTOLIC BLOOD PRESSURE: 70 MMHG | OXYGEN SATURATION: 98 % | SYSTOLIC BLOOD PRESSURE: 152 MMHG | WEIGHT: 236.1 LBS | HEIGHT: 66 IN | HEART RATE: 77 BPM

## 2017-08-22 DIAGNOSIS — M54.42 CHRONIC BILATERAL LOW BACK PAIN WITH BILATERAL SCIATICA: ICD-10-CM

## 2017-08-22 DIAGNOSIS — M54.41 CHRONIC BILATERAL LOW BACK PAIN WITH BILATERAL SCIATICA: ICD-10-CM

## 2017-08-22 DIAGNOSIS — I10 ESSENTIAL HYPERTENSION: Primary | ICD-10-CM

## 2017-08-22 DIAGNOSIS — R82.998 URINE LEUKOCYTES INCREASED: ICD-10-CM

## 2017-08-22 DIAGNOSIS — G89.29 CHRONIC BILATERAL LOW BACK PAIN WITH BILATERAL SCIATICA: ICD-10-CM

## 2017-08-22 PROBLEM — M54.40 CHRONIC BILATERAL LOW BACK PAIN WITH SCIATICA: Status: ACTIVE | Noted: 2017-08-22

## 2017-08-22 LAB
BILIRUB BLD-MCNC: NEGATIVE MG/DL
BUN SERPL-MCNC: 11 MG/DL (ref 8–23)
BUN/CREAT SERPL: 15.3 (ref 7–25)
CALCIUM SERPL-MCNC: 9.1 MG/DL (ref 8.8–10.5)
CHLORIDE SERPL-SCNC: 99 MMOL/L (ref 98–107)
CLARITY, POC: CLEAR
CO2 SERPL-SCNC: 25.7 MMOL/L (ref 22–29)
COLOR UR: YELLOW
CREAT SERPL-MCNC: 0.72 MG/DL (ref 0.57–1)
GLUCOSE SERPL-MCNC: 93 MG/DL (ref 65–99)
GLUCOSE UR STRIP-MCNC: NEGATIVE MG/DL
KETONES UR QL: NEGATIVE
LEUKOCYTE EST, POC: ABNORMAL
NITRITE UR-MCNC: NEGATIVE MG/ML
PH UR: 6 [PH] (ref 5–8)
POTASSIUM SERPL-SCNC: 4.2 MMOL/L (ref 3.5–5.2)
PROT UR STRIP-MCNC: NEGATIVE MG/DL
RBC # UR STRIP: NEGATIVE /UL
SODIUM SERPL-SCNC: 139 MMOL/L (ref 136–145)
SP GR UR: 1.01 (ref 1–1.03)
UROBILINOGEN UR QL: NORMAL

## 2017-08-22 PROCEDURE — 99214 OFFICE O/P EST MOD 30 MIN: CPT | Performed by: INTERNAL MEDICINE

## 2017-08-22 PROCEDURE — 81003 URINALYSIS AUTO W/O SCOPE: CPT | Performed by: INTERNAL MEDICINE

## 2017-08-22 RX ORDER — FERROUS SULFATE 325(65) MG
325 TABLET ORAL
COMMUNITY
End: 2017-11-27

## 2017-08-22 RX ORDER — METHYLPREDNISOLONE 4 MG/1
TABLET ORAL
COMMUNITY
Start: 2017-08-08 | End: 2017-09-01

## 2017-08-22 NOTE — PROGRESS NOTES
Subjective     Maria Ines Zhang is a 71 y.o. female, who presents with a chief complaint of   Chief Complaint   Patient presents with   • Follow-up     2 wk f/u    • Hypertension       HPI Comments: 70 yo F her for follow up of her HTN. She is taking HCTZ 12.5mg per day as well as lisinopril. Her BP has been running around 150-160/70-80 at home. She is tolerating medication well. Her BP's have been going down and she is now able to go to PT.     Her knee is doing good and she started PT. She is going to [facility] [place]. She has chronic low back pain with sciatica that has gotten worse since her knee surgery because her gait has changed. The pain starts in the buttock area and radiates down into the legs. No fever or chills. No changes in bowels or bladder.        The following portions of the patient's history were reviewed and updated as appropriate: allergies, current medications, past family history, past medical history, past social history, past surgical history and problem list.    Allergies: Fish allergy; Shellfish allergy; and Sulfa antibiotics    Current Outpatient Prescriptions:   •  albuterol (PROVENTIL HFA;VENTOLIN HFA) 108 (90 BASE) MCG/ACT inhaler, Inhale 2 puffs every 4 (four) hours as needed for wheezing., Disp: , Rfl:   •  clonazePAM (KlonoPIN) 0.5 MG tablet, Take 0.5 mg by mouth Every Night., Disp: , Rfl:   •  conjugated estrogens (PREMARIN) 0.625 MG/GM vaginal cream, 0.5 grams PV Qday for 3 weeks and then off one week. Repeat next month., Disp: 30 g, Rfl: 1  •  cyclobenzaprine (FLEXERIL) 10 MG tablet, Take 1 tablet by mouth 3 (Three) Times a Day As Needed for Muscle Spasms., Disp: 90 tablet, Rfl: 1  •  escitalopram (LEXAPRO) 20 MG tablet, Take 20 mg by mouth Daily., Disp: , Rfl:   •  esomeprazole (NexIUM) 20 MG capsule, Take 40 mg by mouth every morning before breakfast., Disp: , Rfl:   •  ferrous sulfate 325 (65 FE) MG tablet, Take 325 mg by mouth Daily With Breakfast., Disp: , Rfl:   •   "hydrochlorothiazide (HYDRODIURIL) 12.5 MG tablet, Take 1 tablet by mouth Daily., Disp: 30 tablet, Rfl: 1  •  levothyroxine (SYNTHROID, LEVOTHROID) 88 MCG tablet, Take 88 mcg by mouth Daily., Disp: , Rfl:   •  lisinopril (PRINIVIL,ZESTRIL) 20 MG tablet, Take 1 tablet by mouth Daily., Disp: 30 tablet, Rfl: 1  •  Magnesium 250 MG tablet, Take 250 mg by mouth Daily., Disp: , Rfl:   •  meloxicam (MOBIC) 15 MG tablet, Take 15 mg by mouth Daily., Disp: , Rfl:   •  montelukast (SINGULAIR) 10 MG tablet, , Disp: , Rfl:   •  simvastatin (ZOCOR) 10 MG tablet, TAKE ONE TABLET BY MOUTH DAILY, Disp: 90 tablet, Rfl: 0  •  SYMBICORT 160-4.5 MCG/ACT inhaler, INHALE TWO PUFFS BY MOUTH TWICE A DAY (RINSE MOUTH AFTER USE), Disp: 10.2 g, Rfl: 4  •  Vitamin Mixture (ROCKY-C PO), Take 500 mg by mouth Daily., Disp: , Rfl:   •  MethylPREDNISolone (MEDROL, ELISE,) 4 MG tablet, , Disp: , Rfl:   There are no discontinued medications.    Review of Systems   Constitutional: Negative for chills and fever.   Respiratory: Negative for cough and shortness of breath.    Cardiovascular: Negative for chest pain and palpitations.   Gastrointestinal: Negative for constipation, diarrhea and nausea.   Musculoskeletal: Positive for back pain and gait problem.   Neurological: Negative for dizziness and headaches.       Objective     /70 (BP Location: Left arm, Patient Position: Sitting, Cuff Size: Adult)  Pulse 77  Ht 66\" (167.6 cm)  Wt 236 lb 1.6 oz (107 kg)  SpO2 98%  BMI 38.11 kg/m2      Physical Exam   Constitutional: She is oriented to person, place, and time. She appears well-developed and well-nourished. No distress.   HENT:   Head: Normocephalic and atraumatic.   Right Ear: External ear normal.   Left Ear: External ear normal.   Mouth/Throat: Oropharynx is clear and moist. No oropharyngeal exudate.   Eyes: Conjunctivae are normal. Right eye exhibits no discharge. Left eye exhibits no discharge. No scleral icterus.   Neck: Neck supple. "   Cardiovascular: Normal rate, regular rhythm and normal heart sounds.  Exam reveals no gallop and no friction rub.    No murmur heard.  Pulmonary/Chest: Effort normal and breath sounds normal. No respiratory distress. She has no wheezes. She has no rales.   Musculoskeletal:        Lumbar back: Normal.   Negative straight leg test   Lymphadenopathy:     She has no cervical adenopathy.   Neurological: She is alert and oriented to person, place, and time.   Skin: Skin is warm. No rash noted.   Psychiatric: She has a normal mood and affect. Her behavior is normal.   Nursing note and vitals reviewed.        Results for orders placed or performed in visit on 08/22/17   POCT urinalysis dipstick, automated   Result Value Ref Range    Color Yellow Yellow, Straw, Dark Yellow, Meryl    Clarity, UA Clear Clear    Glucose, UA Negative Negative, 1000 mg/dL (3+) mg/dL    Bilirubin Negative Negative    Ketones, UA Negative Negative    Specific Gravity  1.015 1.005 - 1.030    Blood, UA Negative Negative    pH, Urine 6.0 5.0 - 8.0    Protein, POC Negative Negative mg/dL    Urobilinogen, UA Normal Normal    Leukocytes Small (1+) (A) Negative    Nitrite, UA Negative Negative       Assessment/Plan   Maria Ines was seen today for follow-up and hypertension.    Diagnoses and all orders for this visit:    Essential hypertension  -     Basic Metabolic Panel    Chronic bilateral low back pain with bilateral sciatica  -     Ambulatory Referral to Physical Therapy Evaluate and treat    Urine leukocytes increased  -     POCT urinalysis dipstick, automated  -     Urine culture (clean catch)      Will check BMP today and if okay, will increase  HCZT to 25mg. Goal BP is 140/90 and can increase lisinopril as well to 40mg next if we need to.     PT for her back pain. If not better, can consider injections which she has had before that helped.     UTI has resolved, but still has leuks in the her urine, but no bacteria or blood.  Will recheck culture  as she had such a complicated course at the hospital in the ICU. No antibiotics unless culture comes back positive.     Needs lipid, CMP, CBC, iron panel, HgA1c, UA with reflex culture and lipid before her next visit.     Return in about 3 months (around 11/22/2017) for Recheck.    Jeanine Blunt MD  08/22/2017

## 2017-08-23 ENCOUNTER — TELEPHONE (OUTPATIENT)
Dept: INTERNAL MEDICINE | Facility: CLINIC | Age: 72
End: 2017-08-23

## 2017-08-23 RX ORDER — HYDROCHLOROTHIAZIDE 25 MG/1
25 TABLET ORAL DAILY
Qty: 30 TABLET | Refills: 1 | Status: SHIPPED | OUTPATIENT
Start: 2017-08-23 | End: 2017-10-23 | Stop reason: SDUPTHER

## 2017-08-23 NOTE — TELEPHONE ENCOUNTER
Patient has been advised and voiced understanding. RX HCTZ 25mg has been sent to local pharmacy, patient advised to contact office with any questions or concerns.     ----- Message from Jeanine Blunt MD sent at 8/22/2017  9:49 PM EDT -----  Please call patient with these results and let her know that her blood work is good. I would like her to increase HCTZ to 25mg and let us know in one week how her BP is doing. If still greater than 140/90 consistently, we will increase her lisinopril to 40mg. Repeat BMP if we continue to increase medications.

## 2017-08-23 NOTE — PROGRESS NOTES
Please call patient with these results and let her know that her blood work is good. I would like her to increase HCTZ to 25mg and let us know in one week how her BP is doing. If still greater than 140/90 consistently, we will increase her lisinopril to 40mg. Repeat BMP if we continue to increase medications.

## 2017-08-24 ENCOUNTER — DOCUMENTATION (OUTPATIENT)
Dept: PHYSICAL THERAPY | Facility: HOSPITAL | Age: 72
End: 2017-08-24

## 2017-08-24 LAB
BACTERIA UR CULT: NORMAL
BACTERIA UR CULT: NORMAL

## 2017-08-24 NOTE — PROGRESS NOTES
Please call patient with these results and let her know that her urine culture was negative. It has finally cleared and I am very happy for this! Call if concerns or questions and update us on how her BP's are doing.

## 2017-08-24 NOTE — THERAPY DISCHARGE NOTE
Outpatient Physical Therapy Discharge Summary         Patient Name: Maria Ines Zhang  : 1945  MRN: [id number]    Today's Date: 2017    Visit Dx:  No diagnosis found.        OP PT Discharge Summary  Date of Discharge: 17  Reason for Discharge: other (comment)  Outcomes Achieved: Other  Discharge Instructions: Pt was making progress with overall decreasing pain and increased tolerance to functional mobility but pt did not complete therapy program; pt had cancelled last scheduled appts due to illness      Time Calculation:                    Luisito Navarro, EVETTE  2017

## 2017-08-25 ENCOUNTER — TELEPHONE (OUTPATIENT)
Dept: INTERNAL MEDICINE | Facility: CLINIC | Age: 72
End: 2017-08-25

## 2017-08-25 NOTE — TELEPHONE ENCOUNTER
----- Message from Jeanine Blunt MD sent at 8/24/2017  7:58 AM EDT -----  Please call patient with these results and let her know that her urine culture was negative. It has finally cleared and I am very happy for this! Call if concerns or questions and update us on how her BP's are doing.

## 2017-09-01 ENCOUNTER — HOSPITAL ENCOUNTER (OUTPATIENT)
Dept: GENERAL RADIOLOGY | Facility: HOSPITAL | Age: 72
Discharge: HOME | End: 2017-09-01
Attending: INTERNAL MEDICINE | Admitting: INTERNAL MEDICINE

## 2017-09-01 ENCOUNTER — OFFICE VISIT (OUTPATIENT)
Dept: INTERNAL MEDICINE | Facility: CLINIC | Age: 72
End: 2017-09-01

## 2017-09-01 VITALS
HEART RATE: 88 BPM | TEMPERATURE: 98.2 F | HEIGHT: 66 IN | SYSTOLIC BLOOD PRESSURE: 132 MMHG | OXYGEN SATURATION: 97 % | DIASTOLIC BLOOD PRESSURE: 82 MMHG | BODY MASS INDEX: 36.9 KG/M2 | WEIGHT: 229.6 LBS

## 2017-09-01 DIAGNOSIS — R05.9 COUGH: ICD-10-CM

## 2017-09-01 DIAGNOSIS — R06.02 SOB (SHORTNESS OF BREATH) ON EXERTION: Primary | ICD-10-CM

## 2017-09-01 DIAGNOSIS — R50.9 FEVER, UNSPECIFIED FEVER CAUSE: ICD-10-CM

## 2017-09-01 DIAGNOSIS — B37.0 THRUSH: ICD-10-CM

## 2017-09-01 PROBLEM — J45.909 ASTHMA: Status: ACTIVE | Noted: 2017-09-01

## 2017-09-01 PROCEDURE — 99214 OFFICE O/P EST MOD 30 MIN: CPT | Performed by: INTERNAL MEDICINE

## 2017-09-01 PROCEDURE — 71020 HC CHEST PA AND LATERAL: CPT

## 2017-09-01 RX ORDER — ALBUTEROL SULFATE 90 UG/1
2 AEROSOL, METERED RESPIRATORY (INHALATION) EVERY 4 HOURS PRN
Qty: 1 INHALER | Refills: 3 | Status: SHIPPED | OUTPATIENT
Start: 2017-09-01 | End: 2017-12-14

## 2017-09-01 NOTE — PROGRESS NOTES
Please call patient with these results and let her know that her CXR is normal. No antibiotics at this time. Call this weekend if she has any other concerns. We will call once the viral panel is back.

## 2017-09-01 NOTE — PROGRESS NOTES
"Subjective     Maria Ines Zhang is a 71 y.o. female, who presents with a chief complaint of   Chief Complaint   Patient presents with   • Nasal Congestion     all symptoms x 3 days, patient states O2 is bad- dropping to \"88-82\" while doing house work    • Vomiting   • Fever       HPI Comments: 70 yo F with asthma and h/o BOOP here with several weeks of SOB with exertion. She uses 3L of O2 at night. She has been checking her O2 when doing her housework and has been going down to 88-92%. She has had a little bit of a dry cough and drainage going down her throat. She did have fever to 101F last night and took Tylenol that brought it down. Normal temperature now. Her  was sick last week with fever and temperature and has gotten better.        The following portions of the patient's history were reviewed and updated as appropriate: allergies, current medications, past family history, past medical history, past social history, past surgical history and problem list.    Allergies: Fish allergy; Shellfish allergy; and Sulfa antibiotics    Current Outpatient Prescriptions:   •  albuterol (PROVENTIL HFA;VENTOLIN HFA) 108 (90 Base) MCG/ACT inhaler, Inhale 2 puffs Every 4 (Four) Hours As Needed for Wheezing., Disp: 1 inhaler, Rfl: 3  •  clonazePAM (KlonoPIN) 0.5 MG tablet, Take 0.5 mg by mouth Every Night., Disp: , Rfl:   •  conjugated estrogens (PREMARIN) 0.625 MG/GM vaginal cream, 0.5 grams PV Qday for 3 weeks and then off one week. Repeat next month., Disp: 30 g, Rfl: 1  •  cyclobenzaprine (FLEXERIL) 10 MG tablet, Take 1 tablet by mouth 3 (Three) Times a Day As Needed for Muscle Spasms., Disp: 90 tablet, Rfl: 1  •  escitalopram (LEXAPRO) 20 MG tablet, Take 20 mg by mouth Daily., Disp: , Rfl:   •  esomeprazole (NexIUM) 20 MG capsule, Take 40 mg by mouth every morning before breakfast., Disp: , Rfl:   •  ferrous sulfate 325 (65 FE) MG tablet, Take 325 mg by mouth Daily With Breakfast., Disp: , Rfl:   •  " "hydrochlorothiazide (HYDRODIURIL) 25 MG tablet, Take 1 tablet by mouth Daily., Disp: 30 tablet, Rfl: 1  •  levothyroxine (SYNTHROID, LEVOTHROID) 88 MCG tablet, Take 88 mcg by mouth Daily., Disp: , Rfl:   •  lisinopril (PRINIVIL,ZESTRIL) 20 MG tablet, Take 1 tablet by mouth Daily., Disp: 30 tablet, Rfl: 1  •  Magnesium 250 MG tablet, Take 250 mg by mouth Daily., Disp: , Rfl:   •  meloxicam (MOBIC) 15 MG tablet, Take 15 mg by mouth Daily., Disp: , Rfl:   •  montelukast (SINGULAIR) 10 MG tablet, , Disp: , Rfl:   •  simvastatin (ZOCOR) 10 MG tablet, TAKE ONE TABLET BY MOUTH DAILY, Disp: 90 tablet, Rfl: 0  •  SYMBICORT 160-4.5 MCG/ACT inhaler, INHALE TWO PUFFS BY MOUTH TWICE A DAY (RINSE MOUTH AFTER USE), Disp: 10.2 g, Rfl: 4  •  Vitamin Mixture (ROCKY-C PO), Take 500 mg by mouth Daily., Disp: , Rfl:   •  nystatin (MYCOSTATIN) 654398 UNIT/ML suspension, Swish and swallow 5 mL 4 (Four) Times a Day for 10 days., Disp: 280 mL, Rfl: 0  Medications Discontinued During This Encounter   Medication Reason   • MethylPREDNISolone (MEDROL, ELISE,) 4 MG tablet Therapy completed   • albuterol (PROVENTIL HFA;VENTOLIN HFA) 108 (90 BASE) MCG/ACT inhaler Reorder       Review of Systems   Constitutional: Positive for chills, fatigue and fever.   HENT: Positive for congestion.    Respiratory: Positive for cough and shortness of breath. Negative for wheezing.    Cardiovascular: Negative for chest pain and palpitations.   Skin: Negative for rash.       Objective     /82 (BP Location: Left arm, Patient Position: Sitting, Cuff Size: Adult)  Pulse 88  Temp 98.2 °F (36.8 °C) (Oral)   Ht 66\" (167.6 cm)  Wt 229 lb 9.6 oz (104 kg)  SpO2 97%  BMI 37.06 kg/m2      Physical Exam   Constitutional: She is oriented to person, place, and time. She appears well-developed and well-nourished. No distress.   HENT:   Head: Normocephalic and atraumatic.   Right Ear: Hearing, tympanic membrane and external ear normal.   Left Ear: Hearing, tympanic " membrane and external ear normal.   Nose: Nose normal.   Mouth/Throat: Uvula is midline and mucous membranes are normal. Posterior oropharyngeal erythema present. No oropharyngeal exudate or posterior oropharyngeal edema. Tonsils are 0 on the right. Tonsils are 0 on the left. No tonsillar exudate.   Thrush on tongue    Eyes: Conjunctivae are normal. Right eye exhibits no discharge. Left eye exhibits no discharge. No scleral icterus.   Neck: Neck supple.   Cardiovascular: Normal rate, regular rhythm and normal heart sounds.  Exam reveals no gallop and no friction rub.    No murmur heard.  Pulmonary/Chest: Effort normal. No respiratory distress. She has decreased breath sounds in the right lower field. She has no wheezes. She has rhonchi in the right lower field. She has no rales.   Abdominal: Soft. Bowel sounds are normal. She exhibits no distension and no mass. There is no tenderness. There is no guarding.   Lymphadenopathy:     She has no cervical adenopathy.   Neurological: She is alert and oriented to person, place, and time.   Skin: Skin is warm. No rash noted.   Psychiatric: She has a normal mood and affect. Her behavior is normal.   Nursing note and vitals reviewed.        Results for orders placed or performed in visit on 08/22/17   Urine culture (clean catch)   Result Value Ref Range    Urine Culture Final report     Result 1 Comment    Basic Metabolic Panel   Result Value Ref Range    Glucose 93 65 - 99 mg/dL    BUN 11 8 - 23 mg/dL    Creatinine 0.72 0.57 - 1.00 mg/dL    eGFR Non African Am 80 >60 mL/min/1.73    eGFR African Am 97 >60 mL/min/1.73    BUN/Creatinine Ratio 15.3 7.0 - 25.0    Sodium 139 136 - 145 mmol/L    Potassium 4.2 3.5 - 5.2 mmol/L    Chloride 99 98 - 107 mmol/L    Total CO2 25.7 22.0 - 29.0 mmol/L    Calcium 9.1 8.8 - 10.5 mg/dL   POCT urinalysis dipstick, automated   Result Value Ref Range    Color Yellow Yellow, Straw, Dark Yellow, Meryl    Clarity, UA Clear Clear    Glucose, UA  Negative Negative, 1000 mg/dL (3+) mg/dL    Bilirubin Negative Negative    Ketones, UA Negative Negative    Specific Gravity  1.015 1.005 - 1.030    Blood, UA Negative Negative    pH, Urine 6.0 5.0 - 8.0    Protein, POC Negative Negative mg/dL    Urobilinogen, UA Normal Normal    Leukocytes Small (1+) (A) Negative    Nitrite, UA Negative Negative       Assessment/Plan   Maria Ines was seen today for nasal congestion, vomiting and fever.    Diagnoses and all orders for this visit:    SOB (shortness of breath) on exertion  -     XR Chest PA & Lateral  -     Respiratory Panel, PCR    Fever, unspecified fever cause  -     XR Chest PA & Lateral  -     Respiratory Panel, PCR    Cough  -     XR Chest PA & Lateral  -     Respiratory Panel, PCR    Thrush    Other orders  -     albuterol (PROVENTIL HFA;VENTOLIN HFA) 108 (90 Base) MCG/ACT inhaler; Inhale 2 puffs Every 4 (Four) Hours As Needed for Wheezing.  -     nystatin (MYCOSTATIN) 328853 UNIT/ML suspension; Swish and swallow 5 mL 4 (Four) Times a Day for 10 days.      Will check CXR and do RVP to monitor for viruses as cause. Once CXR is back, we decide if antibiotics are needed. Walk test today to determine if O2 with exertion is needed. Tylenol as needed for fever. ER if worse over weekend. Follow up with pulm on Wednesday.     Will start Nystatin for thrush. 10 days of treatment. Discussed rinsing or brushing teeth after Symbicort.     Return for Next scheduled follow up.    Jeanine Blunt MD  09/01/2017

## 2017-09-03 LAB
B PERT.PT PRMT NPH QL NAA+NON-PROBE: NOT DETECTED
C PNEUM DNA NPH QL NAA+NON-PROBE: NOT DETECTED
FLUAV H1 2009 PAN RNA NPH NAA+NON-PROBE: NOT DETECTED
FLUAV H1 RNA NPH QL NAA+NON-PROBE: NOT DETECTED
FLUAV H3 RNA NPH QL NAA+NON-PROBE: NOT DETECTED
FLUAV RNA NPH QL NAA+NON-PROBE: NOT DETECTED
FLUBV RNA NPH QL NAA+NON-PROBE: NOT DETECTED
HADV DNA NPH QL NAA+NON-PROBE: NOT DETECTED
HCOV 229E RNA NPH QL NAA+NON-PROBE: NOT DETECTED
HCOV HKU1 RNA NPH QL NAA+NON-PROBE: NOT DETECTED
HCOV NL63 RNA NPH QL NAA+NON-PROBE: NOT DETECTED
HCOV OC43 RNA NPH QL NAA+NON-PROBE: NOT DETECTED
HMPV RNA NPH QL NAA+NON-PROBE: NOT DETECTED
HPIV1 RNA NPH QL NAA+NON-PROBE: NOT DETECTED
HPIV2 RNA NPH QL NAA+NON-PROBE: NOT DETECTED
HPIV3 RNA NPH QL NAA+NON-PROBE: NOT DETECTED
HPIV4 RNA NPH QL NAA+NON-PROBE: NOT DETECTED
M PNEUMO DNA NPH QL NAA+NON-PROBE: NOT DETECTED
RSV RNA NPH QL NAA+NON-PROBE: NOT DETECTED
RV+EV RNA NPH QL NAA+NON-PROBE: NOT DETECTED

## 2017-09-05 ENCOUNTER — TELEPHONE (OUTPATIENT)
Dept: INTERNAL MEDICINE | Facility: CLINIC | Age: 72
End: 2017-09-05

## 2017-09-05 NOTE — PROGRESS NOTES
Please call patient with these results and let her know that her viral panel was negative, but it only able to check for a few (and there are thousands of viruses out there). How is she feeling? Any better?

## 2017-09-05 NOTE — TELEPHONE ENCOUNTER
Patient has been advised and voiced understanding. Patient states she is still having SOB. Patient advises her Pulm appt is tomorrow and will let Dr. Blunt know result.      ----- Message from Jeanine Blunt MD sent at 9/5/2017  7:21 AM EDT -----  Please call patient with these results and let her know that her viral panel was negative, but it only able to check for a few (and there are thousands of viruses out there). How is she feeling? Any better?

## 2017-09-08 RX ORDER — AMLODIPINE BESYLATE 10 MG/1
TABLET ORAL
Qty: 90 TABLET | Refills: 0 | Status: SHIPPED | OUTPATIENT
Start: 2017-09-08 | End: 2017-11-27

## 2017-09-13 RX ORDER — SIMVASTATIN 10 MG
TABLET ORAL
Qty: 90 TABLET | Refills: 0 | OUTPATIENT
Start: 2017-09-13

## 2017-09-27 RX ORDER — LEVOTHYROXINE SODIUM 0.1 MG/1
TABLET ORAL
Qty: 90 TABLET | Refills: 0 | OUTPATIENT
Start: 2017-09-27

## 2017-09-28 RX ORDER — LISINOPRIL 20 MG/1
20 TABLET ORAL DAILY
Qty: 30 TABLET | Refills: 5 | Status: SHIPPED | OUTPATIENT
Start: 2017-09-28 | End: 2018-04-11 | Stop reason: SDUPTHER

## 2017-10-09 RX ORDER — ESCITALOPRAM OXALATE 20 MG/1
TABLET ORAL
Qty: 90 TABLET | Refills: 0 | OUTPATIENT
Start: 2017-10-09

## 2017-10-16 ENCOUNTER — TELEPHONE (OUTPATIENT)
Dept: INTERNAL MEDICINE | Facility: CLINIC | Age: 72
End: 2017-10-16

## 2017-10-16 RX ORDER — LEVOTHYROXINE SODIUM 88 UG/1
88 TABLET ORAL DAILY
Qty: 90 TABLET | Refills: 1 | Status: SHIPPED | OUTPATIENT
Start: 2017-10-16 | End: 2018-01-25 | Stop reason: SDUPTHER

## 2017-10-16 NOTE — TELEPHONE ENCOUNTER
----- Message from Yeny Mercado sent at 10/16/2017 10:20 AM EDT -----  Regarding: REFILL  Contact: [phone]  :  49  [address] LOPES.    PATIENT NEEDS A REFILL ON LEVOTHYROXINE 88 MCG TABLETS SENT TO [facility] IN [place].        Sent to [facility]avez

## 2017-10-23 ENCOUNTER — OFFICE VISIT (OUTPATIENT)
Dept: INTERNAL MEDICINE | Facility: CLINIC | Age: 72
End: 2017-10-23

## 2017-10-23 VITALS
OXYGEN SATURATION: 95 % | HEART RATE: 75 BPM | SYSTOLIC BLOOD PRESSURE: 132 MMHG | WEIGHT: 233 LBS | BODY MASS INDEX: 37.45 KG/M2 | DIASTOLIC BLOOD PRESSURE: 84 MMHG | HEIGHT: 66 IN | TEMPERATURE: 98.4 F

## 2017-10-23 DIAGNOSIS — M54.42 CHRONIC BILATERAL LOW BACK PAIN WITH BILATERAL SCIATICA: Primary | ICD-10-CM

## 2017-10-23 DIAGNOSIS — N89.8 VAGINAL IRRITATION: ICD-10-CM

## 2017-10-23 DIAGNOSIS — M54.41 CHRONIC BILATERAL LOW BACK PAIN WITH BILATERAL SCIATICA: Primary | ICD-10-CM

## 2017-10-23 DIAGNOSIS — B37.31 VAGINAL CANDIDIASIS: ICD-10-CM

## 2017-10-23 DIAGNOSIS — G89.29 CHRONIC BILATERAL LOW BACK PAIN WITH BILATERAL SCIATICA: Primary | ICD-10-CM

## 2017-10-23 DIAGNOSIS — J01.10 ACUTE NON-RECURRENT FRONTAL SINUSITIS: ICD-10-CM

## 2017-10-23 PROCEDURE — 99214 OFFICE O/P EST MOD 30 MIN: CPT | Performed by: INTERNAL MEDICINE

## 2017-10-23 RX ORDER — CEFDINIR 300 MG/1
300 CAPSULE ORAL 2 TIMES DAILY
Qty: 14 CAPSULE | Refills: 0 | Status: SHIPPED | OUTPATIENT
Start: 2017-10-23 | End: 2017-10-30

## 2017-10-23 RX ORDER — NYSTATIN 100000 U/G
CREAM TOPICAL 2 TIMES DAILY
Qty: 30 G | Refills: 0 | Status: SHIPPED | OUTPATIENT
Start: 2017-10-23 | End: 2018-02-01

## 2017-10-23 RX ORDER — FLUCONAZOLE 100 MG/1
100 TABLET ORAL DAILY
Qty: 7 TABLET | Refills: 0 | Status: SHIPPED | OUTPATIENT
Start: 2017-10-23 | End: 2017-10-30

## 2017-10-23 RX ORDER — HYDROCHLOROTHIAZIDE 25 MG/1
25 TABLET ORAL DAILY
Qty: 30 TABLET | Refills: 3 | Status: SHIPPED | OUTPATIENT
Start: 2017-10-23 | End: 2018-02-19 | Stop reason: SDUPTHER

## 2017-10-23 NOTE — PROGRESS NOTES
"Subjective     Maria Ines Zhang is a 71 y.o. female, who presents with a chief complaint of   Chief Complaint   Patient presents with   • Vaginitis     pt states possible yeast infection- premarin cream pt states has helped    • Sinus Problem     pt states possible sinus infection- exposed to \"a lot of dust\" last week          HPI Comments: 72 yo F here for acute visit     She has been having some sinus congestion for one month. She has pressure, but no headache. Using Zyrtec that has not helped. She has green discharge from her nose as well as pain. No fever.     She has pain in her back and left leg tingling and numbness for many years that started getting worse after helping her aunt move.  She was in PT, but stopped due to being too busy. She is taking Tramadol at night that helps her pain. It is worse at night when she lays down.     She has some itching and burning in her vaginal area. She does not have vaginal itching. No bleeding. No dysuria. She is using Premarin which has helped.        The following portions of the patient's history were reviewed and updated as appropriate: allergies, current medications, past family history, past medical history, past social history, past surgical history and problem list.    Allergies: Fish allergy; Shellfish allergy; and Sulfa antibiotics    Current Outpatient Prescriptions:   •  albuterol (PROVENTIL HFA;VENTOLIN HFA) 108 (90 Base) MCG/ACT inhaler, Inhale 2 puffs Every 4 (Four) Hours As Needed for Wheezing., Disp: 1 inhaler, Rfl: 3  •  amLODIPine (NORVASC) 10 MG tablet, TAKE ONE TABLET BY MOUTH DAILY, Disp: 90 tablet, Rfl: 0  •  cefdinir (OMNICEF) 300 MG capsule, Take 1 capsule by mouth 2 (Two) Times a Day for 7 days., Disp: 14 capsule, Rfl: 0  •  clonazePAM (KlonoPIN) 0.5 MG tablet, Take 0.5 mg by mouth Every Night., Disp: , Rfl:   •  conjugated estrogens (PREMARIN) 0.625 MG/GM vaginal cream, 0.5 grams PV Qday for 3 weeks and then off one week. Repeat next month., " Disp: 30 g, Rfl: 1  •  cyclobenzaprine (FLEXERIL) 10 MG tablet, Take 1 tablet by mouth 3 (Three) Times a Day As Needed for Muscle Spasms., Disp: 90 tablet, Rfl: 1  •  escitalopram (LEXAPRO) 20 MG tablet, Take 20 mg by mouth Daily., Disp: , Rfl:   •  esomeprazole (NexIUM) 20 MG capsule, Take 40 mg by mouth every morning before breakfast., Disp: , Rfl:   •  ferrous sulfate 325 (65 FE) MG tablet, Take 325 mg by mouth Daily With Breakfast., Disp: , Rfl:   •  fluconazole (DIFLUCAN) 100 MG tablet, Take 1 tablet by mouth Daily for 7 days., Disp: 7 tablet, Rfl: 0  •  hydrochlorothiazide (HYDRODIURIL) 25 MG tablet, Take 1 tablet by mouth Daily., Disp: 30 tablet, Rfl: 3  •  levothyroxine (SYNTHROID, LEVOTHROID) 88 MCG tablet, Take 1 tablet by mouth Daily., Disp: 90 tablet, Rfl: 1  •  lisinopril (PRINIVIL,ZESTRIL) 20 MG tablet, Take 1 tablet by mouth Daily., Disp: 30 tablet, Rfl: 5  •  Magnesium 250 MG tablet, Take 250 mg by mouth Daily., Disp: , Rfl:   •  meloxicam (MOBIC) 15 MG tablet, Take 15 mg by mouth Daily., Disp: , Rfl:   •  montelukast (SINGULAIR) 10 MG tablet, , Disp: , Rfl:   •  nystatin (MYCOSTATIN) 146290 UNIT/GM cream, Apply  topically 2 (Two) Times a Day., Disp: 30 g, Rfl: 0  •  simvastatin (ZOCOR) 10 MG tablet, TAKE ONE TABLET BY MOUTH DAILY, Disp: 90 tablet, Rfl: 0  •  SYMBICORT 160-4.5 MCG/ACT inhaler, INHALE TWO PUFFS BY MOUTH TWICE A DAY (RINSE MOUTH AFTER USE), Disp: 10.2 g, Rfl: 4  •  Vitamin Mixture (ROCKY-C PO), Take 500 mg by mouth Daily., Disp: , Rfl:   There are no discontinued medications.    Review of Systems   Constitutional: Negative for fatigue and fever.   HENT: Positive for congestion, rhinorrhea, sinus pain and sinus pressure. Negative for ear discharge and ear pain.    Genitourinary: Positive for vaginal bleeding and vaginal pain. Negative for decreased urine volume, difficulty urinating and dysuria.   Neurological: Negative for headaches.   Hematological: Negative for adenopathy.  "      Objective     /84 (BP Location: Left arm, Patient Position: Sitting, Cuff Size: Adult)  Pulse 75  Temp 98.4 °F (36.9 °C) (Oral)   Ht 66\" (167.6 cm)  Wt 233 lb (106 kg)  SpO2 95%  BMI 37.61 kg/m2      Physical Exam   Constitutional: She is oriented to person, place, and time. She appears well-developed and well-nourished. No distress.   HENT:   Head: Normocephalic and atraumatic.   Right Ear: Hearing, tympanic membrane and external ear normal.   Left Ear: Hearing, tympanic membrane and external ear normal.   Nose: Right sinus exhibits maxillary sinus tenderness and frontal sinus tenderness. Left sinus exhibits maxillary sinus tenderness and frontal sinus tenderness.   Mouth/Throat: Uvula is midline, oropharynx is clear and moist and mucous membranes are normal. No oropharyngeal exudate. Tonsils are 0 on the right. Tonsils are 0 on the left. No tonsillar exudate.   Eyes: Conjunctivae are normal. Right eye exhibits no discharge. Left eye exhibits no discharge. No scleral icterus.   Neck: Neck supple.   Cardiovascular: Normal rate, regular rhythm and normal heart sounds.  Exam reveals no gallop and no friction rub.    No murmur heard.  Pulmonary/Chest: Effort normal and breath sounds normal. No respiratory distress. She has no wheezes. She has no rales.   Genitourinary: Pelvic exam was performed with patient supine. There is rash on the right labia. There is rash on the left labia.   Genitourinary Comments: Tender and excoriated area bilaterally of left and right and labia minor   Lymphadenopathy:     She has no cervical adenopathy.   Neurological: She is alert and oriented to person, place, and time.   Skin: Skin is warm. No rash noted.   Psychiatric: She has a normal mood and affect. Her behavior is normal.   Nursing note and vitals reviewed.        Results for orders placed or performed in visit on 09/01/17   Respiratory Panel, PCR   Result Value Ref Range    Adenovirus Detection by PCR Not Detected " Not Detected    Coronavirus HKU1 Not Detected Not Detected    Coronavirus NL63 Not Detected Not Detected    Coronavirus 229E Not Detected Not Detected    Coronavirus OC43 Not Detected Not Detected    Human Metapneumovirus Not Detected Not Detected    Human Rhinovirus/Enterovirus Not Detected Not Detected    Influenza A PCR Not Detected Not Detected    Influenza A H1 Not Detected Not Detected    Influenza 2009 H1N1 by PCR Not Detected Not Detected    Influenza A H3 Not Detected Not Detected    Influenza B PCR Not Detected Not Detected    Parainfluenza Virus 1 Not Detected Not Detected    Parainfluenza Virus 2 Not Detected Not Detected    Parainfluenza Virus 3 Not Detected Not Detected    Parainfluenza Virus 4 Not Detected Not Detected    Respiratory Syncytial Virus Not Detected Not Detected    Bordetella pertussis pcr Not Detected Not Detected    Chlamydophila pneumoniae PCR Not Detected Not Detected    Mycoplasma pneumo by PCR Not Detected Not Detected       Assessment/Plan   Maria Ines was seen today for vaginitis and sinus problem.    Diagnoses and all orders for this visit:    Chronic bilateral low back pain with bilateral sciatica    Vaginal irritation    Vaginal candidiasis    Acute non-recurrent frontal sinusitis    Other orders  -     cefdinir (OMNICEF) 300 MG capsule; Take 1 capsule by mouth 2 (Two) Times a Day for 7 days.  -     nystatin (MYCOSTATIN) 398801 UNIT/GM cream; Apply  topically 2 (Two) Times a Day.  -     fluconazole (DIFLUCAN) 100 MG tablet; Take 1 tablet by mouth Daily for 7 days.      Start Fluconazole for yeast infection and use Nystatin cream as well. Continue premarin. If not better, will see Ob/gyn to ensure that biopsy is not needed to rule out lichen sclerosis.     Cefdinir for sinus infection. Return if not better.     Continue PT for her sciatica. Tramadol as needed for pain. Patient to call and make appointment with PT as she stopped prematurely due to scheduling issues.     Return for  Next scheduled follow up.    Jeanine Blunt MD  10/23/2017

## 2017-10-23 NOTE — PATIENT INSTRUCTIONS
Sciatica With Rehab  The sciatic nerve runs from the back down the leg and is responsible for sensation and control of the muscles in the back (posterior) side of the thigh, lower leg, and foot. Sciatica is a condition that is characterized by inflammation of this nerve.   SYMPTOMS   · Signs of nerve damage, including numbness and/or weakness along the posterior side of the lower extremity.  · Pain in the back of the thigh that may also travel down the leg.  · Pain that worsens when sitting for long periods of time.  · Occasionally, pain in the back or buttock.  CAUSES   Inflammation of the sciatic nerve is the cause of sciatica. The inflammation is due to something irritating the nerve. Common sources of irritation include:  · Sitting for long periods of time.  · Direct trauma to the nerve.  · Arthritis of the spine.  · Herniated or ruptured disk.  · Slipping of the vertebrae (spondylolisthesis).  · Pressure from soft tissues, such as muscles or ligament-like tissue (fascia).  RISK INCREASES WITH:  · Sports that place pressure or stress on the spine (football or weightlifting).  · Poor strength and flexibility.  · Failure to warm up properly before activity.  · Family history of low back pain or disk disorders.  · Previous back injury or surgery.  · Poor body mechanics, especially when lifting, or poor posture.  PREVENTION   · Warm up and stretch properly before activity.  · Maintain physical fitness:    Strength, flexibility, and endurance.  · Cardiovascular fitness.  · Learn and use proper technique, especially with posture and lifting. When possible, have  correct improper technique.  · Avoid activities that place stress on the spine.  PROGNOSIS  If treated properly, then sciatica usually resolves within 6 weeks. However, occasionally surgery is necessary.   RELATED COMPLICATIONS   · Permanent nerve damage, including pain, numbness, tingle, or weakness.  · Chronic back pain.  · Risks of surgery: infection,  bleeding, nerve damage, or damage to surrounding tissues.  TREATMENT  Treatment initially involves resting from any activities that aggravate your symptoms. The use of ice and medication may help reduce pain and inflammation. The use of strengthening and stretching exercises may help reduce pain with activity. These exercises may be performed at home or with referral to a therapist. A therapist may recommend further treatments, such as transcutaneous electronic nerve stimulation (TENS) or ultrasound. Your caregiver may recommend corticosteroid injections to help reduce inflammation of the sciatic nerve. If symptoms persist despite non-surgical (conservative) treatment, then surgery may be recommended.  MEDICATION  · If pain medication is necessary, then nonsteroidal anti-inflammatory medications, such as aspirin and ibuprofen, or other minor pain relievers, such as acetaminophen, are often recommended.  · Do not take pain medication for 7 days before surgery.  · Prescription pain relievers may be given if deemed necessary by your caregiver. Use only as directed and only as much as you need.  · Ointments applied to the skin may be helpful.  · Corticosteroid injections may be given by your caregiver. These injections should be reserved for the most serious cases, because they may only be given a certain number of times.  HEAT AND COLD  · Cold treatment (icing) relieves pain and reduces inflammation. Cold treatment should be applied for 10 to 15 minutes every 2 to 3 hours for inflammation and pain and immediately after any activity that aggravates your symptoms. Use ice packs or massage the area with a piece of ice (ice massage).  · Heat treatment may be used prior to performing the stretching and strengthening activities prescribed by your caregiver, physical therapist, or . Use a heat pack or soak the injury in warm water.  SEEK MEDICAL CARE IF:  · Treatment seems to offer no benefit, or the condition  worsens.  · Any medications produce adverse side effects.  EXERCISES   RANGE OF MOTION (ROM) AND STRETCHING EXERCISES - Sciatica  Most people with sciatic will find that their symptoms worsen with either excessive bending forward (flexion) or arching at the low back (extension). The exercises which will help resolve your symptoms will focus on the opposite motion. Your physician, physical therapist or  will help you determine which exercises will be most helpful to resolve your low back pain. Do not complete any exercises without first consulting with your clinician. Discontinue any exercises which worsen your symptoms until you speak to your clinician. If you have pain, numbness or tingling which travels down into your buttocks, leg or foot, the goal of the therapy is for these symptoms to move closer to your back and eventually resolve. Occasionally, these leg symptoms will get better, but your low back pain may worsen; this is typically an indication of progress in your rehabilitation. Be certain to be very alert to any changes in your symptoms and the activities in which you participated in the 24 hours prior to the change. Sharing this information with your clinician will allow him/her to most efficiently treat your condition.  These exercises may help you when beginning to rehabilitate your injury. Your symptoms may resolve with or without further involvement from your physician, physical therapist or . While completing these exercises, remember:   · Restoring tissue flexibility helps normal motion to return to the joints. This allows healthier, less painful movement and activity.  · An effective stretch should be held for at least 30 seconds.  · A stretch should never be painful. You should only feel a gentle lengthening or release in the stretched tissue.  FLEXION RANGE OF MOTION AND STRETCHING EXERCISES:  STRETCH - Flexion, Single Knee to Chest   · Lie on a firm bed or floor  with both legs extended in front of you.  · Keeping one leg in contact with the floor, bring your opposite knee to your chest. Hold your leg in place by either grabbing behind your thigh or at your knee.  · Pull until you feel a gentle stretch in your low back. Hold __________ seconds.  · Slowly release your grasp and repeat the exercise with the opposite side.  Repeat __________ times. Complete this exercise __________ times per day.   STRETCH - Flexion, Double Knee to Chest  · Lie on a firm bed or floor with both legs extended in front of you.  · Keeping one leg in contact with the floor, bring your opposite knee to your chest.  · Tense your stomach muscles to support your back and then lift your other knee to your chest. Hold your legs in place by either grabbing behind your thighs or at your knees.  · Pull both knees toward your chest until you feel a gentle stretch in your low back. Hold __________ seconds.  · Tense your stomach muscles and slowly return one leg at a time to the floor.  Repeat __________ times. Complete this exercise __________ times per day.   STRETCH - Low Trunk Rotation   · Lie on a firm bed or floor. Keeping your legs in front of you, bend your knees so they are both pointed toward the ceiling and your feet are flat on the floor.  · Extend your arms out to the side. This will stabilize your upper body by keeping your shoulders in contact with the floor.  · Gently and slowly drop both knees together to one side until you feel a gentle stretch in your low back. Hold for __________ seconds.  · Tense your stomach muscles to support your low back as you bring your knees back to the starting position. Repeat the exercise to the other side.  Repeat __________ times. Complete this exercise __________ times per day   EXTENSION RANGE OF MOTION AND FLEXIBILITY EXERCISES:  STRETCH - Extension, Prone on Elbows  · Lie on your stomach on the floor, a bed will be too soft. Place your palms about shoulder  "width apart and at the height of your head.  · Place your elbows under your shoulders. If this is too painful, stack pillows under your chest.  · Allow your body to relax so that your hips drop lower and make contact more completely with the floor.  · Hold this position for __________ seconds.  · Slowly return to lying flat on the floor.  Repeat __________ times. Complete this exercise __________ times per day.   RANGE OF MOTION - Extension, Prone Press Ups  · Lie on your stomach on the floor, a bed will be too soft. Place your palms about shoulder width apart and at the height of your head.  · Keeping your back as relaxed as possible, slowly straighten your elbows while keeping your hips on the floor. You may adjust the placement of your hands to maximize your comfort. As you gain motion, your hands will come more underneath your shoulders.  · Hold this position __________ seconds.  · Slowly return to lying flat on the floor.  Repeat __________ times. Complete this exercise __________ times per day.   STRENGTHENING EXERCISES - Sciatica   These exercises may help you when beginning to rehabilitate your injury. These exercises should be done near your \"sweet spot.\" This is the neutral, low-back arch, somewhere between fully rounded and fully arched, that is your least painful position. When performed in this safe range of motion, these exercises can be used for people who have either a flexion or extension based injury. These exercises may resolve your symptoms with or without further involvement from your physician, physical therapist or . While completing these exercises, remember:   · Muscles can gain both the endurance and the strength needed for everyday activities through controlled exercises.  · Complete these exercises as instructed by your physician, physical therapist or . Progress with the resistance and repetition exercises only as your caregiver advises.  · You may " experience muscle soreness or fatigue, but the pain or discomfort you are trying to eliminate should never worsen during these exercises. If this pain does worsen, stop and make certain you are following the directions exactly. If the pain is still present after adjustments, discontinue the exercise until you can discuss the trouble with your clinician.  STRENGTHENING - Deep Abdominals, Pelvic Tilt   · Lie on a firm bed or floor. Keeping your legs in front of you, bend your knees so they are both pointed toward the ceiling and your feet are flat on the floor.  · Tense your lower abdominal muscles to press your low back into the floor. This motion will rotate your pelvis so that your tail bone is scooping upwards rather than pointing at your feet or into the floor.  · With a gentle tension and even breathing, hold this position for __________ seconds.  Repeat __________ times. Complete this exercise __________ times per day.   STRENGTHENING - Abdominals, Crunches   · Lie on a firm bed or floor. Keeping your legs in front of you, bend your knees so they are both pointed toward the ceiling and your feet are flat on the floor. Cross your arms over your chest.  · Slightly tip your chin down without bending your neck.  · Tense your abdominals and slowly lift your trunk high enough to just clear your shoulder blades. Lifting higher can put excessive stress on the low back and does not further strengthen your abdominal muscles.  · Control your return to the starting position.  Repeat __________ times. Complete this exercise __________ times per day.   STRENGTHENING - Quadruped, Opposite UE/LE Lift  · Assume a hands and knees position on a firm surface. Keep your hands under your shoulders and your knees under your hips. You may place padding under your knees for comfort.  · Find your neutral spine and gently tense your abdominal muscles so that you can maintain this position. Your shoulders and hips should form a rectangle  that is parallel with the floor and is not twisted.  · Keeping your trunk steady, lift your right hand no higher than your shoulder and then your left leg no higher than your hip. Make sure you are not holding your breath. Hold this position __________ seconds.  · Continuing to keep your abdominal muscles tense and your back steady, slowly return to your starting position. Repeat with the opposite arm and leg.  Repeat __________ times. Complete this exercise __________ times per day.   STRENGTHENING - Abdominals and Quadriceps, Straight Leg Raise   · Lie on a firm bed or floor with both legs extended in front of you.  · Keeping one leg in contact with the floor, bend the other knee so that your foot can rest flat on the floor.  · Find your neutral spine, and tense your abdominal muscles to maintain your spinal position throughout the exercise.  · Slowly lift your straight leg off the floor about 6 inches for a count of 15, making sure to not hold your breath.  · Still keeping your neutral spine, slowly lower your leg all the way to the floor.  Repeat this exercise with each leg __________ times. Complete this exercise __________ times per day.  POSTURE AND BODY MECHANICS CONSIDERATIONS - Sciatica  Keeping correct posture when sitting, standing or completing your activities will reduce the stress put on different body tissues, allowing injured tissues a chance to heal and limiting painful experiences. The following are general guidelines for improved posture. Your physician or physical therapist will provide you with any instructions specific to your needs. While reading these guidelines, remember:  · The exercises prescribed by your provider will help you have the flexibility and strength to maintain correct postures.  · The correct posture provides the optimal environment for your joints to work. All of your joints have less wear and tear when properly supported by a spine with good posture. This means you will  experience a healthier, less painful body.  · Correct posture must be practiced with all of your activities, especially prolonged sitting and standing. Correct posture is as important when doing repetitive low-stress activities (typing) as it is when doing a single heavy-load activity (lifting).  RESTING POSITIONS  Consider which positions are most painful for you when choosing a resting position. If you have pain with flexion-based activities (sitting, bending, stooping, squatting), choose a position that allows you to rest in a less flexed posture. You would want to avoid curling into a fetal position on your side. If your pain worsens with extension-based activities (prolonged standing, working overhead), avoid resting in an extended position such as sleeping on your stomach. Most people will find more comfort when they rest with their spine in a more neutral position, neither too rounded nor too arched. Lying on a non-sagging bed on your side with a pillow between your knees, or on your back with a pillow under your knees will often provide some relief. Keep in mind, being in any one position for a prolonged period of time, no matter how correct your posture, can still lead to stiffness.  PROPER SITTING POSTURE  In order to minimize stress and discomfort on your spine, you must sit with correct posture Sitting with good posture should be effortless for a healthy body. Returning to good posture is a gradual process. Many people can work toward this most comfortably by using various supports until they have the flexibility and strength to maintain this posture on their own.  When sitting with proper posture, your ears will fall over your shoulders and your shoulders will fall over your hips. You should use the back of the chair to support your upper back. Your low back will be in a neutral position, just slightly arched. You may place a small pillow or folded towel at the base of your low back for support.   When  working at a desk, create an environment that supports good, upright posture. Without extra support, muscles fatigue and lead to excessive strain on joints and other tissues. Keep these recommendations in mind:  CHAIR:   · A chair should be able to slide under your desk when your back makes contact with the back of the chair. This allows you to work closely.  · The chair's height should allow your eyes to be level with the upper part of your monitor and your hands to be slightly lower than your elbows.  BODY POSITION  · Your feet should make contact with the floor. If this is not possible, use a foot rest.  · Keep your ears over your shoulders. This will reduce stress on your neck and low back.  INCORRECT SITTING POSTURES   · If you are feeling tired and unable to assume a healthy sitting posture, do not slouch or slump. This puts excessive strain on your back tissues, causing more damage and pain. Healthier options include:  · Using more support, like a lumbar pillow.  · Switching tasks to something that requires you to be upright or walking.  · Talking a brief walk.  · Lying down to rest in a neutral-spine position.  PROLONGED STANDING WHILE SLIGHTLY LEANING FORWARD   When completing a task that requires you to lean forward while standing in one place for a long time, place either foot up on a stationary 2-4 inch high object to help maintain the best posture. When both feet are on the ground, the low back tends to lose its slight inward curve. If this curve flattens (or becomes too large), then the back and your other joints will experience too much stress, fatigue more quickly and can cause pain.   CORRECT STANDING POSTURES  Proper standing posture should be assumed with all daily activities, even if they only take a few moments, like when brushing your teeth. As in sitting, your ears should fall over your shoulders and your shoulders should fall over your hips. You should keep a slight tension in your abdominal  muscles to brace your spine. Your tailbone should point down to the ground, not behind your body, resulting in an over-extended swayback posture.   INCORRECT STANDING POSTURES   Common incorrect standing postures include a forward head, locked knees and/or an excessive swayback.  WALKING  Walk with an upright posture. Your ears, shoulders and hips should all line-up.  PROLONGED ACTIVITY IN A FLEXED POSITION  When completing a task that requires you to bend forward at your waist or lean over a low surface, try to find a way to stabilize 3 of 4 of your limbs. You can place a hand or elbow on your thigh or rest a knee on the surface you are reaching across. This will provide you more stability so that your muscles do not fatigue as quickly. By keeping your knees relaxed, or slightly bent, you will also reduce stress across your low back.  CORRECT LIFTING TECHNIQUES  DO :   · Assume a wide stance. This will provide you more stability and the opportunity to get as close as possible to the object which you are lifting.  · Tense your abdominals to brace your spine; then bend at the knees and hips. Keeping your back locked in a neutral-spine position, lift using your leg muscles. Lift with your legs, keeping your back straight.  · Test the weight of unknown objects before attempting to lift them.  · Try to keep your elbows locked down at your sides in order get the best strength from your shoulders when carrying an object.  · Always ask for help when lifting heavy or awkward objects.  INCORRECT LIFTING TECHNIQUES  DO NOT:   · Lock your knees when lifting, even if it is a small object.  · Bend and twist. Pivot at your feet or move your feet when needing to change directions.  · Assume that you cannot safely  a paperclip without proper posture.     This information is not intended to replace advice given to you by your health care provider. Make sure you discuss any questions you have with your health care provider.      Document Released: 12/18/2006 Document Revised: 05/03/2016 Document Reviewed: 09/02/2016  [employer] Interactive Patient Education ©2017 [employer]

## 2017-11-03 RX ORDER — ESCITALOPRAM OXALATE 20 MG/1
TABLET ORAL
Qty: 90 TABLET | Refills: 0 | OUTPATIENT
Start: 2017-11-03

## 2017-11-06 RX ORDER — ESCITALOPRAM OXALATE 20 MG/1
20 TABLET ORAL DAILY
Qty: 90 TABLET | Refills: 1 | Status: SHIPPED | OUTPATIENT
Start: 2017-11-06 | End: 2018-05-07 | Stop reason: SDUPTHER

## 2017-11-17 DIAGNOSIS — D64.9 NORMOCYTIC ANEMIA: ICD-10-CM

## 2017-11-17 DIAGNOSIS — E03.9 ACQUIRED HYPOTHYROIDISM: ICD-10-CM

## 2017-11-17 DIAGNOSIS — E78.5 HYPERLIPIDEMIA, UNSPECIFIED HYPERLIPIDEMIA TYPE: ICD-10-CM

## 2017-11-17 DIAGNOSIS — R73.01 IFG (IMPAIRED FASTING GLUCOSE): ICD-10-CM

## 2017-11-17 DIAGNOSIS — K21.9 GASTROESOPHAGEAL REFLUX DISEASE, ESOPHAGITIS PRESENCE NOT SPECIFIED: ICD-10-CM

## 2017-11-17 DIAGNOSIS — I10 ESSENTIAL HYPERTENSION: Primary | ICD-10-CM

## 2017-11-17 DIAGNOSIS — R73.9 HYPERGLYCEMIA: ICD-10-CM

## 2017-11-20 LAB — UNABLE TO VOID: NORMAL

## 2017-11-20 RX ORDER — SIMVASTATIN 10 MG
TABLET ORAL
Qty: 90 TABLET | Refills: 2 | Status: ON HOLD | OUTPATIENT
Start: 2017-11-20 | End: 2018-01-10 | Stop reason: SDUPTHER

## 2017-11-20 RX ORDER — SIMVASTATIN 10 MG
TABLET ORAL
Qty: 90 TABLET | Refills: 0 | OUTPATIENT
Start: 2017-11-20

## 2017-11-21 LAB
ALBUMIN SERPL-MCNC: 4.2 G/DL (ref 3.5–5.2)
ALBUMIN/GLOB SERPL: 1.6 G/DL
ALP SERPL-CCNC: 80 U/L (ref 40–129)
ALT SERPL-CCNC: 12 U/L (ref 5–33)
AST SERPL-CCNC: 17 U/L (ref 5–32)
BASOPHILS # BLD AUTO: 0.03 10*3/MM3 (ref 0–0.2)
BASOPHILS NFR BLD AUTO: 0.6 % (ref 0–2)
BILIRUB SERPL-MCNC: 0.2 MG/DL (ref 0.2–1.2)
BUN SERPL-MCNC: 13 MG/DL (ref 8–23)
BUN/CREAT SERPL: 16.5 (ref 7–25)
CALCIUM SERPL-MCNC: 9.3 MG/DL (ref 8.8–10.5)
CHLORIDE SERPL-SCNC: 102 MMOL/L (ref 98–107)
CHOLEST SERPL-MCNC: 157 MG/DL (ref 0–200)
CO2 SERPL-SCNC: 28.4 MMOL/L (ref 22–29)
CREAT SERPL-MCNC: 0.79 MG/DL (ref 0.57–1)
EOSINOPHIL # BLD AUTO: 0.06 10*3/MM3 (ref 0.1–0.3)
EOSINOPHIL NFR BLD AUTO: 1.1 % (ref 0–4)
ERYTHROCYTE [DISTWIDTH] IN BLOOD BY AUTOMATED COUNT: 14.7 % (ref 11.5–14.5)
GFR SERPLBLD CREATININE-BSD FMLA CKD-EPI: 72 ML/MIN/1.73
GFR SERPLBLD CREATININE-BSD FMLA CKD-EPI: 87 ML/MIN/1.73
GLOBULIN SER CALC-MCNC: 2.6 GM/DL
GLUCOSE SERPL-MCNC: 93 MG/DL (ref 65–99)
HBA1C MFR BLD: 5.9 % (ref 4.8–5.6)
HCT VFR BLD AUTO: 35.7 % (ref 37–47)
HDLC SERPL-MCNC: 41 MG/DL (ref 40–60)
HGB BLD-MCNC: 11.2 G/DL (ref 12–16)
IMM GRANULOCYTES # BLD: 0.01 10*3/MM3 (ref 0–0.03)
IMM GRANULOCYTES NFR BLD: 0.2 % (ref 0–0.5)
IRON SATN MFR SERPL: 21 %
IRON SERPL-MCNC: 69 MCG/DL (ref 37–145)
LDLC SERPL CALC-MCNC: 87 MG/DL (ref 0–100)
LDLC/HDLC SERPL: 2.13 {RATIO}
LYMPHOCYTES # BLD AUTO: 1.85 10*3/MM3 (ref 0.6–4.8)
LYMPHOCYTES NFR BLD AUTO: 35 % (ref 20–45)
MCH RBC QN AUTO: 28 PG (ref 27–31)
MCHC RBC AUTO-ENTMCNC: 31.4 G/DL (ref 31–37)
MCV RBC AUTO: 89.3 FL (ref 81–99)
MONOCYTES # BLD AUTO: 0.48 10*3/MM3 (ref 0–1)
MONOCYTES NFR BLD AUTO: 9.1 % (ref 3–8)
NEUTROPHILS # BLD AUTO: 2.86 10*3/MM3 (ref 1.5–8.3)
NEUTROPHILS NFR BLD AUTO: 54 % (ref 45–70)
NRBC BLD AUTO-RTO: 0 /100 WBC (ref 0–0)
PLATELET # BLD AUTO: 299 10*3/MM3 (ref 140–500)
POTASSIUM SERPL-SCNC: 4.6 MMOL/L (ref 3.5–5.2)
PROT SERPL-MCNC: 6.8 G/DL (ref 6–8.5)
RBC # BLD AUTO: 4 10*6/MM3 (ref 4.2–5.4)
SODIUM SERPL-SCNC: 141 MMOL/L (ref 136–145)
TIBC SERPL-MCNC: 331 MCG/DL (ref 261–478)
TRIGL SERPL-MCNC: 144 MG/DL (ref 0–150)
UIBC SERPL-MCNC: 262 MCG/DL (ref 112–346)
VLDLC SERPL CALC-MCNC: 28.8 MG/DL (ref 7–27)
WBC # BLD AUTO: 5.29 10*3/MM3 (ref 4.8–10.8)

## 2017-11-21 NOTE — PROGRESS NOTES
Labs are stable and will review with patient in clinic next week. Hgb coming up nicely. Iron studies are better.

## 2017-11-27 ENCOUNTER — OFFICE VISIT (OUTPATIENT)
Dept: INTERNAL MEDICINE | Facility: CLINIC | Age: 72
End: 2017-11-27

## 2017-11-27 VITALS
HEIGHT: 66 IN | WEIGHT: 230 LBS | HEART RATE: 80 BPM | OXYGEN SATURATION: 99 % | BODY MASS INDEX: 36.96 KG/M2 | SYSTOLIC BLOOD PRESSURE: 144 MMHG | DIASTOLIC BLOOD PRESSURE: 74 MMHG

## 2017-11-27 DIAGNOSIS — E03.9 ACQUIRED HYPOTHYROIDISM: ICD-10-CM

## 2017-11-27 DIAGNOSIS — J01.00 ACUTE MAXILLARY SINUSITIS, RECURRENCE NOT SPECIFIED: ICD-10-CM

## 2017-11-27 DIAGNOSIS — Z12.31 ENCOUNTER FOR SCREENING MAMMOGRAM FOR MALIGNANT NEOPLASM OF BREAST: ICD-10-CM

## 2017-11-27 DIAGNOSIS — R73.01 IFG (IMPAIRED FASTING GLUCOSE): ICD-10-CM

## 2017-11-27 DIAGNOSIS — Z78.0 POST-MENOPAUSAL: ICD-10-CM

## 2017-11-27 DIAGNOSIS — F32.A DEPRESSION, UNSPECIFIED DEPRESSION TYPE: ICD-10-CM

## 2017-11-27 DIAGNOSIS — E78.5 HYPERLIPIDEMIA, UNSPECIFIED HYPERLIPIDEMIA TYPE: ICD-10-CM

## 2017-11-27 DIAGNOSIS — I10 ESSENTIAL HYPERTENSION: Primary | ICD-10-CM

## 2017-11-27 DIAGNOSIS — D62 ACUTE BLOOD LOSS ANEMIA: ICD-10-CM

## 2017-11-27 PROBLEM — R11.0 NAUSEA: Status: RESOLVED | Noted: 2017-08-03 | Resolved: 2017-11-27

## 2017-11-27 PROBLEM — E66.9 OBESITY: Status: ACTIVE | Noted: 2017-11-27

## 2017-11-27 PROCEDURE — 99214 OFFICE O/P EST MOD 30 MIN: CPT | Performed by: INTERNAL MEDICINE

## 2017-11-27 RX ORDER — CEFDINIR 300 MG/1
300 CAPSULE ORAL 2 TIMES DAILY
Qty: 14 CAPSULE | Refills: 0 | Status: SHIPPED | OUTPATIENT
Start: 2017-11-27 | End: 2017-12-04

## 2017-11-27 NOTE — PROGRESS NOTES
Subjective     Maria Ines Zhang is a 71 y.o. female, who presents with a chief complaint of   Chief Complaint   Patient presents with   • Follow-up     3 month f/u, lab results    • Hypertension       HPI Comments: 72 yo F here for follow up     She has had sinus congestion for 2 weeks with some green mucous/phlegm. She has also had cough. Taken Mucinex which has not helped. No fever or sick contacts.     HTN is chronic and doing well on ACE and HCTZ. She states that it runs lower at home than the reading here. No dizziness or CP.     Her hypothyroidism is chronic and she is doing well on medication with no side effects. No constipation or hair thinning issues. Her weight is stable.     She has HLD that is chronic and doing well on Zocor without myalgias. No exercising daily.            The following portions of the patient's history were reviewed and updated as appropriate: allergies, current medications, past family history, past medical history, past social history, past surgical history and problem list.    Allergies: Fish allergy; Shellfish allergy; and Sulfa antibiotics    Current Outpatient Prescriptions:   •  albuterol (PROVENTIL HFA;VENTOLIN HFA) 108 (90 Base) MCG/ACT inhaler, Inhale 2 puffs Every 4 (Four) Hours As Needed for Wheezing., Disp: 1 inhaler, Rfl: 3  •  clonazePAM (KlonoPIN) 0.5 MG tablet, Take 0.5 mg by mouth Every Night., Disp: , Rfl:   •  conjugated estrogens (PREMARIN) 0.625 MG/GM vaginal cream, 0.5 grams PV Qday for 3 weeks and then off one week. Repeat next month., Disp: 30 g, Rfl: 1  •  cyclobenzaprine (FLEXERIL) 10 MG tablet, Take 1 tablet by mouth 3 (Three) Times a Day As Needed for Muscle Spasms., Disp: 90 tablet, Rfl: 1  •  escitalopram (LEXAPRO) 20 MG tablet, Take 1 tablet by mouth Daily., Disp: 90 tablet, Rfl: 1  •  esomeprazole (NexIUM) 20 MG capsule, Take 40 mg by mouth every morning before breakfast., Disp: , Rfl:   •  hydrochlorothiazide (HYDRODIURIL) 25 MG tablet, Take 1 tablet  "by mouth Daily., Disp: 30 tablet, Rfl: 3  •  levothyroxine (SYNTHROID, LEVOTHROID) 88 MCG tablet, Take 1 tablet by mouth Daily., Disp: 90 tablet, Rfl: 1  •  lisinopril (PRINIVIL,ZESTRIL) 20 MG tablet, Take 1 tablet by mouth Daily., Disp: 30 tablet, Rfl: 5  •  Magnesium 250 MG tablet, Take 250 mg by mouth Daily., Disp: , Rfl:   •  meloxicam (MOBIC) 15 MG tablet, Take 15 mg by mouth Daily., Disp: , Rfl:   •  montelukast (SINGULAIR) 10 MG tablet, , Disp: , Rfl:   •  nystatin (MYCOSTATIN) 473419 UNIT/GM cream, Apply  topically 2 (Two) Times a Day., Disp: 30 g, Rfl: 0  •  simvastatin (ZOCOR) 10 MG tablet, TAKE ONE PO DAILY, Disp: 90 tablet, Rfl: 2  •  SYMBICORT 160-4.5 MCG/ACT inhaler, INHALE TWO PUFFS BY MOUTH TWICE A DAY (RINSE MOUTH AFTER USE), Disp: 10.2 g, Rfl: 4  •  Vitamin Mixture (ROCKY-C PO), Take 500 mg by mouth Daily., Disp: , Rfl:   •  cefdinir (OMNICEF) 300 MG capsule, Take 1 capsule by mouth 2 (Two) Times a Day for 7 days., Disp: 14 capsule, Rfl: 0  Medications Discontinued During This Encounter   Medication Reason   • amLODIPine (NORVASC) 10 MG tablet Therapy completed   • ferrous sulfate 325 (65 FE) MG tablet Therapy completed       Review of Systems   Constitutional: Positive for fatigue. Negative for chills and fever.   HENT: Positive for congestion and sinus pain.    Respiratory: Positive for cough. Negative for shortness of breath.    Gastrointestinal: Negative for abdominal pain, constipation, diarrhea, nausea and vomiting.   Genitourinary: Negative for difficulty urinating.   Skin: Negative for rash.       Objective     /74 (BP Location: Left arm, Patient Position: Sitting, Cuff Size: Adult)  Pulse 80  Ht 66\" (167.6 cm)  Wt 230 lb (104 kg)  SpO2 99%  BMI 37.12 kg/m2      Physical Exam   Constitutional: She is oriented to person, place, and time. She appears well-developed and well-nourished. No distress.   HENT:   Head: Normocephalic and atraumatic.   Right Ear: Hearing, tympanic membrane " and external ear normal.   Left Ear: Hearing, tympanic membrane and external ear normal.   Nose: Right sinus exhibits maxillary sinus tenderness. Left sinus exhibits maxillary sinus tenderness.   Mouth/Throat: Uvula is midline, oropharynx is clear and moist and mucous membranes are normal. No oropharyngeal exudate. Tonsils are 0 on the right. Tonsils are 0 on the left. No tonsillar exudate.   Eyes: Conjunctivae are normal. Right eye exhibits no discharge. Left eye exhibits no discharge. No scleral icterus.   Neck: Neck supple.   Cardiovascular: Normal rate, regular rhythm and normal heart sounds.  Exam reveals no gallop and no friction rub.    No murmur heard.  Pulmonary/Chest: Effort normal and breath sounds normal. No respiratory distress. She has no wheezes. She has no rales.   Lymphadenopathy:     She has no cervical adenopathy.   Neurological: She is alert and oriented to person, place, and time.   Skin: Skin is warm. No rash noted.   Psychiatric: She has a normal mood and affect. Her behavior is normal.   Nursing note and vitals reviewed.      Results for orders placed or performed in visit on 11/17/17   Comprehensive metabolic panel   Result Value Ref Range    Glucose 93 65 - 99 mg/dL    BUN 13 8 - 23 mg/dL    Creatinine 0.79 0.57 - 1.00 mg/dL    eGFR Non African Am 72 >60 mL/min/1.73    eGFR African Am 87 >60 mL/min/1.73    BUN/Creatinine Ratio 16.5 7.0 - 25.0    Sodium 141 136 - 145 mmol/L    Potassium 4.6 3.5 - 5.2 mmol/L    Chloride 102 98 - 107 mmol/L    Total CO2 28.4 22.0 - 29.0 mmol/L    Calcium 9.3 8.8 - 10.5 mg/dL    Total Protein 6.8 6.0 - 8.5 g/dL    Albumin 4.20 3.50 - 5.20 g/dL    Globulin 2.6 gm/dL    A/G Ratio 1.6 g/dL    Total Bilirubin 0.2 0.2 - 1.2 mg/dL    Alkaline Phosphatase 80 40 - 129 U/L    AST (SGOT) 17 5 - 32 U/L    ALT (SGPT) 12 5 - 33 U/L   Iron Profile   Result Value Ref Range    TIBC 331 261 - 478 mcg/dL    UIBC 262 112 - 346 mcg/dL    Iron 69 37 - 145 mcg/dL    Iron Saturation  21 %   Hemoglobin A1c   Result Value Ref Range    Hemoglobin A1C 5.90 (H) 4.80 - 5.60 %   Lipid Panel With LDL / HDL Ratio   Result Value Ref Range    Total Cholesterol 157 0 - 200 mg/dL    Triglycerides 144 0 - 150 mg/dL    HDL Cholesterol 41 40 - 60 mg/dL    VLDL Cholesterol 28.8 (H) 7 - 27 mg/dL    LDL Cholesterol  87 0 - 100 mg/dL    LDL/HDL Ratio 2.13    Unable To Void   Result Value Ref Range    Unable to Void Comment    CBC & Differential   Result Value Ref Range    WBC 5.29 4.80 - 10.80 10*3/mm3    RBC 4.00 (L) 4.20 - 5.40 10*6/mm3    Hemoglobin 11.2 (L) 12.0 - 16.0 g/dL    Hematocrit 35.7 (L) 37.0 - 47.0 %    MCV 89.3 81.0 - 99.0 fL    MCH 28.0 27.0 - 31.0 pg    MCHC 31.4 31.0 - 37.0 g/dL    RDW 14.7 (H) 11.5 - 14.5 %    Platelets 299 140 - 500 10*3/mm3    Neutrophil Rel % 54.0 45.0 - 70.0 %    Lymphocyte Rel % 35.0 20.0 - 45.0 %    Monocyte Rel % 9.1 (H) 3.0 - 8.0 %    Eosinophil Rel % 1.1 0.0 - 4.0 %    Basophil Rel % 0.6 0.0 - 2.0 %    Neutrophils Absolute 2.86 1.50 - 8.30 10*3/mm3    Lymphocytes Absolute 1.85 0.60 - 4.80 10*3/mm3    Monocytes Absolute 0.48 0.00 - 1.00 10*3/mm3    Eosinophils Absolute 0.06 (L) 0.10 - 0.30 10*3/mm3    Basophils Absolute 0.03 0.00 - 0.20 10*3/mm3    Immature Granulocyte Rel % 0.2 0.0 - 0.5 %    Immature Grans Absolute 0.01 0.00 - 0.03 10*3/mm3    nRBC 0.0 0.0 - 0.0 /100 WBC       Assessment/Plan   Maria Ines was seen today for follow-up and hypertension.    Diagnoses and all orders for this visit:    Essential hypertension    Hyperlipidemia, unspecified hyperlipidemia type    Acquired hypothyroidism    IFG (impaired fasting glucose)    Encounter for screening mammogram for malignant neoplasm of breast  -     Mammo Screening Bilateral With CAD    Post-menopausal  -     DEXA Bone Density Axial    Acute blood loss anemia    Acute maxillary sinusitis, recurrence not specified    Depression, unspecified depression type    Other orders  -     Cancel: Pneumococcal Polysaccharide  Vaccine 23-Valent Greater Than or Equal To 1yo Subcutaneous / IM  -     cefdinir (OMNICEF) 300 MG capsule; Take 1 capsule by mouth 2 (Two) Times a Day for 7 days.      HTN is under fair control and patient will continue to monitor with home BP cuff and call me with results in 1 week. No side effects from medications. Will continue current regimen of lisinopril and HCTZ. Will follow up in 6 months or sooner if remains high. I would like her BP to be less than 140/90.      Patient's cholesterol is under good control. Will continue current regimen of Simvastatin. No side effects from medications reported. Will follow up in 6 months to monitor levels.     Patient's BG's have been elevated and their HgA1c is 5.9%. Will continue to focus on diet and exercise. Will follow up in 6 months.     Her anemia is almost back to normal. I will stop her iron as she has been on it for more than 3 months. Recheck blood counts in 6 months.     Depression is stable on Lexapro. Will continue at current dose and see back in 6 months.     Treat sinus infection with Cefdinir. See back if not better.     TSH has been stable and patient is doing well. Will continue current regimen of levothyroxine 88 mcg and follow up levels in 6 months.     Pneumococcal next week when we have them in. She has already had Prevnar.     Return in about 6 months (around 5/27/2018) for Medicare Wellness, Recheck.    Jeanine Blunt MD  11/27/2017

## 2017-12-14 ENCOUNTER — OFFICE VISIT (OUTPATIENT)
Dept: INTERNAL MEDICINE | Facility: CLINIC | Age: 72
End: 2017-12-14

## 2017-12-14 VITALS
RESPIRATION RATE: 16 BRPM | WEIGHT: 230 LBS | HEIGHT: 66 IN | BODY MASS INDEX: 36.96 KG/M2 | TEMPERATURE: 102 F | DIASTOLIC BLOOD PRESSURE: 70 MMHG | SYSTOLIC BLOOD PRESSURE: 150 MMHG | OXYGEN SATURATION: 98 % | HEART RATE: 93 BPM

## 2017-12-14 DIAGNOSIS — R50.9 FEVER, UNSPECIFIED FEVER CAUSE: Primary | ICD-10-CM

## 2017-12-14 DIAGNOSIS — J10.1 INFLUENZA A (H1N1): ICD-10-CM

## 2017-12-14 LAB
EXPIRATION DATE: ABNORMAL
FLUAV AG NPH QL: POSITIVE
FLUBV AG NPH QL: NEGATIVE
INTERNAL CONTROL: ABNORMAL
Lab: ABNORMAL

## 2017-12-14 PROCEDURE — 99213 OFFICE O/P EST LOW 20 MIN: CPT | Performed by: INTERNAL MEDICINE

## 2017-12-14 PROCEDURE — 87804 INFLUENZA ASSAY W/OPTIC: CPT | Performed by: INTERNAL MEDICINE

## 2017-12-14 RX ORDER — ALBUTEROL SULFATE 90 UG/1
2 AEROSOL, METERED RESPIRATORY (INHALATION) EVERY 4 HOURS PRN
Qty: 1 INHALER | Refills: 6 | Status: SHIPPED | OUTPATIENT
Start: 2017-12-14 | End: 2019-05-07 | Stop reason: SDUPTHER

## 2017-12-14 RX ORDER — OSELTAMIVIR PHOSPHATE 75 MG/1
75 CAPSULE ORAL 2 TIMES DAILY
Qty: 10 CAPSULE | Refills: 0 | Status: SHIPPED | OUTPATIENT
Start: 2017-12-14 | End: 2017-12-19

## 2017-12-14 RX ORDER — AZITHROMYCIN 250 MG/1
TABLET, FILM COATED ORAL
Qty: 6 TABLET | Refills: 0 | Status: SHIPPED | OUTPATIENT
Start: 2017-12-14 | End: 2017-12-28

## 2017-12-14 NOTE — PROGRESS NOTES
Subjective     Maria Ines Zhang is a 71 y.o. female, who presents with a chief complaint of   Chief Complaint   Patient presents with   • URI   • Cough   • Headache   • Fever   • Fatigue   • Sore Throat       HPI Comments: 72 yo F here for acute visit. 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 She states that she started yesterday with cough, congestion, sneezing and sore throat. She has not taken anything. For the last few weeks, she has been SOB and using inhaler more than usual, only 1-2 times per day.Still continuing Symbicort.        The following portions of the patient's history were reviewed and updated as appropriate: allergies, current medications, past family history, past medical history, past social history, past surgical history and problem list.    Allergies: Fish allergy; Shellfish allergy; and Sulfa antibiotics    Current Outpatient Prescriptions:   •  clonazePAM (KlonoPIN) 0.5 MG tablet, Take 0.5 mg by mouth Every Night., Disp: , Rfl:   •  cyclobenzaprine (FLEXERIL) 10 MG tablet, Take 1 tablet by mouth 3 (Three) Times a Day As Needed for Muscle Spasms., Disp: 90 tablet, Rfl: 1  •  escitalopram (LEXAPRO) 20 MG tablet, Take 1 tablet by mouth Daily., Disp: 90 tablet, Rfl: 1  •  esomeprazole (NexIUM) 20 MG capsule, Take 40 mg by mouth every morning before breakfast., Disp: , Rfl:   •  hydrochlorothiazide (HYDRODIURIL) 25 MG tablet, Take 1 tablet by mouth Daily., Disp: 30 tablet, Rfl: 3  •  levothyroxine (SYNTHROID, LEVOTHROID) 88 MCG tablet, Take 1 tablet by mouth Daily., Disp: 90 tablet, Rfl: 1  •  lisinopril (PRINIVIL,ZESTRIL) 20 MG tablet, Take 1 tablet by mouth Daily., Disp: 30 tablet, Rfl: 5  •  meloxicam (MOBIC) 15 MG tablet, Take 15 mg by mouth Daily., Disp: , Rfl:   •  nystatin (MYCOSTATIN) 520817 UNIT/GM cream, Apply  topically 2 (Two) Times a Day., Disp: 30 g, Rfl: 0  •  simvastatin (ZOCOR) 10 MG tablet, TAKE ONE PO DAILY, Disp: 90 tablet, Rfl: 2  •  SYMBICORT 160-4.5 MCG/ACT inhaler,  "INHALE TWO PUFFS BY MOUTH TWICE A DAY (RINSE MOUTH AFTER USE), Disp: 10.2 g, Rfl: 4  •  Vitamin Mixture (ROCKY-C PO), Take 500 mg by mouth Daily., Disp: , Rfl:   •  albuterol (PROVENTIL HFA;VENTOLIN HFA) 108 (90 Base) MCG/ACT inhaler, Inhale 2 puffs Every 4 (Four) Hours As Needed for Wheezing or Shortness of Air., Disp: 1 inhaler, Rfl: 6  •  azithromycin (ZITHROMAX Z-ELISE) 250 MG tablet, Take 2 tablets the first day, then 1 tablet daily for 4 days., Disp: 6 tablet, Rfl: 0  •  conjugated estrogens (PREMARIN) 0.625 MG/GM vaginal cream, 0.5 grams PV Qday for 3 weeks and then off one week. Repeat next month., Disp: 30 g, Rfl: 1  •  Magnesium 250 MG tablet, Take 250 mg by mouth Daily., Disp: , Rfl:   •  montelukast (SINGULAIR) 10 MG tablet, , Disp: , Rfl:   •  oseltamivir (TAMIFLU) 75 MG capsule, Take 1 capsule by mouth 2 (Two) Times a Day for 5 days., Disp: 10 capsule, Rfl: 0  Medications Discontinued During This Encounter   Medication Reason   • albuterol (PROVENTIL HFA;VENTOLIN HFA) 108 (90 Base) MCG/ACT inhaler Therapy completed       Review of Systems   Constitutional: Positive for chills and fever. Negative for fatigue.   HENT: Positive for congestion and rhinorrhea. Negative for sinus pain and sinus pressure.    Respiratory: Positive for cough. Negative for shortness of breath and wheezing.    Gastrointestinal: Negative for diarrhea.       Objective     /70  Pulse 93  Temp (!) 102 °F (38.9 °C) (Oral)   Resp 16  Ht 167.6 cm (66\")  Wt 104 kg (230 lb)  SpO2 98%  BMI 37.12 kg/m2      Physical Exam   Constitutional: She is oriented to person, place, and time. She appears well-developed and well-nourished. No distress.   HENT:   Head: Normocephalic and atraumatic.   Right Ear: Hearing, tympanic membrane and external ear normal.   Left Ear: Hearing, tympanic membrane and external ear normal.   Nose: Nose normal.   Mouth/Throat: Uvula is midline and mucous membranes are normal. Posterior oropharyngeal edema " and posterior oropharyngeal erythema present. No oropharyngeal exudate. Tonsils are 1+ on the right. Tonsils are 1+ on the left.   Eyes: Conjunctivae are normal. Right eye exhibits no discharge. Left eye exhibits no discharge. No scleral icterus.   Neck: Neck supple.   Cardiovascular: Normal rate, regular rhythm and normal heart sounds.  Exam reveals no gallop and no friction rub.    No murmur heard.  Pulmonary/Chest: Effort normal and breath sounds normal. No respiratory distress. She has no wheezes. She has no rales.   Lymphadenopathy:     She has no cervical adenopathy.   Neurological: She is alert and oriented to person, place, and time.   Skin: Skin is warm. No rash noted.   Psychiatric: She has a normal mood and affect. Her behavior is normal.   Nursing note and vitals reviewed.        Results for orders placed or performed in visit on 12/14/17   POC Influenza A / B   Result Value Ref Range    Rapid Influenza A Ag Positive     Rapid Influenza B Ag Negative     Internal Control Failed (A) Passed    Lot Number [id number]     Expiration Date 20/10/2020        Assessment/Plan   Maria Ines was seen today for uri, cough, headache, fever, fatigue and sore throat.    Diagnoses and all orders for this visit:    Fever, unspecified fever cause  -     POC Influenza A / B    Influenza A (H1N1)    Other orders  -     oseltamivir (TAMIFLU) 75 MG capsule; Take 1 capsule by mouth 2 (Two) Times a Day for 5 days.  -     azithromycin (ZITHROMAX Z-ELISE) 250 MG tablet; Take 2 tablets the first day, then 1 tablet daily for 4 days.  -     albuterol (PROVENTIL HFA;VENTOLIN HFA) 108 (90 Base) MCG/ACT inhaler; Inhale 2 puffs Every 4 (Four) Hours As Needed for Wheezing or Shortness of Air.    Tamiflu given for 5 days  Added Z-pack for anti-inflammatory properties due to underlying lung disease  Continue Symbicort and albuterol as needed   Understands that if lung function worsens, needs to call me or go to ER  Tylenol as needed   Gave   Tamiflu prophylaxis even though he is not my patient. Reviewed medical history with wife and no contraindications or allergies. He does not have a PMD currently       Return if symptoms worsen or fail to improve.    Jeanine lBunt MD  12/14/2017

## 2017-12-22 ENCOUNTER — TELEPHONE (OUTPATIENT)
Dept: INTERNAL MEDICINE | Facility: CLINIC | Age: 72
End: 2017-12-22

## 2017-12-22 NOTE — TELEPHONE ENCOUNTER
Per Dr. Blunt no RX without being examined. Patient has been advised to see UC or ER. Patient voiced understanding.     ----- Message from Dior Guzman sent at 12/22/2017  9:41 AM EST -----  Regarding: SCRIPT FOR STEROID?  ASHLEY    Patient was seen on 12/14 and given Tamiflu, z-honey and inhaler.  Patient has completed course of above and now it seems to be in her chest.  Asking if something could possibly be sent to [facility], ? Steroid?      Patient had difficulty sharing information due to continuous cough.    [phone]

## 2017-12-27 RX ORDER — MELOXICAM 15 MG/1
TABLET ORAL
Qty: 30 TABLET | Refills: 2 | Status: ON HOLD | OUTPATIENT
Start: 2017-12-27 | End: 2018-01-10 | Stop reason: SDUPTHER

## 2017-12-28 ENCOUNTER — OFFICE VISIT (OUTPATIENT)
Dept: INTERNAL MEDICINE | Facility: CLINIC | Age: 72
End: 2017-12-28

## 2017-12-28 VITALS
HEIGHT: 66 IN | OXYGEN SATURATION: 98 % | WEIGHT: 228 LBS | DIASTOLIC BLOOD PRESSURE: 78 MMHG | SYSTOLIC BLOOD PRESSURE: 130 MMHG | HEART RATE: 91 BPM | BODY MASS INDEX: 36.64 KG/M2

## 2017-12-28 DIAGNOSIS — R06.2 WHEEZING: ICD-10-CM

## 2017-12-28 DIAGNOSIS — G93.31 POST-INFLUENZA SYNDROME: Primary | ICD-10-CM

## 2017-12-28 PROCEDURE — 99214 OFFICE O/P EST MOD 30 MIN: CPT | Performed by: INTERNAL MEDICINE

## 2017-12-28 PROCEDURE — 94640 AIRWAY INHALATION TREATMENT: CPT | Performed by: INTERNAL MEDICINE

## 2017-12-28 RX ORDER — ALBUTEROL SULFATE 2.5 MG/3ML
2.5 SOLUTION RESPIRATORY (INHALATION) ONCE
Status: COMPLETED | OUTPATIENT
Start: 2017-12-28 | End: 2017-12-28

## 2017-12-28 RX ORDER — METHYLPREDNISOLONE 4 MG/1
TABLET ORAL
Qty: 21 EACH | Refills: 0 | Status: ON HOLD | OUTPATIENT
Start: 2017-12-28 | End: 2018-01-10

## 2017-12-28 RX ORDER — BENZONATATE 100 MG/1
100 CAPSULE ORAL 3 TIMES DAILY PRN
Qty: 30 CAPSULE | Refills: 0 | Status: SHIPPED | OUTPATIENT
Start: 2017-12-28 | End: 2018-02-01

## 2017-12-28 RX ORDER — ALBUTEROL SULFATE 2.5 MG/3ML
2.5 SOLUTION RESPIRATORY (INHALATION) EVERY 4 HOURS PRN
Qty: 100 VIAL | Refills: 1 | Status: SHIPPED | OUTPATIENT
Start: 2017-12-28 | End: 2019-11-09 | Stop reason: SDUPTHER

## 2017-12-28 RX ADMIN — ALBUTEROL SULFATE 2.5 MG: 2.5 SOLUTION RESPIRATORY (INHALATION) at 16:11

## 2017-12-28 NOTE — PROGRESS NOTES
Subjective     Maria Ines Zhang is a 71 y.o. female, who presents with a chief complaint of   Chief Complaint   Patient presents with   • Cough   • Nasal Congestion   • Hoarse       HPI Comments: 70 yo F with history of BOOP here with continued cough and wheezing s/p influenza diagnosed on 12/14. She is doing much better with no longer having fevers or chills. She is still hoarse and has cough and wheezing. Has been taking albuterol MDI which has helped some. She is still taking Symbicort. No chest pressure.        The following portions of the patient's history were reviewed and updated as appropriate: allergies, current medications, past family history, past medical history, past social history, past surgical history and problem list.    Allergies: Fish allergy; Shellfish allergy; and Sulfa antibiotics    Current Outpatient Prescriptions:   •  albuterol (PROVENTIL HFA;VENTOLIN HFA) 108 (90 Base) MCG/ACT inhaler, Inhale 2 puffs Every 4 (Four) Hours As Needed for Wheezing or Shortness of Air., Disp: 1 inhaler, Rfl: 6  •  clonazePAM (KlonoPIN) 0.5 MG tablet, Take 0.5 mg by mouth Every Night., Disp: , Rfl:   •  conjugated estrogens (PREMARIN) 0.625 MG/GM vaginal cream, 0.5 grams PV Qday for 3 weeks and then off one week. Repeat next month., Disp: 30 g, Rfl: 1  •  cyclobenzaprine (FLEXERIL) 10 MG tablet, Take 1 tablet by mouth 3 (Three) Times a Day As Needed for Muscle Spasms., Disp: 90 tablet, Rfl: 1  •  escitalopram (LEXAPRO) 20 MG tablet, Take 1 tablet by mouth Daily., Disp: 90 tablet, Rfl: 1  •  esomeprazole (NexIUM) 20 MG capsule, Take 40 mg by mouth every morning before breakfast., Disp: , Rfl:   •  hydrochlorothiazide (HYDRODIURIL) 25 MG tablet, Take 1 tablet by mouth Daily., Disp: 30 tablet, Rfl: 3  •  levothyroxine (SYNTHROID, LEVOTHROID) 88 MCG tablet, Take 1 tablet by mouth Daily., Disp: 90 tablet, Rfl: 1  •  lisinopril (PRINIVIL,ZESTRIL) 20 MG tablet, Take 1 tablet by mouth Daily., Disp: 30 tablet, Rfl: 5  •  " Magnesium 250 MG tablet, Take 250 mg by mouth Daily., Disp: , Rfl:   •  meloxicam (MOBIC) 15 MG tablet, Take 15 mg by mouth Daily., Disp: , Rfl:   •  meloxicam (MOBIC) 15 MG tablet, TAKE ONE TABLET BY MOUTH DAILY, Disp: 30 tablet, Rfl: 2  •  montelukast (SINGULAIR) 10 MG tablet, , Disp: , Rfl:   •  nystatin (MYCOSTATIN) 445403 UNIT/GM cream, Apply  topically 2 (Two) Times a Day., Disp: 30 g, Rfl: 0  •  simvastatin (ZOCOR) 10 MG tablet, TAKE ONE PO DAILY, Disp: 90 tablet, Rfl: 2  •  SYMBICORT 160-4.5 MCG/ACT inhaler, INHALE TWO PUFFS BY MOUTH TWICE A DAY (RINSE MOUTH AFTER USE), Disp: 10.2 g, Rfl: 4  •  Vitamin Mixture (ROCKY-C PO), Take 500 mg by mouth Daily., Disp: , Rfl:   •  albuterol (PROVENTIL) (2.5 MG/3ML) 0.083% nebulizer solution, Take 2.5 mg by nebulization Every 4 (Four) Hours As Needed for Wheezing., Disp: 100 vial, Rfl: 1  •  benzonatate (TESSALON PERLES) 100 MG capsule, Take 1 capsule by mouth 3 (Three) Times a Day As Needed for Cough., Disp: 30 capsule, Rfl: 0  •  MethylPREDNISolone (MEDROL, ELISE,) 4 MG tablet, Take as directed on package instructions., Disp: 21 each, Rfl: 0  No current facility-administered medications for this visit.   Medications Discontinued During This Encounter   Medication Reason   • azithromycin (ZITHROMAX Z-ELISE) 250 MG tablet Therapy completed       Review of Systems   Constitutional: Positive for fatigue. Negative for chills and fever.   HENT: Positive for congestion.    Respiratory: Positive for cough and wheezing.    Cardiovascular: Negative for palpitations.       Objective     /78 (BP Location: Left arm, Patient Position: Sitting, Cuff Size: Adult)  Pulse 91  Ht 167.6 cm (65.98\")  Wt 103 kg (228 lb)  SpO2 98%  BMI 36.82 kg/m2      Physical Exam   Constitutional: She is oriented to person, place, and time. She appears well-developed and well-nourished. No distress.   HENT:   Head: Normocephalic and atraumatic.   Right Ear: External ear normal.   Left Ear: " External ear normal.   Mouth/Throat: Oropharynx is clear and moist. No oropharyngeal exudate.   Eyes: Conjunctivae are normal. Right eye exhibits no discharge. Left eye exhibits no discharge. No scleral icterus.   Neck: Neck supple.   Cardiovascular: Normal rate, regular rhythm and normal heart sounds.  Exam reveals no gallop and no friction rub.    No murmur heard.  Pulmonary/Chest: Effort normal. No respiratory distress. She has decreased breath sounds. She has wheezes (diffusely ). She has no rales.   Wheezing improved after nebulizer treatment    Abdominal: Soft. Bowel sounds are normal. She exhibits no distension and no mass. There is no tenderness. There is no guarding.   Lymphadenopathy:     She has no cervical adenopathy.   Neurological: She is alert and oriented to person, place, and time.   Skin: Skin is warm. No rash noted.   Psychiatric: She has a normal mood and affect. Her behavior is normal.   Nursing note and vitals reviewed.        Results for orders placed or performed in visit on 12/14/17   POC Influenza A / B   Result Value Ref Range    Rapid Influenza A Ag Positive     Rapid Influenza B Ag Negative     Internal Control Passed Passed    Lot Number [id number]     Expiration Date 20/10/2020        Assessment/Plan   Maria Ines was seen today for cough, nasal congestion and hoarse.    Diagnoses and all orders for this visit:    Post-influenza syndrome    Wheezing  -     albuterol (PROVENTIL) nebulizer solution 0.083% 2.5 mg/3mL; Take 2.5 mg by nebulization 1 (One) Time.    Other orders  -     MethylPREDNISolone (MEDROL, ELISE,) 4 MG tablet; Take as directed on package instructions.  -     benzonatate (TESSALON PERLES) 100 MG capsule; Take 1 capsule by mouth 3 (Three) Times a Day As Needed for Cough.  -     albuterol (PROVENTIL) (2.5 MG/3ML) 0.083% nebulizer solution; Take 2.5 mg by nebulization Every 4 (Four) Hours As Needed for Wheezing.        Albuterol helped wheezing and will take every 4-6 hours over  next 48 hours and then as needed  Call if not getting better or further concerns  Medrol dose pack to help with cough and inflammation   Tessalon pearls for cough   Return in about 4 months (around 5/1/2018) for Recheck.    Jeanine Blunt MD  12/28/2017

## 2018-01-09 PROCEDURE — 99285 EMERGENCY DEPT VISIT HI MDM: CPT

## 2018-01-09 PROCEDURE — 80053 COMPREHEN METABOLIC PANEL: CPT | Performed by: EMERGENCY MEDICINE

## 2018-01-09 PROCEDURE — 85025 COMPLETE CBC W/AUTO DIFF WBC: CPT | Performed by: EMERGENCY MEDICINE

## 2018-01-09 PROCEDURE — 85007 BL SMEAR W/DIFF WBC COUNT: CPT | Performed by: EMERGENCY MEDICINE

## 2018-01-09 PROCEDURE — 83690 ASSAY OF LIPASE: CPT | Performed by: EMERGENCY MEDICINE

## 2018-01-09 RX ORDER — ONDANSETRON 4 MG/1
4 TABLET, ORALLY DISINTEGRATING ORAL ONCE
Status: COMPLETED | OUTPATIENT
Start: 2018-01-09 | End: 2018-01-09

## 2018-01-09 RX ORDER — SODIUM CHLORIDE 0.9 % (FLUSH) 0.9 %
10 SYRINGE (ML) INJECTION AS NEEDED
Status: DISCONTINUED | OUTPATIENT
Start: 2018-01-09 | End: 2018-01-18 | Stop reason: HOSPADM

## 2018-01-09 RX ADMIN — ONDANSETRON 4 MG: 4 TABLET, ORALLY DISINTEGRATING ORAL at 22:17

## 2018-01-10 ENCOUNTER — APPOINTMENT (OUTPATIENT)
Dept: CT IMAGING | Facility: HOSPITAL | Age: 73
End: 2018-01-10

## 2018-01-10 ENCOUNTER — HOSPITAL ENCOUNTER (INPATIENT)
Facility: HOSPITAL | Age: 73
LOS: 8 days | Discharge: HOME | End: 2018-01-18
Attending: EMERGENCY MEDICINE | Admitting: SURGERY

## 2018-01-10 DIAGNOSIS — K52.9 COLITIS: Primary | ICD-10-CM

## 2018-01-10 LAB
ALBUMIN SERPL-MCNC: 4.2 G/DL (ref 3.5–5.2)
ALBUMIN/GLOB SERPL: 1.1 G/DL
ALP SERPL-CCNC: 113 U/L (ref 39–117)
ALT SERPL W P-5'-P-CCNC: 18 U/L (ref 1–33)
ANION GAP SERPL CALCULATED.3IONS-SCNC: 20.2 MMOL/L
AST SERPL-CCNC: 25 U/L (ref 1–32)
BACTERIA UR QL AUTO: NORMAL /HPF
BASOPHILS # BLD AUTO: 0.02 10*3/MM3 (ref 0–0.2)
BASOPHILS NFR BLD AUTO: 0.1 % (ref 0–1.5)
BILIRUB SERPL-MCNC: 0.5 MG/DL (ref 0.1–1.2)
BILIRUB UR QL STRIP: NEGATIVE
BUN BLD-MCNC: 21 MG/DL (ref 8–23)
BUN/CREAT SERPL: 13.5 (ref 7–25)
C DIFF TOX GENS STL QL NAA+PROBE: NEGATIVE
CALCIUM SPEC-SCNC: 9.9 MG/DL (ref 8.6–10.5)
CHLORIDE SERPL-SCNC: 99 MMOL/L (ref 98–107)
CLARITY UR: ABNORMAL
CO2 SERPL-SCNC: 21.8 MMOL/L (ref 22–29)
COLOR UR: ABNORMAL
CREAT BLD-MCNC: 1.56 MG/DL (ref 0.57–1)
DEPRECATED RDW RBC AUTO: 53 FL (ref 37–54)
EOSINOPHIL # BLD AUTO: 0.01 10*3/MM3 (ref 0–0.7)
EOSINOPHIL NFR BLD AUTO: 0.1 % (ref 0.3–6.2)
ERYTHROCYTE [DISTWIDTH] IN BLOOD BY AUTOMATED COUNT: 15.6 % (ref 11.7–13)
GFR SERPL CREATININE-BSD FRML MDRD: 33 ML/MIN/1.73
GLOBULIN UR ELPH-MCNC: 3.7 GM/DL
GLUCOSE BLD-MCNC: 121 MG/DL (ref 65–99)
GLUCOSE UR STRIP-MCNC: NEGATIVE MG/DL
HCT VFR BLD AUTO: 42.1 % (ref 35.6–45.5)
HGB BLD-MCNC: 13.3 G/DL (ref 11.9–15.5)
HGB UR QL STRIP.AUTO: NEGATIVE
HOLD SPECIMEN: NORMAL
HOLD SPECIMEN: NORMAL
HYALINE CASTS UR QL AUTO: NORMAL /LPF
IMM GRANULOCYTES # BLD: 0.07 10*3/MM3 (ref 0–0.03)
IMM GRANULOCYTES NFR BLD: 0.4 % (ref 0–0.5)
KETONES UR QL STRIP: ABNORMAL
LEUKOCYTE ESTERASE UR QL STRIP.AUTO: ABNORMAL
LIPASE SERPL-CCNC: 44 U/L (ref 13–60)
LYMPHOCYTES # BLD AUTO: 1.75 10*3/MM3 (ref 0.9–4.8)
LYMPHOCYTES NFR BLD AUTO: 8.9 % (ref 19.6–45.3)
MCH RBC QN AUTO: 29.3 PG (ref 26.9–32)
MCHC RBC AUTO-ENTMCNC: 31.6 G/DL (ref 32.4–36.3)
MCV RBC AUTO: 92.7 FL (ref 80.5–98.2)
MONOCYTES # BLD AUTO: 1.7 10*3/MM3 (ref 0.2–1.2)
MONOCYTES NFR BLD AUTO: 8.7 % (ref 5–12)
NEUTROPHILS # BLD AUTO: 16.01 10*3/MM3 (ref 1.9–8.1)
NEUTROPHILS NFR BLD AUTO: 81.8 % (ref 42.7–76)
NITRITE UR QL STRIP: NEGATIVE
PH UR STRIP.AUTO: <=5 [PH] (ref 5–8)
PLAT MORPH BLD: NORMAL
PLATELET # BLD AUTO: 343 10*3/MM3 (ref 140–500)
PMV BLD AUTO: 10.5 FL (ref 6–12)
POTASSIUM BLD-SCNC: 3.9 MMOL/L (ref 3.5–5.2)
PROT SERPL-MCNC: 7.9 G/DL (ref 6–8.5)
PROT UR QL STRIP: ABNORMAL
RBC # BLD AUTO: 4.54 10*6/MM3 (ref 3.9–5.2)
RBC # UR: NORMAL /HPF
RBC MORPH BLD: NORMAL
REF LAB TEST METHOD: NORMAL
SODIUM BLD-SCNC: 141 MMOL/L (ref 136–145)
SP GR UR STRIP: 1.02 (ref 1–1.03)
SQUAMOUS #/AREA URNS HPF: NORMAL /HPF
UROBILINOGEN UR QL STRIP: ABNORMAL
WBC MORPH BLD: NORMAL
WBC NRBC COR # BLD: 19.56 10*3/MM3 (ref 4.5–10.7)
WBC UR QL AUTO: NORMAL /HPF
WHOLE BLOOD HOLD SPECIMEN: NORMAL
WHOLE BLOOD HOLD SPECIMEN: NORMAL

## 2018-01-10 PROCEDURE — 87046 STOOL CULTR AEROBIC BACT EA: CPT | Performed by: EMERGENCY MEDICINE

## 2018-01-10 PROCEDURE — 87045 FECES CULTURE AEROBIC BACT: CPT | Performed by: EMERGENCY MEDICINE

## 2018-01-10 PROCEDURE — 87899 AGENT NOS ASSAY W/OPTIC: CPT | Performed by: EMERGENCY MEDICINE

## 2018-01-10 PROCEDURE — 25010000002 PROMETHAZINE PER 50 MG: Performed by: SURGERY

## 2018-01-10 PROCEDURE — 25010000002 ONDANSETRON PER 1 MG: Performed by: SURGERY

## 2018-01-10 PROCEDURE — 87493 C DIFF AMPLIFIED PROBE: CPT | Performed by: EMERGENCY MEDICINE

## 2018-01-10 PROCEDURE — 81001 URINALYSIS AUTO W/SCOPE: CPT | Performed by: EMERGENCY MEDICINE

## 2018-01-10 PROCEDURE — 25010000002 ONDANSETRON PER 1 MG: Performed by: EMERGENCY MEDICINE

## 2018-01-10 PROCEDURE — 74176 CT ABD & PELVIS W/O CONTRAST: CPT

## 2018-01-10 RX ORDER — PROMETHAZINE HYDROCHLORIDE 25 MG/ML
12.5 INJECTION, SOLUTION INTRAMUSCULAR; INTRAVENOUS EVERY 4 HOURS PRN
Status: DISCONTINUED | OUTPATIENT
Start: 2018-01-10 | End: 2018-01-18 | Stop reason: HOSPADM

## 2018-01-10 RX ORDER — CYCLOBENZAPRINE HCL 10 MG
10 TABLET ORAL 3 TIMES DAILY PRN
Status: DISCONTINUED | OUTPATIENT
Start: 2018-01-10 | End: 2018-01-12

## 2018-01-10 RX ORDER — HYDROMORPHONE HYDROCHLORIDE 1 MG/ML
0.5 INJECTION, SOLUTION INTRAMUSCULAR; INTRAVENOUS; SUBCUTANEOUS
Status: DISCONTINUED | OUTPATIENT
Start: 2018-01-10 | End: 2018-01-18 | Stop reason: HOSPADM

## 2018-01-10 RX ORDER — BUDESONIDE AND FORMOTEROL FUMARATE DIHYDRATE 160; 4.5 UG/1; UG/1
2 AEROSOL RESPIRATORY (INHALATION)
Status: DISCONTINUED | OUTPATIENT
Start: 2018-01-10 | End: 2018-01-18 | Stop reason: HOSPADM

## 2018-01-10 RX ORDER — SODIUM CHLORIDE 0.9 % (FLUSH) 0.9 %
1-10 SYRINGE (ML) INJECTION AS NEEDED
Status: DISCONTINUED | OUTPATIENT
Start: 2018-01-10 | End: 2018-01-18 | Stop reason: HOSPADM

## 2018-01-10 RX ORDER — LEVOTHYROXINE SODIUM 88 UG/1
88 TABLET ORAL
Status: DISCONTINUED | OUTPATIENT
Start: 2018-01-10 | End: 2018-01-18 | Stop reason: HOSPADM

## 2018-01-10 RX ORDER — MONTELUKAST SODIUM 10 MG/1
10 TABLET ORAL NIGHTLY
Status: DISCONTINUED | OUTPATIENT
Start: 2018-01-10 | End: 2018-01-18 | Stop reason: HOSPADM

## 2018-01-10 RX ORDER — ALBUTEROL SULFATE 90 UG/1
2 AEROSOL, METERED RESPIRATORY (INHALATION) EVERY 4 HOURS PRN
Status: DISCONTINUED | OUTPATIENT
Start: 2018-01-10 | End: 2018-01-10 | Stop reason: CLARIF

## 2018-01-10 RX ORDER — ONDANSETRON 2 MG/ML
4 INJECTION INTRAMUSCULAR; INTRAVENOUS EVERY 6 HOURS PRN
Status: DISCONTINUED | OUTPATIENT
Start: 2018-01-10 | End: 2018-01-18 | Stop reason: HOSPADM

## 2018-01-10 RX ORDER — ESCITALOPRAM OXALATE 20 MG/1
20 TABLET ORAL DAILY
Status: DISCONTINUED | OUTPATIENT
Start: 2018-01-10 | End: 2018-01-12

## 2018-01-10 RX ORDER — ONDANSETRON 2 MG/ML
4 INJECTION INTRAMUSCULAR; INTRAVENOUS ONCE
Status: COMPLETED | OUTPATIENT
Start: 2018-01-10 | End: 2018-01-10

## 2018-01-10 RX ORDER — HYDROMORPHONE HYDROCHLORIDE 1 MG/ML
0.5 INJECTION, SOLUTION INTRAMUSCULAR; INTRAVENOUS; SUBCUTANEOUS ONCE
Status: COMPLETED | OUTPATIENT
Start: 2018-01-10 | End: 2018-01-10

## 2018-01-10 RX ORDER — SODIUM CHLORIDE, SODIUM LACTATE, POTASSIUM CHLORIDE, CALCIUM CHLORIDE 600; 310; 30; 20 MG/100ML; MG/100ML; MG/100ML; MG/100ML
125 INJECTION, SOLUTION INTRAVENOUS CONTINUOUS
Status: DISCONTINUED | OUTPATIENT
Start: 2018-01-10 | End: 2018-01-12

## 2018-01-10 RX ORDER — SODIUM CHLORIDE, SODIUM LACTATE, POTASSIUM CHLORIDE, CALCIUM CHLORIDE 600; 310; 30; 20 MG/100ML; MG/100ML; MG/100ML; MG/100ML
150 INJECTION, SOLUTION INTRAVENOUS CONTINUOUS
Status: DISCONTINUED | OUTPATIENT
Start: 2018-01-10 | End: 2018-01-11

## 2018-01-10 RX ORDER — PANTOPRAZOLE SODIUM 40 MG/1
40 TABLET, DELAYED RELEASE ORAL EVERY MORNING
Status: DISCONTINUED | OUTPATIENT
Start: 2018-01-10 | End: 2018-01-12

## 2018-01-10 RX ORDER — ALBUTEROL SULFATE 2.5 MG/3ML
2.5 SOLUTION RESPIRATORY (INHALATION) EVERY 4 HOURS PRN
Status: DISCONTINUED | OUTPATIENT
Start: 2018-01-10 | End: 2018-01-18 | Stop reason: HOSPADM

## 2018-01-10 RX ORDER — ONDANSETRON 4 MG/1
4 TABLET, ORALLY DISINTEGRATING ORAL EVERY 6 HOURS PRN
Status: DISCONTINUED | OUTPATIENT
Start: 2018-01-10 | End: 2018-01-18 | Stop reason: HOSPADM

## 2018-01-10 RX ORDER — HYDROCHLOROTHIAZIDE 25 MG/1
25 TABLET ORAL DAILY
Status: DISCONTINUED | OUTPATIENT
Start: 2018-01-10 | End: 2018-01-18 | Stop reason: HOSPADM

## 2018-01-10 RX ORDER — ONDANSETRON 4 MG/1
4 TABLET, FILM COATED ORAL EVERY 6 HOURS PRN
Status: DISCONTINUED | OUTPATIENT
Start: 2018-01-10 | End: 2018-01-18 | Stop reason: HOSPADM

## 2018-01-10 RX ORDER — NALOXONE HCL 0.4 MG/ML
0.4 VIAL (ML) INJECTION
Status: DISCONTINUED | OUTPATIENT
Start: 2018-01-10 | End: 2018-01-11

## 2018-01-10 RX ORDER — CLONAZEPAM 0.5 MG/1
0.5 TABLET ORAL NIGHTLY
Status: DISCONTINUED | OUTPATIENT
Start: 2018-01-10 | End: 2018-01-18 | Stop reason: HOSPADM

## 2018-01-10 RX ORDER — LISINOPRIL 20 MG/1
20 TABLET ORAL DAILY
Status: DISCONTINUED | OUTPATIENT
Start: 2018-01-10 | End: 2018-01-18 | Stop reason: HOSPADM

## 2018-01-10 RX ORDER — SIMVASTATIN 10 MG
10 TABLET ORAL NIGHTLY
COMMUNITY
End: 2018-08-14 | Stop reason: SDUPTHER

## 2018-01-10 RX ORDER — BUDESONIDE AND FORMOTEROL FUMARATE DIHYDRATE 160; 4.5 UG/1; UG/1
2 AEROSOL RESPIRATORY (INHALATION)
COMMUNITY
End: 2018-04-12 | Stop reason: SDUPTHER

## 2018-01-10 RX ORDER — HYDROMORPHONE HYDROCHLORIDE 1 MG/ML
0.5 INJECTION, SOLUTION INTRAMUSCULAR; INTRAVENOUS; SUBCUTANEOUS
Status: DISCONTINUED | OUTPATIENT
Start: 2018-01-10 | End: 2018-01-11

## 2018-01-10 RX ORDER — ACETAMINOPHEN 325 MG/1
650 TABLET ORAL EVERY 4 HOURS PRN
Status: DISCONTINUED | OUTPATIENT
Start: 2018-01-10 | End: 2018-01-18 | Stop reason: HOSPADM

## 2018-01-10 RX ADMIN — SODIUM CHLORIDE, POTASSIUM CHLORIDE, SODIUM LACTATE AND CALCIUM CHLORIDE 150 ML/HR: 600; 310; 30; 20 INJECTION, SOLUTION INTRAVENOUS at 18:47

## 2018-01-10 RX ADMIN — HYDROMORPHONE HYDROCHLORIDE 0.5 MG: 10 INJECTION INTRAMUSCULAR; INTRAVENOUS; SUBCUTANEOUS at 10:40

## 2018-01-10 RX ADMIN — LISINOPRIL 20 MG: 20 TABLET ORAL at 12:50

## 2018-01-10 RX ADMIN — HYDROMORPHONE HYDROCHLORIDE 0.5 MG: 10 INJECTION INTRAMUSCULAR; INTRAVENOUS; SUBCUTANEOUS at 12:50

## 2018-01-10 RX ADMIN — ONDANSETRON 4 MG: 2 INJECTION INTRAMUSCULAR; INTRAVENOUS at 01:13

## 2018-01-10 RX ADMIN — SODIUM CHLORIDE, POTASSIUM CHLORIDE, SODIUM LACTATE AND CALCIUM CHLORIDE 150 ML/HR: 600; 310; 30; 20 INJECTION, SOLUTION INTRAVENOUS at 12:55

## 2018-01-10 RX ADMIN — PROMETHAZINE HYDROCHLORIDE 12.5 MG: 25 INJECTION INTRAMUSCULAR; INTRAVENOUS at 10:45

## 2018-01-10 RX ADMIN — LOPERAMIDE HYDROCHLORIDE 2 MG: 1 SOLUTION ORAL at 21:44

## 2018-01-10 RX ADMIN — METRONIDAZOLE 500 MG: 500 INJECTION, SOLUTION INTRAVENOUS at 04:17

## 2018-01-10 RX ADMIN — ONDANSETRON 4 MG: 2 INJECTION INTRAMUSCULAR; INTRAVENOUS at 08:25

## 2018-01-10 RX ADMIN — HYDROMORPHONE HYDROCHLORIDE 0.5 MG: 10 INJECTION INTRAMUSCULAR; INTRAVENOUS; SUBCUTANEOUS at 08:20

## 2018-01-10 RX ADMIN — HYDROCHLOROTHIAZIDE 25 MG: 25 TABLET ORAL at 12:50

## 2018-01-10 RX ADMIN — METRONIDAZOLE 500 MG: 500 INJECTION, SOLUTION INTRAVENOUS at 16:10

## 2018-01-10 RX ADMIN — HYDROMORPHONE HYDROCHLORIDE 0.5 MG: 10 INJECTION INTRAMUSCULAR; INTRAVENOUS; SUBCUTANEOUS at 01:18

## 2018-01-10 RX ADMIN — HYDROMORPHONE HYDROCHLORIDE 0.5 MG: 1 INJECTION, SOLUTION INTRAMUSCULAR; INTRAVENOUS; SUBCUTANEOUS at 04:13

## 2018-01-10 RX ADMIN — PROMETHAZINE HYDROCHLORIDE 12.5 MG: 25 INJECTION INTRAMUSCULAR; INTRAVENOUS at 04:04

## 2018-01-10 RX ADMIN — ESCITALOPRAM 20 MG: 20 TABLET, FILM COATED ORAL at 12:50

## 2018-01-10 RX ADMIN — METRONIDAZOLE 500 MG: 500 INJECTION, SOLUTION INTRAVENOUS at 21:44

## 2018-01-10 RX ADMIN — ONDANSETRON 4 MG: 2 INJECTION INTRAMUSCULAR; INTRAVENOUS at 22:12

## 2018-01-10 RX ADMIN — HYDROMORPHONE HYDROCHLORIDE 0.5 MG: 10 INJECTION INTRAMUSCULAR; INTRAVENOUS; SUBCUTANEOUS at 17:51

## 2018-01-10 RX ADMIN — METRONIDAZOLE 500 MG: 500 INJECTION, SOLUTION INTRAVENOUS at 10:45

## 2018-01-10 RX ADMIN — PANTOPRAZOLE SODIUM 40 MG: 40 TABLET, DELAYED RELEASE ORAL at 08:20

## 2018-01-10 RX ADMIN — SODIUM CHLORIDE 500 ML: 9 INJECTION, SOLUTION INTRAVENOUS at 01:14

## 2018-01-10 RX ADMIN — ONDANSETRON 4 MG: 2 INJECTION INTRAMUSCULAR; INTRAVENOUS at 12:50

## 2018-01-10 RX ADMIN — HYDROMORPHONE HYDROCHLORIDE 0.5 MG: 10 INJECTION INTRAMUSCULAR; INTRAVENOUS; SUBCUTANEOUS at 15:07

## 2018-01-10 RX ADMIN — SODIUM CHLORIDE, POTASSIUM CHLORIDE, SODIUM LACTATE AND CALCIUM CHLORIDE 150 ML/HR: 600; 310; 30; 20 INJECTION, SOLUTION INTRAVENOUS at 05:24

## 2018-01-10 RX ADMIN — CLONAZEPAM 0.5 MG: 0.5 TABLET ORAL at 21:44

## 2018-01-10 RX ADMIN — LEVOTHYROXINE SODIUM 88 MCG: 88 TABLET ORAL at 12:50

## 2018-01-10 RX ADMIN — HYDROMORPHONE HYDROCHLORIDE 0.5 MG: 10 INJECTION INTRAMUSCULAR; INTRAVENOUS; SUBCUTANEOUS at 22:12

## 2018-01-10 NOTE — ED PROVIDER NOTES
" EMERGENCY DEPARTMENT ENCOUNTER    CHIEF COMPLAINT  Chief Complaint: Abdominal pain  History given by: pt  History limited by: nothing  Room Number: 38/38  PMD: Jeanine Blunt MD      HPI:  Pt is a 72 y.o. female who presents complaining of diffuse abdominal pain that began 5-6 hours ago. She does not have her gallbladder. She also admits to nausea. Her last BM was earlier tonight, but it was normal. The symptoms began after arriving home from a restaurant.    Duration:  5-6 hours  Onset: gradual  Timing: constant  Location: abdomen  Radiation: none  Quality: \"pain\"  Intensity/Severity: moderate  Progression: unchanged  Associated Symptoms: nausea  Aggravating Factors: none  Alleviating Factors: none  Previous Episodes: no  Treatment before arrival: none    PAST MEDICAL HISTORY  Active Ambulatory Problems     Diagnosis Date Noted   • Essential hypertension 04/27/2016   • HLD (hyperlipidemia) 04/27/2016   • Acquired hypothyroidism 04/27/2016   • Depression 04/27/2016   • Bronchiolitis obliterans organizing pneumonia 05/09/2016   • Primary osteoarthritis of right knee 05/19/2016   • Shoulder, capsulitis, adhesive 05/19/2016   • Laceration of right lower extremity 07/08/2016   • Hyperglycemia 07/08/2016   • Biceps tendinitis 08/31/2016   • Subacromial bursitis, right 12/06/2016   • Acute blood loss anemia 07/18/2017   • Anxiety 08/03/2017   • Gastroesophageal reflux disease 08/03/2017   • Osteoarthritis of lumbar spine 08/03/2017   • Pulmonary embolism 08/03/2017   • Inflammation of sacroiliac joint 11/22/2013   • Sleep apnea 09/24/2012   • Spondylolisthesis 08/03/2017   • Venous stasis 08/03/2017   • Cryptogenic stroke 08/03/2017   • Drug-induced constipation 08/03/2017   • IFG (impaired fasting glucose) 08/07/2017   • Chronic bilateral low back pain with sciatica 08/22/2017   • Asthma 09/01/2017   • Obesity 11/27/2017     Resolved Ambulatory Problems     Diagnosis Date Noted   • Follow-up examination, following " other surgery 03/21/2016   • Acute cystitis without hematuria 04/27/2016   • Edema 04/27/2016   • Abrasion of arm, right 08/24/2016   • Cough 11/02/2016   • Bronchitis 11/02/2016   • Osteoarthritis, knee 07/06/2017   • Fever in adult 07/16/2017   • UTI (urinary tract infection) 07/16/2017   • Sepsis 07/18/2017   • Nausea 08/03/2017     Past Medical History:   Diagnosis Date   • Anxiety    • Asthma    • Benign essential hypertension    • Cryptogenic stroke 8/3/2017   • Depression    • Fatigue    • GERD (gastroesophageal reflux disease)    • Hyperlipidemia    • Hypertension    • Hypothyroidism    • IFG (impaired fasting glucose) 8/7/2017   • Mitral valve insufficiency    • MRSA infection within last 3 months 05/2017   • Obesity    • Obstructive sleep apnea    • Osteoarthritis    • Patent foramen ovale    • Pulmonary embolism    • Pulmonary hypertension    • Sciatica    • Venous stasis        PAST SURGICAL HISTORY  Past Surgical History:   Procedure Laterality Date   • BACK SURGERY  2011   • CATARACT EXTRACTION W/ INTRAOCULAR LENS IMPLANT Bilateral 2016   • HYSTERECTOMY     • [place] TOTAL KNEE ARTHROPLASTY Right 7/6/2017    Procedure: TOTAL KNEE ARTHROPLASTY;  Surgeon: Dragan De La Vega MD;  Location: [facility];  Service: Orthopedics   • SPINE SURGERY     • THORACOSCOPY Right 02/25/2016    RT VATS WEDGE RESECTION   • TOE AMPUTATION Right 05/2017    SECOND TOE   • TOTAL KNEE ARTHROPLASTY Left        FAMILY HISTORY  Family History   Problem Relation Age of Onset   • Heart disease Mother 60   • Diabetes Mother    • Stroke Mother 83   • Melanoma Father    • Heart attack Brother    • Heart disease Brother      Valve problem    • Heart attack Maternal Grandmother    • Heart attack Maternal Grandfather    • No Known Problems Paternal Grandmother    • No Known Problems Paternal Grandfather    • Malig Hyperthermia Neg Hx        SOCIAL HISTORY  Social History     Social History   • Marital status:      Spouse name: N/A    • Number of children: N/A   • Years of education: N/A     Occupational History   • Not on file.     Social History Main Topics   • Smoking status: Never Smoker   • Smokeless tobacco: Never Used   • Alcohol use No   • Drug use: No   • Sexual activity: Defer     Other Topics Concern   • Not on file     Social History Narrative       ALLERGIES  Fish allergy; Shellfish allergy; and Sulfa antibiotics    REVIEW OF SYSTEMS  Review of Systems   Constitutional: Negative for fever.   HENT: Negative for sore throat.    Eyes: Negative.    Respiratory: Negative for cough and shortness of breath.    Cardiovascular: Negative for chest pain.   Gastrointestinal: Positive for abdominal pain and nausea. Negative for diarrhea and vomiting.   Genitourinary: Negative for dysuria.   Musculoskeletal: Negative for neck pain.   Skin: Negative for rash.   Allergic/Immunologic: Negative.    Neurological: Negative for weakness, numbness and headaches.   Hematological: Negative.    Psychiatric/Behavioral: Negative.    All other systems reviewed and are negative.      PHYSICAL EXAM  ED Triage Vitals   Temp Heart Rate Resp BP SpO2   01/09/18 2130 01/09/18 2130 01/09/18 2130 01/09/18 2130 01/09/18 2130   97.5 °F (36.4 °C) 101 19 137/74 100 %      Temp src Heart Rate Source Patient Position BP Location FiO2 (%)   01/09/18 2130 01/09/18 2130 01/09/18 2130 01/09/18 2130 --   Oral Monitor Sitting Right arm        Physical Exam   Constitutional: She is oriented to person, place, and time and well-developed, well-nourished, and in no distress. No distress.   HENT:   Head: Normocephalic and atraumatic.   Eyes: EOM are normal. Pupils are equal, round, and reactive to light.   Neck: Normal range of motion. Neck supple.   Cardiovascular: Normal rate, regular rhythm and normal heart sounds.    Pulmonary/Chest: Effort normal and breath sounds normal. No respiratory distress.   Abdominal: Soft. There is generalized tenderness. There is no rebound and no  guarding.   Musculoskeletal: Normal range of motion. She exhibits no edema.   Neurological: She is alert and oriented to person, place, and time. She has normal sensation and normal strength.   Skin: Skin is warm and dry. No rash noted.   Psychiatric: Mood and affect normal.   Nursing note and vitals reviewed.      LAB RESULTS  Lab Results (last 24 hours)     Procedure Component Value Units Date/Time    CBC & Differential [[id number]] Collected:  01/09/18 2347    Specimen:  Blood Updated:  01/10/18 0013    Narrative:       The following orders were created for panel order CBC & Differential.  Procedure                               Abnormality         Status                     ---------                               -----------         ------                     Scan Slide[[id number]]                   Normal              Final result               CBC Auto Differential[[id number]]        Abnormal            Final result                 Please view results for these tests on the individual orders.    Comprehensive Metabolic Panel [[id number]]  (Abnormal) Collected:  01/09/18 2347    Specimen:  Blood Updated:  01/10/18 0036     Glucose 121 (H) mg/dL      BUN 21 mg/dL      Creatinine 1.56 (H) mg/dL      Sodium 141 mmol/L      Potassium 3.9 mmol/L      Chloride 99 mmol/L      CO2 21.8 (L) mmol/L      Calcium 9.9 mg/dL      Total Protein 7.9 g/dL      Albumin 4.20 g/dL      ALT (SGPT) 18 U/L      AST (SGOT) 25 U/L      Alkaline Phosphatase 113 U/L      Total Bilirubin 0.5 mg/dL      eGFR Non African Amer 33 (L) mL/min/1.73      Globulin 3.7 gm/dL      A/G Ratio 1.1 g/dL      BUN/Creatinine Ratio 13.5     Anion Gap 20.2 mmol/L     Lipase [[id number]]  (Normal) Collected:  01/09/18 2347    Specimen:  Blood Updated:  01/10/18 0036     Lipase 44 U/L     CBC Auto Differential [[id number]]  (Abnormal) Collected:  01/09/18 2347    Specimen:  Blood Updated:  01/10/18 0013     WBC 19.56 (H) 10*3/mm3      RBC 4.54 10*6/mm3       Hemoglobin 13.3 g/dL      Hematocrit 42.1 %      MCV 92.7 fL      MCH 29.3 pg      MCHC 31.6 (L) g/dL      RDW 15.6 (H) %      RDW-SD 53.0 fl      MPV 10.5 fL      Platelets 343 10*3/mm3      Neutrophil % 81.8 (H) %      Lymphocyte % 8.9 (L) %      Monocyte % 8.7 %      Eosinophil % 0.1 (L) %      Basophil % 0.1 %      Immature Grans % 0.4 %      Neutrophils, Absolute 16.01 (H) 10*3/mm3      Lymphocytes, Absolute 1.75 10*3/mm3      Monocytes, Absolute 1.70 (H) 10*3/mm3      Eosinophils, Absolute 0.01 10*3/mm3      Basophils, Absolute 0.02 10*3/mm3      Immature Grans, Absolute 0.07 (H) 10*3/mm3     Scan Slide [[id number]]  (Normal) Collected:  01/09/18 2347    Specimen:  Blood Updated:  01/10/18 0013     RBC Morphology Normal     WBC Morphology Normal     Platelet Morphology Normal    Urinalysis With / Culture If Indicated - Urine, Clean Catch [[id number]]  (Abnormal) Collected:  01/10/18 0018    Specimen:  Urine from Urine, Clean Catch Updated:  01/10/18 0032     Color, UA Dark Yellow (A)     Appearance, UA Cloudy (A)     pH, UA <=5.0     Specific Gravity, UA 1.022     Glucose, UA Negative     Ketones, UA Trace (A)     Bilirubin, UA Negative     Blood, UA Negative     Protein, UA 30 mg/dL (1+) (A)     Leuk Esterase, UA Trace (A)     Nitrite, UA Negative     Urobilinogen, UA 1.0 E.U./dL    Urinalysis, Microscopic Only - Urine, Clean Catch [[id number]] Collected:  01/10/18 0018    Specimen:  Urine from Urine, Clean Catch Updated:  01/10/18 0034     RBC, UA 0-2 /HPF      WBC, UA 0-2 /HPF      Bacteria, UA None Seen /HPF      Squamous Epithelial Cells, UA 0-2 /HPF      Hyaline Casts, UA 7-12 /LPF      Methodology Automated Microscopy    Clostridium Difficile Toxin - Stool, Per Rectum [[id number]] Collected:  01/10/18 0247    Specimen:  Stool from Per Rectum Updated:  01/10/18 0253    Narrative:       The following orders were created for panel order Clostridium Difficile Toxin - Stool, Per Rectum.  Procedure                                Abnormality         Status                     ---------                               -----------         ------                     Clostridium Difficile To...[[id number]]                      In process                   Please view results for these tests on the individual orders.    Stool Culture - Stool, Per Rectum [[id number]] Collected:  01/10/18 0247    Specimen:  Stool from Per Rectum Updated:  01/10/18 0253    Clostridium Difficile Toxin, PCR - Stool, Per Rectum [[id number]] Collected:  01/10/18 0247    Specimen:  Stool from Per Rectum Updated:  01/10/18 0253          I ordered the above labs and reviewed the results    RADIOLOGY  CT Abdomen Pelvis Without Contrast   Preliminary Result   1.  Mild to moderately severe colitis involving the splenic flexure and   descending colon, no obstruction, perforation or abscess. Given   patient's age and distribution of colitis, ischemia is high in the   differential diagnosis. Infectious or inflammatory etiology may also be   considered.    2.  A few cysts or hemangiomas of the liver measure up to 1.5 cm.   3.  Renal artery aneurysm is suspected in the left renal hilum measuring   up to 1.4 cm, CT angiogram of the abdomen may be performed when the   patient is more stable.                   I ordered the above noted radiological studies. Interpreted by radiologist. Reviewed by me in PACS.       PROCEDURES  Procedures      PROGRESS AND CONSULTS  ED Course     2136 - Ordered labs for further evaluation.    2214 - Ordered Zofran for further evaluation.    0041 - Ordered CT Abd/Pelvis for further evaluation.    0052 - Ordered IVF for hydration. Ordered Dilaudid and Zofran for further evaluation.    0246 - Ordered labs for further evaluation.    0318 - Placed call to Surgery.    0319 - Discussed pt's case with Dr. Torres (Surgery), who agreed to admit the pt.    MEDICAL DECISION MAKING  Results were reviewed/discussed with the patient and they were also made  aware of online access. Pt also made aware that some labs, such as cultures, will not be resulted during ER visit and follow up with PMD is necessary.     MDM  Number of Diagnoses or Management Options     Amount and/or Complexity of Data Reviewed  Clinical lab tests: ordered and reviewed (WBC - 19.56)  Tests in the radiology section of CPT®: ordered and reviewed (CT Abd/pelvis - mild to moderately sever colitis)  Decide to obtain previous medical records or to obtain history from someone other than the patient: yes           DIAGNOSIS  Final diagnoses:   None       DISPOSITION  ADMISSION    Discussed treatment plan and reason for admission with pt/family and admitting physician.  Pt/family voiced understanding of the plan for admission for further testing/treatment as needed.         Latest Documented Vital Signs:  As of 3:22 AM  BP- 132/60 HR- 101 Temp- 97.5 °F (36.4 °C) (Oral) O2 sat- 99%    --  Documentation assistance provided by carrington Roland for Dr. Rodriguez.  Information recorded by the scribe was done at my direction and has been verified and validated by me.     Jose Maria Roland  01/10/18 0322       Td Rodriguez MD  01/10/18 0335

## 2018-01-10 NOTE — ED NOTES
While in the bathroom (toileting) the patient began vomiting into an emesis bag.      Alexx Franklin  01/10/18 0108

## 2018-01-10 NOTE — PROGRESS NOTES
Clinical Pharmacy Services: Medication History    Maria Ines Zhang is a 72 y.o. female presenting to [facility] for   Chief Complaint   Patient presents with   • Abdominal Pain   • Nausea   • Vomiting       She  has a past medical history of Anxiety; Asthma; Benign essential hypertension; Cryptogenic stroke (8/3/2017); Depression; Fatigue; GERD (gastroesophageal reflux disease); Hyperlipidemia; Hypertension; Hypothyroidism; IFG (impaired fasting glucose) (8/7/2017); Mitral valve insufficiency; MRSA infection within last 3 months (05/2017); Obesity; Obstructive sleep apnea; Osteoarthritis; Patent foramen ovale; Pulmonary embolism; Pulmonary hypertension; Sciatica; and Venous stasis.    Allergies as of 01/09/2018 - Miguel as Reviewed 01/09/2018   Allergen Reaction Noted   • Fish allergy Nausea And Vomiting 12/10/2015   • Shellfish allergy Nausea And Vomiting 12/10/2015   • Sulfa antibiotics Rash 07/16/2017       Medication information was obtained from: Patient, medication list  Pharmacy and Phone Number: [facility] [phone]    Prior to Admission Medications     Prescriptions Last Dose Informant Patient Reported? Taking?    albuterol (PROVENTIL HFA;VENTOLIN HFA) 108 (90 Base) MCG/ACT inhaler  Self No Yes    Inhale 2 puffs Every 4 (Four) Hours As Needed for Wheezing or Shortness of Air.    albuterol (PROVENTIL) (2.5 MG/3ML) 0.083% nebulizer solution  Self No Yes    Take 2.5 mg by nebulization Every 4 (Four) Hours As Needed for Wheezing.    benzonatate (TESSALON PERLES) 100 MG capsule  Self No No    Take 1 capsule by mouth 3 (Three) Times a Day As Needed for Cough.    budesonide-formoterol (SYMBICORT) 160-4.5 MCG/ACT inhaler  Self Yes Yes    Inhale 2 puffs 2 (Two) Times a Day.    clonazePAM (KlonoPIN) 0.5 MG tablet  Self Yes Yes    Take 0.5 mg by mouth Every Night.    conjugated estrogens (PREMARIN) 0.625 MG/GM vaginal cream  Self No No    0.5 grams PV Qday for 3 weeks and then off one week. Repeat next  month.    cyclobenzaprine (FLEXERIL) 10 MG tablet  Self No Yes    Take 1 tablet by mouth 3 (Three) Times a Day As Needed for Muscle Spasms.    escitalopram (LEXAPRO) 20 MG tablet  Self No Yes    Take 1 tablet by mouth Daily.    esomeprazole (NexIUM) 20 MG capsule  Self Yes Yes    Take 40 mg by mouth every morning before breakfast.    hydrochlorothiazide (HYDRODIURIL) 25 MG tablet  Self No Yes    Take 1 tablet by mouth Daily.    levothyroxine (SYNTHROID, LEVOTHROID) 88 MCG tablet  Self No Yes    Take 1 tablet by mouth Daily.    lisinopril (PRINIVIL,ZESTRIL) 20 MG tablet  Self No Yes    Take 1 tablet by mouth Daily.    Magnesium 250 MG tablet  Self Yes No    Take 250 mg by mouth Daily.    meloxicam (MOBIC) 15 MG tablet  Self Yes Yes    Take 15 mg by mouth Daily.    montelukast (SINGULAIR) 10 MG tablet  Self Yes Yes    Take 10 mg by mouth Every Night.    nystatin (MYCOSTATIN) 168949 UNIT/GM cream  Self No No    Apply  topically 2 (Two) Times a Day.    simvastatin (ZOCOR) 10 MG tablet  Self Yes Yes    Take 10 mg by mouth Every Night.    Vitamin Mixture (ROCKY-C PO)  Self Yes Yes    Take 500 mg by mouth Daily.            Medication notes: Patient and daughter said they provided a med list to nurse in ER and all meds were reviewed in our computer list.    Removed the Medrol Dose pack, therapy completed per patient    This medication list is complete to the best of my knowledge as of 1/10/2018    Please call if questions.    Joanne Benavidez, Medication History Technician  1/10/2018 6:11 PM

## 2018-01-10 NOTE — H&P
January 10, 2018    Maria Ines Zhang  [id number]      GENERAL SURGERY HISTORY AND PHYSICAL    ADMITTING PHYSICIAN:  Nino Torres M.D.    PRIMARY PHYSICIAN:   Jeanine Blunt MD.    DIAGNOSIS:  Colitis.    HISTORY:  Maria Ines Zhang is a 72 y.o. female who is admitted to the hospital with colitis.  Patient reports she was in her normal state of health when she went to dinner and then soon after around 7 PM the evening prior to this admission she developed severe upper abdominal pain that became diffuse and was associated initially with significant nausea and vomiting and then ultimately diarrhea.  She thought it might be food poisoning but it did not go away in several hours she presented to the hospital for further evaluation.  She was found to have significant leukocytosis and colitis of the splenic flexure region of the colon so she was admitted for supportive care and observation.  She does feel better this morning since receiving IV fluids and pain medication.  She's had no further emesis or diarrhea but she does still have nausea.  She has no history of similar problems the past.  Her class colonoscopy was at least 8 or 9 years ago not longer.  She denies blood from above or below.  She had no urinary or gynecologic complaints.  She denies any chest pain or shortness of breath.      Past Medical History:   Diagnosis Date   • Anxiety    • Asthma    • Benign essential hypertension    • Cryptogenic stroke 8/3/2017   • Depression    • Fatigue    • GERD (gastroesophageal reflux disease)    • Hyperlipidemia    • Hypertension    • Hypothyroidism    • IFG (impaired fasting glucose) 8/7/2017   • Mitral valve insufficiency     nild   • MRSA infection within last 3 months 05/2017    RIGHT FOOT   • Obesity    • Obstructive sleep apnea    • Osteoarthritis    • Patent foramen ovale    • Pulmonary embolism     3 SEPARATE OCCASIONS   • Pulmonary hypertension    • Sciatica    • Venous stasis      Past Surgical History:    Procedure Laterality Date   • BACK SURGERY  2011   • CATARACT EXTRACTION W/ INTRAOCULAR LENS IMPLANT Bilateral 2016   • HYSTERECTOMY     • [place] TOTAL KNEE ARTHROPLASTY Right 7/6/2017    Procedure: TOTAL KNEE ARTHROPLASTY;  Surgeon: Dragan De La Vega MD;  Location: [facility] OR;  Service: Orthopedics   • SPINE SURGERY     • THORACOSCOPY Right 02/25/2016    RT VATS WEDGE RESECTION   • TOE AMPUTATION Right 05/2017    SECOND TOE   • TOTAL KNEE ARTHROPLASTY Left      Family History   Problem Relation Age of Onset   • Heart disease Mother 60   • Diabetes Mother    • Stroke Mother 83   • Melanoma Father    • Heart attack Brother    • Heart disease Brother      Valve problem    • Heart attack Maternal Grandmother    • Heart attack Maternal Grandfather    • No Known Problems Paternal Grandmother    • No Known Problems Paternal Grandfather    • Malig Hyperthermia Neg Hx      Social History   Substance Use Topics   • Smoking status: Never Smoker   • Smokeless tobacco: Never Used   • Alcohol use No       Review of Systems  On questioning, the patient denies any weight loss, fatigue or headache, no visual changes or hearing complaints, no new cardiovascular or pulmonary complaints, no  complaints, no GYN complaints, no musculoskeletal or neurologic complaints, no skin, bleeding, psychiatric or endocrine complaints.    Vitals:    01/10/18 0230 01/10/18 0303 01/10/18 0426 01/10/18 0502   BP:  145/61 (!) 151/107 141/74   BP Location:    Right arm   Patient Position:    Sitting   Pulse: 101 91 97 95   Resp:    15   Temp:       TempSrc:       SpO2: 99% 100% 100% 97%   Weight:       Height:           Physical Exam  On exam, patient is resting comfortably and in no distress.  She is alert oriented and appropriate.  HEENT:  Sclera nonicteric, neck supple.  Chest: Clear.  Heart: Regular.  Abdomen: Obese but soft without distention or tympany, tender left upper quadrant without rebound or guarding.  Extremities: Without swelling or  edema.  Skin: Negative.  Rectal: Deferred.      Diagnostic Imaging: CT scan images and results reviewed.   CT Abdomen Pelvis Without Contrast   Preliminary Result   1.  Mild to moderately severe colitis involving the splenic flexure and   descending colon, no obstruction, perforation or abscess. Given   patient's age and distribution of colitis, ischemia is high in the   differential diagnosis. Infectious or inflammatory etiology may also be   considered.    2.  A few cysts or hemangiomas of the liver measure up to 1.5 cm.   3.  Renal artery aneurysm is suspected in the left renal hilum measuring   up to 1.4 cm, CT angiogram of the abdomen may be performed when the   patient is more stable.                  Labs:   Lab Results   Component Value Date    WBC 19.56 (H) 01/09/2018    HGB 13.3 01/09/2018    HCT 42.1 01/09/2018     01/09/2018     Lab Results   Component Value Date     01/09/2018    K 3.9 01/09/2018    CL 99 01/09/2018    CO2 21.8 (L) 01/09/2018    BUN 21 01/09/2018    CREATININE 1.56 (H) 01/09/2018    GLUCOSE 121 (H) 01/09/2018       ASSESSMENT/PLAN:  Patient is a 72 y.o. female who is admitted to the hospital with Colitis at the splenic flexure region of the colon, likely ischemic but infectious is not ruled out.  Plan for now is supportive and conservative therapy including IV fluids for rehydration and IV Flagyl for possible infectious colitis.  Stool studies will be sent and repeat lab studies will be ordered.  If she responds to conservative measures, colonoscopy will be recommended at the minimum what she is over this episode.    Nino Torres M.D.

## 2018-01-10 NOTE — PLAN OF CARE
Problem: Fall Risk (Adult)  Goal: Identify Related Risk Factors and Signs and Symptoms  Outcome: Ongoing (interventions implemented as appropriate)    Goal: Absence of Falls  Outcome: Ongoing (interventions implemented as appropriate)

## 2018-01-10 NOTE — PLAN OF CARE
Problem: Fall Risk (Adult)  Goal: Identify Related Risk Factors and Signs and Symptoms  Outcome: Ongoing (interventions implemented as appropriate)    Goal: Absence of Falls  Outcome: Ongoing (interventions implemented as appropriate)      Problem: Patient Care Overview (Adult)  Goal: Plan of Care Review  Outcome: Ongoing (interventions implemented as appropriate)   01/10/18 1610   Coping/Psychosocial Response Interventions   Plan Of Care Reviewed With patient;family   Patient Care Overview   Progress improving   Outcome Evaluation   Outcome Summary/Follow up Plan admitted for abd pain--CT confirmed colitis. on IVF and flagyl. intermittent pain treated w/dilaudid with good relief. nausea treated w/zofran and phenergan. 2 liquid BMs--cdiff negative. imodium ordered. tolerating clear liquid diet. VSS. no other issues. transfer to  for continued care. PAWAN Garza RN     Goal: Adult Individualization and Mutuality  Outcome: Ongoing (interventions implemented as appropriate)    Goal: Discharge Needs Assessment  Outcome: Ongoing (interventions implemented as appropriate)      Problem: Pain, Acute (Adult)  Goal: Identify Related Risk Factors and Signs and Symptoms  Outcome: Ongoing (interventions implemented as appropriate)    Goal: Acceptable Pain Control/Comfort Level  Outcome: Ongoing (interventions implemented as appropriate)

## 2018-01-11 ENCOUNTER — APPOINTMENT (OUTPATIENT)
Dept: GENERAL RADIOLOGY | Facility: HOSPITAL | Age: 73
End: 2018-01-11
Attending: SURGERY

## 2018-01-11 ENCOUNTER — EPISODE CHANGES (OUTPATIENT)
Dept: CASE MANAGEMENT | Facility: OTHER | Age: 73
End: 2018-01-11

## 2018-01-11 PROBLEM — I72.2 RENAL ARTERY ANEURYSM (HCC): Status: ACTIVE | Noted: 2018-01-11

## 2018-01-11 LAB
ANION GAP SERPL CALCULATED.3IONS-SCNC: 12.4 MMOL/L
BASOPHILS # BLD AUTO: 0.01 10*3/MM3 (ref 0–0.2)
BASOPHILS NFR BLD AUTO: 0.1 % (ref 0–1.5)
BUN BLD-MCNC: 29 MG/DL (ref 8–23)
BUN/CREAT SERPL: 14.7 (ref 7–25)
CALCIUM SPEC-SCNC: 8.2 MG/DL (ref 8.6–10.5)
CHLORIDE SERPL-SCNC: 97 MMOL/L (ref 98–107)
CO2 SERPL-SCNC: 23.6 MMOL/L (ref 22–29)
CREAT BLD-MCNC: 1.97 MG/DL (ref 0.57–1)
DEPRECATED RDW RBC AUTO: 56.6 FL (ref 37–54)
EOSINOPHIL # BLD AUTO: 0.01 10*3/MM3 (ref 0–0.7)
EOSINOPHIL NFR BLD AUTO: 0.1 % (ref 0.3–6.2)
ERYTHROCYTE [DISTWIDTH] IN BLOOD BY AUTOMATED COUNT: 16.7 % (ref 11.7–13)
GFR SERPL CREATININE-BSD FRML MDRD: 25 ML/MIN/1.73
GLUCOSE BLD-MCNC: 97 MG/DL (ref 65–99)
HCT VFR BLD AUTO: 32.9 % (ref 35.6–45.5)
HGB BLD-MCNC: 10 G/DL (ref 11.9–15.5)
IMM GRANULOCYTES # BLD: 0.03 10*3/MM3 (ref 0–0.03)
IMM GRANULOCYTES NFR BLD: 0.2 % (ref 0–0.5)
LYMPHOCYTES # BLD AUTO: 2.55 10*3/MM3 (ref 0.9–4.8)
LYMPHOCYTES NFR BLD AUTO: 15.8 % (ref 19.6–45.3)
MCH RBC QN AUTO: 28.3 PG (ref 26.9–32)
MCHC RBC AUTO-ENTMCNC: 30.4 G/DL (ref 32.4–36.3)
MCV RBC AUTO: 93.2 FL (ref 80.5–98.2)
MONOCYTES # BLD AUTO: 1.76 10*3/MM3 (ref 0.2–1.2)
MONOCYTES NFR BLD AUTO: 10.9 % (ref 5–12)
NEUTROPHILS # BLD AUTO: 11.82 10*3/MM3 (ref 1.9–8.1)
NEUTROPHILS NFR BLD AUTO: 72.9 % (ref 42.7–76)
PLATELET # BLD AUTO: 260 10*3/MM3 (ref 140–500)
PMV BLD AUTO: 10.5 FL (ref 6–12)
POTASSIUM BLD-SCNC: 4.5 MMOL/L (ref 3.5–5.2)
RBC # BLD AUTO: 3.53 10*6/MM3 (ref 3.9–5.2)
SODIUM BLD-SCNC: 133 MMOL/L (ref 136–145)
WBC NRBC COR # BLD: 16.18 10*3/MM3 (ref 4.5–10.7)

## 2018-01-11 PROCEDURE — 74018 RADEX ABDOMEN 1 VIEW: CPT

## 2018-01-11 PROCEDURE — 25010000002 ONDANSETRON PER 1 MG: Performed by: SURGERY

## 2018-01-11 PROCEDURE — 25010000002 PROMETHAZINE PER 50 MG: Performed by: SURGERY

## 2018-01-11 PROCEDURE — 94640 AIRWAY INHALATION TREATMENT: CPT

## 2018-01-11 PROCEDURE — 80048 BASIC METABOLIC PNL TOTAL CA: CPT | Performed by: SURGERY

## 2018-01-11 PROCEDURE — 85025 COMPLETE CBC W/AUTO DIFF WBC: CPT | Performed by: SURGERY

## 2018-01-11 RX ORDER — SACCHAROMYCES BOULARDII 250 MG
250 CAPSULE ORAL 2 TIMES DAILY
Status: DISCONTINUED | OUTPATIENT
Start: 2018-01-11 | End: 2018-01-12

## 2018-01-11 RX ORDER — HYDROCODONE BITARTRATE AND ACETAMINOPHEN 7.5; 325 MG/1; MG/1
1 TABLET ORAL EVERY 4 HOURS PRN
Status: DISCONTINUED | OUTPATIENT
Start: 2018-01-11 | End: 2018-01-18 | Stop reason: HOSPADM

## 2018-01-11 RX ORDER — CALCIUM CARBONATE 200(500)MG
1 TABLET,CHEWABLE ORAL 3 TIMES DAILY PRN
Status: DISCONTINUED | OUTPATIENT
Start: 2018-01-11 | End: 2018-01-18 | Stop reason: HOSPADM

## 2018-01-11 RX ORDER — METRONIDAZOLE 500 MG/1
500 TABLET ORAL EVERY 8 HOURS SCHEDULED
Status: DISCONTINUED | OUTPATIENT
Start: 2018-01-11 | End: 2018-01-12

## 2018-01-11 RX ORDER — HYDROCODONE BITARTRATE AND ACETAMINOPHEN 7.5; 325 MG/1; MG/1
2 TABLET ORAL EVERY 4 HOURS PRN
Status: DISCONTINUED | OUTPATIENT
Start: 2018-01-11 | End: 2018-01-18 | Stop reason: HOSPADM

## 2018-01-11 RX ADMIN — METRONIDAZOLE 500 MG: 500 TABLET, FILM COATED ORAL at 21:58

## 2018-01-11 RX ADMIN — HYDROMORPHONE HYDROCHLORIDE 0.5 MG: 1 INJECTION, SOLUTION INTRAMUSCULAR; INTRAVENOUS; SUBCUTANEOUS at 21:58

## 2018-01-11 RX ADMIN — HYDROMORPHONE HYDROCHLORIDE 0.5 MG: 10 INJECTION INTRAMUSCULAR; INTRAVENOUS; SUBCUTANEOUS at 06:18

## 2018-01-11 RX ADMIN — ESCITALOPRAM 20 MG: 20 TABLET, FILM COATED ORAL at 07:54

## 2018-01-11 RX ADMIN — HYDROMORPHONE HYDROCHLORIDE 0.5 MG: 10 INJECTION INTRAMUSCULAR; INTRAVENOUS; SUBCUTANEOUS at 02:21

## 2018-01-11 RX ADMIN — ONDANSETRON 4 MG: 2 INJECTION INTRAMUSCULAR; INTRAVENOUS at 19:01

## 2018-01-11 RX ADMIN — PROMETHAZINE HYDROCHLORIDE 12.5 MG: 25 INJECTION INTRAMUSCULAR; INTRAVENOUS at 23:32

## 2018-01-11 RX ADMIN — ONDANSETRON 4 MG: 2 INJECTION INTRAMUSCULAR; INTRAVENOUS at 12:58

## 2018-01-11 RX ADMIN — ONDANSETRON 4 MG: 2 INJECTION INTRAMUSCULAR; INTRAVENOUS at 04:30

## 2018-01-11 RX ADMIN — Medication 250 MG: at 15:13

## 2018-01-11 RX ADMIN — SODIUM CHLORIDE, POTASSIUM CHLORIDE, SODIUM LACTATE AND CALCIUM CHLORIDE 150 ML/HR: 600; 310; 30; 20 INJECTION, SOLUTION INTRAVENOUS at 02:25

## 2018-01-11 RX ADMIN — HYDROMORPHONE HYDROCHLORIDE 0.5 MG: 10 INJECTION INTRAMUSCULAR; INTRAVENOUS; SUBCUTANEOUS at 04:26

## 2018-01-11 RX ADMIN — ALBUTEROL SULFATE 2.5 MG: 2.5 SOLUTION RESPIRATORY (INHALATION) at 05:17

## 2018-01-11 RX ADMIN — PANTOPRAZOLE SODIUM 40 MG: 40 TABLET, DELAYED RELEASE ORAL at 06:18

## 2018-01-11 RX ADMIN — METRONIDAZOLE 500 MG: 500 TABLET, FILM COATED ORAL at 15:13

## 2018-01-11 RX ADMIN — LEVOTHYROXINE SODIUM 88 MCG: 88 TABLET ORAL at 06:38

## 2018-01-11 RX ADMIN — CLONAZEPAM 0.5 MG: 0.5 TABLET ORAL at 21:58

## 2018-01-11 RX ADMIN — HYDROMORPHONE HYDROCHLORIDE 0.5 MG: 1 INJECTION, SOLUTION INTRAMUSCULAR; INTRAVENOUS; SUBCUTANEOUS at 18:58

## 2018-01-11 RX ADMIN — HYDROCODONE BITARTRATE AND ACETAMINOPHEN 2 TABLET: 7.5; 325 TABLET ORAL at 09:52

## 2018-01-11 RX ADMIN — Medication 250 MG: at 21:58

## 2018-01-11 RX ADMIN — METRONIDAZOLE 500 MG: 500 INJECTION, SOLUTION INTRAVENOUS at 03:38

## 2018-01-11 RX ADMIN — HYDROMORPHONE HYDROCHLORIDE 0.5 MG: 10 INJECTION INTRAMUSCULAR; INTRAVENOUS; SUBCUTANEOUS at 00:17

## 2018-01-11 RX ADMIN — HYDROMORPHONE HYDROCHLORIDE 0.5 MG: 1 INJECTION, SOLUTION INTRAMUSCULAR; INTRAVENOUS; SUBCUTANEOUS at 12:58

## 2018-01-11 RX ADMIN — HYDROCODONE BITARTRATE AND ACETAMINOPHEN 2 TABLET: 7.5; 325 TABLET ORAL at 14:56

## 2018-01-11 RX ADMIN — Medication 1 TABLET: at 13:55

## 2018-01-11 RX ADMIN — Medication 1 TABLET: at 03:38

## 2018-01-11 RX ADMIN — MONTELUKAST 10 MG: 10 TABLET, FILM COATED ORAL at 21:58

## 2018-01-11 RX ADMIN — HYDROMORPHONE HYDROCHLORIDE 0.5 MG: 1 INJECTION, SOLUTION INTRAMUSCULAR; INTRAVENOUS; SUBCUTANEOUS at 16:30

## 2018-01-11 RX ADMIN — LOPERAMIDE HYDROCHLORIDE 2 MG: 1 SOLUTION ORAL at 03:38

## 2018-01-11 NOTE — PLAN OF CARE
Problem: Fall Risk (Adult)  Goal: Absence of Falls  Outcome: Ongoing (interventions implemented as appropriate)      Problem: Patient Care Overview (Adult)  Goal: Plan of Care Review  Outcome: Ongoing (interventions implemented as appropriate)   01/11/18 1610   Coping/Psychosocial Response Interventions   Plan Of Care Reviewed With patient   Patient Care Overview   Progress improving   Outcome Evaluation   Outcome Summary/Follow up Plan pain control better with PO pain medicine and dilaudid for breakthrough pain. assist x1. diet advanced to bland/low residue. one dose of zofran given. educated about BP monitoring.      Goal: Adult Individualization and Mutuality  Outcome: Ongoing (interventions implemented as appropriate)    Goal: Discharge Needs Assessment  Outcome: Ongoing (interventions implemented as appropriate)      Problem: Pain, Acute (Adult)  Goal: Acceptable Pain Control/Comfort Level  Outcome: Ongoing (interventions implemented as appropriate)

## 2018-01-11 NOTE — PLAN OF CARE
Problem: Fall Risk (Adult)  Goal: Identify Related Risk Factors and Signs and Symptoms  Outcome: Outcome(s) achieved Date Met: 01/11/18 01/11/18 0447   Fall Risk   Fall Risk: Related Risk Factors culprit medication(s);gait/mobility problems   Fall Risk: Signs and Symptoms presence of risk factors     Goal: Absence of Falls  Outcome: Ongoing (interventions implemented as appropriate)   01/11/18 0447   Fall Risk (Adult)   Absence of Falls achieves outcome       Problem: Patient Care Overview (Adult)  Goal: Plan of Care Review  Outcome: Ongoing (interventions implemented as appropriate)   01/11/18 0447   Coping/Psychosocial Response Interventions   Plan Of Care Reviewed With patient   Patient Care Overview   Progress improving   Outcome Evaluation   Outcome Summary/Follow up Plan Pt has been stable through the night. PRN meds given for pain, nausea, and diarrhea. Pain increased with meds given Q2h. Weakness with ambulation, assist of 1. Tolerating clear liquid diet. Will continue to monitor.      Goal: Adult Individualization and Mutuality  Outcome: Ongoing (interventions implemented as appropriate)    Goal: Discharge Needs Assessment  Outcome: Ongoing (interventions implemented as appropriate)      Problem: Pain, Acute (Adult)  Goal: Identify Related Risk Factors and Signs and Symptoms  Outcome: Outcome(s) achieved Date Met: 01/11/18 01/11/18 0447   Pain, Acute   Related Risk Factors (Acute Pain) positioning;disease process;persistent pain   Signs and Symptoms (Acute Pain) moaning;verbalization of pain descriptors     Goal: Acceptable Pain Control/Comfort Level  Outcome: Ongoing (interventions implemented as appropriate)   01/11/18 0447   Pain, Acute (Adult)   Acceptable Pain Control/Comfort Level achieves outcome

## 2018-01-11 NOTE — PROGRESS NOTES
"January 11, 2018    Subjective: Quiet evening.  Feels and looks better.  Diarrhea slowed/stopped.    Objective: Stable and afebrile.  /65 (BP Location: Right arm, Patient Position: Lying)  Pulse 110  Temp 99.1 °F (37.3 °C) (Oral)   Resp 20  Ht 167.6 cm (66\")  Wt 99.8 kg (220 lb)  SpO2 99%  BMI 35.51 kg/m2  In no distress.  Chest clear and heart regular.  Abdomen soft.  Tender left upper quadrant but no rebound or guarding.    Labs: WBC still 16.2 but improved.    Diagnostic Studies: Stool studies so far negative.      Assessment/Plan:  Colitis, likely ischemic, improving.  Plan low residue diet, decrease IV fluids, oral pain medication today.  We will recheck her CBC in the morning.    Nino Torres M.D.      "

## 2018-01-11 NOTE — CONSULTS
"Adult Nutrition  Assessment/PES    Patient Name:  Maria Ines Zhang  YOB: 1945  MRN: [id number]  Admit Date:  1/10/2018    Assessment Date:  1/11/2018    Comments:  Consult for diet education.  Provided material but pt not feeling well to discuss diet.  Will follow for education.          Reason for Assessment       01/11/18 0846    Reason for Assessment    Reason For Assessment/Visit physician consult;education    Diagnosis Diagnosis    Gastrointestinal Colitis              Nutrition/Diet History       01/11/18 0850    Nutrition/Diet History    Food Allergies/Intolerances fish/shellfish            Anthropometrics       01/11/18 0847    Anthropometrics    Height 167.6 cm (65.98\")    RD Documented Current Weight  99.8 kg (220 lb)    Ideal Body Weight (IBW)    Ideal Body Weight (IBW), Female 59.94    Body Mass Index (BMI)    BMI Grade 35 - 39.9 - obesity - grade II            Labs/Tests/Procedures/Meds       01/11/18 0848    Labs/Tests/Procedures/Meds    Diagnostic Test/Procedure Review reviewed    Labs/Tests Review Reviewed;Na+;Creat;WBC;Hgb Hct    Medication Review Reviewed, pertinent;Antacid   florastor    Significant Vitals reviewed            Physical Findings       01/11/18 0849    Physical Findings/Assessment    Additional Documentation Physical Appearance (Group)    Physical Appearance    Overall Physical Appearance obese    Gastrointestinal abdominal tenderness    Skin --   intact              Nutrition Prescription Ordered       01/11/18 0850    Nutrition Prescription PO    Current PO Diet Clear Liquid              Problem/Interventions:        Problem 1       01/11/18 0851    Nutrition Diagnoses Problem 1    Problem 1 Altered GI Function    Etiology (related to) Medical Diagnosis    Gastrointestinal Colitis                    Intervention Goal       01/11/18 0851    Intervention Goal    General Maintain nutrition    PO Advance diet;Tolerate PO    Weight No significant weight loss          "   Nutrition Intervention       01/11/18 0852    Nutrition Intervention    RD/Tech Action Follow Tx progress;Care plan reviewd              Education/Evaluation       01/11/18 0852    Education    Education Provided education regarding   provided material, pt in pain and did not want to go over diet        Electronically signed by:  Celeste Dave RD  01/11/18 8:53 AM

## 2018-01-11 NOTE — PROGRESS NOTES
Discharge Planning Assessment  [facility]     Patient Name: Maria Ines Zhang  MRN: [id number]  Today's Date: 1/11/2018    Admit Date: 1/10/2018          Discharge Needs Assessment       01/11/18 1636    Living Environment    Lives With spouse    Living Arrangements house    Home Accessibility stairs to enter home    Number of Stairs to Enter Home 1    Type of Financial/Environmental Concern none    Transportation Available car;family or friend will provide    Living Environment    Provides Primary Care For no one    Quality Of Family Relationships supportive;helpful;involved    Able to Return to Prior Living Arrangements yes    Discharge Needs Assessment    Concerns To Be Addressed no discharge needs identified;denies needs/concerns at this time    Equipment Currently Used at Home bipap/ cpap;oxygen;nebulizer;cane, straight    Discharge Disposition home or self-care    Discharge Planning Comments Home            Discharge Plan       01/11/18 1637    Case Management/Social Work Plan    Plan Home    Patient/Family In Agreement With Plan yes    Additional Comments IMM letter checked. CCP met with pt and  to discuss d/c planning. Facesheet verified. CCP role explained. Pt resides with her  in a single level home with one exterior step. Pt reports IADLs with access to cane if needed, and use of nocturnal O2 supplied by [employer], nebulizer and CPAP at home. Pt reports past home health via [facility] and denies past sub-acute rehab. Pt's pharmacy is [email or web address] in [place], [place]. Pt uncertain of d/c needs pending treatment course. CCP to follow to assist should d/c needs arise. Paradise Alejo LCSW        Discharge Placement     No information found                Demographic Summary       01/11/18 1636    Referral Information    Admission Type inpatient    Arrived From admitted as an inpatient    Referral Source nursing;physician    Reason For Consult discharge planning    Record Reviewed medical record    Primary  Care Physician Information    Name Jeanine Blunt MD            Functional Status       01/11/18 8641    Functional Status Current    Ambulation 2-->assistive person    Transferring 2-->assistive person    Toileting 2-->assistive person    Bathing 0-->independent    Dressing 0-->independent    Eating 0-->independent    Communication 0-->understands/communicates without difficulty    Swallowing (if score 2 or more for any item, consult Rehab Services) 0-->swallows foods/liquids without difficulty    Functional Status Prior    Ambulation 0-->independent    Transferring 0-->independent    Toileting 0-->independent    Bathing 0-->independent    Dressing 0-->independent    Eating 0-->independent    Communication 0-->understands/communicates without difficulty    Swallowing 0-->swallows foods/liquids without difficulty    Cognitive/Perceptual/Developmental    Current Mental Status/Cognitive Functioning no deficits noted            Psychosocial     None            Abuse/Neglect     None            Legal     None            Substance Abuse     None            Patient Forms     None          Haritha Alejo LCSW

## 2018-01-12 ENCOUNTER — APPOINTMENT (OUTPATIENT)
Dept: CT IMAGING | Facility: HOSPITAL | Age: 73
End: 2018-01-12
Attending: SURGERY

## 2018-01-12 LAB
BACTERIA SPEC AEROBE CULT: NORMAL
BASOPHILS # BLD AUTO: 0 10*3/MM3 (ref 0–0.2)
BASOPHILS NFR BLD AUTO: 0 % (ref 0–1.5)
DEPRECATED RDW RBC AUTO: 54.9 FL (ref 37–54)
EOSINOPHIL # BLD AUTO: 0.02 10*3/MM3 (ref 0–0.7)
EOSINOPHIL NFR BLD AUTO: 0.2 % (ref 0.3–6.2)
ERYTHROCYTE [DISTWIDTH] IN BLOOD BY AUTOMATED COUNT: 16.4 % (ref 11.7–13)
HCT VFR BLD AUTO: 31.3 % (ref 35.6–45.5)
HGB BLD-MCNC: 9.7 G/DL (ref 11.9–15.5)
IMM GRANULOCYTES # BLD: 0 10*3/MM3 (ref 0–0.03)
IMM GRANULOCYTES NFR BLD: 0 % (ref 0–0.5)
LYMPHOCYTES # BLD AUTO: 0.95 10*3/MM3 (ref 0.9–4.8)
LYMPHOCYTES NFR BLD AUTO: 8.6 % (ref 19.6–45.3)
MCH RBC QN AUTO: 28.4 PG (ref 26.9–32)
MCHC RBC AUTO-ENTMCNC: 31 G/DL (ref 32.4–36.3)
MCV RBC AUTO: 91.5 FL (ref 80.5–98.2)
MONOCYTES # BLD AUTO: 1 10*3/MM3 (ref 0.2–1.2)
MONOCYTES NFR BLD AUTO: 9 % (ref 5–12)
NEUTROPHILS # BLD AUTO: 9.12 10*3/MM3 (ref 1.9–8.1)
NEUTROPHILS NFR BLD AUTO: 82.2 % (ref 42.7–76)
PLATELET # BLD AUTO: 236 10*3/MM3 (ref 140–500)
PMV BLD AUTO: 10.3 FL (ref 6–12)
RBC # BLD AUTO: 3.42 10*6/MM3 (ref 3.9–5.2)
WBC NRBC COR # BLD: 11.09 10*3/MM3 (ref 4.5–10.7)

## 2018-01-12 PROCEDURE — 99222 1ST HOSP IP/OBS MODERATE 55: CPT | Performed by: SURGERY

## 2018-01-12 PROCEDURE — 25010000002 ONDANSETRON PER 1 MG: Performed by: SURGERY

## 2018-01-12 PROCEDURE — 25010000002 HYDROMORPHONE PER 4 MG: Performed by: SURGERY

## 2018-01-12 PROCEDURE — 25010000002 PROMETHAZINE PER 50 MG: Performed by: SURGERY

## 2018-01-12 PROCEDURE — 85025 COMPLETE CBC W/AUTO DIFF WBC: CPT | Performed by: SURGERY

## 2018-01-12 PROCEDURE — 25010000002 LEVOFLOXACIN PER 250 MG: Performed by: SURGERY

## 2018-01-12 PROCEDURE — 74176 CT ABD & PELVIS W/O CONTRAST: CPT

## 2018-01-12 PROCEDURE — 94640 AIRWAY INHALATION TREATMENT: CPT

## 2018-01-12 RX ORDER — BISACODYL 10 MG
10 SUPPOSITORY, RECTAL RECTAL ONCE
Status: COMPLETED | OUTPATIENT
Start: 2018-01-12 | End: 2018-01-12

## 2018-01-12 RX ORDER — BUDESONIDE AND FORMOTEROL FUMARATE DIHYDRATE 160; 4.5 UG/1; UG/1
2 AEROSOL RESPIRATORY (INHALATION)
Status: DISCONTINUED | OUTPATIENT
Start: 2018-01-13 | End: 2018-01-12

## 2018-01-12 RX ORDER — LEVOFLOXACIN 5 MG/ML
500 INJECTION, SOLUTION INTRAVENOUS EVERY 24 HOURS
Status: DISCONTINUED | OUTPATIENT
Start: 2018-01-12 | End: 2018-01-15

## 2018-01-12 RX ORDER — SODIUM CHLORIDE 9 MG/ML
125 INJECTION, SOLUTION INTRAVENOUS CONTINUOUS
Status: DISCONTINUED | OUTPATIENT
Start: 2018-01-12 | End: 2018-01-13

## 2018-01-12 RX ADMIN — LEVOFLOXACIN 500 MG: 5 INJECTION, SOLUTION INTRAVENOUS at 18:13

## 2018-01-12 RX ADMIN — METRONIDAZOLE 500 MG: 500 INJECTION, SOLUTION INTRAVENOUS at 19:35

## 2018-01-12 RX ADMIN — BISACODYL 10 MG: 10 SUPPOSITORY RECTAL at 14:27

## 2018-01-12 RX ADMIN — HYDROMORPHONE HYDROCHLORIDE 0.5 MG: 1 INJECTION, SOLUTION INTRAMUSCULAR; INTRAVENOUS; SUBCUTANEOUS at 23:32

## 2018-01-12 RX ADMIN — METRONIDAZOLE 500 MG: 500 INJECTION, SOLUTION INTRAVENOUS at 09:43

## 2018-01-12 RX ADMIN — SODIUM CHLORIDE 125 ML/HR: 9 INJECTION, SOLUTION INTRAVENOUS at 17:31

## 2018-01-12 RX ADMIN — ONDANSETRON 4 MG: 2 INJECTION INTRAMUSCULAR; INTRAVENOUS at 20:21

## 2018-01-12 RX ADMIN — BUDESONIDE AND FORMOTEROL FUMARATE DIHYDRATE 2 PUFF: 160; 4.5 AEROSOL RESPIRATORY (INHALATION) at 23:50

## 2018-01-12 RX ADMIN — FAMOTIDINE 20 MG: 10 INJECTION INTRAVENOUS at 23:32

## 2018-01-12 RX ADMIN — HYDROMORPHONE HYDROCHLORIDE 0.5 MG: 1 INJECTION, SOLUTION INTRAMUSCULAR; INTRAVENOUS; SUBCUTANEOUS at 13:02

## 2018-01-12 RX ADMIN — HYDROMORPHONE HYDROCHLORIDE 0.5 MG: 1 INJECTION, SOLUTION INTRAMUSCULAR; INTRAVENOUS; SUBCUTANEOUS at 15:04

## 2018-01-12 RX ADMIN — PROMETHAZINE HYDROCHLORIDE 12.5 MG: 25 INJECTION INTRAMUSCULAR; INTRAVENOUS at 10:46

## 2018-01-12 RX ADMIN — HYDROMORPHONE HYDROCHLORIDE 0.5 MG: 1 INJECTION, SOLUTION INTRAMUSCULAR; INTRAVENOUS; SUBCUTANEOUS at 20:21

## 2018-01-12 RX ADMIN — ONDANSETRON 4 MG: 2 INJECTION INTRAMUSCULAR; INTRAVENOUS at 02:10

## 2018-01-12 RX ADMIN — PROMETHAZINE HYDROCHLORIDE 12.5 MG: 25 INJECTION INTRAMUSCULAR; INTRAVENOUS at 06:42

## 2018-01-12 RX ADMIN — SODIUM CHLORIDE, POTASSIUM CHLORIDE, SODIUM LACTATE AND CALCIUM CHLORIDE 125 ML/HR: 600; 310; 30; 20 INJECTION, SOLUTION INTRAVENOUS at 15:04

## 2018-01-12 RX ADMIN — HYDROMORPHONE HYDROCHLORIDE 0.5 MG: 1 INJECTION, SOLUTION INTRAMUSCULAR; INTRAVENOUS; SUBCUTANEOUS at 06:54

## 2018-01-12 RX ADMIN — ONDANSETRON 4 MG: 2 INJECTION INTRAMUSCULAR; INTRAVENOUS at 13:18

## 2018-01-12 RX ADMIN — HYDROMORPHONE HYDROCHLORIDE 0.5 MG: 1 INJECTION, SOLUTION INTRAMUSCULAR; INTRAVENOUS; SUBCUTANEOUS at 18:12

## 2018-01-12 NOTE — PLAN OF CARE
Problem: Fall Risk (Adult)  Goal: Absence of Falls  Outcome: Ongoing (interventions implemented as appropriate)      Problem: Patient Care Overview (Adult)  Goal: Plan of Care Review  Outcome: Ongoing (interventions implemented as appropriate)   01/12/18 0421   Coping/Psychosocial Response Interventions   Plan Of Care Reviewed With patient   Patient Care Overview   Progress improving   Outcome Evaluation   Outcome Summary/Follow up Plan pain control with IV medication. Patient abdomen distended and tender. Minimal passing of gas with hypoactive to no bowel sounds in lower quadrants. KUB positive for partial SBO. Dr Torres notified and new order for NPO except ice chips. Patient has intermittent nausea through shift. assist x 1. Dr Torres to see this am. Educated on BP and monitoring r/t h/o HTN      Goal: Adult Individualization and Mutuality  Outcome: Ongoing (interventions implemented as appropriate)      Problem: Pain, Acute (Adult)  Goal: Acceptable Pain Control/Comfort Level  Outcome: Ongoing (interventions implemented as appropriate)

## 2018-01-12 NOTE — PLAN OF CARE
Problem: Fall Risk (Adult)  Goal: Absence of Falls  Outcome: Ongoing (interventions implemented as appropriate)   01/12/18 1853   Fall Risk (Adult)   Absence of Falls making progress toward outcome       Problem: Patient Care Overview (Adult)  Goal: Plan of Care Review  Outcome: Ongoing (interventions implemented as appropriate)   01/12/18 1853   Coping/Psychosocial Response Interventions   Plan Of Care Reviewed With patient;family   Patient Care Overview   Progress improving   Outcome Evaluation   Outcome Summary/Follow up Plan Pt arrived to unit from 8 park with NG tube in place. NG tube to low wall suction. Pt tolerating well. Pt does c/o sore throat. Pt ambulated with assistance due to wekaness. VSS. Will continue to monitor.      Goal: Adult Individualization and Mutuality  Outcome: Ongoing (interventions implemented as appropriate)   01/12/18 1853   Individualization   Patient Specific Interventions Pt requests pain medications regularly.        Problem: Pain, Acute (Adult)  Goal: Acceptable Pain Control/Comfort Level  Outcome: Ongoing (interventions implemented as appropriate)   01/12/18 1853   Pain, Acute (Adult)   Acceptable Pain Control/Comfort Level making progress toward outcome

## 2018-01-12 NOTE — PROGRESS NOTES
"January 12, 2018    Subjective:   Patient has more pain and nausea today?  Has developed much more abdominal bloating and distention as well?  No further flatus or diarrhea from below.    Objective: Remains afebrile and stable.  /58 (BP Location: Left arm, Patient Position: Lying)  Pulse 94  Temp 98.6 °F (37 °C) (Oral)   Resp 16  Ht 167.6 cm (65.98\")  Wt 99.8 kg (220 lb)  SpO2 92%  BMI 35.51 kg/m2  In no distress.  Chest clear and heart regular.  Abdomen distended and tympanic now.  Tender but not acutely so, no rebound or guarding.    Labs: WBC actually continues to decrease, ~11 this morning.    Diagnostic Studies: KUB from last p.m. now looks like a bowel obstruction.      Assessment/Plan:  Colitis, likely ischemic, stable.  Patient was doing quite well from a colitis standpoint with resolution of diarrhea complaints and advancing diet but took a change for the worst midday into the evening last night with increasing nausea and abdominal distention.  Nursing called we will order a KUB which now looks like a bowel obstruction?  I am not sure if this is a secondary phenomenon related to inflammatory changes in the abdomen or if the colitis is now causing an obstructive problem at that level of the colon.  I will order a stat follow-up CT scan without contrast to get a better idea of which way this is going.  Hopefully we can continue with conservative measures.  I will continue nothing by mouth status and change some of her medications back to IV in the interim.    Nino Torres M.D.      "

## 2018-01-12 NOTE — NURSING NOTE
Call placed to Dr Torres re: DIONTE results. Advised new order for NPO except ice chips and will follow up with patient in AM.

## 2018-01-12 NOTE — PLAN OF CARE
Problem: Fall Risk (Adult)  Goal: Absence of Falls  Outcome: Ongoing (interventions implemented as appropriate)      Problem: Patient Care Overview (Adult)  Goal: Plan of Care Review  Outcome: Ongoing (interventions implemented as appropriate)   01/12/18 1323   Coping/Psychosocial Response Interventions   Plan Of Care Reviewed With patient   Patient Care Overview   Progress improving   Outcome Evaluation   Outcome Summary/Follow up Plan better pain control today. nausea increased, zofran/phenergan given. SBO, NG tube started to low wall suction. educated about BP monitoring and importance of monitoring o2 sats     Goal: Adult Individualization and Mutuality  Outcome: Ongoing (interventions implemented as appropriate)    Goal: Discharge Needs Assessment  Outcome: Outcome(s) achieved Date Met: 01/12/18      Problem: Pain, Acute (Adult)  Goal: Acceptable Pain Control/Comfort Level  Outcome: Ongoing (interventions implemented as appropriate)

## 2018-01-12 NOTE — CONSULTS
Consultation note    Referring physician: Nino Torres MD    Consulting Physician: Ritchie Garland MD    Reason for consultation: SBO, Colitis, pt requesting transition of care to my service.     Chief Complaint   Patient presents with   • Abdominal Pain   • Nausea   • Vomiting       HPI:   The patient is a very pleasant 72 y.o. years old female that presented to the hospital with abdominal pain, nausea and vomiting. She reports severe abdominal pain that started on Tuesday after having a meal.  This was associated with several episodes of nonbloody or bilious vomiting.  She was having abdominal pain located or throughout her abdomen.  She continued to pass gas.  She presented to the emergency room where she was found to have findings on CT scan consistent with colitis at the splenic flexure and descending colon.  He was admitted for further treatment.  After being admitted, she had several episodes of nonbloody watery diarrhea that lasted one day. She was treated conservatively with nothing by mouth and antibiotics.  Diarrhea resolved as well as white blood cell that was initially 19,000 and is today 11,000  The diet was slowly advanced and she was tolerating regular diet yesterday.  Yesterday afternoon, she developed worsening abdominal distention and nausea.  This morning because of her worsening abdominal exam she was ordered to have another CT scan of the abdomen and pelvis that show evidence of improvement of her colitis but findings of small bowel obstruction.  Reports no passing any gas or having bowel movements today.  She feels nauseous but denies any vomiting.  She feels distended and bloated. Stool cultures were negative for c.diff and other enteropathogenic bacteria. Had last colonoscopy approximately 8 years ago and was normal as per her report      Past Medical History:   Diagnosis Date   • Anxiety    • Asthma    • Benign essential hypertension    • Cryptogenic stroke 8/3/2017   • Depression     • Fatigue    • GERD (gastroesophageal reflux disease)    • Hyperlipidemia    • Hypertension    • Hypothyroidism    • IFG (impaired fasting glucose) 8/7/2017   • Mitral valve insufficiency     nild   • MRSA infection within last 3 months 05/2017    RIGHT FOOT   • Obesity    • Obstructive sleep apnea    • Osteoarthritis    • Patent foramen ovale    • Pulmonary embolism     3 SEPARATE OCCASIONS   • Pulmonary hypertension    • Sciatica    • Venous stasis        Past Surgical History:   Procedure Laterality Date   • BACK SURGERY  2011   • CATARACT EXTRACTION W/ INTRAOCULAR LENS IMPLANT Bilateral 2016   • HYSTERECTOMY     • NJ TOTAL KNEE ARTHROPLASTY Right 7/6/2017    Procedure: TOTAL KNEE ARTHROPLASTY;  Surgeon: Dragan De La Vega MD;  Location: [facility];  Service: Orthopedics   • SPINE SURGERY     • THORACOSCOPY Right 02/25/2016    RT VATS WEDGE RESECTION   • TOE AMPUTATION Right 05/2017    SECOND TOE   • TOTAL KNEE ARTHROPLASTY Left    - Hysterectomy and cholecystectomy      Current Facility-Administered Medications:   •  acetaminophen (TYLENOL) tablet 650 mg, 650 mg, Oral, Q4H PRN, Nino Torres MD  •  albuterol (PROVENTIL) nebulizer solution 0.083% 2.5 mg/3mL, 2.5 mg, Nebulization, Q4H PRN, Nino Torres MD, 2.5 mg at 01/11/18 0517  •  bisacodyl (DULCOLAX) suppository 10 mg, 10 mg, Rectal, Once, Nino Torres MD  •  budesonide-formoterol (SYMBICORT) 160-4.5 MCG/ACT inhaler 2 puff, 2 puff, Inhalation, BID - RT, Nino Torres MD  •  calcium carbonate (TUMS) chewable tablet 500 mg (200 mg elemental), 1 tablet, Oral, TID PRN, Nino Torres MD, 1 tablet at 01/11/18 5527  •  clonazePAM (KlonoPIN) tablet 0.5 mg, 0.5 mg, Oral, Nightly, Nino Torres MD, 0.5 mg at 01/11/18 3606  •  cyclobenzaprine (FLEXERIL) tablet 10 mg, 10 mg, Oral, TID PRN, Nino Torres MD  •  escitalopram (LEXAPRO) tablet 20 mg, 20 mg, Oral, Daily, Nino Torres MD, 20 mg at 01/11/18 7951  •  famotidine (PEPCID) injection 20 mg, 20  mg, Intravenous, BID, Nino Torres MD  •  hydrochlorothiazide (HYDRODIURIL) tablet 25 mg, 25 mg, Oral, Daily, Nino Torres MD, 25 mg at 01/10/18 1250  •  HYDROcodone-acetaminophen (NORCO) 7.5-325 MG per tablet 1 tablet, 1 tablet, Oral, Q4H PRN, Nino Torres MD  •  HYDROcodone-acetaminophen (NORCO) 7.5-325 MG per tablet 2 tablet, 2 tablet, Oral, Q4H PRN, Nino Torres MD, 2 tablet at 01/11/18 1456  •  HYDROmorphone (DILAUDID) injection 0.5 mg, 0.5 mg, Intravenous, Q2H PRN, Nino Torres MD, 0.5 mg at 01/12/18 0654  •  lactated ringers infusion, 125 mL/hr, Intravenous, Continuous, Nino Torres MD, Last Rate: 125 mL/hr at 01/12/18 0745, 125 mL/hr at 01/12/18 0745  •  levothyroxine (SYNTHROID, LEVOTHROID) tablet 88 mcg, 88 mcg, Oral, Q AM, Nino Torres MD, 88 mcg at 01/11/18 0638  •  lisinopril (PRINIVIL,ZESTRIL) tablet 20 mg, 20 mg, Oral, Daily, Nino Torres MD, 20 mg at 01/10/18 1250  •  metroNIDAZOLE (FLAGYL) IVPB 500 mg, 500 mg, Intravenous, Q8H, Nino Torres MD, 500 mg at 01/12/18 0943  •  montelukast (SINGULAIR) tablet 10 mg, 10 mg, Oral, Nightly, Nino Torres MD, 10 mg at 01/11/18 2158  •  ondansetron (ZOFRAN) tablet 4 mg, 4 mg, Oral, Q6H PRN **OR** ondansetron ODT (ZOFRAN-ODT) disintegrating tablet 4 mg, 4 mg, Oral, Q6H PRN **OR** ondansetron (ZOFRAN) injection 4 mg, 4 mg, Intravenous, Q6H PRN, Nino Torres MD, 4 mg at 01/12/18 0210  •  promethazine (PHENERGAN) injection 12.5 mg, 12.5 mg, Intravenous, Q4H PRN, Nino Torres MD, 12.5 mg at 01/12/18 1046  •  saccharomyces boulardii (FLORASTOR) capsule 250 mg, 250 mg, Oral, BID, Nino Torres MD, 250 mg at 01/11/18 2158  •  sodium chloride 0.9 % flush 1-10 mL, 1-10 mL, Intravenous, PRN, Nino Torres MD  •  sodium chloride 0.9 % flush 10 mL, 10 mL, Intravenous, PRN, Td Rodriguez MD    Allergies   Allergen Reactions   • Fish Allergy Nausea And Vomiting   • Shellfish Allergy Nausea And Vomiting   • Sulfa Antibiotics Rash        Social History     Social History   • Marital status:      Spouse name: N/A   • Number of children: N/A   • Years of education: N/A     Occupational History   • Not on file.     Social History Main Topics   • Smoking status: Never Smoker   • Smokeless tobacco: Never Used   • Alcohol use No   • Drug use: No   • Sexual activity: Defer     Other Topics Concern   • Not on file     Social History Narrative       Family History   Problem Relation Age of Onset   • Heart disease Mother 60   • Diabetes Mother    • Stroke Mother 83   • Melanoma Father    • Heart attack Brother    • Heart disease Brother      Valve problem    • Heart attack Maternal Grandmother    • Heart attack Maternal Grandfather    • No Known Problems Paternal Grandmother    • No Known Problems Paternal Grandfather    • Malig Hyperthermia Neg Hx        ROS:   Constitutional: denies any weight changes, fatigue or weakness.  Eyes: : denies blurred or double vision  Cardiovascular: denies chest pain, palpitations, edemas.  Respiratory: denies cough, sputum, SOB.  Gastrointestinal: as per HPI  Genitourinary: denies dysuria, frequency.  Endocrine: denies cold intolerance, lethargy and flushing.  Hem: denies excessive bruising and postop bleeding.  Musculoskeletal: denies weakness, joint swelling, pain or stiffness.  Neuro: denies seizures, CVA, paresthesia, or peripheral neuropathy.   Skin: denies change in nevi, rashes, masses.    Physical Exam:   Vitals:    01/12/18 1100   BP: 117/62   Pulse: 90   Resp: 16   Temp: 97.6 °F (36.4 °C)   SpO2: 93%     GENERAL:oriented to person, place, and time and ill-appearing  HEENT: normochephalic, atraumatic, no scleral icterus moist mucous membranes.  NECK: Supple there is no thyromegaly or lymphadenopathy  CHEST: clear to auscultation, no wheezes, rales or rhonchi, symmetric air entry  CARDIAC: regular rate and rhythm    ABDOMEN: soft, mildly tender, distended and tympanic. BS+ no masses or organomegaly. No  rebound or guarding, no hernias.   EXTREMITIES: no cyanosis, clubbing or edema    NEURO: alert and oriented, normal speech, cranial nerves 2-12 grossly intact, no focal deficits   SKIN: Moist, warm, no rashes.    Diagnostic workup:   Reviewed and interpreted by me.   CT Abdomen and Pelvis(1/12/18): IMPRESSION:   1. Development of a partial small bowel obstruction with gradual  decrease in caliber at the mid to distal ileum. There is a new tiny  amount of free fluid within the abdomen or pelvis.  2. The haziness adjacent to the distal transverse colon and splenic  flexure has nearly resolved. The distal transverse colon remains mildly  thickened in appearance.  3. New tiny right pleural effusion and minimal atelectatic change at  the lung bases.    CT Abdomen and Pelvis(1/10/18): IMPRESSION:   1. Mild to moderately severe colitis involving the splenic flexure and  descending colon, no obstruction, perforation or abscess. Given  patient's age and distribution of colitis, ischemia is high in the  differential diagnosis. Infectious or inflammatory etiology may also be  considered.   2. A few cysts or hemangiomas of the liver measure up to 1.5 cm.  3. 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    WBC 11 from 16 from 19 K    Results for LEA HARDY (MRN [id number]) as of 1/12/2018 15:55   Ref. Range 1/9/2018 23:47 1/11/2018 03:57   Glucose Latest Ref Range: 65 - 99 mg/dL 121 (H) 97   Sodium Latest Ref Range: 136 - 145 mmol/L 141 133 (L)   Potassium Latest Ref Range: 3.5 - 5.2 mmol/L 3.9 4.5   CO2 Latest Ref Range: 22.0 - 29.0 mmol/L 21.8 (L) 23.6   Chloride Latest Ref Range: 98 - 107 mmol/L 99 97 (L)   Anion Gap Latest Units: mmol/L 20.2 12.4   Creatinine Latest Ref Range: 0.57 - 1.00 mg/dL 1.56 (H) 1.97 (H)     Assessment and plan:   The patient is a very pleasant 72 y.o. years old female that was  initially admitted to the hospital with abdominal pain nausea and vomiting and was found to have what it seemed to be colitis that initially was thought to be due to ischemia although patient never have any bleeding neither was acidotic.  Her repeat CT scan today show evidence of small bowel obstruction and she got relieved with nasogastric tube placement for decompression.  Her abdominal pain has been slowly getting better.  He developed worsening acute renal failure that is new for her.  Her creatinine has gone up  to 1.97 that is likely related due to dehydration from bowel obstruction.  Clinically she is looking fine and there is no evidence of ischemia.     - I discussed the case with Dr. Torres.  He understands that I know the patient from taking care of her  for a long time and they would like me to take over her care.  - I will transfer the patient to my service  - Continue Naso-gastric tube decompression.   - Abdominal x-ray in the morning and CBC and BMP with Mg and Phosp  - Switched to normal saline 125 due to acute renal failure and hyponatremia  - Continue home meds  - Continue Flagyl, we will add levofloxacin for now for suspicious of infectious colitis  - Lovenox and SCDs  - Ambulate    Case was discussed with Nino Torres MD and patient.    Risk and benefits of the current recommended treatment were explained to the patient that had time to ask questions that where answered, verbalized understanding and agreed with the plan.     Ritchie Garland MD  General, Minimally Invasive and Endoscopic Surgery  [facility]    [address], [address]  [place], [place], [address]  P: [phone]  F: [phone]

## 2018-01-13 ENCOUNTER — APPOINTMENT (OUTPATIENT)
Dept: GENERAL RADIOLOGY | Facility: HOSPITAL | Age: 73
End: 2018-01-13

## 2018-01-13 LAB
ANION GAP SERPL CALCULATED.3IONS-SCNC: 11.7 MMOL/L
BASOPHILS # BLD AUTO: 0.01 10*3/MM3 (ref 0–0.2)
BASOPHILS NFR BLD AUTO: 0.2 % (ref 0–1.5)
BUN BLD-MCNC: 20 MG/DL (ref 8–23)
BUN/CREAT SERPL: 25.6 (ref 7–25)
CALCIUM SPEC-SCNC: 8.3 MG/DL (ref 8.6–10.5)
CHLORIDE SERPL-SCNC: 98 MMOL/L (ref 98–107)
CO2 SERPL-SCNC: 26.3 MMOL/L (ref 22–29)
CREAT BLD-MCNC: 0.78 MG/DL (ref 0.57–1)
DEPRECATED RDW RBC AUTO: 54.9 FL (ref 37–54)
EOSINOPHIL # BLD AUTO: 0.09 10*3/MM3 (ref 0–0.7)
EOSINOPHIL NFR BLD AUTO: 1.4 % (ref 0.3–6.2)
ERYTHROCYTE [DISTWIDTH] IN BLOOD BY AUTOMATED COUNT: 16.5 % (ref 11.7–13)
GFR SERPL CREATININE-BSD FRML MDRD: 73 ML/MIN/1.73
GLUCOSE BLD-MCNC: 94 MG/DL (ref 65–99)
HCT VFR BLD AUTO: 30.4 % (ref 35.6–45.5)
HGB BLD-MCNC: 9.3 G/DL (ref 11.9–15.5)
IMM GRANULOCYTES # BLD: 0 10*3/MM3 (ref 0–0.03)
IMM GRANULOCYTES NFR BLD: 0 % (ref 0–0.5)
LYMPHOCYTES # BLD AUTO: 1.29 10*3/MM3 (ref 0.9–4.8)
LYMPHOCYTES NFR BLD AUTO: 20.5 % (ref 19.6–45.3)
MAGNESIUM SERPL-MCNC: 2.1 MG/DL (ref 1.6–2.4)
MCH RBC QN AUTO: 27.9 PG (ref 26.9–32)
MCHC RBC AUTO-ENTMCNC: 30.6 G/DL (ref 32.4–36.3)
MCV RBC AUTO: 91.3 FL (ref 80.5–98.2)
MONOCYTES # BLD AUTO: 0.81 10*3/MM3 (ref 0.2–1.2)
MONOCYTES NFR BLD AUTO: 12.9 % (ref 5–12)
NEUTROPHILS # BLD AUTO: 4.08 10*3/MM3 (ref 1.9–8.1)
NEUTROPHILS NFR BLD AUTO: 65 % (ref 42.7–76)
NRBC BLD MANUAL-RTO: 0 /100 WBC (ref 0–0)
PHOSPHATE SERPL-MCNC: 2.8 MG/DL (ref 2.5–4.5)
PLATELET # BLD AUTO: 253 10*3/MM3 (ref 140–500)
PMV BLD AUTO: 10.6 FL (ref 6–12)
POTASSIUM BLD-SCNC: 4.3 MMOL/L (ref 3.5–5.2)
RBC # BLD AUTO: 3.33 10*6/MM3 (ref 3.9–5.2)
SODIUM BLD-SCNC: 136 MMOL/L (ref 136–145)
WBC NRBC COR # BLD: 6.28 10*3/MM3 (ref 4.5–10.7)

## 2018-01-13 PROCEDURE — 94799 UNLISTED PULMONARY SVC/PX: CPT

## 2018-01-13 PROCEDURE — 84100 ASSAY OF PHOSPHORUS: CPT | Performed by: SURGERY

## 2018-01-13 PROCEDURE — 25010000002 PROMETHAZINE PER 50 MG: Performed by: SURGERY

## 2018-01-13 PROCEDURE — 74019 RADEX ABDOMEN 2 VIEWS: CPT

## 2018-01-13 PROCEDURE — 25010000002 LEVOFLOXACIN PER 250 MG: Performed by: SURGERY

## 2018-01-13 PROCEDURE — 83735 ASSAY OF MAGNESIUM: CPT | Performed by: SURGERY

## 2018-01-13 PROCEDURE — 80048 BASIC METABOLIC PNL TOTAL CA: CPT | Performed by: SURGERY

## 2018-01-13 PROCEDURE — 99232 SBSQ HOSP IP/OBS MODERATE 35: CPT | Performed by: SURGERY

## 2018-01-13 PROCEDURE — 25010000002 ONDANSETRON PER 1 MG: Performed by: SURGERY

## 2018-01-13 PROCEDURE — 25010000002 HYDROMORPHONE PER 4 MG: Performed by: SURGERY

## 2018-01-13 PROCEDURE — 85025 COMPLETE CBC W/AUTO DIFF WBC: CPT | Performed by: SURGERY

## 2018-01-13 RX ADMIN — PROMETHAZINE HYDROCHLORIDE 12.5 MG: 25 INJECTION INTRAMUSCULAR; INTRAVENOUS at 05:31

## 2018-01-13 RX ADMIN — FAMOTIDINE 20 MG: 10 INJECTION INTRAVENOUS at 08:35

## 2018-01-13 RX ADMIN — SODIUM CHLORIDE 125 ML/HR: 9 INJECTION, SOLUTION INTRAVENOUS at 10:34

## 2018-01-13 RX ADMIN — Medication 1 SPRAY: at 01:13

## 2018-01-13 RX ADMIN — LEVOFLOXACIN 500 MG: 5 INJECTION, SOLUTION INTRAVENOUS at 18:33

## 2018-01-13 RX ADMIN — HYDROMORPHONE HYDROCHLORIDE 0.5 MG: 1 INJECTION, SOLUTION INTRAMUSCULAR; INTRAVENOUS; SUBCUTANEOUS at 13:34

## 2018-01-13 RX ADMIN — FAMOTIDINE 20 MG: 10 INJECTION INTRAVENOUS at 20:25

## 2018-01-13 RX ADMIN — ONDANSETRON 4 MG: 2 INJECTION INTRAMUSCULAR; INTRAVENOUS at 13:34

## 2018-01-13 RX ADMIN — HYDROMORPHONE HYDROCHLORIDE 0.5 MG: 1 INJECTION, SOLUTION INTRAMUSCULAR; INTRAVENOUS; SUBCUTANEOUS at 15:38

## 2018-01-13 RX ADMIN — SODIUM PHOSPHATE, MONOBASIC, MONOHYDRATE 30 MMOL: 276; 142 INJECTION, SOLUTION INTRAVENOUS at 13:34

## 2018-01-13 RX ADMIN — METRONIDAZOLE 500 MG: 500 INJECTION, SOLUTION INTRAVENOUS at 21:31

## 2018-01-13 RX ADMIN — SODIUM CHLORIDE 125 ML/HR: 9 INJECTION, SOLUTION INTRAVENOUS at 02:21

## 2018-01-13 RX ADMIN — BUDESONIDE AND FORMOTEROL FUMARATE DIHYDRATE 2 PUFF: 160; 4.5 AEROSOL RESPIRATORY (INHALATION) at 11:25

## 2018-01-13 RX ADMIN — BUDESONIDE AND FORMOTEROL FUMARATE DIHYDRATE 2 PUFF: 160; 4.5 AEROSOL RESPIRATORY (INHALATION) at 23:20

## 2018-01-13 RX ADMIN — METRONIDAZOLE 500 MG: 500 INJECTION, SOLUTION INTRAVENOUS at 02:21

## 2018-01-13 RX ADMIN — HYDROMORPHONE HYDROCHLORIDE 0.5 MG: 1 INJECTION, SOLUTION INTRAMUSCULAR; INTRAVENOUS; SUBCUTANEOUS at 08:44

## 2018-01-13 RX ADMIN — METRONIDAZOLE 500 MG: 500 INJECTION, SOLUTION INTRAVENOUS at 13:34

## 2018-01-13 NOTE — PROGRESS NOTES
"Progress Note    Chief Complaint   Patient presents with   • Abdominal Pain   • Nausea   • Vomiting       S: There were no events overnight.  Patient is doing fine. Minimal abdominal pain. Started passing some gas and had one BM.     O:/64 (BP Location: Right arm, Patient Position: Lying)  Pulse 92  Temp 98.3 °F (36.8 °C) (Oral)   Resp 14  Ht 167.6 cm (65.98\")  Wt 99.8 kg (220 lb)  SpO2 95%  BMI 35.51 kg/m2  Body mass index is 35.51 kg/(m^2).    I/O last 3 completed shifts:  In: 3392 [I.V.:3242; Other:150]  Out: 650 [Other:650]  I/O this shift:  In: 60 [Other:60]  Out: 600 [Urine:350; Other:250]  NG= 650    GENERAL:alert, well appearing, and in no distress and oriented to person, place, and time  HEENT: normochephalic, atraumatic, moist mucus membranes, clear sclera . NG-tube with bilious fluid  CHEST: clear to auscultation, no wheezes, rales or rhonchi, symmetric air entry  CARDIAC: regular rate and rhythM    ABDOMEN: soft, mildly distended, mildly tender, no masses or organomegaly.  bs+  EXTREMITIES: no cyanosis, clubbing or edema    SKIN: Warm and moist, no rashes      Results from last 7 days  Lab Units 01/13/18  0504   WBC 10*3/mm3 6.28   HEMOGLOBIN g/dL 9.3*   HEMATOCRIT % 30.4*   PLATELETS 10*3/mm3 253       Results from last 7 days  Lab Units 01/13/18  0504 01/11/18  0357 01/09/18  2347   SODIUM mmol/L 136 133* 141   POTASSIUM mmol/L 4.3 4.5 3.9   CHLORIDE mmol/L 98 97* 99   CO2 mmol/L 26.3 23.6 21.8*   BUN mg/dL 20 29* 21   CREATININE mg/dL 0.78 1.97* 1.56*   CALCIUM mg/dL 8.3* 8.2* 9.9   BILIRUBIN mg/dL  --   --  0.5   ALK PHOS U/L  --   --  113   ALT (SGPT) U/L  --   --  18   AST (SGOT) U/L  --   --  25   GLUCOSE mg/dL 94 97 121*       ROS:   Constitutional: denies any weight changes, fatigue or weakness.  Cardiovascular: denies chest pain, palpitations, edemas.  Respiratory: denies cough, sputum, SOB..  Genitourinary: denies dysuria, frequency.    DIET: NPO    A/P: 72 y.o. female with what " is seems to be a partial small bowel obstruction.  Today she feels much better.  Her laboratory results are within normal limits.  Her creatinine has normalized with adequate hydration.  Abdominal x-ray still pending for today.  Clinically she is doing much better.  She still has bilious output from the NG.    - Continue conservative management with bowel rest, IV fluids.  Continue antibiotics for now  - Abdominal x-ray today and tomorrow  - Ambulate  - Continue home meds    Patient verbalized understanding and agree with the plan     Ritchie Garland MD  General, Minimally Invasive and Endoscopic Surgery  [facility]    [address], [address]  [place], [place], [address]  P: [phone]  F: [phone]

## 2018-01-13 NOTE — PLAN OF CARE
Problem: Fall Risk (Adult)  Goal: Absence of Falls  Outcome: Ongoing (interventions implemented as appropriate)      Problem: Patient Care Overview (Adult)  Goal: Plan of Care Review  Outcome: Ongoing (interventions implemented as appropriate)   01/13/18 1644   Coping/Psychosocial Response Interventions   Plan Of Care Reviewed With patient   Patient Care Overview   Progress no change   Outcome Evaluation   Outcome Summary/Follow up Plan pt c/o pain jaw sinuses, chlora spray enc, up with assist BRP, xray completed, NG 4cm pulled abx, nausea med monitoring     Goal: Adult Individualization and Mutuality  Outcome: Ongoing (interventions implemented as appropriate)    Goal: Discharge Needs Assessment  Outcome: Ongoing (interventions implemented as appropriate)      Problem: Pain, Acute (Adult)  Goal: Acceptable Pain Control/Comfort Level  Outcome: Ongoing (interventions implemented as appropriate)      Problem: Bowel Obstruction (Adult)  Goal: Signs and Symptoms of Listed Potential Problems Will be Absent or Manageable (Bowel Obstruction)  Outcome: Ongoing (interventions implemented as appropriate)

## 2018-01-13 NOTE — PLAN OF CARE
Problem: Patient Care Overview (Adult)  Goal: Plan of Care Review  Outcome: Ongoing (interventions implemented as appropriate)   01/12/18 2041 01/13/18 0548   Coping/Psychosocial Response Interventions   Plan Of Care Reviewed With patient;family --    Outcome Evaluation   Outcome Summary/Follow up Plan --  VSS; NG output 1100 cc; complaining of sore throat - given Cholraseptic spray and ice chips; will continue to monitor;      Goal: Adult Individualization and Mutuality  Outcome: Ongoing (interventions implemented as appropriate)    Goal: Discharge Needs Assessment  Outcome: Ongoing (interventions implemented as appropriate)      Problem: Pain, Acute (Adult)  Goal: Acceptable Pain Control/Comfort Level  Outcome: Ongoing (interventions implemented as appropriate)      Problem: Bowel Obstruction (Adult)  Goal: Signs and Symptoms of Listed Potential Problems Will be Absent or Manageable (Bowel Obstruction)  Outcome: Ongoing (interventions implemented as appropriate)

## 2018-01-14 ENCOUNTER — APPOINTMENT (OUTPATIENT)
Dept: GENERAL RADIOLOGY | Facility: HOSPITAL | Age: 73
End: 2018-01-14

## 2018-01-14 LAB
ANION GAP SERPL CALCULATED.3IONS-SCNC: 14.6 MMOL/L
BASOPHILS # BLD AUTO: 0.01 10*3/MM3 (ref 0–0.2)
BASOPHILS NFR BLD AUTO: 0.1 % (ref 0–1.5)
BUN BLD-MCNC: 11 MG/DL (ref 8–23)
BUN/CREAT SERPL: 16.9 (ref 7–25)
CALCIUM SPEC-SCNC: 7.9 MG/DL (ref 8.6–10.5)
CHLORIDE SERPL-SCNC: 100 MMOL/L (ref 98–107)
CO2 SERPL-SCNC: 24.4 MMOL/L (ref 22–29)
CREAT BLD-MCNC: 0.65 MG/DL (ref 0.57–1)
DEPRECATED RDW RBC AUTO: 54.5 FL (ref 37–54)
EOSINOPHIL # BLD AUTO: 0.08 10*3/MM3 (ref 0–0.7)
EOSINOPHIL NFR BLD AUTO: 1.2 % (ref 0.3–6.2)
ERYTHROCYTE [DISTWIDTH] IN BLOOD BY AUTOMATED COUNT: 16.3 % (ref 11.7–13)
GFR SERPL CREATININE-BSD FRML MDRD: 90 ML/MIN/1.73
GLUCOSE BLD-MCNC: 78 MG/DL (ref 65–99)
HCT VFR BLD AUTO: 28.7 % (ref 35.6–45.5)
HGB BLD-MCNC: 8.9 G/DL (ref 11.9–15.5)
IMM GRANULOCYTES # BLD: 0.03 10*3/MM3 (ref 0–0.03)
IMM GRANULOCYTES NFR BLD: 0.4 % (ref 0–0.5)
LYMPHOCYTES # BLD AUTO: 1.09 10*3/MM3 (ref 0.9–4.8)
LYMPHOCYTES NFR BLD AUTO: 16.2 % (ref 19.6–45.3)
MAGNESIUM SERPL-MCNC: 2 MG/DL (ref 1.6–2.4)
MCH RBC QN AUTO: 28.3 PG (ref 26.9–32)
MCHC RBC AUTO-ENTMCNC: 31 G/DL (ref 32.4–36.3)
MCV RBC AUTO: 91.4 FL (ref 80.5–98.2)
MONOCYTES # BLD AUTO: 0.97 10*3/MM3 (ref 0.2–1.2)
MONOCYTES NFR BLD AUTO: 14.4 % (ref 5–12)
NEUTROPHILS # BLD AUTO: 4.54 10*3/MM3 (ref 1.9–8.1)
NEUTROPHILS NFR BLD AUTO: 67.7 % (ref 42.7–76)
PHOSPHATE SERPL-MCNC: 2.7 MG/DL (ref 2.5–4.5)
PLATELET # BLD AUTO: 236 10*3/MM3 (ref 140–500)
PMV BLD AUTO: 9.9 FL (ref 6–12)
POTASSIUM BLD-SCNC: 3.8 MMOL/L (ref 3.5–5.2)
RBC # BLD AUTO: 3.14 10*6/MM3 (ref 3.9–5.2)
SODIUM BLD-SCNC: 139 MMOL/L (ref 136–145)
WBC NRBC COR # BLD: 6.72 10*3/MM3 (ref 4.5–10.7)

## 2018-01-14 PROCEDURE — 94660 CPAP INITIATION&MGMT: CPT

## 2018-01-14 PROCEDURE — 84100 ASSAY OF PHOSPHORUS: CPT | Performed by: SURGERY

## 2018-01-14 PROCEDURE — 94799 UNLISTED PULMONARY SVC/PX: CPT

## 2018-01-14 PROCEDURE — 80048 BASIC METABOLIC PNL TOTAL CA: CPT | Performed by: SURGERY

## 2018-01-14 PROCEDURE — 83735 ASSAY OF MAGNESIUM: CPT | Performed by: SURGERY

## 2018-01-14 PROCEDURE — 74019 RADEX ABDOMEN 2 VIEWS: CPT

## 2018-01-14 PROCEDURE — 99232 SBSQ HOSP IP/OBS MODERATE 35: CPT | Performed by: SURGERY

## 2018-01-14 PROCEDURE — 25010000002 HYDROMORPHONE PER 4 MG: Performed by: SURGERY

## 2018-01-14 PROCEDURE — 85025 COMPLETE CBC W/AUTO DIFF WBC: CPT | Performed by: SURGERY

## 2018-01-14 PROCEDURE — 25010000002 LEVOFLOXACIN PER 250 MG: Performed by: SURGERY

## 2018-01-14 PROCEDURE — 25010000002 ONDANSETRON PER 1 MG: Performed by: SURGERY

## 2018-01-14 RX ADMIN — BUDESONIDE AND FORMOTEROL FUMARATE DIHYDRATE 2 PUFF: 160; 4.5 AEROSOL RESPIRATORY (INHALATION) at 14:04

## 2018-01-14 RX ADMIN — METRONIDAZOLE 500 MG: 500 INJECTION, SOLUTION INTRAVENOUS at 06:15

## 2018-01-14 RX ADMIN — CLONAZEPAM 0.5 MG: 0.5 TABLET ORAL at 20:42

## 2018-01-14 RX ADMIN — METRONIDAZOLE 500 MG: 500 INJECTION, SOLUTION INTRAVENOUS at 13:53

## 2018-01-14 RX ADMIN — FAMOTIDINE 20 MG: 10 INJECTION INTRAVENOUS at 08:02

## 2018-01-14 RX ADMIN — HYDROMORPHONE HYDROCHLORIDE 0.5 MG: 1 INJECTION, SOLUTION INTRAMUSCULAR; INTRAVENOUS; SUBCUTANEOUS at 00:25

## 2018-01-14 RX ADMIN — HYDROCODONE BITARTRATE AND ACETAMINOPHEN 1 TABLET: 7.5; 325 TABLET ORAL at 20:42

## 2018-01-14 RX ADMIN — MONTELUKAST 10 MG: 10 TABLET, FILM COATED ORAL at 20:42

## 2018-01-14 RX ADMIN — HYDROMORPHONE HYDROCHLORIDE 0.5 MG: 1 INJECTION, SOLUTION INTRAMUSCULAR; INTRAVENOUS; SUBCUTANEOUS at 08:15

## 2018-01-14 RX ADMIN — BUDESONIDE AND FORMOTEROL FUMARATE DIHYDRATE 2 PUFF: 160; 4.5 AEROSOL RESPIRATORY (INHALATION) at 23:27

## 2018-01-14 RX ADMIN — ONDANSETRON 4 MG: 2 INJECTION INTRAMUSCULAR; INTRAVENOUS at 08:40

## 2018-01-14 RX ADMIN — FAMOTIDINE 20 MG: 10 INJECTION INTRAVENOUS at 21:49

## 2018-01-14 RX ADMIN — LEVOFLOXACIN 500 MG: 5 INJECTION, SOLUTION INTRAVENOUS at 17:42

## 2018-01-14 RX ADMIN — METRONIDAZOLE 500 MG: 500 INJECTION, SOLUTION INTRAVENOUS at 20:47

## 2018-01-14 NOTE — PROGRESS NOTES
"Progress Note    Chief Complaint   Patient presents with   • Abdominal Pain   • Nausea   • Vomiting       S: There were no events overnight.  Patient is doing fine. Passing gas and having bowel movements.  Clear brown NG output and minimal abdominal pain    O:/65 (BP Location: Left arm, Patient Position: Sitting)  Pulse 95  Temp 99.9 °F (37.7 °C) (Oral)   Resp 18  Ht 167.6 cm (65.98\")  Wt 99.8 kg (220 lb)  SpO2 97%  BMI 35.51 kg/m2  Body mass index is 35.51 kg/(m^2).    I/O last 3 completed shifts:  In: 1275 [I.V.:975; Other:300]  Out: 1900 [Urine:350; Other:1550]    cc     GENERAL:alert, well appearing, and in no distress and oriented to person, place, and time  HEENT: normochephalic, atraumatic, moist mucus membranes, clear sclera. NG clear brown   CHEST: clear to auscultation, no wheezes, rales or rhonchi, symmetric air entry  CARDIAC: regular rate and rhythm    ABDOMEN: soft, nontender, nondistended, no masses or organomegaly  EXTREMITIES: no cyanosis, clubbing or edema    SKIN: Warm and moist, no rashes      Results from last 7 days  Lab Units 01/14/18  0552   WBC 10*3/mm3 6.72   HEMOGLOBIN g/dL 8.9*   HEMATOCRIT % 28.7*   PLATELETS 10*3/mm3 236       Results from last 7 days  Lab Units 01/14/18  0806 01/13/18  0504 01/11/18  0357 01/09/18  2347   SODIUM mmol/L 139 136 133* 141   POTASSIUM mmol/L 3.8 4.3 4.5 3.9   CHLORIDE mmol/L 100 98 97* 99   CO2 mmol/L 24.4 26.3 23.6 21.8*   BUN mg/dL 11 20 29* 21   CREATININE mg/dL 0.65 0.78 1.97* 1.56*   CALCIUM mg/dL 7.9* 8.3* 8.2* 9.9   BILIRUBIN mg/dL  --   --   --  0.5   ALK PHOS U/L  --   --   --  113   ALT (SGPT) U/L  --   --   --  18   AST (SGOT) U/L  --   --   --  25   GLUCOSE mg/dL 78 94 97 121*       ROS:   Constitutional: denies any weight changes, fatigue or weakness.  Cardiovascular: denies chest pain, palpitations, edemas.  Respiratory: denies cough, sputum, SOB.  Gastrointestinal: denies N&V, abd pain, diarrhea, " constipation.  Genitourinary: denies dysuria, frequency.    DIET: NPO    Axr(1/14/18): Normal bowel gas pattern, NG in place    A/P: 72 y.o. female with colitis followed by partial SBO that is now resolving.  Laboratory results show evidence of anemia that would need to be workup as an outpatient.  For now she should continue conservative management.  Her pain is resolved and she is having bowel function.    - Remove NG tube and continue nothing by mouth for now  - Continue IV fluids  - Continue IV antibiotics      Ritchie Garland MD  General, Minimally Invasive and Endoscopic Surgery  [facility]    [address], [address]  [place], [address]  P: [phone]  F: [phone]

## 2018-01-14 NOTE — PLAN OF CARE
Problem: Patient Care Overview (Adult)  Goal: Plan of Care Review  Outcome: Ongoing (interventions implemented as appropriate)   01/14/18 4371   Coping/Psychosocial Response Interventions   Plan Of Care Reviewed With patient   Patient Care Overview   Progress improving   Outcome Evaluation   Outcome Summary/Follow up Plan VSS. NG tube pulled today. Minimal pain complaints. Receiving IV abx. Diet advanced to clear liquids. Will continue to monitor.     Goal: Adult Individualization and Mutuality  Outcome: Ongoing (interventions implemented as appropriate)      Problem: Pain, Acute (Adult)  Goal: Acceptable Pain Control/Comfort Level  Outcome: Ongoing (interventions implemented as appropriate)      Problem: Bowel Obstruction (Adult)  Goal: Signs and Symptoms of Listed Potential Problems Will be Absent or Manageable (Bowel Obstruction)  Outcome: Ongoing (interventions implemented as appropriate)

## 2018-01-14 NOTE — PLAN OF CARE
Problem: Fall Risk (Adult)  Goal: Absence of Falls  Outcome: Ongoing (interventions implemented as appropriate)      Problem: Patient Care Overview (Adult)  Goal: Plan of Care Review  Outcome: Ongoing (interventions implemented as appropriate)   01/13/18 2030 01/14/18 0517   Coping/Psychosocial Response Interventions   Plan Of Care Reviewed With patient;family --    Patient Care Overview   Progress --  improving   Outcome Evaluation   Outcome Summary/Follow up Plan --  VSS; Pt seems to be improving; some sounds heard in abdomen; Seemed to be in less pain since the NG was pulled back more; will continue to monitor;      Goal: Adult Individualization and Mutuality  Outcome: Ongoing (interventions implemented as appropriate)    Goal: Discharge Needs Assessment  Outcome: Ongoing (interventions implemented as appropriate)      Problem: Pain, Acute (Adult)  Goal: Acceptable Pain Control/Comfort Level  Outcome: Ongoing (interventions implemented as appropriate)      Problem: Bowel Obstruction (Adult)  Goal: Signs and Symptoms of Listed Potential Problems Will be Absent or Manageable (Bowel Obstruction)  Outcome: Ongoing (interventions implemented as appropriate)

## 2018-01-15 LAB
ANION GAP SERPL CALCULATED.3IONS-SCNC: 10.6 MMOL/L
BUN BLD-MCNC: 8 MG/DL (ref 8–23)
BUN/CREAT SERPL: 12.9 (ref 7–25)
CALCIUM SPEC-SCNC: 7.7 MG/DL (ref 8.6–10.5)
CHLORIDE SERPL-SCNC: 99 MMOL/L (ref 98–107)
CO2 SERPL-SCNC: 26.4 MMOL/L (ref 22–29)
CREAT BLD-MCNC: 0.62 MG/DL (ref 0.57–1)
GFR SERPL CREATININE-BSD FRML MDRD: 95 ML/MIN/1.73
GLUCOSE BLD-MCNC: 116 MG/DL (ref 65–99)
POTASSIUM BLD-SCNC: 3 MMOL/L (ref 3.5–5.2)
SODIUM BLD-SCNC: 136 MMOL/L (ref 136–145)

## 2018-01-15 PROCEDURE — 25010000002 HYDROMORPHONE PER 4 MG: Performed by: SURGERY

## 2018-01-15 PROCEDURE — 99232 SBSQ HOSP IP/OBS MODERATE 35: CPT | Performed by: SURGERY

## 2018-01-15 PROCEDURE — 94799 UNLISTED PULMONARY SVC/PX: CPT

## 2018-01-15 PROCEDURE — 25010000002 ONDANSETRON PER 1 MG: Performed by: SURGERY

## 2018-01-15 PROCEDURE — 80048 BASIC METABOLIC PNL TOTAL CA: CPT | Performed by: SURGERY

## 2018-01-15 RX ORDER — METRONIDAZOLE 500 MG/1
500 TABLET ORAL EVERY 8 HOURS SCHEDULED
Status: DISCONTINUED | OUTPATIENT
Start: 2018-01-15 | End: 2018-01-18 | Stop reason: HOSPADM

## 2018-01-15 RX ORDER — POTASSIUM CHLORIDE 7.45 MG/ML
10 INJECTION INTRAVENOUS
Status: DISCONTINUED | OUTPATIENT
Start: 2018-01-15 | End: 2018-01-18 | Stop reason: HOSPADM

## 2018-01-15 RX ORDER — POTASSIUM CHLORIDE 1.5 G/1.77G
40 POWDER, FOR SOLUTION ORAL AS NEEDED
Status: DISCONTINUED | OUTPATIENT
Start: 2018-01-15 | End: 2018-01-18 | Stop reason: HOSPADM

## 2018-01-15 RX ORDER — POTASSIUM CHLORIDE 750 MG/1
40 CAPSULE, EXTENDED RELEASE ORAL AS NEEDED
Status: DISCONTINUED | OUTPATIENT
Start: 2018-01-15 | End: 2018-01-18 | Stop reason: HOSPADM

## 2018-01-15 RX ORDER — LEVOFLOXACIN 500 MG/1
500 TABLET, FILM COATED ORAL EVERY 24 HOURS
Status: DISCONTINUED | OUTPATIENT
Start: 2018-01-15 | End: 2018-01-18 | Stop reason: HOSPADM

## 2018-01-15 RX ADMIN — ONDANSETRON 4 MG: 2 INJECTION INTRAMUSCULAR; INTRAVENOUS at 18:20

## 2018-01-15 RX ADMIN — ACETAMINOPHEN 650 MG: 325 TABLET ORAL at 15:47

## 2018-01-15 RX ADMIN — ACETAMINOPHEN 650 MG: 325 TABLET ORAL at 05:14

## 2018-01-15 RX ADMIN — METRONIDAZOLE 500 MG: 500 TABLET, FILM COATED ORAL at 21:09

## 2018-01-15 RX ADMIN — HYDROCHLOROTHIAZIDE 25 MG: 25 TABLET ORAL at 08:49

## 2018-01-15 RX ADMIN — LISINOPRIL 20 MG: 20 TABLET ORAL at 08:49

## 2018-01-15 RX ADMIN — METRONIDAZOLE 500 MG: 500 INJECTION, SOLUTION INTRAVENOUS at 05:14

## 2018-01-15 RX ADMIN — LEVOFLOXACIN 500 MG: 500 TABLET, FILM COATED ORAL at 14:41

## 2018-01-15 RX ADMIN — METRONIDAZOLE 500 MG: 500 TABLET, FILM COATED ORAL at 14:41

## 2018-01-15 RX ADMIN — ALBUTEROL SULFATE 2.5 MG: 2.5 SOLUTION RESPIRATORY (INHALATION) at 17:15

## 2018-01-15 RX ADMIN — MONTELUKAST 10 MG: 10 TABLET, FILM COATED ORAL at 21:09

## 2018-01-15 RX ADMIN — FAMOTIDINE 20 MG: 10 INJECTION INTRAVENOUS at 21:09

## 2018-01-15 RX ADMIN — LEVOTHYROXINE SODIUM 88 MCG: 88 TABLET ORAL at 05:14

## 2018-01-15 RX ADMIN — HYDROMORPHONE HYDROCHLORIDE 0.5 MG: 1 INJECTION, SOLUTION INTRAMUSCULAR; INTRAVENOUS; SUBCUTANEOUS at 21:16

## 2018-01-15 RX ADMIN — FAMOTIDINE 20 MG: 10 INJECTION INTRAVENOUS at 11:03

## 2018-01-15 RX ADMIN — BUDESONIDE AND FORMOTEROL FUMARATE DIHYDRATE 2 PUFF: 160; 4.5 AEROSOL RESPIRATORY (INHALATION) at 09:39

## 2018-01-15 RX ADMIN — POTASSIUM CHLORIDE 40 MEQ: 750 CAPSULE, EXTENDED RELEASE ORAL at 15:57

## 2018-01-15 RX ADMIN — BUDESONIDE AND FORMOTEROL FUMARATE DIHYDRATE 2 PUFF: 160; 4.5 AEROSOL RESPIRATORY (INHALATION) at 20:06

## 2018-01-15 RX ADMIN — POTASSIUM CHLORIDE 40 MEQ: 750 CAPSULE, EXTENDED RELEASE ORAL at 09:48

## 2018-01-15 RX ADMIN — CLONAZEPAM 0.5 MG: 0.5 TABLET ORAL at 21:09

## 2018-01-15 RX ADMIN — ACETAMINOPHEN 650 MG: 325 TABLET ORAL at 11:03

## 2018-01-15 NOTE — PROGRESS NOTES
Continued Stay Note  [facility]     Patient Name: Maria Ines Zhang  MRN: [id number]  Today's Date: 1/15/2018    Admit Date: 1/10/2018          Discharge Plan       01/15/18 1134    Case Management/Social Work Plan    Plan Home    Patient/Family In Agreement With Plan yes    Additional Comments Met at bedside with pt to introduce self and role here on unit. Per pt she may need home health or a bedside commode but unable to determine at this time. CCP will follow......[facility]              Discharge Codes     None        Expected Discharge Date and Time     Expected Discharge Date Expected Discharge Time    Jan 16, 2018             Yeny Wray RN

## 2018-01-15 NOTE — PLAN OF CARE
Problem: Patient Care Overview (Adult)  Goal: Plan of Care Review  Outcome: Ongoing (interventions implemented as appropriate)   01/14/18 1647 01/15/18 0726   Coping/Psychosocial Response Interventions   Plan Of Care Reviewed With --  patient   Patient Care Overview   Progress improving --    Outcome Evaluation   Outcome Summary/Follow up Plan --  VSS. C/o pain, relieved with medications. Tolerating clear liquid diet.  Pt had an episode of diarrhea last night. K+ 3.0 this am, doctor notified, protocol ordered.  On 3L, tried to sleep with home BiPAP last night but switched back to NC.        Goal: Adult Individualization and Mutuality  Outcome: Ongoing (interventions implemented as appropriate)    Goal: Discharge Needs Assessment  Outcome: Ongoing (interventions implemented as appropriate)      Problem: Pain, Acute (Adult)  Goal: Acceptable Pain Control/Comfort Level  Outcome: Ongoing (interventions implemented as appropriate)      Problem: Bowel Obstruction (Adult)  Goal: Signs and Symptoms of Listed Potential Problems Will be Absent or Manageable (Bowel Obstruction)  Outcome: Ongoing (interventions implemented as appropriate)

## 2018-01-15 NOTE — PROGRESS NOTES
"Chief Complaint   Patient presents with   • Abdominal Pain   • Nausea   • Vomiting       S: There were no events overnight.  Patient is doing fine. The patient is passing gas and is having liquid bowel movements. Minimal abdominal pain. No nausea     Diet:   Diet Order   Procedures   • Diet Full Liquid       I/O last 3 completed shifts:  In: 90 [Other:90]  Out: 650 [Other:650]       O:/59 (BP Location: Left arm)  Pulse 88  Temp 98.8 °F (37.1 °C) (Oral)   Resp 16  Ht 167.6 cm (65.98\")  Wt 99.8 kg (220 lb)  SpO2 98%  BMI 35.51 kg/m2  Body mass index is 35.51 kg/(m^2).  GENERAL:alert, well appearing, and in no distress and oriented to person, place, and time  HEENT: normochephalic, atraumatic, moist mucus membranes, clear sclera    CHEST: clear to auscultation, no wheezes, rales or rhonchi, symmetric air entry  CARDIAC: regular rate and rhythm    ABDOMEN: soft, obese, slightly tender at epigastrium, nondistended, no masses or organomegaly. No hernias  EXTREMITIES: no cyanosis, clubbing or edema   SKIN: Warm and moist, no rashes       Results from last 7 days  Lab Units 01/14/18  0552   WBC 10*3/mm3 6.72   HEMOGLOBIN g/dL 8.9*   HEMATOCRIT % 28.7*   PLATELETS 10*3/mm3 236       Results from last 7 days  Lab Units 01/15/18  0542 01/14/18  0806 01/13/18  0504  01/09/18  2347   SODIUM mmol/L 136 139 136  < > 141   POTASSIUM mmol/L 3.0* 3.8 4.3  < > 3.9   CHLORIDE mmol/L 99 100 98  < > 99   CO2 mmol/L 26.4 24.4 26.3  < > 21.8*   BUN mg/dL 8 11 20  < > 21   CREATININE mg/dL 0.62 0.65 0.78  < > 1.56*   CALCIUM mg/dL 7.7* 7.9* 8.3*  < > 9.9   BILIRUBIN mg/dL  --   --   --   --  0.5   ALK PHOS U/L  --   --   --   --  113   ALT (SGPT) U/L  --   --   --   --  18   AST (SGOT) U/L  --   --   --   --  25   GLUCOSE mg/dL 116* 78 94  < > 121*   < > = values in this interval not displayed.    ROS:   Denies any nausea or vomiting  Denies fevers or chills  Denies chest pain or palpitations  Denies SOB and coughing    A/P: 72 " y.o. female with partial bowel obstruction that is resolving. Also had colitis and has been having diarrhea that I think is more post obstructive than due to colitis    - I will advance diet at this time. Diet: FLD  - Encourage ambulation  - Switch to PO ATx  - IVF  - SCDs  - DVT prophylaxis      Ritchie Garland MD  General, Minimally Invasive and Endoscopic Surgery  [facility]    [address], [address]  [place], [place], [address]  P: [phone]  F: [phone]

## 2018-01-15 NOTE — PLAN OF CARE
Problem: Fall Risk (Adult)  Goal: Absence of Falls  Outcome: Ongoing (interventions implemented as appropriate)      Problem: Patient Care Overview (Adult)  Goal: Plan of Care Review  Outcome: Ongoing (interventions implemented as appropriate)   01/15/18 1554   Coping/Psychosocial Response Interventions   Plan Of Care Reviewed With patient   Outcome Evaluation   Outcome Summary/Follow up Plan vss. safety maintained. abd pain controlled with Tylenol. diet advanced to full liquid. replacing potassium.      Goal: Adult Individualization and Mutuality  Outcome: Ongoing (interventions implemented as appropriate)    Goal: Discharge Needs Assessment  Outcome: Ongoing (interventions implemented as appropriate)      Problem: Pain, Acute (Adult)  Goal: Acceptable Pain Control/Comfort Level  Outcome: Ongoing (interventions implemented as appropriate)      Problem: Bowel Obstruction (Adult)  Goal: Signs and Symptoms of Listed Potential Problems Will be Absent or Manageable (Bowel Obstruction)  Outcome: Ongoing (interventions implemented as appropriate)

## 2018-01-16 LAB
ANION GAP SERPL CALCULATED.3IONS-SCNC: 8.7 MMOL/L
BUN BLD-MCNC: 4 MG/DL (ref 8–23)
BUN/CREAT SERPL: 5.7 (ref 7–25)
CALCIUM SPEC-SCNC: 8.1 MG/DL (ref 8.6–10.5)
CHLORIDE SERPL-SCNC: 97 MMOL/L (ref 98–107)
CO2 SERPL-SCNC: 27.3 MMOL/L (ref 22–29)
CREAT BLD-MCNC: 0.7 MG/DL (ref 0.57–1)
GFR SERPL CREATININE-BSD FRML MDRD: 83 ML/MIN/1.73
GLUCOSE BLD-MCNC: 118 MG/DL (ref 65–99)
POTASSIUM BLD-SCNC: 3.7 MMOL/L (ref 3.5–5.2)
SODIUM BLD-SCNC: 133 MMOL/L (ref 136–145)

## 2018-01-16 PROCEDURE — 80048 BASIC METABOLIC PNL TOTAL CA: CPT | Performed by: SURGERY

## 2018-01-16 PROCEDURE — 94799 UNLISTED PULMONARY SVC/PX: CPT

## 2018-01-16 PROCEDURE — 99232 SBSQ HOSP IP/OBS MODERATE 35: CPT | Performed by: SURGERY

## 2018-01-16 RX ADMIN — HYDROCHLOROTHIAZIDE 25 MG: 25 TABLET ORAL at 10:57

## 2018-01-16 RX ADMIN — LISINOPRIL 20 MG: 20 TABLET ORAL at 10:57

## 2018-01-16 RX ADMIN — MONTELUKAST 10 MG: 10 TABLET, FILM COATED ORAL at 21:18

## 2018-01-16 RX ADMIN — LEVOFLOXACIN 500 MG: 500 TABLET, FILM COATED ORAL at 13:21

## 2018-01-16 RX ADMIN — FAMOTIDINE 20 MG: 10 INJECTION INTRAVENOUS at 10:57

## 2018-01-16 RX ADMIN — METRONIDAZOLE 500 MG: 500 TABLET, FILM COATED ORAL at 13:21

## 2018-01-16 RX ADMIN — FAMOTIDINE 20 MG: 10 INJECTION INTRAVENOUS at 21:19

## 2018-01-16 RX ADMIN — LEVOTHYROXINE SODIUM 88 MCG: 88 TABLET ORAL at 06:19

## 2018-01-16 RX ADMIN — METRONIDAZOLE 500 MG: 500 TABLET, FILM COATED ORAL at 21:18

## 2018-01-16 RX ADMIN — BUDESONIDE AND FORMOTEROL FUMARATE DIHYDRATE 2 PUFF: 160; 4.5 AEROSOL RESPIRATORY (INHALATION) at 20:57

## 2018-01-16 RX ADMIN — METRONIDAZOLE 500 MG: 500 TABLET, FILM COATED ORAL at 06:19

## 2018-01-16 RX ADMIN — BUDESONIDE AND FORMOTEROL FUMARATE DIHYDRATE 2 PUFF: 160; 4.5 AEROSOL RESPIRATORY (INHALATION) at 09:12

## 2018-01-16 RX ADMIN — CLONAZEPAM 0.5 MG: 0.5 TABLET ORAL at 21:19

## 2018-01-16 NOTE — PLAN OF CARE
Problem: Fall Risk (Adult)  Goal: Absence of Falls  Outcome: Ongoing (interventions implemented as appropriate)      Problem: Patient Care Overview (Adult)  Goal: Plan of Care Review  Outcome: Ongoing (interventions implemented as appropriate)   01/16/18 1550   Coping/Psychosocial Response Interventions   Plan Of Care Reviewed With patient   Outcome Evaluation   Outcome Summary/Follow up Plan VSS. SAFETY MAINTAINED. STATES SHE FEELS BETTER TODAY. AMB IN HALLS WITH FAMILY. IRA DIET WELL.     Goal: Adult Individualization and Mutuality  Outcome: Ongoing (interventions implemented as appropriate)    Goal: Discharge Needs Assessment  Outcome: Ongoing (interventions implemented as appropriate)      Problem: Pain, Acute (Adult)  Goal: Acceptable Pain Control/Comfort Level  Outcome: Ongoing (interventions implemented as appropriate)      Problem: Bowel Obstruction (Adult)  Goal: Signs and Symptoms of Listed Potential Problems Will be Absent or Manageable (Bowel Obstruction)  Outcome: Ongoing (interventions implemented as appropriate)

## 2018-01-16 NOTE — PLAN OF CARE
Problem: Fall Risk (Adult)  Goal: Absence of Falls  Outcome: Ongoing (interventions implemented as appropriate)      Problem: Patient Care Overview (Adult)  Goal: Plan of Care Review  Outcome: Ongoing (interventions implemented as appropriate)   01/16/18 0356   Coping/Psychosocial Response Interventions   Plan Of Care Reviewed With patient   Patient Care Overview   Progress no change   Outcome Evaluation   Outcome Summary/Follow up Plan VSS; pt recv'd dilaudid x 1; tolerating full liquid diet; no c/o nausea; no s/s of distress        Problem: Pain, Acute (Adult)  Goal: Acceptable Pain Control/Comfort Level  Outcome: Ongoing (interventions implemented as appropriate)      Problem: Bowel Obstruction (Adult)  Goal: Signs and Symptoms of Listed Potential Problems Will be Absent or Manageable (Bowel Obstruction)  Outcome: Ongoing (interventions implemented as appropriate)

## 2018-01-16 NOTE — PROGRESS NOTES
"Chief Complaint   Patient presents with   • Abdominal Pain   • Nausea   • Vomiting       S: There were no events overnight.  Patient is doing fine. The patient is passing gas and is having bowel movements. Has several liquid bowel movements yesterday.  Today she had only 3.  Complaints of left flank pain that was worse overnight and he feels better today.  A to full liquid diet and passing gas    Diet:   Full liquid diet    O:/72 (BP Location: Right arm, Patient Position: Sitting)  Pulse 105  Temp 98 °F (36.7 °C) (Oral)   Resp 16  Ht 167.6 cm (65.98\")  Wt 99.8 kg (220 lb)  SpO2 100%  BMI 35.51 kg/m2  Body mass index is 35.51 kg/(m^2).   GENERAL:alert, well appearing, and in no distress and oriented to person, place, and time  HEENT: normochephalic, atraumatic, moist mucus membranes, clear sclera    CHEST: clear to auscultation, no wheezes, rales or rhonchi, symmetric air entry  CARDIAC: regular rate and rhythm    ABDOMEN: soft, nontender, nondistended, no masses or organomegaly.  Tender over the left flank.  No rebound or guarding, no peritoneal signs  EXTREMITIES: no cyanosis, clubbing or edema    SKIN: Warm and moist, no rashes       Results from last 7 days  Lab Units 01/14/18  0552   WBC 10*3/mm3 6.72   HEMOGLOBIN g/dL 8.9*   HEMATOCRIT % 28.7*   PLATELETS 10*3/mm3 236       Results from last 7 days  Lab Units 01/16/18  0741 01/15/18  0542 01/14/18  0806  01/09/18  2347   SODIUM mmol/L 133* 136 139  < > 141   POTASSIUM mmol/L 3.7 3.0* 3.8  < > 3.9   CHLORIDE mmol/L 97* 99 100  < > 99   CO2 mmol/L 27.3 26.4 24.4  < > 21.8*   BUN mg/dL 4* 8 11  < > 21   CREATININE mg/dL 0.70 0.62 0.65  < > 1.56*   CALCIUM mg/dL 8.1* 7.7* 7.9*  < > 9.9   BILIRUBIN mg/dL  --   --   --   --  0.5   ALK PHOS U/L  --   --   --   --  113   ALT (SGPT) U/L  --   --   --   --  18   AST (SGOT) U/L  --   --   --   --  25   GLUCOSE mg/dL 118* 116* 78  < > 121*   < > = values in this interval not displayed.    ROS:   Denies any " nausea or vomiting  Denies fevers or chills  Denies chest pain or palpitations  Denies SOB and coughing    A/P: 72 y.o. female with partial bowel obstruction that is resolving.  She also had findings on CT scan concerning for ischemic colitis.  He has been having diarrhea that is seems to be better controlled.  She developed now pain over the left flank left upper quadrant that is the area where she had colitis.  She does not have any other concerning signs.  Her laboratory results are unremarkable other than mild dehydration    - I will advance diet at this time. Diet: Low fat diet  - Encourage ambulation  - SCDs  - DVT prophylaxis    Ritchie Garland MD  General, Minimally Invasive and Endoscopic Surgery  [facility]    [address], [address]  [place], [place], [address]  P: [phone]  F: [phone]

## 2018-01-17 PROCEDURE — 94799 UNLISTED PULMONARY SVC/PX: CPT

## 2018-01-17 PROCEDURE — 25010000002 ONDANSETRON PER 1 MG: Performed by: SURGERY

## 2018-01-17 PROCEDURE — 99232 SBSQ HOSP IP/OBS MODERATE 35: CPT | Performed by: SURGERY

## 2018-01-17 RX ORDER — CALCIUM POLYCARBOPHIL 625 MG 625 MG/1
1250 TABLET ORAL DAILY
Status: DISCONTINUED | OUTPATIENT
Start: 2018-01-17 | End: 2018-01-18 | Stop reason: HOSPADM

## 2018-01-17 RX ORDER — LOPERAMIDE HYDROCHLORIDE 2 MG/1
2 CAPSULE ORAL ONCE
Status: COMPLETED | OUTPATIENT
Start: 2018-01-17 | End: 2018-01-17

## 2018-01-17 RX ADMIN — METRONIDAZOLE 500 MG: 500 TABLET, FILM COATED ORAL at 13:11

## 2018-01-17 RX ADMIN — LEVOFLOXACIN 500 MG: 500 TABLET, FILM COATED ORAL at 12:00

## 2018-01-17 RX ADMIN — METRONIDAZOLE 500 MG: 500 TABLET, FILM COATED ORAL at 07:01

## 2018-01-17 RX ADMIN — MONTELUKAST 10 MG: 10 TABLET, FILM COATED ORAL at 21:44

## 2018-01-17 RX ADMIN — HYDROCHLOROTHIAZIDE 25 MG: 25 TABLET ORAL at 08:52

## 2018-01-17 RX ADMIN — BUDESONIDE AND FORMOTEROL FUMARATE DIHYDRATE 2 PUFF: 160; 4.5 AEROSOL RESPIRATORY (INHALATION) at 21:49

## 2018-01-17 RX ADMIN — BUDESONIDE AND FORMOTEROL FUMARATE DIHYDRATE 2 PUFF: 160; 4.5 AEROSOL RESPIRATORY (INHALATION) at 09:54

## 2018-01-17 RX ADMIN — ONDANSETRON 4 MG: 2 INJECTION INTRAMUSCULAR; INTRAVENOUS at 00:29

## 2018-01-17 RX ADMIN — CLONAZEPAM 0.5 MG: 0.5 TABLET ORAL at 21:44

## 2018-01-17 RX ADMIN — LOPERAMIDE HYDROCHLORIDE 2 MG: 2 CAPSULE ORAL at 10:15

## 2018-01-17 RX ADMIN — ONDANSETRON 4 MG: 2 INJECTION INTRAMUSCULAR; INTRAVENOUS at 21:51

## 2018-01-17 RX ADMIN — METRONIDAZOLE 500 MG: 500 TABLET, FILM COATED ORAL at 21:44

## 2018-01-17 RX ADMIN — CALCIUM POLYCARBOPHIL 1250 MG: 625 TABLET, FILM COATED ORAL at 10:16

## 2018-01-17 RX ADMIN — FAMOTIDINE 20 MG: 10 INJECTION INTRAVENOUS at 08:53

## 2018-01-17 RX ADMIN — LISINOPRIL 20 MG: 20 TABLET ORAL at 08:52

## 2018-01-17 RX ADMIN — LEVOTHYROXINE SODIUM 88 MCG: 88 TABLET ORAL at 07:01

## 2018-01-17 NOTE — PLAN OF CARE
Problem: Fall Risk (Adult)  Goal: Absence of Falls  Outcome: Ongoing (interventions implemented as appropriate)      Problem: Patient Care Overview (Adult)  Goal: Plan of Care Review  Outcome: Ongoing (interventions implemented as appropriate)   01/17/18 3462   Coping/Psychosocial Response Interventions   Plan Of Care Reviewed With patient;spouse   Patient Care Overview   Progress improving   Outcome Evaluation   Outcome Summary/Follow up Plan tolerating diet, imodium ordered for diarrhea, patient to be discharged later this evening or tomorrow am, vss, will continue to monitor      Goal: Adult Individualization and Mutuality  Outcome: Ongoing (interventions implemented as appropriate)    Goal: Discharge Needs Assessment  Outcome: Ongoing (interventions implemented as appropriate)      Problem: Pain, Acute (Adult)  Goal: Acceptable Pain Control/Comfort Level  Outcome: Ongoing (interventions implemented as appropriate)      Problem: Bowel Obstruction (Adult)  Goal: Signs and Symptoms of Listed Potential Problems Will be Absent or Manageable (Bowel Obstruction)  Outcome: Ongoing (interventions implemented as appropriate)

## 2018-01-17 NOTE — PROGRESS NOTES
Continued Stay Note  [facility]     Patient Name: Maria Ines Zhang  MRN: [id number]  Today's Date: 1/17/2018    Admit Date: 1/10/2018          Discharge Plan       01/17/18 1153    Case Management/Social Work Plan    Plan Home    Patient/Family In Agreement With Plan yes    Additional Comments Met at bedside wtih pt and family. They do not feel pt will need home health. She has been up ambulating in all and room with out assist. Plan remains home. Pt does have nocturnal O2 and CPAP from [employer]. CCP will follow.....dr              Discharge Codes     None        Expected Discharge Date and Time     Expected Discharge Date Expected Discharge Time    Jan 16, 2018             Yeny Wray RN

## 2018-01-17 NOTE — PROGRESS NOTES
"Progress Note    Chief Complaint   Patient presents with   • Abdominal Pain   • Nausea   • Vomiting       S: There were no events overnight.  Patient is doing fine. She feels that the food doesn't stay very good.  She denies any nausea or vomiting.  Reports having approximately 10 loose bowel movements.  Has been passing gas and tolerating diet.  On and off pain at the left flank area.  No dysuria or hematuria    O:/54 (BP Location: Left arm, Patient Position: Lying)  Pulse 92  Temp 98.1 °F (36.7 °C) (Oral)   Resp 16  Ht 167.6 cm (65.98\")  Wt 99.8 kg (220 lb)  SpO2 91%  BMI 35.51 kg/m2  Body mass index is 35.51 kg/(m^2).       I/O this shift:  In: 600 [P.O.:600]  Out: -     GENERAL:oriented to person, place, and time and chronically ill appearing  HEENT: normochephalic, atraumatic, moist mucus membranes, clear sclera   CHEST: clear to auscultation, no wheezes, rales or rhonchi, symmetric air entry  CARDIAC: regular rate and rhythm    ABDOMEN: soft,  nondistended, no masses or organomegaly.  Mildly tenderness noted in the left upper quadrant.  There is no rebound or guarding and there is no peritoneal signs  EXTREMITIES: no cyanosis, clubbing or edema    SKIN: Warm and moist, no rashes    ROS:   Constitutional: denies any weight changes, fatigue or weakness.  Cardiovascular: denies chest pain, palpitations, edemas.  Respiratory: denies cough, sputum, SOB.  Gastrointestinal: denies N&V, abd pain, constipation.  Genitourinary: denies dysuria, frequency.    DIET: Regular Low fat    A/P: 72 y.o. female with partial bowel obstruction that is resolved.  She also had findings on CT scan concerning for ischemic colitis and continues to have diarrhea but reports her stools have been getting slightly thicker.  Her pain in the left flank area is episodic and gets better after bowel movements.      - Continue Low fat diet  - Loperamide once   - we'll order stools studies tomorrow if diarrhea continues.  At this time " there is no evidence of infection  - Encourage ambulation  - by mouth levofloxacin and Flagyl  - SCDs  - DVT prophylaxis       Ritchie Garland MD  General, Minimally Invasive and Endoscopic Surgery  [facility]    [address], [address]  [place], [place], [address]  P: [phone]  F: [phone]

## 2018-01-17 NOTE — PLAN OF CARE
Problem: Patient Care Overview (Adult)  Goal: Plan of Care Review  Outcome: Ongoing (interventions implemented as appropriate)   01/17/18 0526   Coping/Psychosocial Response Interventions   Plan Of Care Reviewed With patient   Patient Care Overview   Progress improving   Outcome Evaluation   Outcome Summary/Follow up Plan VSS. Pt feeling better. Tolerating low fat diet though did feel nauseated after getting PM medication. Possible d/c in am. Will continue to monitor.     Goal: Adult Individualization and Mutuality  Outcome: Ongoing (interventions implemented as appropriate)    Goal: Discharge Needs Assessment  Outcome: Ongoing (interventions implemented as appropriate)      Problem: Pain, Acute (Adult)  Goal: Acceptable Pain Control/Comfort Level  Outcome: Ongoing (interventions implemented as appropriate)      Problem: Bowel Obstruction (Adult)  Goal: Signs and Symptoms of Listed Potential Problems Will be Absent or Manageable (Bowel Obstruction)  Outcome: Ongoing (interventions implemented as appropriate)

## 2018-01-18 ENCOUNTER — TELEPHONE (OUTPATIENT)
Dept: INTERNAL MEDICINE | Facility: CLINIC | Age: 73
End: 2018-01-18

## 2018-01-18 VITALS
WEIGHT: 220 LBS | HEART RATE: 83 BPM | SYSTOLIC BLOOD PRESSURE: 128 MMHG | OXYGEN SATURATION: 94 % | BODY MASS INDEX: 35.36 KG/M2 | DIASTOLIC BLOOD PRESSURE: 55 MMHG | HEIGHT: 66 IN | TEMPERATURE: 97.9 F | RESPIRATION RATE: 20 BRPM

## 2018-01-18 PROBLEM — K52.9 COLITIS: Status: RESOLVED | Noted: 2018-01-10 | Resolved: 2018-01-18

## 2018-01-18 LAB
ANION GAP SERPL CALCULATED.3IONS-SCNC: 15.8 MMOL/L
BASOPHILS # BLD AUTO: 0.02 10*3/MM3 (ref 0–0.2)
BASOPHILS NFR BLD AUTO: 0.3 % (ref 0–1.5)
BUN BLD-MCNC: 3 MG/DL (ref 8–23)
BUN/CREAT SERPL: 4.7 (ref 7–25)
CALCIUM SPEC-SCNC: 8.4 MG/DL (ref 8.6–10.5)
CHLORIDE SERPL-SCNC: 99 MMOL/L (ref 98–107)
CO2 SERPL-SCNC: 24.2 MMOL/L (ref 22–29)
CREAT BLD-MCNC: 0.64 MG/DL (ref 0.57–1)
DEPRECATED RDW RBC AUTO: 54.4 FL (ref 37–54)
EOSINOPHIL # BLD AUTO: 0.06 10*3/MM3 (ref 0–0.7)
EOSINOPHIL NFR BLD AUTO: 0.9 % (ref 0.3–6.2)
ERYTHROCYTE [DISTWIDTH] IN BLOOD BY AUTOMATED COUNT: 16.6 % (ref 11.7–13)
GFR SERPL CREATININE-BSD FRML MDRD: 92 ML/MIN/1.73
GLUCOSE BLD-MCNC: 89 MG/DL (ref 65–99)
HCT VFR BLD AUTO: 30.2 % (ref 35.6–45.5)
HGB BLD-MCNC: 9.4 G/DL (ref 11.9–15.5)
IMM GRANULOCYTES # BLD: 0.06 10*3/MM3 (ref 0–0.03)
IMM GRANULOCYTES NFR BLD: 0.9 % (ref 0–0.5)
LYMPHOCYTES # BLD AUTO: 2.02 10*3/MM3 (ref 0.9–4.8)
LYMPHOCYTES NFR BLD AUTO: 29.4 % (ref 19.6–45.3)
MCH RBC QN AUTO: 27.8 PG (ref 26.9–32)
MCHC RBC AUTO-ENTMCNC: 31.1 G/DL (ref 32.4–36.3)
MCV RBC AUTO: 89.3 FL (ref 80.5–98.2)
MONOCYTES # BLD AUTO: 0.66 10*3/MM3 (ref 0.2–1.2)
MONOCYTES NFR BLD AUTO: 9.6 % (ref 5–12)
NEUTROPHILS # BLD AUTO: 4.05 10*3/MM3 (ref 1.9–8.1)
NEUTROPHILS NFR BLD AUTO: 58.9 % (ref 42.7–76)
NRBC BLD MANUAL-RTO: 0 /100 WBC (ref 0–0)
PLATELET # BLD AUTO: 475 10*3/MM3 (ref 140–500)
PMV BLD AUTO: 9.7 FL (ref 6–12)
POTASSIUM BLD-SCNC: 3.3 MMOL/L (ref 3.5–5.2)
RBC # BLD AUTO: 3.38 10*6/MM3 (ref 3.9–5.2)
SODIUM BLD-SCNC: 139 MMOL/L (ref 136–145)
WBC NRBC COR # BLD: 6.87 10*3/MM3 (ref 4.5–10.7)

## 2018-01-18 PROCEDURE — 85025 COMPLETE CBC W/AUTO DIFF WBC: CPT | Performed by: SURGERY

## 2018-01-18 PROCEDURE — 80048 BASIC METABOLIC PNL TOTAL CA: CPT | Performed by: SURGERY

## 2018-01-18 PROCEDURE — 99238 HOSP IP/OBS DSCHRG MGMT 30/<: CPT | Performed by: SURGERY

## 2018-01-18 PROCEDURE — 94799 UNLISTED PULMONARY SVC/PX: CPT

## 2018-01-18 RX ORDER — TRAMADOL HYDROCHLORIDE 50 MG/1
50 TABLET ORAL EVERY 8 HOURS PRN
Qty: 20 TABLET | Refills: 0 | Status: SHIPPED | OUTPATIENT
Start: 2018-01-18 | End: 2018-01-28

## 2018-01-18 RX ORDER — LEVOFLOXACIN 500 MG/1
500 TABLET, FILM COATED ORAL EVERY 24 HOURS
Qty: 7 TABLET | Refills: 0 | Status: SHIPPED | OUTPATIENT
Start: 2018-01-18 | End: 2018-01-25

## 2018-01-18 RX ORDER — METRONIDAZOLE 500 MG/1
500 TABLET ORAL EVERY 8 HOURS SCHEDULED
Qty: 21 TABLET | Refills: 0 | Status: SHIPPED | OUTPATIENT
Start: 2018-01-18 | End: 2018-01-25

## 2018-01-18 RX ORDER — ONDANSETRON 4 MG/1
4 TABLET, FILM COATED ORAL EVERY 8 HOURS PRN
Qty: 20 TABLET | Refills: 1 | Status: SHIPPED | OUTPATIENT
Start: 2018-01-18 | End: 2018-02-01

## 2018-01-18 RX ADMIN — METRONIDAZOLE 500 MG: 500 TABLET, FILM COATED ORAL at 06:41

## 2018-01-18 RX ADMIN — BUDESONIDE AND FORMOTEROL FUMARATE DIHYDRATE 2 PUFF: 160; 4.5 AEROSOL RESPIRATORY (INHALATION) at 07:42

## 2018-01-18 RX ADMIN — POTASSIUM CHLORIDE 40 MEQ: 750 CAPSULE, EXTENDED RELEASE ORAL at 08:00

## 2018-01-18 RX ADMIN — HYDROCHLOROTHIAZIDE 25 MG: 25 TABLET ORAL at 08:00

## 2018-01-18 RX ADMIN — LEVOTHYROXINE SODIUM 88 MCG: 88 TABLET ORAL at 06:41

## 2018-01-18 RX ADMIN — POTASSIUM CHLORIDE 40 MEQ: 750 CAPSULE, EXTENDED RELEASE ORAL at 11:46

## 2018-01-18 RX ADMIN — LISINOPRIL 20 MG: 20 TABLET ORAL at 08:00

## 2018-01-18 RX ADMIN — CALCIUM POLYCARBOPHIL 1250 MG: 625 TABLET, FILM COATED ORAL at 08:00

## 2018-01-18 NOTE — PLAN OF CARE
Problem: Patient Care Overview (Adult)  Goal: Plan of Care Review  Outcome: Ongoing (interventions implemented as appropriate)   01/17/18 1649 01/18/18 0611   Coping/Psychosocial Response Interventions   Plan Of Care Reviewed With --  patient   Patient Care Overview   Progress improving --    Outcome Evaluation   Outcome Summary/Follow up Plan --  VSS. Pt still c/o loose stools but states that they're becoming less frequent and more formed. Intermittent nausea. No acute changes during shift. Possible d/c today. Will continue to monitor.     Goal: Adult Individualization and Mutuality  Outcome: Ongoing (interventions implemented as appropriate)    Goal: Discharge Needs Assessment  Outcome: Ongoing (interventions implemented as appropriate)      Problem: Pain, Acute (Adult)  Goal: Acceptable Pain Control/Comfort Level  Outcome: Ongoing (interventions implemented as appropriate)      Problem: Bowel Obstruction (Adult)  Goal: Signs and Symptoms of Listed Potential Problems Will be Absent or Manageable (Bowel Obstruction)  Outcome: Ongoing (interventions implemented as appropriate)

## 2018-01-18 NOTE — DISCHARGE SUMMARY
Discharge Summary    Patient name: Maria Ines Zhang    Medical record number: [id number]    Admission date: 1/10/2018  Discharge date: 1/18/18     Attending physician: Ritchie Garland MD    Primary care physician: Jeanine Blunt MD    Consulting physician(s): None    Condition on discharge: improving    Admitting diagnosis:   Patient Active Problem List   Diagnosis   • Essential hypertension   • HLD (hyperlipidemia)   • Acquired hypothyroidism   • Depression   • Bronchiolitis obliterans organizing pneumonia   • Primary osteoarthritis of right knee   • Shoulder, capsulitis, adhesive   • Laceration of right lower extremity   • Hyperglycemia   • Biceps tendinitis   • Subacromial bursitis, right   • Acute blood loss anemia   • Anxiety   • Gastroesophageal reflux disease   • Osteoarthritis of lumbar spine   • Pulmonary embolism   • Inflammation of sacroiliac joint   • Sleep apnea   • Spondylolisthesis   • Venous stasis   • Cryptogenic stroke   • Drug-induced constipation   • IFG (impaired fasting glucose)   • Chronic bilateral low back pain with sciatica   • Asthma   • Obesity   • Renal artery aneurysm       Final diagnosis:   - Colitis  - Partial small bowel obstruction    Procedures:   - None    History of present illness: The patient is a very pleasant 72 y.o. female that was admitted to the hospital with abdominal pain, nausea, vomiting and diarrhea.  She was diagnosed with colitis at the level of the splenic flexure.  She was admitted for further treatment    Hospital course: The patient was admitted to the hospital by Dr. Rosales and was placed on IV fluids and antibiotics.  Her nausea and diarrhea improved on day 1 and she was placed on a regular diet.  On day 2 she developed worsening abdominal pain, nausea and diarrhea.  She was placed again nothing by mouth and a CT scan of the abdomen and pelvis was performed that showed findings concerning of partial small bowel obstruction.  Colitis of the level of the  splenic flexure improved at the time but she continued to have some thickening at the level of the transverse colon.  Patient and family requested transition of care to myself (Dr. Garland).  This was done and a nasogastric tube was placed for decompression.  He was placed back on IV fluids and continue antibiotics.  She had a large amount of bilious drainage.  On day 3 her abdominal pain improved.  Her Nasogastric tube was removed on day 4.  She was started on clear liquid diet.  She started to pass gas.  On day 5 she started having diarrhea of several watery bowel movements a day.  C. difficile colitis was ruled out.  Diet was slowly advanced and she tolerated regular diet on day 6.  Her pain continued to improve, her laboratory results show no evidence of infection.  Decision was to discharge the patient home on 1/11/18 in stable condition    Discharge medications:    Maria Ines Zhang   Home Medication Instructions RICHARD:[id number]    Printed on:01/18/18 5219   Medication Information                      albuterol (PROVENTIL HFA;VENTOLIN HFA) 108 (90 Base) MCG/ACT inhaler  Inhale 2 puffs Every 4 (Four) Hours As Needed for Wheezing or Shortness of Air.             albuterol (PROVENTIL) (2.5 MG/3ML) 0.083% nebulizer solution  Take 2.5 mg by nebulization Every 4 (Four) Hours As Needed for Wheezing.             benzonatate (TESSALON PERLES) 100 MG capsule  Take 1 capsule by mouth 3 (Three) Times a Day As Needed for Cough.             budesonide-formoterol (SYMBICORT) 160-4.5 MCG/ACT inhaler  Inhale 2 puffs 2 (Two) Times a Day.             clonazePAM (KlonoPIN) 0.5 MG tablet  Take 0.5 mg by mouth Every Night.             conjugated estrogens (PREMARIN) 0.625 MG/GM vaginal cream  0.5 grams PV Qday for 3 weeks and then off one week. Repeat next month.             cyclobenzaprine (FLEXERIL) 10 MG tablet  Take 1 tablet by mouth 3 (Three) Times a Day As Needed for Muscle Spasms.             escitalopram (LEXAPRO) 20 MG  tablet  Take 1 tablet by mouth Daily.             esomeprazole (NexIUM) 20 MG capsule  Take 40 mg by mouth every morning before breakfast.             hydrochlorothiazide (HYDRODIURIL) 25 MG tablet  Take 1 tablet by mouth Daily.             levoFLOXacin (LEVAQUIN) 500 MG tablet  Take 1 tablet by mouth Daily for 7 doses. Indications: Infection Within the Abdomen             levothyroxine (SYNTHROID, LEVOTHROID) 88 MCG tablet  Take 1 tablet by mouth Daily.             lisinopril (PRINIVIL,ZESTRIL) 20 MG tablet  Take 1 tablet by mouth Daily.             Magnesium 250 MG tablet  Take 250 mg by mouth Daily.             metroNIDAZOLE (FLAGYL) 500 MG tablet  Take 1 tablet by mouth Every 8 (Eight) Hours for 7 days. Indications: Infection Within the Abdomen             montelukast (SINGULAIR) 10 MG tablet  Take 10 mg by mouth Every Night.             nystatin (MYCOSTATIN) 258459 UNIT/GM cream  Apply  topically 2 (Two) Times a Day.             ondansetron (ZOFRAN) 4 MG tablet  Take 1 tablet by mouth Every 8 (Eight) Hours As Needed for Nausea or Vomiting.             simvastatin (ZOCOR) 10 MG tablet  Take 10 mg by mouth Every Night.             traMADol (ULTRAM) 50 MG tablet  Take 1 tablet by mouth Every 8 (Eight) Hours As Needed for Moderate Pain  for up to 10 days.             Vitamin Mixture (ROCKY-C PO)  Take 500 mg by mouth Daily.                 Discharge instructions and follow up:    - Patient to continue a healthy diet with high fiber and Metamucil daily  - Follow-up with primary care physician for his health check an anemia workup  - Follow-up in my office in 6 weeks, we will plan for colonoscopy and upper endoscopy that time    CODE STATUS: Full code

## 2018-01-18 NOTE — DISCHARGE INSTRUCTIONS
Introduction    Colitis is inflammation of the colon. Colitis may last a short time (acute) or it may last a long time (chronic).  What are the causes?  This condition may be caused by:  · Viruses.  · Bacteria.  · Reactions to medicine.  · Certain autoimmune diseases, such as Crohn disease or ulcerative colitis.  What are the signs or symptoms?  Symptoms of this condition include:  · Diarrhea.  · Passing bloody or tarry stool.  · Pain.  · Fever.  · Vomiting.  · Tiredness (fatigue).  · Weight loss.  · Bloating.  · Sudden increase in abdominal pain.  · Having fewer bowel movements than usual.  How is this diagnosed?  This condition is diagnosed with a stool test or a blood test. You may also have other tests, including X-rays, a CT scan, or a colonoscopy.  How is this treated?  Treatment may include:  · Resting the bowel. This involves not eating or drinking for a period of time.  · Fluids that are given through an IV tube.  · Medicine for pain and diarrhea.  · Antibiotic medicines.  · Cortisone medicines.  · Surgery.  Follow these instructions at home:  Eating and drinking  · Follow instructions from your health care provider about eating or drinking restrictions.  · Drink enough fluid to keep your urine clear or pale yellow.  · Work with a dietitian to determine which foods cause your condition to flare up.  · Avoid foods that cause flare-ups.  · Eat a well-balanced diet.  Medicines  · Take over-the-counter and prescription medicines only as told by your health care provider.  · If you were prescribed an antibiotic medicine, take it as told by your health care provider. Do not stop taking the antibiotic even if you start to feel better.  General instructions  · Keep all follow-up visits as told by your health care provider. This is important.  Contact a health care provider if:  · Your symptoms do not go away.  · You develop new symptoms.  Get help right away if:  · You have a fever that does not go away with  treatment.  · You develop chills.  · You have extreme weakness, fainting, or dehydration.  · You have repeated vomiting.  · You develop severe pain in your abdomen.  · You pass bloody or tarry stool.  This information is not intended to replace advice given to you by your health care provider. Make sure you discuss any questions you have with your health care provider.

## 2018-01-18 NOTE — PROGRESS NOTES
"Progress Note    Chief Complaint   Patient presents with   • Abdominal Pain   • Nausea   • Vomiting       S: There were no events overnight.  Patient is doing fine. No nausea or vomiting, only one BM overnight. No abdominal pain    O:/55 (BP Location: Right arm, Patient Position: Lying)  Pulse 83  Temp 97.9 °F (36.6 °C) (Oral)   Resp 20  Ht 167.6 cm (65.98\")  Wt 99.8 kg (220 lb)  SpO2 94%  BMI 35.51 kg/m2  Body mass index is 35.51 kg/(m^2).    I/O last 3 completed shifts:  In: 600 [P.O.:600]  Out: -   I/O this shift:  In: 105 [P.O.:105]  Out: -       GENERAL:alert, well appearing, and in no distress and oriented to person, place, and time  HEENT: normochephalic, atraumatic, moist mucus membranes, clear sclera   CHEST: clear to auscultation, no wheezes, rales or rhonchi, symmetric air entry  CARDIAC: regular rate and rhythm    ABDOMEN: soft, nontender, nondistended, no masses or organomegaly  EXTREMITIES: no cyanosis, clubbing or edema    SKIN: Warm and moist, no rashes      Results from last 7 days  Lab Units 01/18/18  0318   WBC 10*3/mm3 6.87   HEMOGLOBIN g/dL 9.4*   HEMATOCRIT % 30.2*   PLATELETS 10*3/mm3 475       Results from last 7 days  Lab Units 01/18/18  0318 01/16/18  0741 01/15/18  0542   SODIUM mmol/L 139 133* 136   POTASSIUM mmol/L 3.3* 3.7 3.0*   CHLORIDE mmol/L 99 97* 99   CO2 mmol/L 24.2 27.3 26.4   BUN mg/dL 3* 4* 8   CREATININE mg/dL 0.64 0.70 0.62   CALCIUM mg/dL 8.4* 8.1* 7.7*   GLUCOSE mg/dL 89 118* 116*       ROS:   Constitutional: denies any weight changes, fatigue or weakness.  Cardiovascular: denies chest pain, palpitations, edemas.  Respiratory: denies cough, sputum, SOB.  Gastrointestinal: denies N&V, abd pain, diarrhea, constipation.    DIET: Regular    A/P: 72 y.o. female with partial bowel obstruction that is resolved.  She also had findings on CT scan concerning for ischemic colitis but her diarrhea is resolved and had only loose stools. Her pain is resolved.    - Continue " high fiber diet  - Encourage ambulation  - By mouth levofloxacin and Flagyl until 1/25  - SCDs  - DVT prophylaxis  - DC home today, follow up in my office in 6 weeks    Ritchie Garland MD  General, Minimally Invasive and Endoscopic Surgery  [facility]    [address], [address]  [place], [place], [address]  P: [phone]  F: [phone]

## 2018-01-18 NOTE — TELEPHONE ENCOUNTER
White script filled out and faxed to [facility]. Advised daughter. Pulm consult attached as well.     ----- Message from Augusta Ross MA sent at 1/17/2018  4:20 PM EST -----  Regarding: FW: CPAP SUPPLY      ----- Message -----     From: Dior Guzman     Sent: 1/17/2018   3:57 PM       To: [facility]  Subject: FW: CPAP SUPPLY                                  Patient's daughter phones today to check status of below.  Per [facility] they need a script for new mask and new tubing for CPAP with notes showing patient has had flu and wishes to replace these items because of that.      ----- Message -----     From: Dior Guzman     Sent: 1/15/2018   3:58 PM       To: [facility]  Subject: CPAP SUPPLY                                      [place]    Patient needs a script for new mask and new tubing for CPAP.  Family spoke with [facility] and was told to get provider to write a script.      Family wants to  and take to patient who is currently inpatient at the [facility].    [phone] - daughter, Олег Rey

## 2018-01-19 ENCOUNTER — TRANSITIONAL CARE MANAGEMENT TELEPHONE ENCOUNTER (OUTPATIENT)
Dept: INTERNAL MEDICINE | Facility: CLINIC | Age: 73
End: 2018-01-19

## 2018-01-19 ENCOUNTER — EPISODE CHANGES (OUTPATIENT)
Dept: CASE MANAGEMENT | Facility: OTHER | Age: 73
End: 2018-01-19

## 2018-01-19 NOTE — PROGRESS NOTES
Case Management Discharge Note    Final Note: Home. Review of AVS and Dc summary no new needs    Discharge Placement     No information found        Other: Other (car)    Discharge Codes: 01  Discharge to home

## 2018-01-25 RX ORDER — LEVOTHYROXINE SODIUM 88 UG/1
88 TABLET ORAL DAILY
Qty: 90 TABLET | Refills: 1 | Status: SHIPPED | OUTPATIENT
Start: 2018-01-25 | End: 2018-09-14 | Stop reason: SDUPTHER

## 2018-02-01 ENCOUNTER — OFFICE VISIT (OUTPATIENT)
Dept: INTERNAL MEDICINE | Facility: CLINIC | Age: 73
End: 2018-02-01

## 2018-02-01 VITALS
TEMPERATURE: 98.3 F | WEIGHT: 215.5 LBS | HEART RATE: 71 BPM | RESPIRATION RATE: 18 BRPM | DIASTOLIC BLOOD PRESSURE: 64 MMHG | HEIGHT: 66 IN | BODY MASS INDEX: 34.63 KG/M2 | OXYGEN SATURATION: 98 % | SYSTOLIC BLOOD PRESSURE: 132 MMHG

## 2018-02-01 DIAGNOSIS — K56.609 SBO (SMALL BOWEL OBSTRUCTION) (HCC): ICD-10-CM

## 2018-02-01 DIAGNOSIS — D64.9 NORMOCYTIC ANEMIA: ICD-10-CM

## 2018-02-01 DIAGNOSIS — E83.51 HYPOCALCEMIA: ICD-10-CM

## 2018-02-01 DIAGNOSIS — I72.2 RENAL ARTERY ANEURYSM (HCC): ICD-10-CM

## 2018-02-01 DIAGNOSIS — K52.9 COLITIS, ACUTE: ICD-10-CM

## 2018-02-01 DIAGNOSIS — Z09 HOSPITAL DISCHARGE FOLLOW-UP: Primary | ICD-10-CM

## 2018-02-01 DIAGNOSIS — E87.6 HYPOKALEMIA: ICD-10-CM

## 2018-02-01 DIAGNOSIS — Z86.19 FREQUENT INFECTIONS: ICD-10-CM

## 2018-02-01 PROBLEM — D62 ACUTE BLOOD LOSS ANEMIA: Status: RESOLVED | Noted: 2017-07-18 | Resolved: 2018-02-01

## 2018-02-01 LAB
ALBUMIN SERPL-MCNC: 3.8 G/DL (ref 3.5–5.2)
ALBUMIN/GLOB SERPL: 1.3 G/DL
ALP SERPL-CCNC: 80 U/L (ref 40–129)
ALT SERPL-CCNC: 7 U/L (ref 5–33)
AST SERPL-CCNC: 15 U/L (ref 5–32)
BASOPHILS # BLD AUTO: 0.02 10*3/MM3 (ref 0–0.2)
BASOPHILS NFR BLD AUTO: 0.3 % (ref 0–2)
BILIRUB SERPL-MCNC: 0.2 MG/DL (ref 0.2–1.2)
BUN SERPL-MCNC: 15 MG/DL (ref 8–23)
BUN/CREAT SERPL: 18.8 (ref 7–25)
CALCIUM SERPL-MCNC: 8.8 MG/DL (ref 8.8–10.5)
CHLORIDE SERPL-SCNC: 100 MMOL/L (ref 98–107)
CO2 SERPL-SCNC: 24.3 MMOL/L (ref 22–29)
CREAT SERPL-MCNC: 0.8 MG/DL (ref 0.57–1)
EOSINOPHIL # BLD AUTO: 0.02 10*3/MM3 (ref 0.1–0.3)
EOSINOPHIL NFR BLD AUTO: 0.3 % (ref 0–4)
ERYTHROCYTE [DISTWIDTH] IN BLOOD BY AUTOMATED COUNT: 17.7 % (ref 11.5–14.5)
GFR SERPLBLD CREATININE-BSD FMLA CKD-EPI: 71 ML/MIN/1.73
GFR SERPLBLD CREATININE-BSD FMLA CKD-EPI: 86 ML/MIN/1.73
GLOBULIN SER CALC-MCNC: 2.9 GM/DL
GLUCOSE SERPL-MCNC: 106 MG/DL (ref 65–99)
HCT VFR BLD AUTO: 33.9 % (ref 37–47)
HGB BLD-MCNC: 10.7 G/DL (ref 12–16)
IMM GRANULOCYTES # BLD: 0.01 10*3/MM3 (ref 0–0.03)
IMM GRANULOCYTES NFR BLD: 0.1 % (ref 0–0.5)
LYMPHOCYTES # BLD AUTO: 2 10*3/MM3 (ref 0.6–4.8)
LYMPHOCYTES NFR BLD AUTO: 26.8 % (ref 20–45)
MCH RBC QN AUTO: 28.5 PG (ref 27–31)
MCHC RBC AUTO-ENTMCNC: 31.6 G/DL (ref 31–37)
MCV RBC AUTO: 90.2 FL (ref 81–99)
MONOCYTES # BLD AUTO: 0.61 10*3/MM3 (ref 0–1)
MONOCYTES NFR BLD AUTO: 8.2 % (ref 3–8)
NEUTROPHILS # BLD AUTO: 4.8 10*3/MM3 (ref 1.5–8.3)
NEUTROPHILS NFR BLD AUTO: 64.3 % (ref 45–70)
NRBC BLD AUTO-RTO: 0 /100 WBC (ref 0–0)
PLATELET # BLD AUTO: 358 10*3/MM3 (ref 140–500)
POTASSIUM SERPL-SCNC: 4.6 MMOL/L (ref 3.5–5.2)
PROT SERPL-MCNC: 6.7 G/DL (ref 6–8.5)
RBC # BLD AUTO: 3.76 10*6/MM3 (ref 4.2–5.4)
SODIUM SERPL-SCNC: 139 MMOL/L (ref 136–145)
WBC # BLD AUTO: 7.46 10*3/MM3 (ref 4.8–10.8)

## 2018-02-01 PROCEDURE — 99495 TRANSJ CARE MGMT MOD F2F 14D: CPT | Performed by: INTERNAL MEDICINE

## 2018-02-02 ENCOUNTER — TELEPHONE (OUTPATIENT)
Dept: SURGERY | Facility: CLINIC | Age: 73
End: 2018-02-02

## 2018-02-02 NOTE — PROGRESS NOTES
Let patient know that her blood work still shows an anemia. Please add folate, B12 and iron panel and ferritin to her blood in the lab. I will touch base with Dr. Garland once those labs are back to make sure that he knows the results. Nothing further to do, but the scopes that are planned will further evaluate this.

## 2018-02-07 ENCOUNTER — HOSPITAL ENCOUNTER (OUTPATIENT)
Dept: CT IMAGING | Facility: HOSPITAL | Age: 73
End: 2018-02-07

## 2018-02-07 LAB
FERRITIN SERPL-MCNC: 63 NG/ML (ref 13–150)
FOLATE SERPL-MCNC: 11.32 NG/ML (ref 4.78–20)
IRON SATN MFR SERPL: 26 %
IRON SERPL-MCNC: 59 MCG/DL (ref 37–145)
Lab: NORMAL
TIBC SERPL-MCNC: 229 MCG/DL (ref 261–478)
UIBC SERPL-MCNC: 170 MCG/DL (ref 112–346)
VIT B12 SERPL-MCNC: 366 PG/ML (ref 232–1245)
WRITTEN AUTHORIZATION: NORMAL

## 2018-02-08 DIAGNOSIS — I72.2 RENAL ARTERY ANEURYSM (HCC): Primary | ICD-10-CM

## 2018-02-08 RX ORDER — PREDNISONE 50 MG/1
TABLET ORAL
Qty: 3 TABLET | Refills: 0 | Status: SHIPPED | OUTPATIENT
Start: 2018-02-08 | End: 2018-02-20

## 2018-02-08 NOTE — PROGRESS NOTES
Labs for anemia look good. She does have a slightly low B12 and I would like her to start taking 1000mcg per day. She can buy this over the counter.

## 2018-02-09 ENCOUNTER — TELEPHONE (OUTPATIENT)
Dept: INTERNAL MEDICINE | Facility: CLINIC | Age: 73
End: 2018-02-09

## 2018-02-09 ENCOUNTER — APPOINTMENT (OUTPATIENT)
Dept: CT IMAGING | Facility: HOSPITAL | Age: 73
End: 2018-02-09
Attending: INTERNAL MEDICINE

## 2018-02-09 NOTE — TELEPHONE ENCOUNTER
"Patient advised as per below, has already picked up prednisone, and voiced understanding about Benadryl    ----- Message from Jeanine Blunt MD sent at 2/8/2018  9:24 AM EST -----  Regarding: RE: CT ANGIOGRAM ABD/PELVIS NEEDS PRE MED PROTOCOL  I sent in prednisone for her. Needs to take 50mg of benadryl 1 hour prior to CT scan and can buy this over the counter.   ----- Message -----     From: Augusta Ross MA     Sent: 2/7/2018   3:50 PM       To: Jeanine Blunt MD  Subject: FW: CT ANGIOGRAM ABD/PELVIS NEEDS PRE MED IL#        ----- Message -----     From: Dior Guzman     Sent: 2/7/2018   3:37 PM       To: Yohana Reynoso[address] [place] [address]  Subject: CT ANGIOGRAM ABD/PELVIS NEEDS PRE MED PROTOC#    Patient was scheduled for scan downstairs in Radiology on Wednesday, 2/7/18.  Rad tech phoned our office stating patient had \"hives\" several years back after receiving contrast.  She had contrast administered since, but they were not comfortable proceeding.  Scan was cancelled.    There is a pre-medication protocol that will have to be done in order for patient to have procedure completed.  They faxed a copy of it to our office and I am putting it on Dr. Blunt's desk.  Patient uses [facility] pharmacy and script should be sent over at least 1 day prior to the study.      Patient phoned office after leaving Radiology and stated she wanted to get this test done as soon as possible and within the next couple of days was preferred.  She also wants to have it done at [facility], not [facility].          "

## 2018-02-09 NOTE — TELEPHONE ENCOUNTER
"This was addressed. See other 2/9 note.    ---- Message from Amelia Castellanos sent at 2/8/2018  2:07 PM EST -----  Regarding: RE: CT ANGIOGRAM ABD/PELVIS NEEDS PRE MED PROTOCOL  Order in already in system for test. Pharmacy has rx pt needs instructions which Jose Raul said she gave you the info.  ----- Message -----     From: Augusta Ross MA     Sent: 2/7/2018   5:20 PM       To: Amelia Castellanos  Subject: FW: CT ANGIOGRAM ABD/PELVIS NEEDS PRE MED VT#        ----- Message -----     From: Jeanine Blunt MD     Sent: 2/7/2018   4:09 PM       To: Augusta Ross MA  Subject: RE: CT ANGIOGRAM ABD/PELVIS NEEDS PRE MED VT#    Okay, I’ll take care of this tomorrow and go ahead and have Amelia get this up for [place].  ----- Message -----     From: Augusta Ross MA     Sent: 2/7/2018   3:50 PM       To: Jeanine Blunt MD  Subject: FW: CT ANGIOGRAM ABD/PELVIS NEEDS PRE MED VT#        ----- Message -----     From: Dior Guzman     Sent: 2/7/2018   3:37 PM       To: Yohana Bai [address]  Subject: CT ANGIOGRAM ABD/PELVIS NEEDS PRE MED PROTOC#    Patient was scheduled for scan downstairs in Radiology on Wednesday, 2/7/18.  Rad tech phoned our office stating patient had \"hives\" several years back after receiving contrast.  She had contrast administered since, but they were not comfortable proceeding.  Scan was cancelled.    There is a pre-medication protocol that will have to be done in order for patient to have procedure completed.  They faxed a copy of it to our office and I am putting it on Dr. Blunt's desk.  Patient uses [facility] pharmacy and script should be sent over at least 1 day prior to the study.      Patient phoned office after leaving Radiology and stated she wanted to get this test done as soon as possible and within the next couple of days was preferred.  She also wants to have it done at [facility], not [facility].          "

## 2018-02-13 ENCOUNTER — HOSPITAL ENCOUNTER (OUTPATIENT)
Dept: CT IMAGING | Facility: HOSPITAL | Age: 73
Discharge: HOME | End: 2018-02-13
Admitting: INTERNAL MEDICINE

## 2018-02-13 ENCOUNTER — TRANSCRIBE ORDERS (OUTPATIENT)
Dept: ADMINISTRATIVE | Facility: HOSPITAL | Age: 73
End: 2018-02-13

## 2018-02-13 DIAGNOSIS — Z12.39 BREAST SCREENING: Primary | ICD-10-CM

## 2018-02-13 PROCEDURE — 74174 CTA ABD&PLVS W/CONTRAST: CPT

## 2018-02-13 PROCEDURE — 82565 ASSAY OF CREATININE: CPT

## 2018-02-13 PROCEDURE — 0 IOPAMIDOL 61 % SOLUTION: Performed by: INTERNAL MEDICINE

## 2018-02-13 RX ADMIN — IOPAMIDOL 90 ML: 612 INJECTION, SOLUTION INTRAVENOUS at 14:20

## 2018-02-14 LAB — CREAT BLDA-MCNC: 0.6 MG/DL (ref 0.6–1.3)

## 2018-02-14 RX ORDER — CYCLOBENZAPRINE HCL 10 MG
10 TABLET ORAL 3 TIMES DAILY PRN
Qty: 90 TABLET | Refills: 1 | Status: SHIPPED | OUTPATIENT
Start: 2018-02-14 | End: 2018-08-13 | Stop reason: SDUPTHER

## 2018-02-15 ENCOUNTER — HOSPITAL ENCOUNTER (OUTPATIENT)
Dept: MAMMOGRAPHY | Facility: HOSPITAL | Age: 73
Discharge: HOME | End: 2018-02-15
Attending: INTERNAL MEDICINE | Admitting: INTERNAL MEDICINE

## 2018-02-15 DIAGNOSIS — Z12.39 BREAST SCREENING: ICD-10-CM

## 2018-02-15 PROBLEM — K76.89 BENIGN LIVER CYST: Status: ACTIVE | Noted: 2018-02-15

## 2018-02-15 PROBLEM — K57.90 DIVERTICULOSIS: Status: ACTIVE | Noted: 2018-02-15

## 2018-02-15 PROCEDURE — 77067 SCR MAMMO BI INCL CAD: CPT

## 2018-02-15 PROCEDURE — 77063 BREAST TOMOSYNTHESIS BI: CPT

## 2018-02-15 NOTE — PROGRESS NOTES
"Kidney aneurysms is very small and per radiology \"not clinically significant\". I would advise that we watch her blood pressure closely and not do anything further."

## 2018-02-16 ENCOUNTER — TELEPHONE (OUTPATIENT)
Dept: INTERNAL MEDICINE | Facility: CLINIC | Age: 73
End: 2018-02-16

## 2018-02-16 NOTE — TELEPHONE ENCOUNTER
"Patient has been advised and voiced understanding.       ----- Message from Jeanine Blunt MD sent at 2/15/2018  7:36 AM EST -----  Kidney aneurysms is very small and per radiology \"not clinically significant\". I would advise that we watch her blood pressure closely and not do anything further.    "

## 2018-02-16 NOTE — TELEPHONE ENCOUNTER
Patient has been advised and voiced understanding.       ----- Message from Jeanine Blunt MD sent at 2/15/2018  4:33 PM EST -----  Mammogram is normal

## 2018-02-19 RX ORDER — HYDROCHLOROTHIAZIDE 25 MG/1
25 TABLET ORAL DAILY
Qty: 30 TABLET | Refills: 3 | Status: SHIPPED | OUTPATIENT
Start: 2018-02-19 | End: 2018-06-25 | Stop reason: SDUPTHER

## 2018-02-20 ENCOUNTER — OFFICE VISIT (OUTPATIENT)
Dept: SURGERY | Facility: CLINIC | Age: 73
End: 2018-02-20

## 2018-02-20 ENCOUNTER — EPISODE CHANGES (OUTPATIENT)
Dept: CASE MANAGEMENT | Facility: OTHER | Age: 73
End: 2018-02-20

## 2018-02-20 VITALS — HEIGHT: 66 IN | WEIGHT: 218.2 LBS | HEART RATE: 104 BPM | OXYGEN SATURATION: 98 % | BODY MASS INDEX: 35.07 KG/M2

## 2018-02-20 DIAGNOSIS — K57.30 DIVERTICULOSIS OF LARGE INTESTINE WITHOUT HEMORRHAGE: Primary | ICD-10-CM

## 2018-02-20 PROCEDURE — 99214 OFFICE O/P EST MOD 30 MIN: CPT | Performed by: SURGERY

## 2018-02-20 RX ORDER — MELOXICAM 15 MG/1
15 TABLET ORAL DAILY
COMMUNITY
Start: 2018-02-12 | End: 2018-04-16

## 2018-02-22 NOTE — PROGRESS NOTES
CC: Follow up colitis     HPI: The patient is a very pleasant 72-year-old female that was recently admitted to the hospital with nausea vomiting and diarrhea.  She was found to have colitis at the level of the splenic flexure.  The patient was treated conservatively with nasogastric decompression and antibiotic therapy.  She was eventually discharged home tolerating regular diet.  She is here today for follow-up.  She has been eating and drinking without any major problem.  She has been having regular bowel movements.  She denies any fevers or chills.  She has not had any more abdominal pain.  She recently underwent CTA of the abdomen for follow-up of fossa renal artery aneurysm.  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  There was no evidence of colitis but the diverticulosis.  She reports having a normal colonoscopy approximately 10 years ago.  Denies any rectal bleeding or melanotic stools     PMH: Hypertension, hyperlipidemia, hypothyroidism, depression, bronchiolitis, osteoarthritis, GERD, pulmonary embolism, sleep apnea, venous stasis, constipation, impaired fasting glucose, asthma, obesity, renal artery aneurysm     PSH: Back surgery, cataract surgery, hysterectomy, bilateral total knee arthroplasty , spine surgery, thoracoscopic right wedge lung resection, right toe amputation second toe, cholecystectomy    Medications and allergies were reviewed    FH and SH: There is no family history of colon cancer or inflammatory bowel disease.  The patient is , does not smoke or drink alcohol or take any drugs     ROS:   Constitutional: denies any weight changes, fatigue or weakness.  Eyes: : denies blurred or double vision  Cardiovascular: denies chest pain, palpitations, edemas.  Respiratory: denies cough, sputum, SOB.  Gastrointestinal: denies N&V, abd pain, diarrhea, constipation.  Genitourinary: denies dysuria, frequency.  Endocrine: denies cold intolerance, lethargy and  flushing.  Hem: denies excessive bruising and postop bleeding.  Musculoskeletal: denies weakness, joint swelling, pain or stiffness.  Neuro: denies seizures, CVA, paresthesia, or peripheral neuropathy.   Skin: denies change in nevi, rashes, masses.     PE: The patient is afebrile, vital signs are stable  Alert and oriented ×3, no acute distress.  Head is normocephalic and atraumatic.  Neck is supple there is no thyroimegaly or lymphadenopathy  Chest is clear bilaterally there is no added sounds  Regular rate and rhythm, no murmurs  Abdomen is soft and nontender, is nondistended, bowel sounds are positive.  There is no rebound or guarding is and there is no peritoneal signs.  Well-healed midline incision.  There is no evidence of hernia  No clubbing cyanosis or edema in lower or upper extremities    Diagnostic studies:   Diagnostic studies reviewed  CTA abdomen and pelvis 2/13/18: IMPRESSION:  Small approximately 11 mm diameter left renal artery  aneurysm. Sigmoid diverticulosis without evidence of diverticulitis.  Small liver cysts. Otherwise unremarkable CTA of the abdomen and pelvis.     Assessment and plan    The patient is a very pleasant 72-year-old female admitted to the hospital recently due to colitis of the splenic flexure of the colon.  She has history of diverticulosis and diverticulitis.  Last colonoscopy was 10 years ago.  She has been tolerating diet without any major problem.  He has been having regular bowel movements.  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  I have discussed with the patient about the need to undergo a colonoscopy for screening.  I also discussed with her about the need for follow-up yearly with ultrasounds regarding her left renal artery aneurysm to make sure it does not enlarge.  There is no indication for now for any type of treatment if aneurysm is less than 2 cm or asymptomatic  This can be follow-up by her primary care physician.     Indications, risks  and procedure were discussed with Her including but not limited to bleeding, infection, possibility of perforation and possible polypectomy. All of their questions were answered and  would like to proceed with the above recommendations.  Any additional follow-up will be discussed with Her after the results have been reviewed.    - Plan for screening colonoscopy  - Bowel preparation  - Yearly follow-up with ultrasound for left renal artery aneurysm    The patient verbalized understanding and agree with the plan     Ritchie Garland MD  General, Minimally Invasive and Endoscopic Surgery  [facility]     [address] [place] [address], [address]  [place], [place], [address]  P: [phone]  F: [phone]

## 2018-02-28 RX ORDER — CALCIUM POLYCARBOPHIL 625 MG 625 MG/1
1250 TABLET ORAL DAILY
COMMUNITY
End: 2021-08-13

## 2018-03-01 ENCOUNTER — ANESTHESIA EVENT (OUTPATIENT)
Dept: GASTROENTEROLOGY | Facility: HOSPITAL | Age: 73
End: 2018-03-01

## 2018-03-01 ENCOUNTER — ANESTHESIA (OUTPATIENT)
Dept: GASTROENTEROLOGY | Facility: HOSPITAL | Age: 73
End: 2018-03-01

## 2018-03-01 ENCOUNTER — HOSPITAL ENCOUNTER (OUTPATIENT)
Facility: HOSPITAL | Age: 73
Setting detail: HOSPITAL OUTPATIENT SURGERY
Discharge: HOME | End: 2018-03-01
Attending: SURGERY | Admitting: SURGERY

## 2018-03-01 VITALS
RESPIRATION RATE: 16 BRPM | WEIGHT: 212.5 LBS | HEIGHT: 66 IN | HEART RATE: 79 BPM | TEMPERATURE: 97.8 F | OXYGEN SATURATION: 100 % | DIASTOLIC BLOOD PRESSURE: 69 MMHG | SYSTOLIC BLOOD PRESSURE: 136 MMHG | BODY MASS INDEX: 34.15 KG/M2

## 2018-03-01 DIAGNOSIS — K57.30 DIVERTICULOSIS OF LARGE INTESTINE WITHOUT HEMORRHAGE: ICD-10-CM

## 2018-03-01 PROCEDURE — 88305 TISSUE EXAM BY PATHOLOGIST: CPT | Performed by: SURGERY

## 2018-03-01 PROCEDURE — 45380 COLONOSCOPY AND BIOPSY: CPT | Performed by: SURGERY

## 2018-03-01 PROCEDURE — 25010000002 PROPOFOL 10 MG/ML EMULSION: Performed by: ANESTHESIOLOGY

## 2018-03-01 DEVICE — DEV CLIP ENDO RESOLUTION360 CONTRL ROT 235CM: Type: IMPLANTABLE DEVICE | Status: FUNCTIONAL

## 2018-03-01 RX ORDER — PROPOFOL 10 MG/ML
VIAL (ML) INTRAVENOUS AS NEEDED
Status: DISCONTINUED | OUTPATIENT
Start: 2018-03-01 | End: 2018-03-01 | Stop reason: SURG

## 2018-03-01 RX ORDER — PROPOFOL 10 MG/ML
VIAL (ML) INTRAVENOUS CONTINUOUS PRN
Status: DISCONTINUED | OUTPATIENT
Start: 2018-03-01 | End: 2018-03-01 | Stop reason: SURG

## 2018-03-01 RX ORDER — SODIUM CHLORIDE 0.9 % (FLUSH) 0.9 %
3 SYRINGE (ML) INJECTION AS NEEDED
Status: DISCONTINUED | OUTPATIENT
Start: 2018-03-01 | End: 2018-03-01 | Stop reason: HOSPADM

## 2018-03-01 RX ORDER — LIDOCAINE HYDROCHLORIDE 20 MG/ML
INJECTION, SOLUTION INFILTRATION; PERINEURAL AS NEEDED
Status: DISCONTINUED | OUTPATIENT
Start: 2018-03-01 | End: 2018-03-01 | Stop reason: SURG

## 2018-03-01 RX ORDER — SODIUM CHLORIDE, SODIUM LACTATE, POTASSIUM CHLORIDE, CALCIUM CHLORIDE 600; 310; 30; 20 MG/100ML; MG/100ML; MG/100ML; MG/100ML
1000 INJECTION, SOLUTION INTRAVENOUS CONTINUOUS PRN
Status: DISCONTINUED | OUTPATIENT
Start: 2018-03-01 | End: 2018-03-01 | Stop reason: HOSPADM

## 2018-03-01 RX ORDER — LIDOCAINE HYDROCHLORIDE 10 MG/ML
0.5 INJECTION, SOLUTION INFILTRATION; PERINEURAL ONCE AS NEEDED
Status: DISCONTINUED | OUTPATIENT
Start: 2018-03-01 | End: 2018-03-01 | Stop reason: HOSPADM

## 2018-03-01 RX ADMIN — SODIUM CHLORIDE, POTASSIUM CHLORIDE, SODIUM LACTATE AND CALCIUM CHLORIDE 1000 ML: 600; 310; 30; 20 INJECTION, SOLUTION INTRAVENOUS at 07:40

## 2018-03-01 RX ADMIN — SODIUM CHLORIDE, POTASSIUM CHLORIDE, SODIUM LACTATE AND CALCIUM CHLORIDE: 600; 310; 30; 20 INJECTION, SOLUTION INTRAVENOUS at 08:44

## 2018-03-01 RX ADMIN — PROPOFOL 30 MG: 10 INJECTION, EMULSION INTRAVENOUS at 08:15

## 2018-03-01 RX ADMIN — LIDOCAINE HYDROCHLORIDE 60 MG: 20 INJECTION, SOLUTION INFILTRATION; PERINEURAL at 08:10

## 2018-03-01 RX ADMIN — PROPOFOL 120 MCG/KG/MIN: 10 INJECTION, EMULSION INTRAVENOUS at 08:10

## 2018-03-01 RX ADMIN — PROPOFOL 50 MG: 10 INJECTION, EMULSION INTRAVENOUS at 08:10

## 2018-03-01 NOTE — PLAN OF CARE
Problem: Patient Care Overview (Adult)  Goal: Plan of Care Review  Outcome: Ongoing (interventions implemented as appropriate)   03/01/18 0727   Coping/Psychosocial Response Interventions   Plan Of Care Reviewed With patient   Patient Care Overview   Progress no change   Outcome Evaluation   Outcome Summary/Follow up Plan pending procedure results     Goal: Adult Individualization and Mutuality  Outcome: Ongoing (interventions implemented as appropriate)   03/01/18 0727   Individualization   Patient Specific Preferences goes by [facility]   Mutuality/Individual Preferences   What Anxieties, Fears or Concerns Do You Have About Your Health or Care? none     Goal: Discharge Needs Assessment  Outcome: Ongoing (interventions implemented as appropriate)   03/01/18 0727   Discharge Needs Assessment   Concerns To Be Addressed no discharge needs identified   Discharge Disposition home or self-care   Living Environment   Transportation Available car       Problem: GI Endoscopy (Adult)  Goal: Signs and Symptoms of Listed Potential Problems Will be Absent or Manageable (GI Endoscopy)  Outcome: Ongoing (interventions implemented as appropriate)   03/01/18 0727   GI Endoscopy   Problems Assessed (GI Endoscopy) pain;bleeding;hypoxia/hypoxemia   Problems Present (GI Endoscopy) none

## 2018-03-01 NOTE — H&P (VIEW-ONLY)
CC: Follow up colitis     HPI: The patient is a very pleasant 72-year-old female that was recently admitted to the hospital with nausea vomiting and diarrhea.  She was found to have colitis at the level of the splenic flexure.  The patient was treated conservatively with nasogastric decompression and antibiotic therapy.  She was eventually discharged home tolerating regular diet.  She is here today for follow-up.  She has been eating and drinking without any major problem.  She has been having regular bowel movements.  She denies any fevers or chills.  She has not had any more abdominal pain.  She recently underwent CTA of the abdomen for follow-up of fossa renal artery aneurysm.  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  There was no evidence of colitis but the diverticulosis.  She reports having a normal colonoscopy approximately 10 years ago.  Denies any rectal bleeding or melanotic stools     PMH: Hypertension, hyperlipidemia, hypothyroidism, depression, bronchiolitis, osteoarthritis, GERD, pulmonary embolism, sleep apnea, venous stasis, constipation, impaired fasting glucose, asthma, obesity, renal artery aneurysm     PSH: Back surgery, cataract surgery, hysterectomy, bilateral total knee arthroplasty , spine surgery, thoracoscopic right wedge lung resection, right toe amputation second toe, cholecystectomy    Medications and allergies were reviewed    FH and SH: There is no family history of colon cancer or inflammatory bowel disease.  The patient is , does not smoke or drink alcohol or take any drugs     ROS:   Constitutional: denies any weight changes, fatigue or weakness.  Eyes: : denies blurred or double vision  Cardiovascular: denies chest pain, palpitations, edemas.  Respiratory: denies cough, sputum, SOB.  Gastrointestinal: denies N&V, abd pain, diarrhea, constipation.  Genitourinary: denies dysuria, frequency.  Endocrine: denies cold intolerance, lethargy and  flushing.  Hem: denies excessive bruising and postop bleeding.  Musculoskeletal: denies weakness, joint swelling, pain or stiffness.  Neuro: denies seizures, CVA, paresthesia, or peripheral neuropathy.   Skin: denies change in nevi, rashes, masses.     PE: The patient is afebrile, vital signs are stable  Alert and oriented ×3, no acute distress.  Head is normocephalic and atraumatic.  Neck is supple there is no thyroimegaly or lymphadenopathy  Chest is clear bilaterally there is no added sounds  Regular rate and rhythm, no murmurs  Abdomen is soft and nontender, is nondistended, bowel sounds are positive.  There is no rebound or guarding is and there is no peritoneal signs.  Well-healed midline incision.  There is no evidence of hernia  No clubbing cyanosis or edema in lower or upper extremities    Diagnostic studies:   Diagnostic studies reviewed  CTA abdomen and pelvis 2/13/18: IMPRESSION:  Small approximately 11 mm diameter left renal artery  aneurysm. Sigmoid diverticulosis without evidence of diverticulitis.  Small liver cysts. Otherwise unremarkable CTA of the abdomen and pelvis.     Assessment and plan    The patient is a very pleasant 72-year-old female admitted to the hospital recently due to colitis of the splenic flexure of the colon.  She has history of diverticulosis and diverticulitis.  Last colonoscopy was 10 years ago.  She has been tolerating diet without any major problem.  He has been having regular bowel movements.  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  I have discussed with the patient about the need to undergo a colonoscopy for screening.  I also discussed with her about the need for follow-up yearly with ultrasounds regarding her left renal artery aneurysm to make sure it does not enlarge.  There is no indication for now for any type of treatment if aneurysm is less than 2 cm or asymptomatic  This can be follow-up by her primary care physician.     Indications, risks  and procedure were discussed with Her including but not limited to bleeding, infection, possibility of perforation and possible polypectomy. All of their questions were answered and  would like to proceed with the above recommendations.  Any additional follow-up will be discussed with Her after the results have been reviewed.    - Plan for screening colonoscopy  - Bowel preparation  - Yearly follow-up with ultrasound for left renal artery aneurysm    The patient verbalized understanding and agree with the plan     Ritchie Garland MD  General, Minimally Invasive and Endoscopic Surgery  [facility] [place]     [address] [place] [address], [address]  [place], [place], [address]  P: [phone]  F: [phone]

## 2018-03-01 NOTE — ANESTHESIA PREPROCEDURE EVALUATION
Anesthesia Evaluation     Patient summary reviewed and Nursing notes reviewed   history of anesthetic complications: prolonged sedation  NPO Solid Status: > 8 hours  NPO Liquid Status: > 8 hours           Airway   Mallampati: II  TM distance: >3 FB  Neck ROM: full  Dental    (+) implants        Pulmonary - normal exam   (+) pneumonia resolved , pulmonary embolism, COPD, asthma, sleep apnea on CPAP,   Cardiovascular - normal exam  Exercise tolerance: poor (<4 METS)    ECG reviewed    (+) hypertension, valvular problems/murmurs MR, PVD, hyperlipidemia,       Neuro/Psych  (+) CVA, numbness, psychiatric history Anxiety and Depression,     GI/Hepatic/Renal/Endo    (+) obesity,  GERD,  liver disease (benign liver cyst), hypothyroidism,     Musculoskeletal     Abdominal  - normal exam   Substance History      OB/GYN          Other   (+) arthritis     ROS/Med Hx Other: · Left ventricular function is hyperdynamic (EF > 70%). Calculated EF = 74.6%. Estimated EF was in agreement with the calculated EF. Normal left ventricular cavity size and wall thickness noted. All left ventricular wall segments contract normally. Left ventricular diastolic function is normal.  · Right ventricular cavity is mildly dilated.                    Anesthesia Plan    ASA 3     MAC     intravenous induction   Anesthetic plan and risks discussed with patient.

## 2018-03-01 NOTE — ANESTHESIA POSTPROCEDURE EVALUATION
Patient: Maria Ines Zhang    Procedure Summary     Date Anesthesia Start Anesthesia Stop Room / Location    03/01/18 0812 0852  [facility] /  [facility]       Procedure Diagnosis Surgeon Provider    COLONOSCOPY INTO CECUM/ TERMINAL ILEUM WITH BIOPSIES AND CLIP X 1 (N/A ) Diverticulosis of large intestine without hemorrhage  (Diverticulosis of large intestine without hemorrhage [K57.30]) MD Amie Grimm MD          Anesthesia Type: MAC  Last vitals  BP   136/69 (03/01/18 0913)   Temp   36.6 °C (97.8 °F) (03/01/18 0853)   Pulse   79 (03/01/18 0913)   Resp   16 (03/01/18 0913)     SpO2   100 % (03/01/18 0913)     Post Anesthesia Care and Evaluation    Patient location during evaluation: bedside  Patient participation: complete - patient participated  Level of consciousness: awake and alert  Pain management: adequate  Airway patency: patent  Anesthetic complications: No anesthetic complications  PONV Status: controlled  Cardiovascular status: acceptable  Respiratory status: acceptable  Hydration status: acceptable

## 2018-03-02 ENCOUNTER — TELEPHONE (OUTPATIENT)
Dept: SURGERY | Facility: CLINIC | Age: 73
End: 2018-03-02

## 2018-03-02 LAB
CYTO UR: NORMAL
LAB AP CASE REPORT: NORMAL
Lab: NORMAL
PATH REPORT.FINAL DX SPEC: NORMAL
PATH REPORT.GROSS SPEC: NORMAL

## 2018-03-02 NOTE — TELEPHONE ENCOUNTER
I called and spoke with Maria Ines Zhang regarding his colonoscopy results.     Final Diagnosis     1: COLON, SPLENIC FLEXURE, BIOPSIES:                         FOCAL CHRONIC ACTIVE INFLAMMATION WITH ARCHITECTURAL DISTORTION AND SUPERFICIAL                                                 EROSION.                         NO IDENTIFIED GRANULOMAS.     2: LEFT/DESCENDING COLON, BIOPSIES:                         ULCERATION WITH ACUTE INFLAMMATION AND GRANULATION TISSUE REACTION.         The patient is symptom free and this is consistent with healing ischemic colitis.  I would not recommend any other treatment for now  Regarding her CT scan.  She has a 1 cm renal artery aneurysm.  She has no indication for therapy due to the size of the aneurysm.  She will need to have follow-up CT scan in 1 year    I recommend that she undergoes colonoscopy in 5 years.     She verbalized understanding and agreed    Ritchie Garland MD  General, Minimally Invasive and Endoscopic Surgery  [facility]    [address], [address]  [place], [place], [address]  P: [phone]  F: [phone]

## 2018-03-15 ENCOUNTER — OFFICE VISIT (OUTPATIENT)
Dept: ORTHOPEDIC SURGERY | Facility: CLINIC | Age: 73
End: 2018-03-15

## 2018-03-15 DIAGNOSIS — M54.16 LUMBAR RADICULOPATHY: ICD-10-CM

## 2018-03-15 DIAGNOSIS — M19.019 AC (ACROMIOCLAVICULAR) JOINT ARTHRITIS: ICD-10-CM

## 2018-03-15 DIAGNOSIS — M19.011 PRIMARY OSTEOARTHRITIS, RIGHT SHOULDER: ICD-10-CM

## 2018-03-15 DIAGNOSIS — R52 PAIN: Primary | ICD-10-CM

## 2018-03-15 PROCEDURE — 99213 OFFICE O/P EST LOW 20 MIN: CPT | Performed by: NURSE PRACTITIONER

## 2018-03-15 PROCEDURE — 73030 X-RAY EXAM OF SHOULDER: CPT | Performed by: NURSE PRACTITIONER

## 2018-03-15 PROCEDURE — 20610 DRAIN/INJ JOINT/BURSA W/O US: CPT | Performed by: NURSE PRACTITIONER

## 2018-03-15 PROCEDURE — 73562 X-RAY EXAM OF KNEE 3: CPT | Performed by: NURSE PRACTITIONER

## 2018-03-15 RX ORDER — TRIAMCINOLONE ACETONIDE 40 MG/ML
80 INJECTION, SUSPENSION INTRA-ARTICULAR; INTRAMUSCULAR
Status: COMPLETED | OUTPATIENT
Start: 2018-03-15 | End: 2018-03-15

## 2018-03-15 RX ORDER — LIDOCAINE HYDROCHLORIDE 10 MG/ML
4 INJECTION, SOLUTION EPIDURAL; INFILTRATION; INTRACAUDAL; PERINEURAL
Status: COMPLETED | OUTPATIENT
Start: 2018-03-15 | End: 2018-03-15

## 2018-03-15 RX ADMIN — TRIAMCINOLONE ACETONIDE 80 MG: 40 INJECTION, SUSPENSION INTRA-ARTICULAR; INTRAMUSCULAR at 14:47

## 2018-03-15 RX ADMIN — LIDOCAINE HYDROCHLORIDE 4 ML: 10 INJECTION, SOLUTION EPIDURAL; INFILTRATION; INTRACAUDAL; PERINEURAL at 14:47

## 2018-03-15 NOTE — PROGRESS NOTES
Subjective:     Patient ID: Maria Ines Zhang is a 72 y.o. female.    Chief Complaint: follow-up right shoulder pain, new onset pain lateral aspect left knee    History of Present Illness    Ms. Zhang presents back to clinic with a reported 1-2 month history of pain present at right shoulder. Pain present over lateral aspect right shoulder, increased pain with laying on right side at night and reaching up above head. Denies presence of numbness and tingling. Pain does radiate down right upper extremity. She also experiences pain over the acromioclavicular joint. Denies presence of numbness and tingling radiating down right upper extremity. She last received corticosteroid injection greater than 1 year ago.     Would also like to discuss pain that she is having at left knee. Pain present for the last one year and believes maybe radiating from low back however has had total knee replacement approximately 12 years ago with Dr. Weber. Denies that the knee is giving out on her and denies swelling. Pain starts low back and radiates down left lower extremity, along with presence of numbness and tingling. She has had previous lumbar spine surgery with Dr. Alvarado in past. Denies all other concerns present at this time.     Social History     Occupational History   • Not on file.     Social History Main Topics   • Smoking status: Never Smoker   • Smokeless tobacco: Never Used   • Alcohol use No   • Drug use: No   • Sexual activity: Defer      Past Medical History:   Diagnosis Date   • Anxiety    • Asthma    • Benign essential hypertension    • Benign liver cyst 2/15/2018    Founds incidentally on CT scan of abdomen. Not clinically significant in size or appearance.    • Colitis    • Cryptogenic stroke 8/3/2017   • DDD (degenerative disc disease), lumbar 12/10/2012    W/ spondylolisthesis, Patient has had 2 epidurals previously-Dr. Micheal Marquez   • DDD (degenerative disc disease), lumbosacral    • Depression    • Diverticulosis  2/15/2018   • Fatigue    • GERD (gastroesophageal reflux disease)    • H/O acute respiratory failure 02/25/2016    With bilateral pulmonary infiltrates-Dr. Jose Maria Funk   • Hyperlipidemia    • Hypertension    • Hypothyroidism    • IFG (impaired fasting glucose) 8/7/2017   • Mitral valve insufficiency     nild   • MRSA infection within last 3 months 05/2017    RIGHT FOOT   • Obesity    • Obstructive sleep apnea     USES CPAP   • Osteoarthritis    • Patent foramen ovale    • Pulmonary embolism     3 SEPARATE OCCASIONS   • Pulmonary hypertension    • SBO (small bowel obstruction)    • Sciatica    • Venous stasis      Past Surgical History:   Procedure Laterality Date   • BRONCHOSCOPY WITH ENDOSCOPY Right 02/25/2016    Exploratory bronchoscopy, exploratory RT video-assisted thoracoscopy and wedge resection of RT upper lobe and Subpleural pain catheter x2-Dr. Jose Maria Funk   • CATARACT EXTRACTION W/ INTRAOCULAR LENS IMPLANT Bilateral 2016   • COLONOSCOPY N/A 3/1/2018    Procedure: COLONOSCOPY INTO CECUM/ TERMINAL ILEUM WITH BIOPSIES AND CLIP X 1;  Surgeon: Ritchie Garland MD;  Location: [facility] ENDOSCOPY;  Service:    • HYSTERECTOMY     • LAPAROSCOPIC CHOLECYSTECTOMY N/A 10/01/2003    Dr. Nino Blackburn   • LUMBAR EPIDURAL INJECTION N/A 12/10/2012    Multiple lumbar epidural injections-Dr. Micheal Marquez   • LUMBAR FUSION N/A 08/2012   • VA TOTAL KNEE ARTHROPLASTY Right 7/6/2017    Procedure: TOTAL KNEE ARTHROPLASTY;  Surgeon: Dragan De La Vega MD;  Location: [facility] MAIN OR;  Service: Orthopedics   • SPINE SURGERY     • TOE AMPUTATION Right 05/2017    SECOND TOE   • TOTAL KNEE ARTHROPLASTY Left 11/13/2006    Dr. Jerome Weber   • UPPER GASTROINTESTINAL ENDOSCOPY N/A 10/02/2008    Dr. Salud Lowry       Family History   Problem Relation Age of Onset   • Heart disease Mother 60   • Diabetes Mother    • Stroke Mother 83   • Melanoma Father    • Heart attack Brother    • Heart disease Brother      Valve problem    • Heart  attack Maternal Grandmother    • Heart attack Maternal Grandfather    • No Known Problems Paternal Grandmother    • No Known Problems Paternal Grandfather    • Malig Hyperthermia Neg Hx    • Breast cancer Neg Hx          Review of Systems   Constitutional: Negative for chills, diaphoresis, fever and unexpected weight change.   HENT: Negative for hearing loss, nosebleeds, sore throat and tinnitus.    Eyes: Negative for pain and visual disturbance.   Respiratory: Negative for cough, shortness of breath and wheezing.    Cardiovascular: Negative for chest pain and palpitations.   Gastrointestinal: Negative for abdominal pain, diarrhea, nausea and vomiting.   Endocrine: Negative for cold intolerance, heat intolerance and polydipsia.   Genitourinary: Negative for difficulty urinating, dysuria and hematuria.   Musculoskeletal: Positive for arthralgias. Negative for joint swelling and myalgias.   Skin: Negative for rash and wound.   Allergic/Immunologic: Negative for environmental allergies.   Neurological: Negative for dizziness, syncope and numbness.   Hematological: Does not bruise/bleed easily.   Psychiatric/Behavioral: Negative for dysphoric mood and sleep disturbance. The patient is not nervous/anxious.    All other systems reviewed and are negative.          Objective:  Physical Exam    General: No acute distress.  Eyes: conjunctiva clear; pupils equally round and reactive  ENT: external ears and nose atraumatic; oropharynx clear  CV: no peripheral edema  Resp: normal respiratory effort  Skin: no rashes or wounds; normal turgor  Psych: mood and affect appropriate; recent and remote memory intact    There were no vitals filed for this visit.  There were no vitals filed for this visit.  There is no height or weight on file to calculate BMI.     Left Knee Exam     Tenderness   The patient is experiencing tenderness in the lateral joint line.    Range of Motion   Extension: 0   Flexion: 120     Tests   Mitchel:  Medial  - negative Lateral - negative  Drawer:       Anterior - negative     Posterior - negative  Varus: negative  Valgus: negative  Patellar Apprehension: negative    Other   Erythema: absent  Scars: absent  Sensation: normal  Swelling: none  Effusion: no effusion present      Left Shoulder Exam     Range of Motion   Forward Flexion: 180+   External Rotation: 60     Muscle Strength   Internal Rotation: 4/5   External Rotation: 4/5   Supraspinatus: 4/5   Subscapularis: 4/5   Biceps: 4/5     Tests   Apprehension: negative  Cross Arm: negative  Drop Arm: negative  Hawkin's test: negative  Impingement: negative  Sulcus: absent    Other   Erythema: absent  Scars: absent  Sensation: normal  Pulse: present     Comments:  Mildly positive empty can  negative [place]'s  Mildly positive Speed's  negative bear hug exam                Imaging:  Right Shoulder X-Ray  Indication: Pain  AP Internal and External Rotation views    Findings:  No fracture  No bony lesion  Normal soft tissues  Glenohumeral joint narrowing, AC joint narrowing    No prior studies were available for comparison.  Left Knee X-Ray  Indication: Pain    AP, Lateral, and [place] views    Findings:  No fracture  No bony lesion  Normal soft tissues  Implant well aligned, no evidence of loosening, dislocation nor fracture    No prior studies were available for comparison.    Assessment:       1. Pain    2. Lumbar radiculopathy    3. Primary osteoarthritis, right shoulder    4. AC (acromioclavicular) joint arthritis          Plan:  1. Discussed plan of care with patient. Wishes to proceed with corticosteroid injection, subacromial aspect, lateral.   2. Will have her complete MRI lumbar spine, prefers [facility]. Will plan to see her back to discuss results and will have her follow-up with Dr. Alvarado if necessary. Patient verbalized understanding of all information and agrees with plan of care. Denies all other concerns present at this time.     Orders:  Orders Placed This  Encounter   Procedures   • Large Joint Arthrocentesis   • XR Shoulder 2+ View Right   • XR Knee 3 View Left   • MRI Lumbar Spine Without Contrast       I ordered and reviewed the ROMEL today.     Dictated utilizing Dragon dictation     Large Joint Arthrocentesis  Date/Time: 3/15/2018 2:47 PM  Consent given by: patient  Site marked: site marked  Timeout: Immediately prior to procedure a time out was called to verify the correct patient, procedure, equipment, support staff and site/side marked as required   Supporting Documentation  Indications: pain   Procedure Details  Location: shoulder - R subacromial bursa  Preparation: Patient was prepped and draped in the usual sterile fashion  Needle size: 22 G  Medications administered: 4 mL lidocaine PF 1% 1 %; 80 mg triamcinolone acetonide 40 MG/ML  Patient tolerance: patient tolerated the procedure well with no immediate complications

## 2018-04-02 ENCOUNTER — OFFICE VISIT (OUTPATIENT)
Dept: INTERNAL MEDICINE | Facility: CLINIC | Age: 73
End: 2018-04-02

## 2018-04-02 VITALS
SYSTOLIC BLOOD PRESSURE: 132 MMHG | HEART RATE: 80 BPM | DIASTOLIC BLOOD PRESSURE: 80 MMHG | OXYGEN SATURATION: 97 % | TEMPERATURE: 98.1 F | BODY MASS INDEX: 34.27 KG/M2 | HEIGHT: 66 IN | WEIGHT: 213.2 LBS

## 2018-04-02 DIAGNOSIS — J45.901 REACTIVE AIRWAY DISEASE WITH ACUTE EXACERBATION, UNSPECIFIED ASTHMA SEVERITY, UNSPECIFIED WHETHER PERSISTENT: ICD-10-CM

## 2018-04-02 DIAGNOSIS — R09.81 NASAL CONGESTION: Primary | ICD-10-CM

## 2018-04-02 LAB
EXPIRATION DATE: NORMAL
FLUAV AG NPH QL: NORMAL
FLUBV AG NPH QL: NORMAL
INTERNAL CONTROL: NORMAL
Lab: NORMAL

## 2018-04-02 PROCEDURE — 94640 AIRWAY INHALATION TREATMENT: CPT | Performed by: INTERNAL MEDICINE

## 2018-04-02 PROCEDURE — 87804 INFLUENZA ASSAY W/OPTIC: CPT | Performed by: INTERNAL MEDICINE

## 2018-04-02 PROCEDURE — 99213 OFFICE O/P EST LOW 20 MIN: CPT | Performed by: INTERNAL MEDICINE

## 2018-04-02 RX ORDER — IPRATROPIUM BROMIDE AND ALBUTEROL SULFATE 2.5; .5 MG/3ML; MG/3ML
3 SOLUTION RESPIRATORY (INHALATION) ONCE
Status: COMPLETED | OUTPATIENT
Start: 2018-04-02 | End: 2018-04-02

## 2018-04-02 RX ORDER — CLARITHROMYCIN 500 MG/1
500 TABLET, COATED ORAL 2 TIMES DAILY
Qty: 20 TABLET | Refills: 0 | Status: SHIPPED | OUTPATIENT
Start: 2018-04-02 | End: 2018-04-12

## 2018-04-02 RX ORDER — PREDNISONE 10 MG/1
TABLET ORAL
Qty: 303 TABLET | Refills: 0 | Status: SHIPPED | OUTPATIENT
Start: 2018-04-02 | End: 2018-05-22

## 2018-04-02 RX ADMIN — IPRATROPIUM BROMIDE AND ALBUTEROL SULFATE 3 ML: 2.5; .5 SOLUTION RESPIRATORY (INHALATION) at 16:37

## 2018-04-02 NOTE — PROGRESS NOTES
Maria Ines Zhang is a 72 y.o. female, who presents with a chief complaint of   Chief Complaint   Patient presents with   • Cough     3 x days    • Nasal Congestion   • Chills       71 yo F here with 3 days of cough worse at night, congestion, fatigue, and chills. She has been taking Tylenol, Albuterol as needed, Tessalon pearls. Mucinex at night and phlegm is green.  was sick last week. She does feel SOB and has wheezing.          The following portions of the patient's history were reviewed and updated as appropriate: allergies, current medications, past family history, past medical history, past social history, past surgical history and problem list.    Allergies: Fish allergy; Iodine; Shellfish allergy; and Sulfa antibiotics    Current Outpatient Prescriptions:   •  albuterol (PROVENTIL HFA;VENTOLIN HFA) 108 (90 Base) MCG/ACT inhaler, Inhale 2 puffs Every 4 (Four) Hours As Needed for Wheezing or Shortness of Air., Disp: 1 inhaler, Rfl: 6  •  albuterol (PROVENTIL) (2.5 MG/3ML) 0.083% nebulizer solution, Take 2.5 mg by nebulization Every 4 (Four) Hours As Needed for Wheezing., Disp: 100 vial, Rfl: 1  •  budesonide-formoterol (SYMBICORT) 160-4.5 MCG/ACT inhaler, Inhale 2 puffs 2 (Two) Times a Day., Disp: , Rfl:   •  calcium polycarbophil (FIBERCON) 625 MG tablet, Take 625 mg by mouth Daily., Disp: , Rfl:   •  clonazePAM (KlonoPIN) 0.5 MG tablet, Take 0.5 mg by mouth Every Night., Disp: , Rfl:   •  cyclobenzaprine (FLEXERIL) 10 MG tablet, Take 1 tablet by mouth 3 (Three) Times a Day As Needed for Muscle Spasms., Disp: 90 tablet, Rfl: 1  •  escitalopram (LEXAPRO) 20 MG tablet, Take 1 tablet by mouth Daily., Disp: 90 tablet, Rfl: 1  •  esomeprazole (NexIUM) 20 MG capsule, Take 40 mg by mouth every morning before breakfast., Disp: , Rfl:   •  hydrochlorothiazide (HYDRODIURIL) 25 MG tablet, Take 1 tablet by mouth Daily., Disp: 30 tablet, Rfl: 3  •  levothyroxine (SYNTHROID, LEVOTHROID) 88 MCG tablet, Take 1  "tablet by mouth Daily., Disp: 90 tablet, Rfl: 1  •  lisinopril (PRINIVIL,ZESTRIL) 20 MG tablet, Take 1 tablet by mouth Daily., Disp: 30 tablet, Rfl: 5  •  meloxicam (MOBIC) 15 MG tablet, Take 15 mg by mouth Daily., Disp: , Rfl:   •  Multiple Vitamins-Minerals (MULTIVITAMIN ADULT PO), Take 1 tablet by mouth Daily., Disp: , Rfl:   •  Probiotic Product (PROBIOTIC-10 PO), Take  by mouth., Disp: , Rfl:   •  simvastatin (ZOCOR) 10 MG tablet, Take 10 mg by mouth Every Night., Disp: , Rfl:   •  Vitamin Mixture (ROCKY-C PO), Take 500 mg by mouth Daily., Disp: , Rfl:   •  clarithromycin (BIAXIN) 500 MG tablet, Take 1 tablet by mouth 2 (Two) Times a Day for 10 days., Disp: 20 tablet, Rfl: 0  •  predniSONE (DELTASONE) 10 MG tablet, Take 50mg PO Qam x 2 days, 40mg PO Qam x 2 days, 30mg PO Qam x 2 days, 20mg PO Qam, and 10mg PO Qam x 2 days, Disp: 303 tablet, Rfl: 0  No current facility-administered medications for this visit.   There are no discontinued medications.    Review of Systems   Constitutional: Positive for chills and fatigue. Negative for fever.   HENT: Positive for congestion.    Respiratory: Positive for shortness of breath and wheezing.    Cardiovascular: Negative for chest pain and palpitations.   Gastrointestinal: Negative for diarrhea and nausea.   Skin: Negative for rash.   Neurological: Negative for headaches.             /80 (BP Location: Left arm, Patient Position: Sitting, Cuff Size: Adult)   Pulse 80   Temp 98.1 °F (36.7 °C) (Oral)   Ht 167.6 cm (65.98\")   Wt 96.7 kg (213 lb 3.2 oz)   SpO2 97%   BMI 34.43 kg/m²       Physical Exam   Constitutional: She is oriented to person, place, and time. She appears well-developed and well-nourished. No distress.   HENT:   Head: Normocephalic and atraumatic.   Right Ear: External ear normal.   Left Ear: External ear normal.   Mouth/Throat: Oropharynx is clear and moist. No oropharyngeal exudate.   Eyes: Conjunctivae are normal. Right eye exhibits no " discharge. Left eye exhibits no discharge. No scleral icterus.   Neck: Neck supple.   Cardiovascular: Normal rate, regular rhythm and normal heart sounds.  Exam reveals no gallop and no friction rub.    No murmur heard.  Pulmonary/Chest: Effort normal. No respiratory distress. She has decreased breath sounds. She has wheezes (diffusely that were better after Duoneb ). She has no rales.   Lymphadenopathy:     She has no cervical adenopathy.   Neurological: She is alert and oriented to person, place, and time.   Skin: Skin is warm. No rash noted.   Psychiatric: She has a normal mood and affect. Her behavior is normal.   Nursing note and vitals reviewed.        Results for orders placed or performed in visit on 04/02/18   POC Influenza A / B   Result Value Ref Range    Rapid Influenza A Ag neg     Rapid Influenza B Ag neg     Internal Control Passed Passed    Lot Number [id number]     Expiration Date 08/01/2020            Maria Ines was seen today for cough, nasal congestion and chills.    Diagnoses and all orders for this visit:    Nasal congestion  -     POC Influenza A / B    Reactive airway disease with acute exacerbation, unspecified asthma severity, unspecified whether persistent  -     ipratropium-albuterol (DUO-NEB) nebulizer solution 3 mL; Take 3 mL by nebulization 1 (One) Time.    Other orders  -     clarithromycin (BIAXIN) 500 MG tablet; Take 1 tablet by mouth 2 (Two) Times a Day for 10 days.  -     predniSONE (DELTASONE) 10 MG tablet; Take 50mg PO Qam x 2 days, 40mg PO Qam x 2 days, 30mg PO Qam x 2 days, 20mg PO Qam, and 10mg PO Qam x 2 days      Will use albuterol every 4-6 hours for the next 48 hours   Start Clarithromycin to cover for bacterial infection   Start prednisone taper   Schedule follow up and if better, can cancel    Return in about 3 days (around 4/5/2018) for Recheck with me and can cancel if better .    Jeanine Blunt MD  04/02/2018

## 2018-04-05 ENCOUNTER — OFFICE VISIT (OUTPATIENT)
Dept: INTERNAL MEDICINE | Facility: CLINIC | Age: 73
End: 2018-04-05

## 2018-04-05 VITALS
TEMPERATURE: 98.4 F | WEIGHT: 218.8 LBS | HEIGHT: 66 IN | OXYGEN SATURATION: 97 % | BODY MASS INDEX: 35.17 KG/M2 | RESPIRATION RATE: 16 BRPM | DIASTOLIC BLOOD PRESSURE: 76 MMHG | HEART RATE: 92 BPM | SYSTOLIC BLOOD PRESSURE: 134 MMHG

## 2018-04-05 DIAGNOSIS — J45.909 REACTIVE AIRWAY DISEASE WITH WHEEZING WITHOUT COMPLICATION, UNSPECIFIED ASTHMA SEVERITY, UNSPECIFIED WHETHER PERSISTENT: Primary | ICD-10-CM

## 2018-04-05 DIAGNOSIS — R05.9 COUGH IN ADULT: ICD-10-CM

## 2018-04-05 PROCEDURE — 99213 OFFICE O/P EST LOW 20 MIN: CPT | Performed by: INTERNAL MEDICINE

## 2018-04-05 RX ORDER — GUAIFENESIN AND CODEINE PHOSPHATE 100; 10 MG/5ML; MG/5ML
5 SOLUTION ORAL 3 TIMES DAILY PRN
Qty: 118 ML | Refills: 0 | Status: SHIPPED | OUTPATIENT
Start: 2018-04-05 | End: 2018-10-03

## 2018-04-05 RX ORDER — BENZONATATE 100 MG/1
100 CAPSULE ORAL 3 TIMES DAILY PRN
Qty: 30 CAPSULE | Refills: 1 | Status: SHIPPED | OUTPATIENT
Start: 2018-04-05 | End: 2018-04-17

## 2018-04-05 NOTE — PROGRESS NOTES
Maria Ines Zhang is a 72 y.o. female, who presents with a chief complaint of   Chief Complaint   Patient presents with   • Follow-up     3 x day f/u    • Nasal Congestion       71 yo F here with 5 days of cough worse at night, congestion, fatigue, and chills. She has been taking Tylenol, Albuterol as needed, Tessalon pearls. Mucinex at night and phlegm is green.  was sick last week. She does feel SOB and has wheezing that is getting some better. No fever. Tolerating steroids, but keeping her awake.         The following portions of the patient's history were reviewed and updated as appropriate: allergies, current medications, past family history, past medical history, past social history, past surgical history and problem list.    Allergies: Fish allergy; Iodine; Shellfish allergy; and Sulfa antibiotics    Current Outpatient Prescriptions:   •  albuterol (PROVENTIL HFA;VENTOLIN HFA) 108 (90 Base) MCG/ACT inhaler, Inhale 2 puffs Every 4 (Four) Hours As Needed for Wheezing or Shortness of Air., Disp: 1 inhaler, Rfl: 6  •  albuterol (PROVENTIL) (2.5 MG/3ML) 0.083% nebulizer solution, Take 2.5 mg by nebulization Every 4 (Four) Hours As Needed for Wheezing., Disp: 100 vial, Rfl: 1  •  budesonide-formoterol (SYMBICORT) 160-4.5 MCG/ACT inhaler, Inhale 2 puffs 2 (Two) Times a Day., Disp: , Rfl:   •  calcium polycarbophil (FIBERCON) 625 MG tablet, Take 625 mg by mouth Daily., Disp: , Rfl:   •  clarithromycin (BIAXIN) 500 MG tablet, Take 1 tablet by mouth 2 (Two) Times a Day for 10 days., Disp: 20 tablet, Rfl: 0  •  clonazePAM (KlonoPIN) 0.5 MG tablet, Take 0.5 mg by mouth Every Night., Disp: , Rfl:   •  cyclobenzaprine (FLEXERIL) 10 MG tablet, Take 1 tablet by mouth 3 (Three) Times a Day As Needed for Muscle Spasms., Disp: 90 tablet, Rfl: 1  •  escitalopram (LEXAPRO) 20 MG tablet, Take 1 tablet by mouth Daily., Disp: 90 tablet, Rfl: 1  •  esomeprazole (NexIUM) 20 MG capsule, Take 40 mg by mouth every morning  "before breakfast., Disp: , Rfl:   •  hydrochlorothiazide (HYDRODIURIL) 25 MG tablet, Take 1 tablet by mouth Daily., Disp: 30 tablet, Rfl: 3  •  levothyroxine (SYNTHROID, LEVOTHROID) 88 MCG tablet, Take 1 tablet by mouth Daily., Disp: 90 tablet, Rfl: 1  •  lisinopril (PRINIVIL,ZESTRIL) 20 MG tablet, Take 1 tablet by mouth Daily., Disp: 30 tablet, Rfl: 5  •  meloxicam (MOBIC) 15 MG tablet, Take 15 mg by mouth Daily., Disp: , Rfl:   •  Multiple Vitamins-Minerals (MULTIVITAMIN ADULT PO), Take 1 tablet by mouth Daily., Disp: , Rfl:   •  predniSONE (DELTASONE) 10 MG tablet, Take 50mg PO Qam x 2 days, 40mg PO Qam x 2 days, 30mg PO Qam x 2 days, 20mg PO Qam, and 10mg PO Qam x 2 days, Disp: 303 tablet, Rfl: 0  •  Probiotic Product (PROBIOTIC-10 PO), Take  by mouth., Disp: , Rfl:   •  simvastatin (ZOCOR) 10 MG tablet, Take 10 mg by mouth Every Night., Disp: , Rfl:   •  Vitamin Mixture (ROCKY-C PO), Take 500 mg by mouth Daily., Disp: , Rfl:   •  benzonatate (TESSALON PERLES) 100 MG capsule, Take 1 capsule by mouth 3 (Three) Times a Day As Needed for Cough., Disp: 30 capsule, Rfl: 1  •  guaifenesin-codeine (GUAIFENESIN AC) 100-10 MG/5ML liquid, Take 5 mL by mouth 3 (Three) Times a Day As Needed for Cough., Disp: 118 mL, Rfl: 0  There are no discontinued medications.    Review of Systems   Constitutional: Positive for chills and fatigue. Negative for fever.   HENT: Positive for congestion.    Respiratory: Positive for cough and shortness of breath.    Gastrointestinal: Negative for diarrhea, nausea and vomiting.   Skin: Negative for rash.             /76 (BP Location: Left arm, Patient Position: Sitting, Cuff Size: Adult)   Pulse 92   Temp 98.4 °F (36.9 °C) (Oral)   Resp 16   Ht 167.6 cm (65.98\")   Wt 99.2 kg (218 lb 12.8 oz)   SpO2 97%   BMI 35.33 kg/m²       Physical Exam   Constitutional: She is oriented to person, place, and time. She appears well-developed and well-nourished. No distress.   HENT:   Head: " Normocephalic and atraumatic.   Right Ear: External ear normal.   Left Ear: External ear normal.   Mouth/Throat: Uvula is midline and mucous membranes are normal. Posterior oropharyngeal edema and posterior oropharyngeal erythema present. No oropharyngeal exudate. Tonsils are 0 on the right. Tonsils are 0 on the left. No tonsillar exudate.   Eyes: Conjunctivae are normal. Right eye exhibits no discharge. Left eye exhibits no discharge. No scleral icterus.   Neck: Neck supple.   Cardiovascular: Normal rate, regular rhythm and normal heart sounds.  Exam reveals no gallop and no friction rub.    No murmur heard.  Pulmonary/Chest: Effort normal and breath sounds normal. No respiratory distress. She has no wheezes. She has no rales.   Abdominal: She exhibits no distension.   Lymphadenopathy:     She has no cervical adenopathy.   Neurological: She is alert and oriented to person, place, and time.   Skin: Skin is warm. No rash noted.   Psychiatric: She has a normal mood and affect. Her behavior is normal.   Nursing note and vitals reviewed.        Results for orders placed or performed in visit on 04/02/18   POC Influenza A / B   Result Value Ref Range    Rapid Influenza A Ag neg     Rapid Influenza B Ag neg     Internal Control Passed Passed    Lot Number [id number]     Expiration Date 08/01/2020            Maria Ines was seen today for follow-up and nasal congestion.    Diagnoses and all orders for this visit:    Reactive airway disease with wheezing without complication, unspecified asthma severity, unspecified whether persistent    Cough in adult    Other orders  -     guaifenesin-codeine (GUAIFENESIN AC) 100-10 MG/5ML liquid; Take 5 mL by mouth 3 (Three) Times a Day As Needed for Cough.  -     benzonatate (TESSALON PERLES) 100 MG capsule; Take 1 capsule by mouth 3 (Three) Times a Day As Needed for Cough.    Add Mucinex with Codeine for cough   Add salt water gargles and continue Tessalon pearls   Albuterol as needed        Return if symptoms worsen or fail to improve.    Jeanine Blunt MD  04/05/2018

## 2018-04-11 RX ORDER — BUDESONIDE AND FORMOTEROL FUMARATE DIHYDRATE 160; 4.5 UG/1; UG/1
AEROSOL RESPIRATORY (INHALATION)
Refills: 3 | OUTPATIENT
Start: 2018-04-11

## 2018-04-11 RX ORDER — LISINOPRIL 20 MG/1
20 TABLET ORAL DAILY
Qty: 30 TABLET | Refills: 5 | Status: SHIPPED | OUTPATIENT
Start: 2018-04-11 | End: 2018-09-18 | Stop reason: SDUPTHER

## 2018-04-11 NOTE — TELEPHONE ENCOUNTER
I dont see that you have ever seen this pt. She scheduled in 2016 but then canceled    Looks like her pcp is dr KIMMY Blunt

## 2018-04-12 RX ORDER — BUDESONIDE AND FORMOTEROL FUMARATE DIHYDRATE 160; 4.5 UG/1; UG/1
2 AEROSOL RESPIRATORY (INHALATION)
Qty: 10.2 G | Refills: 6 | Status: SHIPPED | OUTPATIENT
Start: 2018-04-12 | End: 2018-11-09 | Stop reason: SDUPTHER

## 2018-04-16 RX ORDER — MELOXICAM 15 MG/1
TABLET ORAL
Qty: 30 TABLET | Refills: 1 | Status: SHIPPED | OUTPATIENT
Start: 2018-04-16 | End: 2018-06-20 | Stop reason: SDUPTHER

## 2018-04-17 ENCOUNTER — OFFICE VISIT (OUTPATIENT)
Dept: INTERNAL MEDICINE | Facility: CLINIC | Age: 73
End: 2018-04-17

## 2018-04-17 VITALS
WEIGHT: 218 LBS | RESPIRATION RATE: 16 BRPM | BODY MASS INDEX: 35.03 KG/M2 | OXYGEN SATURATION: 95 % | TEMPERATURE: 98.5 F | DIASTOLIC BLOOD PRESSURE: 76 MMHG | SYSTOLIC BLOOD PRESSURE: 144 MMHG | HEART RATE: 85 BPM | HEIGHT: 66 IN

## 2018-04-17 DIAGNOSIS — B37.0 THRUSH: ICD-10-CM

## 2018-04-17 DIAGNOSIS — J04.0 LARYNGITIS: Primary | ICD-10-CM

## 2018-04-17 PROCEDURE — 99213 OFFICE O/P EST LOW 20 MIN: CPT | Performed by: INTERNAL MEDICINE

## 2018-04-17 NOTE — PROGRESS NOTES
Maria Ines Zhang is a 72 y.o. female, who presents with a chief complaint of   Chief Complaint   Patient presents with   • Hoarse     1 x week    • Sore Throat   • Nasal Congestion       71 yo F with COPD and recent exacerbation here for sore throat and hoarseness. Still having green snot and congestion hoarse voice. No fever or SOB. No wheezing. Taking treatments few times per day and using Symbicort once per day. She is having some pain when she eats.          The following portions of the patient's history were reviewed and updated as appropriate: allergies, current medications, past family history, past medical history, past social history, past surgical history and problem list.    Allergies: Fish allergy; Iodine; Shellfish allergy; and Sulfa antibiotics    Current Outpatient Prescriptions:   •  albuterol (PROVENTIL HFA;VENTOLIN HFA) 108 (90 Base) MCG/ACT inhaler, Inhale 2 puffs Every 4 (Four) Hours As Needed for Wheezing or Shortness of Air., Disp: 1 inhaler, Rfl: 6  •  albuterol (PROVENTIL) (2.5 MG/3ML) 0.083% nebulizer solution, Take 2.5 mg by nebulization Every 4 (Four) Hours As Needed for Wheezing., Disp: 100 vial, Rfl: 1  •  budesonide-formoterol (SYMBICORT) 160-4.5 MCG/ACT inhaler, Inhale 2 puffs 2 (Two) Times a Day., Disp: 10.2 g, Rfl: 6  •  calcium polycarbophil (FIBERCON) 625 MG tablet, Take 625 mg by mouth Daily., Disp: , Rfl:   •  clonazePAM (KlonoPIN) 0.5 MG tablet, Take 0.5 mg by mouth Every Night., Disp: , Rfl:   •  cyclobenzaprine (FLEXERIL) 10 MG tablet, Take 1 tablet by mouth 3 (Three) Times a Day As Needed for Muscle Spasms., Disp: 90 tablet, Rfl: 1  •  escitalopram (LEXAPRO) 20 MG tablet, Take 1 tablet by mouth Daily., Disp: 90 tablet, Rfl: 1  •  esomeprazole (NexIUM) 20 MG capsule, Take 40 mg by mouth every morning before breakfast., Disp: , Rfl:   •  guaifenesin-codeine (GUAIFENESIN AC) 100-10 MG/5ML liquid, Take 5 mL by mouth 3 (Three) Times a Day As Needed for Cough., Disp: 118 mL,  "Rfl: 0  •  hydrochlorothiazide (HYDRODIURIL) 25 MG tablet, Take 1 tablet by mouth Daily., Disp: 30 tablet, Rfl: 3  •  levothyroxine (SYNTHROID, LEVOTHROID) 88 MCG tablet, Take 1 tablet by mouth Daily., Disp: 90 tablet, Rfl: 1  •  lisinopril (PRINIVIL,ZESTRIL) 20 MG tablet, Take 1 tablet by mouth Daily., Disp: 30 tablet, Rfl: 5  •  meloxicam (MOBIC) 15 MG tablet, TAKE ONE TABLET BY MOUTH DAILY, Disp: 30 tablet, Rfl: 1  •  Multiple Vitamins-Minerals (MULTIVITAMIN ADULT PO), Take 1 tablet by mouth Daily., Disp: , Rfl:   •  predniSONE (DELTASONE) 10 MG tablet, Take 50mg PO Qam x 2 days, 40mg PO Qam x 2 days, 30mg PO Qam x 2 days, 20mg PO Qam, and 10mg PO Qam x 2 days, Disp: 303 tablet, Rfl: 0  •  Probiotic Product (PROBIOTIC-10 PO), Take  by mouth., Disp: , Rfl:   •  simvastatin (ZOCOR) 10 MG tablet, Take 10 mg by mouth Every Night., Disp: , Rfl:   •  Vitamin Mixture (ROCKY-C PO), Take 500 mg by mouth Daily., Disp: , Rfl:   •  nystatin (MYCOSTATIN) 462232 UNIT/ML suspension, Swish and swallow 5 mL 4 (Four) Times a Day for 7 days., Disp: 140 mL, Rfl: 0  Medications Discontinued During This Encounter   Medication Reason   • benzonatate (TESSALON PERLES) 100 MG capsule *Therapy completed       Review of Systems   Constitutional: Positive for fatigue. Negative for chills and fever.   HENT: Positive for congestion, postnasal drip and sore throat.    Respiratory: Negative for cough, shortness of breath and wheezing.    Gastrointestinal: Negative for nausea.             /76 (BP Location: Left arm, Patient Position: Sitting, Cuff Size: Adult)   Pulse 85   Temp 98.5 °F (36.9 °C) (Oral)   Resp 16   Ht 167.6 cm (65.98\")   Wt 98.9 kg (218 lb)   SpO2 95%   BMI 35.20 kg/m²       Physical Exam   Constitutional: She is oriented to person, place, and time. She appears well-developed and well-nourished. No distress.   HENT:   Head: Normocephalic and atraumatic.   Right Ear: Hearing, tympanic membrane and external ear normal. "   Left Ear: Hearing, tympanic membrane and external ear normal.   Nose: Nose normal.   Mouth/Throat: Mucous membranes are normal. Oral lesions (thrush on tongue and back of throat ) present. Posterior oropharyngeal edema and posterior oropharyngeal erythema present. No oropharyngeal exudate. Tonsils are 1+ on the right. Tonsils are 1+ on the left. No tonsillar exudate.   Eyes: Conjunctivae are normal. Right eye exhibits no discharge. Left eye exhibits no discharge. No scleral icterus.   Neck: Neck supple.   Cardiovascular: Normal rate, regular rhythm and normal heart sounds.  Exam reveals no gallop and no friction rub.    No murmur heard.  Pulmonary/Chest: Effort normal and breath sounds normal. No respiratory distress. She has no wheezes. She has no rales.   Lymphadenopathy:     She has no cervical adenopathy.   Neurological: She is alert and oriented to person, place, and time.   Skin: Skin is warm. No rash noted.   Psychiatric: She has a normal mood and affect. Her behavior is normal.   Nursing note and vitals reviewed.          Results for orders placed or performed in visit on 04/02/18   POC Influenza A / B   Result Value Ref Range    Rapid Influenza A Ag neg     Rapid Influenza B Ag neg     Internal Control Passed Passed    Lot Number [id number]     Expiration Date 08/01/2020            Maria Ines was seen today for hoarse, sore throat and nasal congestion.    Diagnoses and all orders for this visit:    Laryngitis    Thrush    Other orders  -     nystatin (MYCOSTATIN) [id number] UNIT/ML suspension; Swish and swallow 5 mL 4 (Four) Times a Day for 7 days.      Treat thrush with Nystatin swish and swallow   Salt water gargles   Rest voice and make sure that she is rinsing mouth or brushing teeth after using Symbicort   No antibiotics needed today, lung exam sounds very good     Return if symptoms worsen or fail to improve.    Jeanine Blunt MD  04/17/2018

## 2018-05-07 RX ORDER — ESCITALOPRAM OXALATE 20 MG/1
20 TABLET ORAL DAILY
Qty: 90 TABLET | Refills: 1 | Status: SHIPPED | OUTPATIENT
Start: 2018-05-07 | End: 2018-10-31 | Stop reason: SDUPTHER

## 2018-05-14 RX ORDER — CLONAZEPAM 0.5 MG/1
0.5 TABLET ORAL NIGHTLY PRN
Qty: 90 TABLET | Refills: 0 | Status: SHIPPED | OUTPATIENT
Start: 2018-05-14 | End: 2018-08-13 | Stop reason: SDUPTHER

## 2018-05-22 ENCOUNTER — OFFICE VISIT (OUTPATIENT)
Dept: SURGERY | Facility: CLINIC | Age: 73
End: 2018-05-22

## 2018-05-22 VITALS — HEIGHT: 66 IN | BODY MASS INDEX: 35.03 KG/M2 | WEIGHT: 218 LBS | OXYGEN SATURATION: 98 % | HEART RATE: 74 BPM

## 2018-05-22 DIAGNOSIS — R19.7 DIARRHEA, UNSPECIFIED TYPE: Primary | ICD-10-CM

## 2018-05-22 PROCEDURE — 99214 OFFICE O/P EST MOD 30 MIN: CPT | Performed by: SURGERY

## 2018-05-22 RX ORDER — AMOXICILLIN 250 MG
2 CAPSULE ORAL DAILY PRN
Qty: 30 TABLET | Refills: 1 | Status: SHIPPED | OUTPATIENT
Start: 2018-05-22 | End: 2019-05-22

## 2018-05-22 RX ORDER — POLYETHYLENE GLYCOL 3350 17 G/17G
17 POWDER, FOR SOLUTION ORAL DAILY
Qty: 30 PACKET | Refills: 2 | Status: SHIPPED | OUTPATIENT
Start: 2018-05-22 | End: 2018-10-03

## 2018-05-23 NOTE — PROGRESS NOTES
CC: Nausea, vomiting and diarrhea     HPI: Patient is a pleasant 72-year-old female with history of ischemic colitis that is here today in my office for evaluation of intermittent episodes of nausea, vomiting and diarrhea.  The patient was discharged at the beginning of the year from the hospital after having ischemic colitis.  She was seen for follow-up and was doing great. Three excess ago she developed nausea that was followed by nonbloody and bilious vomiting.  This started all of the sudden without any triggers.  At that time she had mild abdominal discomfort for at the epigastric area and the left upper quadrant but not pain.  After a couple of hours she developed watery diarrhea for approximately 10-15 liquid bowel movements without any blood.  She denies any fevers or chills.  She reports no heartburn associated with this.  These 2 episodes lasted for approximately 8-12 hours and resolved spontaneously.  Otherwise, she reports being constipated most of the time and having one bowel movement every 3-4 days.  Recently had a colonoscopy that show changes consistent with ischemic colitis over the splenic flexure.  She denies any recent episodes of weight loss and ringing in drinking without any problems..  There were no sick contacts and the patient cannot recognize any triggering factors.  She takes meloxicam chronically for arthritis.  She has been taking Nexium for a long time for acid reflux that is well controlled with medications. 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  The patient is , does not smoke or drink alcohol or take any drugs        ROS:   Constitutional: denies any weight changes, fatigue or weakness.  Eyes: : denies blurred or double vision  Cardiovascular: denies chest pain, palpitations, edemas.  Respiratory: denies cough, sputum, SOB.  Gastrointestinal: As per history of present illness  Genitourinary: denies dysuria, frequency.  Endocrine: denies cold intolerance, lethargy and flushing.  Hem: denies excessive bruising and postop bleeding.  Musculoskeletal: denies weakness, joint swelling, pain or stiffness.  Neuro: denies seizures, CVA, paresthesia, or peripheral neuropathy.   Skin: denies change in nevi, rashes, masses.     PE: The patient is afebrile, vital signs are stable  Alert and oriented ×3, no acute distress.  Head is normocephalic and atraumatic.  Neck is supple there is no thyroimegaly or lymphadenopathy  Chest is clear bilaterally there is no added sounds  Regular rate and rhythm, no murmurs  Abdomen is obese, soft and nontender, is nondistended, bowel sounds are positive.  There is no rebound or guarding is and there is no peritoneal signs.  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 Sigmoid diverticulosis without evidence of diverticulitis.  Small liver cysts. Otherwise unremarkable CTA of the abdomen and pelvis.  There is some calcification at the level of the celiac and SMA takeoffs but no evidence of strictures     Assessment and plan    The patient is a very pleasant 72-year-old female with 2 different episodes of nausea, vomiting and diarrhea that are self limited.  There is no other associated symptoms other than  mild left-sided abdominal discomfort.  She also has constipation.  I'm not sure what is causing the patient's symptoms.  These episodes are self limiting and do not require any type of therapeutic intervention.  I think that if this was related to her ischemic colitis she would have symptoms for a longer period as well as bloody stools or abdominal pain but without any nausea or vomiting.  Furthermore, I do not see evidence of stricture at the SMA and celiac trunks that would cause chronic mesenteric ischemia. She has been taking meloxicam for several years and she has been on Nexium for gastric protection.  It's unlikely her symptoms are related to peptic ulcer disease.  Prior history of cholecystectomy, retained common bile duct stones can cause some of the symptoms patient reports.  On her last CT scan there is no evidence of common bile duct dilation.  I think she will need further workup with stool studies, laboratory work with CBC and CMP.  In the meantime I will start the patient on senna and MiraLAX to be taken on a daily basis.     - Plan for CT scan abdomen and pelvis with IV and by mouth contrast.  I order prednisone and Benadryl for contrast dye allergy  - Stool for ova and parasites, stool culture and stool leukocytes  - CBC and CMP  - UA   - Senna and MiraLAX and a daily basis.  Patient to continue to take Metamucil 1 tablespoon daily.  - Increase water intake and prevent dehydration    Patient verbalized understanding and agree with the plan     Ritchie Garland MD  General, Minimally Invasive and Endoscopic Surgery  [facility]     [address], [address]  [place], [place], [address]  P: [phone]  F: [phone]

## 2018-05-24 PROBLEM — M16.11 ARTHRITIS OF RIGHT HIP: Status: ACTIVE | Noted: 2018-05-24

## 2018-05-24 PROBLEM — M70.61 TROCHANTERIC BURSITIS OF RIGHT HIP: Status: ACTIVE | Noted: 2018-05-24

## 2018-05-30 ENCOUNTER — LAB (OUTPATIENT)
Dept: LAB | Facility: HOSPITAL | Age: 73
End: 2018-05-30
Attending: SURGERY

## 2018-05-30 ENCOUNTER — HOSPITAL ENCOUNTER (OUTPATIENT)
Dept: CT IMAGING | Facility: HOSPITAL | Age: 73
Discharge: HOME | End: 2018-05-30
Attending: SURGERY | Admitting: SURGERY

## 2018-05-30 DIAGNOSIS — R19.7 DIARRHEA, UNSPECIFIED TYPE: ICD-10-CM

## 2018-05-30 LAB
ALBUMIN SERPL-MCNC: 4.8 G/DL (ref 3.5–5.2)
ALP SERPL-CCNC: 84 U/L (ref 39–117)
ALT SERPL W P-5'-P-CCNC: 9 U/L (ref 1–33)
AST SERPL-CCNC: 13 U/L (ref 1–32)
BACTERIA UR QL AUTO: ABNORMAL /HPF
BASOPHILS # BLD AUTO: 0 10*3/MM3 (ref 0–0.2)
BASOPHILS NFR BLD AUTO: 0 % (ref 0–1.5)
BILIRUB CONJ SERPL-MCNC: <0.2 MG/DL (ref 0–0.3)
BILIRUB INDIRECT SERPL-MCNC: NORMAL MG/DL
BILIRUB SERPL-MCNC: 0.3 MG/DL (ref 0.1–1.2)
BILIRUB UR QL STRIP: NEGATIVE
CLARITY UR: CLEAR
COLOR UR: YELLOW
CREAT BLDA-MCNC: 0.8 MG/DL (ref 0.6–1.3)
DEPRECATED RDW RBC AUTO: 47.8 FL (ref 37–54)
EOSINOPHIL # BLD AUTO: 0 10*3/MM3 (ref 0–0.7)
EOSINOPHIL NFR BLD AUTO: 0 % (ref 0.3–6.2)
ERYTHROCYTE [DISTWIDTH] IN BLOOD BY AUTOMATED COUNT: 13.6 % (ref 11.7–13)
GLUCOSE UR STRIP-MCNC: NEGATIVE MG/DL
HCT VFR BLD AUTO: 38.2 % (ref 35.6–45.5)
HGB BLD-MCNC: 11.9 G/DL (ref 11.9–15.5)
HGB UR QL STRIP.AUTO: NEGATIVE
HYALINE CASTS UR QL AUTO: ABNORMAL /LPF
IMM GRANULOCYTES # BLD: 0.02 10*3/MM3 (ref 0–0.03)
IMM GRANULOCYTES NFR BLD: 0.2 % (ref 0–0.5)
KETONES UR QL STRIP: NEGATIVE
LEUKOCYTE ESTERASE UR QL STRIP.AUTO: ABNORMAL
LYMPHOCYTES # BLD AUTO: 1.04 10*3/MM3 (ref 0.9–4.8)
LYMPHOCYTES NFR BLD AUTO: 12.9 % (ref 19.6–45.3)
MCH RBC QN AUTO: 29.8 PG (ref 26.9–32)
MCHC RBC AUTO-ENTMCNC: 31.2 G/DL (ref 32.4–36.3)
MCV RBC AUTO: 95.5 FL (ref 80.5–98.2)
MONOCYTES # BLD AUTO: 0.17 10*3/MM3 (ref 0.2–1.2)
MONOCYTES NFR BLD AUTO: 2.1 % (ref 5–12)
NEUTROPHILS # BLD AUTO: 6.88 10*3/MM3 (ref 1.9–8.1)
NEUTROPHILS NFR BLD AUTO: 85 % (ref 42.7–76)
NITRITE UR QL STRIP: NEGATIVE
PH UR STRIP.AUTO: 6.5 [PH] (ref 5–8)
PLATELET # BLD AUTO: 395 10*3/MM3 (ref 140–500)
PMV BLD AUTO: 10.5 FL (ref 6–12)
PROT SERPL-MCNC: 8 G/DL (ref 6–8.5)
PROT UR QL STRIP: NEGATIVE
RBC # BLD AUTO: 4 10*6/MM3 (ref 3.9–5.2)
RBC # UR: ABNORMAL /HPF
REF LAB TEST METHOD: ABNORMAL
SP GR UR STRIP: 1.01 (ref 1–1.03)
SQUAMOUS #/AREA URNS HPF: ABNORMAL /HPF
UROBILINOGEN UR QL STRIP: ABNORMAL
WBC NRBC COR # BLD: 8.09 10*3/MM3 (ref 4.5–10.7)
WBC UR QL AUTO: ABNORMAL /HPF

## 2018-05-30 PROCEDURE — 0 DIATRIZOATE MEGLUMINE & SODIUM PER 1 ML: Performed by: SURGERY

## 2018-05-30 PROCEDURE — 82565 ASSAY OF CREATININE: CPT

## 2018-05-30 PROCEDURE — 85025 COMPLETE CBC W/AUTO DIFF WBC: CPT | Performed by: SURGERY

## 2018-05-30 PROCEDURE — 25010000002 IOPAMIDOL 61 % SOLUTION: Performed by: SURGERY

## 2018-05-30 PROCEDURE — 81001 URINALYSIS AUTO W/SCOPE: CPT | Performed by: SURGERY

## 2018-05-30 PROCEDURE — 74177 CT ABD & PELVIS W/CONTRAST: CPT

## 2018-05-30 PROCEDURE — 80076 HEPATIC FUNCTION PANEL: CPT | Performed by: SURGERY

## 2018-05-30 PROCEDURE — 36415 COLL VENOUS BLD VENIPUNCTURE: CPT

## 2018-05-30 RX ADMIN — DIATRIZOATE MEGLUMINE AND DIATRIZOATE SODIUM 30 ML: 660; 100 LIQUID ORAL; RECTAL at 14:15

## 2018-05-30 RX ADMIN — IOPAMIDOL 85 ML: 612 INJECTION, SOLUTION INTRAVENOUS at 15:30

## 2018-06-04 ENCOUNTER — TELEPHONE (OUTPATIENT)
Dept: SURGERY | Facility: CLINIC | Age: 73
End: 2018-06-04

## 2018-06-04 NOTE — TELEPHONE ENCOUNTER
I spoke with the patient regarding her CT scan and laboratory results.  Everything looks fine.  She has a normal CT scan.  Small amount of microscopic blood in the urine but no evidence of nephrolithiasis.  Her white blood cell is within normal limits.  Does not have any evidence of anemia.  Her liver function is normal.  I have discussed with the patient that her symptoms may be related to dietary allergies and that she will need to try to pinpoint what are the foods they give her problems and try to avoid them.  For now she does not have any surgical issue.  She should follow-up with her primary care physician as previously scheduled

## 2018-06-20 RX ORDER — MELOXICAM 15 MG/1
TABLET ORAL
Qty: 30 TABLET | Refills: 0 | Status: SHIPPED | OUTPATIENT
Start: 2018-06-20 | End: 2018-07-24 | Stop reason: SDUPTHER

## 2018-06-25 RX ORDER — HYDROCHLOROTHIAZIDE 25 MG/1
TABLET ORAL
Qty: 30 TABLET | Refills: 2 | Status: SHIPPED | OUTPATIENT
Start: 2018-06-25 | End: 2018-10-03

## 2018-07-24 RX ORDER — MELOXICAM 15 MG/1
TABLET ORAL
Qty: 30 TABLET | Refills: 0 | Status: SHIPPED | OUTPATIENT
Start: 2018-07-24 | End: 2018-08-22 | Stop reason: SDUPTHER

## 2018-08-06 ENCOUNTER — OFFICE VISIT (OUTPATIENT)
Dept: INTERNAL MEDICINE | Facility: CLINIC | Age: 73
End: 2018-08-06

## 2018-08-06 VITALS
HEIGHT: 66 IN | HEART RATE: 80 BPM | WEIGHT: 226 LBS | SYSTOLIC BLOOD PRESSURE: 130 MMHG | BODY MASS INDEX: 36.32 KG/M2 | DIASTOLIC BLOOD PRESSURE: 58 MMHG | OXYGEN SATURATION: 98 % | RESPIRATION RATE: 20 BRPM | TEMPERATURE: 98.8 F

## 2018-08-06 DIAGNOSIS — J01.10 ACUTE FRONTAL SINUSITIS, RECURRENCE NOT SPECIFIED: Primary | ICD-10-CM

## 2018-08-06 DIAGNOSIS — R30.0 BURNING WITH URINATION: ICD-10-CM

## 2018-08-06 DIAGNOSIS — J30.89 CHRONIC NON-SEASONAL ALLERGIC RHINITIS, UNSPECIFIED TRIGGER: ICD-10-CM

## 2018-08-06 LAB
BILIRUB BLD-MCNC: NEGATIVE MG/DL
CLARITY, POC: CLEAR
COLOR UR: YELLOW
GLUCOSE UR STRIP-MCNC: NEGATIVE MG/DL
KETONES UR QL: NEGATIVE
LEUKOCYTE EST, POC: ABNORMAL
NITRITE UR-MCNC: NEGATIVE MG/ML
PH UR: 7 [PH] (ref 5–8)
PROT UR STRIP-MCNC: NEGATIVE MG/DL
RBC # UR STRIP: NEGATIVE /UL
SP GR UR: 1.01 (ref 1–1.03)
UROBILINOGEN UR QL: NORMAL

## 2018-08-06 PROCEDURE — 81003 URINALYSIS AUTO W/O SCOPE: CPT | Performed by: INTERNAL MEDICINE

## 2018-08-06 PROCEDURE — 99214 OFFICE O/P EST MOD 30 MIN: CPT | Performed by: INTERNAL MEDICINE

## 2018-08-06 RX ORDER — CEFDINIR 300 MG/1
300 CAPSULE ORAL 2 TIMES DAILY
Qty: 14 CAPSULE | Refills: 0 | Status: SHIPPED | OUTPATIENT
Start: 2018-08-06 | End: 2018-08-13

## 2018-08-06 RX ORDER — MONTELUKAST SODIUM 10 MG/1
10 TABLET ORAL NIGHTLY
Qty: 30 TABLET | Refills: 6 | Status: SHIPPED | OUTPATIENT
Start: 2018-08-06 | End: 2019-02-28 | Stop reason: SDUPTHER

## 2018-08-06 NOTE — PROGRESS NOTES
Maria Ines Zhang is a 72 y.o. female, who presents with a chief complaint of   Chief Complaint   Patient presents with   • Sinus Problem     abx 7/15/18   • Difficulty Urinating     burning        73 yo F here for acute visit. She is here with sinus pressure and started on antibiotics on 7/15/18 and was started on Amoxil that didn't help. She states that she feels pressure behind her sinuses. No fever or chills. Itchy eyes and drainage in back of throat.     She has had occasional burning when she urinates. No fever or flank pain. No pain currently.          The following portions of the patient's history were reviewed and updated as appropriate: allergies, current medications, past family history, past medical history, past social history, past surgical history and problem list.    Allergies: Fish allergy; Iodine; Shellfish allergy; and Sulfa antibiotics    Current Outpatient Prescriptions:   •  albuterol (PROVENTIL HFA;VENTOLIN HFA) 108 (90 Base) MCG/ACT inhaler, Inhale 2 puffs Every 4 (Four) Hours As Needed for Wheezing or Shortness of Air., Disp: 1 inhaler, Rfl: 6  •  budesonide-formoterol (SYMBICORT) 160-4.5 MCG/ACT inhaler, Inhale 2 puffs 2 (Two) Times a Day., Disp: 10.2 g, Rfl: 6  •  calcium polycarbophil (FIBERCON) 625 MG tablet, Take 625 mg by mouth Daily., Disp: , Rfl:   •  clonazePAM (KlonoPIN) 0.5 MG tablet, Take 1 tablet by mouth At Night As Needed for Seizures., Disp: 90 tablet, Rfl: 0  •  cyclobenzaprine (FLEXERIL) 10 MG tablet, Take 1 tablet by mouth 3 (Three) Times a Day As Needed for Muscle Spasms., Disp: 90 tablet, Rfl: 1  •  escitalopram (LEXAPRO) 20 MG tablet, Take 1 tablet by mouth Daily., Disp: 90 tablet, Rfl: 1  •  esomeprazole (NexIUM) 20 MG capsule, Take 40 mg by mouth every morning before breakfast., Disp: , Rfl:   •  guaifenesin-codeine (GUAIFENESIN AC) 100-10 MG/5ML liquid, Take 5 mL by mouth 3 (Three) Times a Day As Needed for Cough., Disp: 118 mL, Rfl: 0  •  hydrochlorothiazide  "(HYDRODIURIL) 25 MG tablet, TAKE ONE TABLET BY MOUTH DAILY, Disp: 30 tablet, Rfl: 2  •  levothyroxine (SYNTHROID, LEVOTHROID) 88 MCG tablet, Take 1 tablet by mouth Daily., Disp: 90 tablet, Rfl: 1  •  lisinopril (PRINIVIL,ZESTRIL) 20 MG tablet, Take 1 tablet by mouth Daily., Disp: 30 tablet, Rfl: 5  •  meloxicam (MOBIC) 15 MG tablet, TAKE ONE TABLET BY MOUTH DAILY, Disp: 30 tablet, Rfl: 0  •  Multiple Vitamins-Minerals (MULTIVITAMIN ADULT PO), Take 1 tablet by mouth Daily., Disp: , Rfl:   •  polyethylene glycol (MIRALAX) packet, Take 17 g by mouth Daily., Disp: 30 packet, Rfl: 2  •  Probiotic Product (PROBIOTIC-10 PO), Take  by mouth., Disp: , Rfl:   •  senna-docusate (PERICOLACE) 8.6-50 MG per tablet, Take 2 tablets by mouth Daily As Needed for Constipation., Disp: 30 tablet, Rfl: 1  •  simvastatin (ZOCOR) 10 MG tablet, Take 10 mg by mouth Every Night., Disp: , Rfl:   •  Vitamin Mixture (ROCKY-C PO), Take 500 mg by mouth Daily., Disp: , Rfl:   •  albuterol (PROVENTIL) (2.5 MG/3ML) 0.083% nebulizer solution, Take 2.5 mg by nebulization Every 4 (Four) Hours As Needed for Wheezing., Disp: 100 vial, Rfl: 1  •  cefdinir (OMNICEF) 300 MG capsule, Take 1 capsule by mouth 2 (Two) Times a Day for 7 days., Disp: 14 capsule, Rfl: 0  •  montelukast (SINGULAIR) 10 MG tablet, Take 1 tablet by mouth Every Night., Disp: 30 tablet, Rfl: 6  There are no discontinued medications.    Review of Systems   Constitutional: Negative for chills, fatigue and fever.   HENT: Positive for congestion, sinus pain and sinus pressure. Negative for sneezing.    Respiratory: Positive for cough. Negative for shortness of breath.    Genitourinary: Positive for dysuria (occasional burning ).   Skin: Negative for rash.             /58 (BP Location: Left arm, Patient Position: Sitting, Cuff Size: Large Adult)   Pulse 80   Temp 98.8 °F (37.1 °C) (Oral)   Resp 20   Ht 167.6 cm (65.98\")   Wt 103 kg (226 lb)   SpO2 98%   BMI 36.50 kg/m² "       Physical Exam   Constitutional: She is oriented to person, place, and time. She appears well-developed and well-nourished. No distress.   HENT:   Head: Normocephalic and atraumatic.   Right Ear: Hearing, tympanic membrane, external ear and ear canal normal.   Left Ear: Hearing, tympanic membrane, external ear and ear canal normal.   Nose: Right sinus exhibits frontal sinus tenderness. Left sinus exhibits frontal sinus tenderness.   Mouth/Throat: Uvula is midline and mucous membranes are normal. Posterior oropharyngeal edema and posterior oropharyngeal erythema present. No oropharyngeal exudate. Tonsils are 0 on the right. Tonsils are 0 on the left. No tonsillar exudate.   Eyes: Conjunctivae are normal. Right eye exhibits no discharge. Left eye exhibits no discharge. No scleral icterus.   Neck: Neck supple.   Cardiovascular: Normal rate, regular rhythm and normal heart sounds.  Exam reveals no gallop and no friction rub.    No murmur heard.  Pulmonary/Chest: Effort normal and breath sounds normal. No respiratory distress. She has no wheezes. She has no rales.   Lymphadenopathy:     She has no cervical adenopathy.   Neurological: She is alert and oriented to person, place, and time.   Skin: Skin is warm. Capillary refill takes less than 2 seconds. No rash noted.   Psychiatric: She has a normal mood and affect. Her behavior is normal.   Nursing note and vitals reviewed.      Lab Results (most recent)     Procedure Component Value Units Date/Time    POCT urinalysis dipstick, automated [[id number]]  (Abnormal) Collected:  08/06/18 1507    Specimen:  Urine Updated:  08/06/18 1507     Color Yellow     Clarity, UA Clear     Specific Gravity  1.015     pH, Urine 7.0     Leukocytes Small (1+) (A)     Nitrite, UA Negative     Protein, POC Negative mg/dL      Glucose, UA Negative mg/dL      Ketones, UA Negative     Urobilinogen, UA Normal     Bilirubin Negative     Blood, UA Negative          Results for orders placed or  performed in visit on 08/06/18   POCT urinalysis dipstick, automated   Result Value Ref Range    Color Yellow Yellow, Straw, Dark Yellow, Meryl    Clarity, UA Clear Clear    Specific Gravity  1.015 1.005 - 1.030    pH, Urine 7.0 5.0 - 8.0    Leukocytes Small (1+) (A) Negative    Nitrite, UA Negative Negative    Protein, POC Negative Negative mg/dL    Glucose, UA Negative Negative, 1000 mg/dL (3+) mg/dL    Ketones, UA Negative Negative    Urobilinogen, UA Normal Normal    Bilirubin Negative Negative    Blood, UA Negative Negative           Maria Ines was seen today for sinus problem and difficulty urinating.    Diagnoses and all orders for this visit:    Acute frontal sinusitis, recurrence not specified    Burning with urination  -     POCT urinalysis dipstick, automated    Chronic non-seasonal allergic rhinitis, unspecified trigger    Other orders  -     cefdinir (OMNICEF) 300 MG capsule; Take 1 capsule by mouth 2 (Two) Times a Day for 7 days.  -     montelukast (SINGULAIR) 10 MG tablet; Take 1 tablet by mouth Every Night.      Treat sinus infection with Cefdinir   Add Singulair in addition to anti-histamine and Nasocort    Encouraged increase water intake   Urine dip normal here besides small leukocytes     Return if symptoms worsen or fail to improve.    Jeanine Blunt MD  08/06/2018

## 2018-08-13 RX ORDER — CLONAZEPAM 0.5 MG/1
TABLET ORAL
Qty: 90 TABLET | Refills: 0 | Status: SHIPPED | OUTPATIENT
Start: 2018-08-13 | End: 2018-10-03

## 2018-08-13 RX ORDER — CYCLOBENZAPRINE HCL 10 MG
TABLET ORAL
Qty: 90 TABLET | Refills: 0 | Status: SHIPPED | OUTPATIENT
Start: 2018-08-13 | End: 2018-10-03

## 2018-08-14 RX ORDER — SIMVASTATIN 10 MG
TABLET ORAL
Qty: 90 TABLET | Refills: 1 | Status: SHIPPED | OUTPATIENT
Start: 2018-08-14 | End: 2018-10-03

## 2018-08-22 ENCOUNTER — OFFICE VISIT (OUTPATIENT)
Dept: ORTHOPEDIC SURGERY | Facility: CLINIC | Age: 73
End: 2018-08-22

## 2018-08-22 DIAGNOSIS — M19.011 PRIMARY OSTEOARTHRITIS, RIGHT SHOULDER: ICD-10-CM

## 2018-08-22 DIAGNOSIS — M84.374A STRESS FRACTURE OF RIGHT FOOT, INITIAL ENCOUNTER: ICD-10-CM

## 2018-08-22 DIAGNOSIS — M75.01 ADHESIVE CAPSULITIS OF RIGHT SHOULDER: ICD-10-CM

## 2018-08-22 DIAGNOSIS — M75.50 SUBACROMIAL BURSITIS: ICD-10-CM

## 2018-08-22 DIAGNOSIS — R52 PAIN: Primary | ICD-10-CM

## 2018-08-22 PROCEDURE — 20610 DRAIN/INJ JOINT/BURSA W/O US: CPT | Performed by: NURSE PRACTITIONER

## 2018-08-22 PROCEDURE — 73080 X-RAY EXAM OF ELBOW: CPT | Performed by: NURSE PRACTITIONER

## 2018-08-22 PROCEDURE — 73630 X-RAY EXAM OF FOOT: CPT | Performed by: NURSE PRACTITIONER

## 2018-08-22 PROCEDURE — 99214 OFFICE O/P EST MOD 30 MIN: CPT | Performed by: NURSE PRACTITIONER

## 2018-08-22 RX ORDER — MELOXICAM 15 MG/1
15 TABLET ORAL DAILY
Qty: 30 TABLET | Refills: 5 | Status: SHIPPED | OUTPATIENT
Start: 2018-08-22 | End: 2019-03-02 | Stop reason: SDUPTHER

## 2018-08-22 RX ADMIN — LIDOCAINE HYDROCHLORIDE 4 ML: 20 INJECTION, SOLUTION EPIDURAL; INFILTRATION; INTRACAUDAL; PERINEURAL at 14:13

## 2018-08-22 RX ADMIN — TRIAMCINOLONE ACETONIDE 40 MG: 40 INJECTION, SUSPENSION INTRA-ARTICULAR; INTRAMUSCULAR at 14:13

## 2018-08-22 NOTE — PROGRESS NOTES
Subjective:     Patient ID: Maria Ines Zhang is a 72 y.o. female.    Chief Complaint:  Follow-up primary osteoarthritis right shoulder  Right foot pain  Catching sensation right elbow    History of Present Illness    Ms. Zhang presents to clinic for follow-up of right shoulder.  Continues to experience shoulder pain greatest now over the anterior joint line.  Pain present with reaching out to the side, attempting to lift above head, reaching back behind her back.  She is right-hand dominant and therefore has difficulty completing day-to-day activities at the right upper extremity.  She was last seen by this APRN on 3/15/2018, corticosteroid injection provided at that time which did provide her with symptom relief for approximately 3 months.  Since that time pain has returned and is worse.  Rates pain at an 8-9 out of a 10 mainly aching in nature, positive for popping/grinding.  She also would like to discuss catching sensation that she is experiencing at her right elbow.  She is always contributed the right elbow pain due to to the severe osteoarthritis at the right shoulder.  Catching sensation noted with full flexion of the elbow.  Pain does radiate down inferiorly from the shoulder to the elbow although does not experience pain directly over the elbow.  She was more concerned with the catching sensation and afraid that the elbow with locking she would not be able to extend the elbow.    Also verbalizes concerns over pain experience with ambulating at the right foot.  She has been to Dr. Duran's office and saw a physician's assistant at that time who completed x-ray which did not confirm fracture.  She was provided with a steroid injection to her ankle denies that she received any symptom relief with the injection.  Increased pain noted with ambulatory activities over the fourth and third metatarsals.  Denies presence of numbness and tingling over all aspects of the left foot.  She has had previous surgery  removing the phalanx at the second digit.  Began noticing symptoms after long periods of ambulating.  Denies other concerns present this time.     Social History     Occupational History   • Not on file.     Social History Main Topics   • Smoking status: Never Smoker   • Smokeless tobacco: Never Used   • Alcohol use No   • Drug use: No   • Sexual activity: Defer      Past Medical History:   Diagnosis Date   • Anxiety    • Asthma    • Benign essential hypertension    • Benign liver cyst 2/15/2018    Founds incidentally on CT scan of abdomen. Not clinically significant in size or appearance.    • Colitis    • Cryptogenic stroke (CMS/HCC) 8/3/2017   • DDD (degenerative disc disease), lumbar 12/10/2012    W/ spondylolisthesis, Patient has had 2 epidurals previously-Dr. Micheal Marquez   • DDD (degenerative disc disease), lumbosacral    • Depression    • Diverticulosis 2/15/2018   • Fatigue    • GERD (gastroesophageal reflux disease)    • H/O acute respiratory failure 02/25/2016    With bilateral pulmonary infiltrates-Dr. Jose Maria Funk   • Hyperlipidemia    • Hypertension    • Hypothyroidism    • IFG (impaired fasting glucose) 8/7/2017   • Mitral valve insufficiency     nild   • MRSA infection within last 3 months 05/2017    RIGHT FOOT   • Obesity    • Obstructive sleep apnea     USES CPAP   • Osteoarthritis    • Patent foramen ovale    • Pulmonary embolism (CMS/HCC)     3 SEPARATE OCCASIONS   • Pulmonary hypertension    • SBO (small bowel obstruction)    • Sciatica    • Venous stasis      Past Surgical History:   Procedure Laterality Date   • BRONCHOSCOPY WITH ENDOSCOPY Right 02/25/2016    Exploratory bronchoscopy, exploratory RT video-assisted thoracoscopy and wedge resection of RT upper lobe and Subpleural pain catheter x2-Dr. Jose Maria Funk   • CATARACT EXTRACTION W/ INTRAOCULAR LENS IMPLANT Bilateral 2016   • COLONOSCOPY N/A 3/1/2018    Procedure: COLONOSCOPY INTO CECUM/ TERMINAL ILEUM WITH BIOPSIES AND CLIP X 1;   Surgeon: Ritchie Garland MD;  Location:  [facility];  Service:    • COLONOSCOPY N/A 10/02/2008    Diverticulosis of the sigmoid colon-Dr. Salud Lowry   • HYSTERECTOMY     • LAPAROSCOPIC CHOLECYSTECTOMY N/A 10/01/2003    Dr. Nino Torres   • LUMBAR EPIDURAL INJECTION N/A 12/10/2012    Multiple lumbar epidural injections-Dr. Micheal Marquez   • LUMBAR FUSION N/A 08/2012   • MI TOTAL KNEE ARTHROPLASTY Right 7/6/2017    Procedure: TOTAL KNEE ARTHROPLASTY;  Surgeon: Dragan De La Vega MD;  Location: [facility] MAIN OR;  Service: Orthopedics   • TOE AMPUTATION Right 05/2017    SECOND TOE   • TOTAL KNEE ARTHROPLASTY Left 11/13/2006    Dr. Jerome Weber   • UPPER GASTROINTESTINAL ENDOSCOPY N/A 10/02/2008    Normal esophagus, gastric mucosal abnormality characterized by erythema, normal examined duodenum-Dr. Salud Lowry       Family History   Problem Relation Age of Onset   • Heart disease Mother 60   • Diabetes Mother    • Stroke Mother 83   • Melanoma Father    • Heart attack Brother    • Heart disease Brother         Valve problem    • Heart attack Maternal Grandmother    • Heart attack Maternal Grandfather    • No Known Problems Paternal Grandmother    • No Known Problems Paternal Grandfather    • Malig Hyperthermia Neg Hx    • Breast cancer Neg Hx          Review of Systems   Constitutional: Negative for chills, diaphoresis, fever and unexpected weight change.   HENT: Negative for hearing loss, nosebleeds, sore throat and tinnitus.    Eyes: Negative for pain and visual disturbance.   Respiratory: Negative for cough, shortness of breath and wheezing.    Cardiovascular: Negative for chest pain and palpitations.   Gastrointestinal: Negative for abdominal pain, diarrhea, nausea and vomiting.   Endocrine: Negative for cold intolerance, heat intolerance and polydipsia.   Genitourinary: Negative for difficulty urinating, dysuria and hematuria.   Musculoskeletal: Positive for arthralgias. Negative for joint swelling and  myalgias.   Skin: Negative for rash and wound.   Allergic/Immunologic: Negative for environmental allergies.   Neurological: Negative for dizziness, syncope and numbness.   Hematological: Does not bruise/bleed easily.   Psychiatric/Behavioral: Negative for dysphoric mood and sleep disturbance. The patient is not nervous/anxious.            Objective:  Physical Exam    General: No acute distress.  Eyes: conjunctiva clear; pupils equally round and reactive  ENT: external ears and nose atraumatic; oropharynx clear  CV: no peripheral edema  Resp: normal respiratory effort  Skin: no rashes or wounds; normal turgor  Psych: mood and affect appropriate; recent and remote memory intact    There were no vitals filed for this visit.  There were no vitals filed for this visit.  There is no height or weight on file to calculate BMI.     Right Elbow Exam     Range of Motion   Extension: 0   Flexion: 110     Muscle Strength   Pronation:  5/5   Supination:  5/5     Tests Varus: negative  Valgus: negative        Other   Erythema: absent  Scars: absent  Sensation: normal  Pulse: present      Right Shoulder Exam     Tenderness   The patient is experiencing tenderness in the acromion.    Range of Motion   Forward Flexion: 90   External Rotation: 30     Other   Sensation: normal  Pulse: present    Comments:  Maximal tenderness over anterior joint line              Imaging:  Right Elbow X-Ray  Indication: Pain  Views: AP and Lateral views    Findings:  No fracture  No bony lesion  Normal soft tissues  Normal joint spaces    No prior studies were available for comparison.    Right Foot X-Ray  Indication: Pain  AP, Lateral, and Oblique views    Findings:  No fracture  No bony lesion  Normal soft tissues  Normal joint spaces    No prior studies were available for comparison.    Assessment:       1. Pain    2. Primary osteoarthritis, right shoulder    3. Subacromial bursitis, right    4. Adhesive capsulitis of right shoulder    5. Stress  fracture of right foot, initial encounter          Plan:  1.  Discussed plan of care with patient.  Wishes to proceed with corticosteroid injection glenohumeral joint right shoulder.  Due to 's elbow she is unable to undergo any surgical procedure at the shoulder at this time.  2.  Fitted with walking boot the right foot.  Did discuss with patient the walking boot is to provide support and decrease tension on the metatarsals of the right foot.  We'll plan to see her back in approximately 3 weeks, repeat x-rays of the right foot at that time.  Did discuss with patient that although no fracture was noted on x-ray we'll continue to treat as fractured to decrease symptoms that she is experiencing.  Patient verbalized understanding of all information agrees with plan of care.  Denies all other concerns present at this time.  Orders:  Orders Placed This Encounter   Procedures   • Large Joint Arthrocentesis   • XR Foot 3+ View Right   • XR Elbow 3+ View Right     Dictated utilizing [employer] dictation   Large Joint Arthrocentesis  Date/Time: 8/22/2018 2:13 PM  Consent given by: patient  Site marked: site marked  Timeout: Immediately prior to procedure a time out was called to verify the correct patient, procedure, equipment, support staff and site/side marked as required   Supporting Documentation  Indications: pain   Procedure Details  Location: shoulder - R glenohumeral  Preparation: Patient was prepped and draped in the usual sterile fashion  Needle size: 22 G  Approach: anterior  Medications administered: 4 mL lidocaine PF 2% 2 %; 40 mg triamcinolone acetonide 40 MG/ML  Patient tolerance: patient tolerated the procedure well with no immediate complications

## 2018-08-23 PROBLEM — M84.374A STRESS FRACTURE OF RIGHT FOOT: Status: ACTIVE | Noted: 2018-08-23

## 2018-08-23 RX ORDER — LIDOCAINE HYDROCHLORIDE 20 MG/ML
4 INJECTION, SOLUTION EPIDURAL; INFILTRATION; INTRACAUDAL; PERINEURAL
Status: COMPLETED | OUTPATIENT
Start: 2018-08-22 | End: 2018-08-22

## 2018-08-23 RX ORDER — TRIAMCINOLONE ACETONIDE 40 MG/ML
40 INJECTION, SUSPENSION INTRA-ARTICULAR; INTRAMUSCULAR
Status: COMPLETED | OUTPATIENT
Start: 2018-08-22 | End: 2018-08-22

## 2018-09-11 ENCOUNTER — OFFICE VISIT (OUTPATIENT)
Dept: INTERNAL MEDICINE | Facility: CLINIC | Age: 73
End: 2018-09-11

## 2018-09-11 VITALS
OXYGEN SATURATION: 97 % | HEIGHT: 66 IN | TEMPERATURE: 98.5 F | WEIGHT: 225.4 LBS | DIASTOLIC BLOOD PRESSURE: 76 MMHG | HEART RATE: 85 BPM | SYSTOLIC BLOOD PRESSURE: 124 MMHG | BODY MASS INDEX: 36.22 KG/M2 | RESPIRATION RATE: 18 BRPM

## 2018-09-11 DIAGNOSIS — L72.3 SEBACEOUS CYST: ICD-10-CM

## 2018-09-11 DIAGNOSIS — J32.9 CHRONIC SINUSITIS, UNSPECIFIED LOCATION: Primary | ICD-10-CM

## 2018-09-11 DIAGNOSIS — R22.0 FACIAL SWELLING: ICD-10-CM

## 2018-09-11 PROCEDURE — 99214 OFFICE O/P EST MOD 30 MIN: CPT | Performed by: INTERNAL MEDICINE

## 2018-09-11 RX ORDER — DOXYCYCLINE HYCLATE 100 MG
100 TABLET ORAL 2 TIMES DAILY
Qty: 14 TABLET | Refills: 0 | Status: SHIPPED | OUTPATIENT
Start: 2018-09-11 | End: 2018-09-18

## 2018-09-11 NOTE — PROGRESS NOTES
Maria Ines Zhang is a 72 y.o. female, who presents with a chief complaint of   Chief Complaint   Patient presents with   • Facial Swelling     sore, swelling    • Nasal Congestion   • Cough       71 yo F here for acute visit. She has had sinus congestion that has worsened over the last week, but was occurring on 8/6 when I saw her gave her Cefdinir. She noticed swelling in her nose and little bit of pain for 3 days. Redness started yesterday on her nose. Subjective fever. Tylenol has made it better. She has been taking Allegra D and has not been taking Singulair because it keps her awake.  She has been using Flonase as well. Last visit with me was 8/6 and she was on antibiotic. Severely allergic to ragweed.     She has a cyst on her back that has become red and inflamed. No fever. No drainage and only hurts some with pressure. Seems to be getting a little bit bigger. Saw dermatology and they did not want to remove. This has been there for about 8 weeks.          The following portions of the patient's history were reviewed and updated as appropriate: allergies, current medications, past family history, past medical history, past social history, past surgical history and problem list.    Allergies: Fish allergy; Iodine; Shellfish allergy; and Sulfa antibiotics    Current Outpatient Prescriptions:   •  albuterol (PROVENTIL HFA;VENTOLIN HFA) 108 (90 Base) MCG/ACT inhaler, Inhale 2 puffs Every 4 (Four) Hours As Needed for Wheezing or Shortness of Air., Disp: 1 inhaler, Rfl: 6  •  albuterol (PROVENTIL) (2.5 MG/3ML) 0.083% nebulizer solution, Take 2.5 mg by nebulization Every 4 (Four) Hours As Needed for Wheezing., Disp: 100 vial, Rfl: 1  •  budesonide-formoterol (SYMBICORT) 160-4.5 MCG/ACT inhaler, Inhale 2 puffs 2 (Two) Times a Day., Disp: 10.2 g, Rfl: 6  •  calcium polycarbophil (FIBERCON) 625 MG tablet, Take 625 mg by mouth Daily., Disp: , Rfl:   •  clonazePAM (KlonoPIN) 0.5 MG tablet, TAKE ONE TABLET BY MOUTH  ONCE NIGHTLY AS NEEDED FOR SEIZURES, Disp: 90 tablet, Rfl: 0  •  cyclobenzaprine (FLEXERIL) 10 MG tablet, TAKE ONE TABLET BY MOUTH THREE TIMES A DAY AS NEEDED FOR MUSCLE SPASMS, Disp: 90 tablet, Rfl: 0  •  escitalopram (LEXAPRO) 20 MG tablet, Take 1 tablet by mouth Daily., Disp: 90 tablet, Rfl: 1  •  esomeprazole (NexIUM) 20 MG capsule, Take 40 mg by mouth every morning before breakfast., Disp: , Rfl:   •  guaifenesin-codeine (GUAIFENESIN AC) 100-10 MG/5ML liquid, Take 5 mL by mouth 3 (Three) Times a Day As Needed for Cough., Disp: 118 mL, Rfl: 0  •  hydrochlorothiazide (HYDRODIURIL) 25 MG tablet, TAKE ONE TABLET BY MOUTH DAILY, Disp: 30 tablet, Rfl: 2  •  levothyroxine (SYNTHROID, LEVOTHROID) 88 MCG tablet, Take 1 tablet by mouth Daily., Disp: 90 tablet, Rfl: 1  •  lisinopril (PRINIVIL,ZESTRIL) 20 MG tablet, Take 1 tablet by mouth Daily., Disp: 30 tablet, Rfl: 5  •  meloxicam (MOBIC) 15 MG tablet, Take 1 tablet by mouth Daily., Disp: 30 tablet, Rfl: 5  •  montelukast (SINGULAIR) 10 MG tablet, Take 1 tablet by mouth Every Night., Disp: 30 tablet, Rfl: 6  •  Multiple Vitamins-Minerals (MULTIVITAMIN ADULT PO), Take 1 tablet by mouth Daily., Disp: , Rfl:   •  polyethylene glycol (MIRALAX) packet, Take 17 g by mouth Daily., Disp: 30 packet, Rfl: 2  •  Probiotic Product (PROBIOTIC-10 PO), Take  by mouth., Disp: , Rfl:   •  senna-docusate (PERICOLACE) 8.6-50 MG per tablet, Take 2 tablets by mouth Daily As Needed for Constipation., Disp: 30 tablet, Rfl: 1  •  simvastatin (ZOCOR) 10 MG tablet, TAKE ONE TABLET BY MOUTH DAILY, Disp: 90 tablet, Rfl: 1  •  Vitamin Mixture (ROCKY-C PO), Take 500 mg by mouth Daily., Disp: , Rfl:   •  doxycycline (VIBRAMYICN) 100 MG tablet, Take 1 tablet by mouth 2 (Two) Times a Day for 7 days., Disp: 14 tablet, Rfl: 0  There are no discontinued medications.    Review of Systems   Constitutional: Positive for fever (subjective ). Negative for chills and fatigue.   HENT: Positive for congestion,  "ear pain (right ear ), facial swelling, rhinorrhea, sinus pain and sinus pressure.    Skin: Positive for wound (left shoulder/back area).             /76 (BP Location: Left arm, Patient Position: Sitting, Cuff Size: Adult)   Pulse 85   Temp 98.5 °F (36.9 °C) (Oral)   Resp 18   Ht 167.6 cm (65.98\")   Wt 102 kg (225 lb 6.4 oz)   SpO2 97%   BMI 36.40 kg/m²       Physical Exam   Constitutional: She is oriented to person, place, and time. She appears well-developed and well-nourished. No distress.   HENT:   Head: Normocephalic and atraumatic.   Right Ear: Hearing and external ear normal. A middle ear effusion is present.   Left Ear: Hearing, tympanic membrane and external ear normal.   Nose: Mucosal edema (bridge of nose as well as nasolabial area that is swollen ), rhinorrhea and sinus tenderness present. Right sinus exhibits maxillary sinus tenderness and frontal sinus tenderness. Left sinus exhibits maxillary sinus tenderness and frontal sinus tenderness.   Mouth/Throat: Oropharynx is clear and moist. No oropharyngeal exudate.   Eyes: Conjunctivae are normal. Right eye exhibits no discharge. Left eye exhibits no discharge. No scleral icterus.   Neck: Neck supple.   Pulmonary/Chest: Effort normal. She has no wheezes. She has no rales.   Lymphadenopathy:     She has no cervical adenopathy.   Neurological: She is alert and oriented to person, place, and time.   Skin: Skin is warm. Capillary refill takes less than 2 seconds. Lesion (quarter size red and swollen cyst that appears to be an irritated sebacous cyst) noted. No rash noted.   Psychiatric: She has a normal mood and affect. Her behavior is normal.   Nursing note and vitals reviewed.        Results for orders placed or performed in visit on 08/06/18   POCT urinalysis dipstick, automated   Result Value Ref Range    Color Yellow Yellow, Straw, Dark Yellow, Meryl    Clarity, UA Clear Clear    Specific Gravity  1.015 1.005 - 1.030    pH, Urine 7.0 5.0 - " 8.0    Leukocytes Small (1+) (A) Negative    Nitrite, UA Negative Negative    Protein, POC Negative Negative mg/dL    Glucose, UA Negative Negative, 1000 mg/dL (3+) mg/dL    Ketones, UA Negative Negative    Urobilinogen, UA Normal Normal    Bilirubin Negative Negative    Blood, UA Negative Negative           Maria Ines was seen today for facial swelling, nasal congestion and cough.    Diagnoses and all orders for this visit:    Chronic sinusitis, unspecified location  -     CT Sinus Without Contrast    Facial swelling  -     CT Sinus Without Contrast    Sebaceous cyst    Other orders  -     doxycycline (VIBRAMYICN) 100 MG tablet; Take 1 tablet by mouth 2 (Two) Times a Day for 7 days.      I am going to treat her with Doxy to see if this will help the sinus infection as well as the lesion on her back. She needs CT of her sinuses as well due to nasal/mucosal swelling. Will call her with results. I was initially going to treat with Avelox, but I wanted to get better coverage for lesion on the her back that I suspect is an inflamed seb cyst.     If still having issues with cyst after antibiotics, she is going to call and we will have her seen to have an I&D.     Return if symptoms worsen or fail to improve.    Jeanine Blunt MD  09/11/2018

## 2018-09-13 ENCOUNTER — TELEPHONE (OUTPATIENT)
Dept: INTERNAL MEDICINE | Facility: CLINIC | Age: 73
End: 2018-09-13

## 2018-09-13 NOTE — TELEPHONE ENCOUNTER
Patient advised as per below.  She states she is actually leaving town on Monday and will call me tomorrow afternoon if no better.    ----- Message from Jeanine Blunt MD sent at 9/13/2018  8:52 AM EDT -----  Regarding: RE: ANTIBIOTIC ?  I sent her in antibiotic two days ago when I saw her. I think she may just need some more time. If not better by Monday for sure, we can change to another agent like Avelox.     ----- Message -----  From: Augusta Ross MA  Sent: 9/13/2018   8:26 AM  To: Jeanine Blunt MD  Subject: FW: ANTIBIOTIC ?                                     ----- Message -----  From: Dior Guzman  Sent: 9/13/2018   8:10 AM  To: Yohana Reynoso[address] [place] [address]  Subject: ANTIBIOTIC ?                                     ASHLEY    Patient was seen 9/11/18.  Phones this morning stating she is no better and asks if an antibiotic or something should be called in.      aleah in [place]

## 2018-09-14 ENCOUNTER — TELEPHONE (OUTPATIENT)
Dept: INTERNAL MEDICINE | Facility: CLINIC | Age: 73
End: 2018-09-14

## 2018-09-14 RX ORDER — PREDNISONE 10 MG/1
TABLET ORAL
Qty: 30 TABLET | Refills: 0 | Status: SHIPPED | OUTPATIENT
Start: 2018-09-14 | End: 2018-10-01

## 2018-09-14 RX ORDER — LEVOTHYROXINE SODIUM 88 UG/1
TABLET ORAL
Qty: 90 TABLET | Refills: 0 | Status: SHIPPED | OUTPATIENT
Start: 2018-09-14 | End: 2018-10-03

## 2018-09-14 NOTE — TELEPHONE ENCOUNTER
Patient aware.     ----- Message from Jeanine Blunt MD sent at 9/14/2018  2:24 PM EDT -----  Regarding: RE: MEDICATION REQUEST  Contact: [phone]  I can send in steroids for her. Sending now for 7 days.   ----- Message -----  From: Lolis Fernández CMA  Sent: 9/14/2018   2:12 PM  To: Jeanine Blunt MD  Subject: FW: MEDICATION REQUEST                           ----- Message -----  From: Mere Grider  Sent: 9/14/2018   1:39 PM  To: [facility]  Subject: MEDICATION REQUEST                               ASHLEY PT    Patient called to request a steroid pack. She said that the antibiotic is helping but she feels like she still needs a steroid pack because of her breathing. She said that she is not wheezing but she has had to use her inhaler 3 times a day for the last 2 days. They are planning on going out of town next week.    aleah santiago    Thanks!  mere

## 2018-09-18 RX ORDER — LISINOPRIL 20 MG/1
20 TABLET ORAL DAILY
Qty: 90 TABLET | Refills: 1 | Status: SHIPPED | OUTPATIENT
Start: 2018-09-18 | End: 2018-10-08 | Stop reason: SDUPTHER

## 2018-09-27 ENCOUNTER — CLINICAL SUPPORT (OUTPATIENT)
Dept: INTERNAL MEDICINE | Facility: CLINIC | Age: 73
End: 2018-09-27

## 2018-09-27 DIAGNOSIS — R73.01 IFG (IMPAIRED FASTING GLUCOSE): ICD-10-CM

## 2018-09-27 DIAGNOSIS — I10 ESSENTIAL HYPERTENSION: Primary | ICD-10-CM

## 2018-09-27 DIAGNOSIS — E78.5 HYPERLIPIDEMIA, UNSPECIFIED HYPERLIPIDEMIA TYPE: ICD-10-CM

## 2018-09-27 DIAGNOSIS — Z23 NEED FOR INFLUENZA VACCINATION: Primary | ICD-10-CM

## 2018-09-27 DIAGNOSIS — E03.9 ACQUIRED HYPOTHYROIDISM: ICD-10-CM

## 2018-09-27 LAB
ALBUMIN SERPL-MCNC: 3.8 G/DL (ref 3.5–5.2)
ALBUMIN/GLOB SERPL: 1.7 G/DL
ALP SERPL-CCNC: 66 U/L (ref 40–129)
ALT SERPL-CCNC: 16 U/L (ref 5–33)
AST SERPL-CCNC: 13 U/L (ref 5–32)
BASOPHILS # BLD AUTO: 0.02 10*3/MM3 (ref 0–0.2)
BASOPHILS NFR BLD AUTO: 0.4 % (ref 0–2)
BILIRUB SERPL-MCNC: 0.2 MG/DL (ref 0.2–1.2)
BUN SERPL-MCNC: 19 MG/DL (ref 8–23)
BUN/CREAT SERPL: 24.7 (ref 7–25)
CALCIUM SERPL-MCNC: 9.1 MG/DL (ref 8.8–10.5)
CHLORIDE SERPL-SCNC: 102 MMOL/L (ref 98–107)
CHOLEST SERPL-MCNC: 275 MG/DL (ref 0–200)
CO2 SERPL-SCNC: 30.1 MMOL/L (ref 22–29)
CREAT SERPL-MCNC: 0.77 MG/DL (ref 0.57–1)
EOSINOPHIL # BLD AUTO: 0.02 10*3/MM3 (ref 0.1–0.3)
EOSINOPHIL NFR BLD AUTO: 0.4 % (ref 0–4)
ERYTHROCYTE [DISTWIDTH] IN BLOOD BY AUTOMATED COUNT: 14.3 % (ref 11.5–14.5)
GLOBULIN SER CALC-MCNC: 2.3 GM/DL
GLUCOSE SERPL-MCNC: 79 MG/DL (ref 65–99)
HBA1C MFR BLD: 6.1 % (ref 4.8–5.6)
HCT VFR BLD AUTO: 36.2 % (ref 37–47)
HDLC SERPL-MCNC: 61 MG/DL (ref 40–60)
HGB BLD-MCNC: 11.3 G/DL (ref 12–16)
IMM GRANULOCYTES # BLD: 0.06 10*3/MM3 (ref 0–0.03)
IMM GRANULOCYTES NFR BLD: 1.1 % (ref 0–0.5)
LDLC SERPL CALC-MCNC: 174 MG/DL (ref 0–100)
LDLC/HDLC SERPL: 2.85 {RATIO}
LYMPHOCYTES # BLD AUTO: 1.8 10*3/MM3 (ref 0.6–4.8)
LYMPHOCYTES NFR BLD AUTO: 32.4 % (ref 20–45)
MCH RBC QN AUTO: 29.7 PG (ref 27–31)
MCHC RBC AUTO-ENTMCNC: 31.2 G/DL (ref 31–37)
MCV RBC AUTO: 95 FL (ref 81–99)
MONOCYTES # BLD AUTO: 0.6 10*3/MM3 (ref 0–1)
MONOCYTES NFR BLD AUTO: 10.8 % (ref 3–8)
NEUTROPHILS # BLD AUTO: 3.06 10*3/MM3 (ref 1.5–8.3)
NEUTROPHILS NFR BLD AUTO: 54.9 % (ref 45–70)
NRBC BLD AUTO-RTO: 0 /100 WBC (ref 0–0)
PLATELET # BLD AUTO: 302 10*3/MM3 (ref 140–500)
POTASSIUM SERPL-SCNC: 4.5 MMOL/L (ref 3.5–5.2)
PROT SERPL-MCNC: 6.1 G/DL (ref 6–8.5)
RBC # BLD AUTO: 3.81 10*6/MM3 (ref 4.2–5.4)
SODIUM SERPL-SCNC: 141 MMOL/L (ref 136–145)
T4 FREE SERPL-MCNC: 1.7 NG/DL (ref 0.93–1.7)
TRIGL SERPL-MCNC: 201 MG/DL (ref 0–150)
TSH SERPL DL<=0.005 MIU/L-ACNC: 1.02 MIU/ML (ref 0.27–4.2)
VIT B12 SERPL-MCNC: 308 PG/ML
VLDLC SERPL CALC-MCNC: 40.2 MG/DL (ref 7–27)
WBC # BLD AUTO: 5.56 10*3/MM3 (ref 4.8–10.8)

## 2018-09-27 PROCEDURE — 90662 IIV NO PRSV INCREASED AG IM: CPT | Performed by: INTERNAL MEDICINE

## 2018-09-27 PROCEDURE — G0008 ADMIN INFLUENZA VIRUS VAC: HCPCS | Performed by: INTERNAL MEDICINE

## 2018-09-28 ENCOUNTER — TELEPHONE (OUTPATIENT)
Dept: INTERNAL MEDICINE | Facility: CLINIC | Age: 73
End: 2018-09-28

## 2018-09-28 DIAGNOSIS — D64.9 NORMOCYTIC ANEMIA: Primary | ICD-10-CM

## 2018-09-28 LAB
FOLATE SERPL-MCNC: 18.58 NG/ML (ref 4.78–20)
Lab: NORMAL
WRITTEN AUTHORIZATION: NORMAL

## 2018-09-28 NOTE — TELEPHONE ENCOUNTER
Pending add on.     ----- Message from Jeanine Blunt MD sent at 9/28/2018  8:06 AM EDT -----  Please add folate to her blood in lab if possible. Will discuss labs with patient at her follow up with me. Cholesterol is much higher. Anemia is stable.

## 2018-09-28 NOTE — PROGRESS NOTES
Please add folate to her blood in lab if possible. Will discuss labs with patient at her follow up with me. Cholesterol is much higher. Anemia is stable.

## 2018-10-01 ENCOUNTER — HOSPITAL ENCOUNTER (OUTPATIENT)
Dept: GENERAL RADIOLOGY | Facility: HOSPITAL | Age: 73
Discharge: HOME | End: 2018-10-01
Attending: INTERNAL MEDICINE | Admitting: INTERNAL MEDICINE

## 2018-10-01 ENCOUNTER — TELEPHONE (OUTPATIENT)
Dept: INTERNAL MEDICINE | Facility: CLINIC | Age: 73
End: 2018-10-01

## 2018-10-01 ENCOUNTER — OFFICE VISIT (OUTPATIENT)
Dept: INTERNAL MEDICINE | Facility: CLINIC | Age: 73
End: 2018-10-01

## 2018-10-01 VITALS
OXYGEN SATURATION: 98 % | WEIGHT: 228 LBS | DIASTOLIC BLOOD PRESSURE: 64 MMHG | HEIGHT: 66 IN | BODY MASS INDEX: 36.64 KG/M2 | TEMPERATURE: 101.2 F | SYSTOLIC BLOOD PRESSURE: 112 MMHG | RESPIRATION RATE: 20 BRPM | HEART RATE: 100 BPM

## 2018-10-01 DIAGNOSIS — Z00.00 MEDICARE ANNUAL WELLNESS VISIT, SUBSEQUENT: Primary | ICD-10-CM

## 2018-10-01 DIAGNOSIS — E78.5 HYPERLIPIDEMIA, UNSPECIFIED HYPERLIPIDEMIA TYPE: ICD-10-CM

## 2018-10-01 DIAGNOSIS — R73.01 IFG (IMPAIRED FASTING GLUCOSE): ICD-10-CM

## 2018-10-01 DIAGNOSIS — E03.9 ACQUIRED HYPOTHYROIDISM: ICD-10-CM

## 2018-10-01 DIAGNOSIS — R06.00 DYSPNEA, UNSPECIFIED TYPE: ICD-10-CM

## 2018-10-01 DIAGNOSIS — J84.89 BOOP (BRONCHIOLITIS OBLITERANS WITH ORGANIZING PNEUMONIA) (HCC): ICD-10-CM

## 2018-10-01 DIAGNOSIS — R09.89 ABNORMAL LUNG SOUNDS: ICD-10-CM

## 2018-10-01 DIAGNOSIS — I10 ESSENTIAL HYPERTENSION: ICD-10-CM

## 2018-10-01 DIAGNOSIS — R50.9 FEVER, UNSPECIFIED FEVER CAUSE: ICD-10-CM

## 2018-10-01 PROCEDURE — 99214 OFFICE O/P EST MOD 30 MIN: CPT | Performed by: INTERNAL MEDICINE

## 2018-10-01 PROCEDURE — 71046 X-RAY EXAM CHEST 2 VIEWS: CPT

## 2018-10-01 PROCEDURE — 87804 INFLUENZA ASSAY W/OPTIC: CPT | Performed by: INTERNAL MEDICINE

## 2018-10-01 PROCEDURE — G0439 PPPS, SUBSEQ VISIT: HCPCS | Performed by: INTERNAL MEDICINE

## 2018-10-01 RX ORDER — MOXIFLOXACIN HYDROCHLORIDE 400 MG/1
400 TABLET ORAL DAILY
Qty: 7 TABLET | Refills: 0 | Status: SHIPPED | OUTPATIENT
Start: 2018-10-01 | End: 2018-10-05 | Stop reason: HOSPADM

## 2018-10-01 NOTE — PROGRESS NOTES
QUICK REFERENCE INFORMATION:  The ABCs of the Annual Wellness Visit    Subsequent Medicare Wellness Visit    HEALTH RISK ASSESSMENT    1945    Recent Hospitalizations:  No hospitalization(s) within the last year..        Current Medical Providers:  Patient Care Team:  Jeanine Blunt MD as PCP - General (Internal Medicine)  Jeanine Blunt MD as PCP - Claims Attributed  Ritchie Garland MD as Consulting Physician (General Surgery)  Dragan De La Vega MD as Consulting Physician (Orthopedic Surgery)  Andreina Heck MD as Consulting Physician (Pulmonary Disease)        Smoking Status:  History   Smoking Status   • Never Smoker   Smokeless Tobacco   • Never Used       Alcohol Consumption:  History   Alcohol Use No       Depression Screen:   PHQ-2/PHQ-9 Depression Screening 10/1/2018   Little interest or pleasure in doing things 0   Feeling down, depressed, or hopeless 0   Total Score 0       Health Habits and Functional and Cognitive Screening:  Functional & Cognitive Status 10/1/2018   Do you have difficulty preparing food and eating? No   Do you have difficulty bathing yourself, getting dressed or grooming yourself? No   Do you have difficulty using the toilet? No   Do you have difficulty moving around from place to place? No   Do you have trouble with steps or getting out of a bed or a chair? Yes   In the past year have you fallen or experienced a near fall? No   Current Diet Limited Junk Food   Dental Exam Up to date   Eye Exam Up to date   Exercise (times per week) 0 times per week   Current Exercise Activities Include None   Do you need help using the phone?  No   Are you deaf or do you have serious difficulty hearing?  No   Do you need help with transportation? No   Do you need help shopping? No   Do you need help preparing meals?  No   Do you need help with housework?  No   Do you need help with laundry? No   Do you need help taking your medications? No   Do you need help managing money?  No   Do you ever drive or ride in a car without wearing a seat belt? No   Have you felt unusual stress, anger or loneliness in the last month? No   Who do you live with? Spouse   If you need help, do you have trouble finding someone available to you? No   Have you been bothered in the last four weeks by sexual problems? No   Do you have difficulty concentrating, remembering or making decisions? No           Does the patient have evidence of cognitive impairment? No    Aspirin use counseling: Does not need ASA (and currently is not on it)      Recent Lab Results:  CMP:  Lab Results   Component Value Date    GLU 79 09/27/2018    BUN 19 09/27/2018    CREATININE 0.77 09/27/2018    EGFRIFNONA 74 09/27/2018    EGFRIFAFRI 90 09/27/2018    BCR 24.7 09/27/2018     09/27/2018    K 4.5 09/27/2018    CO2 30.1 (H) 09/27/2018    CALCIUM 9.1 09/27/2018    PROTENTOTREF 6.1 09/27/2018    ALBUMIN 3.80 09/27/2018    LABGLOBREF 2.3 09/27/2018    LABIL2 1.7 09/27/2018    BILITOT 0.2 09/27/2018    ALKPHOS 66 09/27/2018    AST 13 09/27/2018    ALT 16 09/27/2018     Lipid Panel:  Lab Results   Component Value Date    TRIG 201 (H) 09/27/2018    HDL 61 (H) 09/27/2018    VLDL 40.2 (H) 09/27/2018    LDLHDL 2.85 09/27/2018     HbA1c:  Lab Results   Component Value Date    HGBA1C 6.10 (H) 09/27/2018       Visual Acuity:  No exam data present    Age-appropriate Screening Schedule:  Refer to the list below for future screening recommendations based on patient's age, sex and/or medical conditions. Orders for these recommended tests are listed in the plan section. The patient has been provided with a written plan.    Health Maintenance   Topic Date Due   • ZOSTER VACCINE (1 of 2) 12/30/1997   • PNEUMOCOCCAL VACCINES (65+ LOW/MEDIUM RISK) (2 of 2 - PPSV23) 09/15/2017   • LIPID PANEL  09/27/2019   • MAMMOGRAM  02/15/2020   • COLONOSCOPY  03/01/2023   • TDAP/TD VACCINES (2 - Td) 07/08/2026   • INFLUENZA VACCINE  Completed        Subjective    History of Present Illness    Maria Ines Zhang is a 72 y.o. female who presents for an Subsequent Wellness Visit.    She started feeling poorly yesterday with SOB and wheezing. Fever to 101F at home. She has been feeling ill and having cramps in her arms/legs. She has a history of BOOP about 18 months ago. In hospital for several weeks requiring lung biopsy as well as drains. SOB is worse with walking.     She has chronic HLD that is getting worse. She has been eating very poorly and taking her statin. Gained some weight as well.     She has chronic hypothyroidism that is under good control. Tolerating medication well without any issues. She does feel tired today.     She has chronic IFG that is getting worse with increasing weight. Has not been watching what she is eating.     The following portions of the patient's history were reviewed and updated as appropriate: allergies, current medications, past family history, past medical history, past social history, past surgical history and problem list.    Outpatient Medications Prior to Visit   Medication Sig Dispense Refill   • albuterol (PROVENTIL HFA;VENTOLIN HFA) 108 (90 Base) MCG/ACT inhaler Inhale 2 puffs Every 4 (Four) Hours As Needed for Wheezing or Shortness of Air. 1 inhaler 6   • albuterol (PROVENTIL) (2.5 MG/3ML) 0.083% nebulizer solution Take 2.5 mg by nebulization Every 4 (Four) Hours As Needed for Wheezing. 100 vial 1   • budesonide-formoterol (SYMBICORT) 160-4.5 MCG/ACT inhaler Inhale 2 puffs 2 (Two) Times a Day. 10.2 g 6   • calcium polycarbophil (FIBERCON) 625 MG tablet Take 625 mg by mouth Daily.     • clonazePAM (KlonoPIN) 0.5 MG tablet TAKE ONE TABLET BY MOUTH ONCE NIGHTLY AS NEEDED FOR SEIZURES 90 tablet 0   • cyclobenzaprine (FLEXERIL) 10 MG tablet TAKE ONE TABLET BY MOUTH THREE TIMES A DAY AS NEEDED FOR MUSCLE SPASMS 90 tablet 0   • escitalopram (LEXAPRO) 20 MG tablet Take 1 tablet by mouth Daily. 90 tablet 1   • esomeprazole (NexIUM) 20 MG  capsule Take 40 mg by mouth every morning before breakfast.     • guaifenesin-codeine (GUAIFENESIN AC) 100-10 MG/5ML liquid Take 5 mL by mouth 3 (Three) Times a Day As Needed for Cough. 118 mL 0   • hydrochlorothiazide (HYDRODIURIL) 25 MG tablet TAKE ONE TABLET BY MOUTH DAILY 30 tablet 2   • levothyroxine (SYNTHROID, LEVOTHROID) 88 MCG tablet TAKE ONE TABLET BY MOUTH DAILY 90 tablet 0   • lisinopril (PRINIVIL,ZESTRIL) 20 MG tablet Take 1 tablet by mouth Daily. 90 tablet 1   • meloxicam (MOBIC) 15 MG tablet Take 1 tablet by mouth Daily. 30 tablet 5   • montelukast (SINGULAIR) 10 MG tablet Take 1 tablet by mouth Every Night. 30 tablet 6   • Multiple Vitamins-Minerals (MULTIVITAMIN ADULT PO) Take 1 tablet by mouth Daily.     • polyethylene glycol (MIRALAX) packet Take 17 g by mouth Daily. 30 packet 2   • Probiotic Product (PROBIOTIC-10 PO) Take  by mouth.     • senna-docusate (PERICOLACE) 8.6-50 MG per tablet Take 2 tablets by mouth Daily As Needed for Constipation. 30 tablet 1   • simvastatin (ZOCOR) 10 MG tablet TAKE ONE TABLET BY MOUTH DAILY 90 tablet 1   • Vitamin Mixture (ROCKY-C PO) Take 500 mg by mouth Daily.     • predniSONE (DELTASONE) 10 MG tablet 50mg PO Qam x 2 days, 40mg PO Qam x 2 days, 30mg PO Qam x 2 days, 20mg PO Qam x 2 days, 10mg PO Qam x 2 days, 30 tablet 0     No facility-administered medications prior to visit.        Patient Active Problem List   Diagnosis   • Essential hypertension   • HLD (hyperlipidemia)   • Acquired hypothyroidism   • Depression   • Bronchiolitis obliterans organizing pneumonia (CMS/HCC)   • Primary osteoarthritis of right knee   • Adhesive capsulitis of right shoulder   • Biceps tendinitis   • Subacromial bursitis, right   • Anxiety   • Gastroesophageal reflux disease   • Osteoarthritis of lumbar spine   • Pulmonary embolism (CMS/HCC)   • Inflammation of sacroiliac joint (CMS/HCC)   • Sleep apnea   • Spondylolisthesis   • Venous stasis   • Cryptogenic stroke (CMS/HCC)   •  Drug-induced constipation   • IFG (impaired fasting glucose)   • Chronic bilateral low back pain with sciatica   • Asthma   • Obesity   • Renal artery aneurysm (CMS/HCC)   • Diverticulosis   • Benign liver cyst   • Diverticulosis of large intestine without hemorrhage   • Lumbar radiculopathy   • AC (acromioclavicular) joint arthritis   • Primary osteoarthritis, right shoulder   • Arthritis of right hip   • Trochanteric bursitis of right hip   • Stress fracture of right foot       Advance Care Planning:  has an advance directive - a copy HAS NOT been provided. Have asked the patient to send this to us to add to record.    Identification of Risk Factors:  Risk factors include: weight , unhealthy diet, cardiovascular risk, inactivity and polypharmacy.    Review of Systems   Constitutional: Positive for activity change, appetite change, chills, fatigue and fever.   HENT: Negative for congestion and rhinorrhea.    Respiratory: Positive for cough, shortness of breath and wheezing.    Cardiovascular: Negative for chest pain and palpitations.   Gastrointestinal: Negative for constipation, diarrhea and nausea.   Genitourinary: Negative for difficulty urinating and dysuria.   Musculoskeletal: Negative for arthralgias.   Neurological: Negative for dizziness and headaches.   Hematological: Negative for adenopathy.       Compared to one year ago, the patient feels her physical health is worse.  Compared to one year ago, the patient feels her mental health is the same.    Objective     Physical Exam   Constitutional: She is oriented to person, place, and time. She appears well-developed and well-nourished. She has a sickly appearance. No distress.   HENT:   Head: Normocephalic and atraumatic.   Right Ear: External ear normal.   Left Ear: External ear normal.   Mouth/Throat: Oropharynx is clear and moist. No oropharyngeal exudate.   Eyes: Conjunctivae and EOM are normal. Right eye exhibits no discharge. Left eye exhibits no  "discharge. No scleral icterus.   Neck: Neck supple.   Cardiovascular: Regular rhythm and normal heart sounds.  Tachycardia present.  Exam reveals no gallop and no friction rub.    No murmur heard.  Pulmonary/Chest: Effort normal. No respiratory distress. She has decreased breath sounds in the right middle field and the right lower field. She has no wheezes. She has rhonchi in the right middle field and the right lower field. She has no rales.   Lymphadenopathy:     She has no cervical adenopathy.   Neurological: She is alert and oriented to person, place, and time.   Skin: Skin is warm. No rash noted.   Psychiatric: She has a normal mood and affect. Her behavior is normal.   Nursing note and vitals reviewed.      Vitals:    10/01/18 1346   BP: 112/64   BP Location: Left arm   Patient Position: Sitting   Cuff Size: Adult   Pulse: 100   Resp: 20   Temp: (!) 101.2 °F (38.4 °C)   TempSrc: Oral   SpO2: 98%   Weight: 103 kg (228 lb)   Height: 167.6 cm (65.98\")   PainSc:   4   PainLoc: Generalized       Patient's Body mass index is 36.82 kg/m². BMI is above normal parameters. Recommendations include: educational material and nutrition counseling.      Assessment/Plan   Patient Self-Management and Personalized Health Advice  The patient has been provided with information about: diet, exercise, weight management, prevention of cardiac or vascular disease, designing advance directives and mental health concerns and preventive services including:   · Advance directive, Diabetes screening, see lab orders, Exercise counseling provided, Fall Risk assessment done, Nutrition counseling provided.    Visit Diagnoses:    ICD-10-CM ICD-9-CM   1. Medicare annual wellness visit, subsequent Z00.00 V70.0   2. Abnormal lung sounds R09.89 786.7   3. Fever, unspecified fever cause R50.9 780.60   4. Dyspnea, unspecified type R06.00 786.09   5. BOOP (bronchiolitis obliterans with organizing pneumonia) (CMS/[facility]) J84.89 516.8   6. IFG (impaired " fasting glucose) R73.01 790.21   7. Essential hypertension I10 401.9   8. Hyperlipidemia, unspecified hyperlipidemia type E78.5 272.4   9. Acquired hypothyroidism E03.9 244.9     I am concerned about patient today. I have ordered STAT CXR to evaluate her abnormal lung sounds on exam of the right lower lung field. I am worried about a PNA vs BOOP. Once we have CXR results, will determine if admission vs close f/u is needed and I will contact pulm to discuss with them. Rapid flu negative. Definitely needs antibiotics and will send in Flouroquinolone.     Patient's cholesterol is not under good control. Will continue current regimen of Zocor. No side effects from medications reported. Will follow up in 6 months to monitor levels.      Patient's BG's have been elevated and their HgA1c is 6.1%. Will continue to focus on diet and exercise. Will follow up in 6 months.     TSH has been stable and patient is doing well. Will continue current regimen of levothyroxine 88 mcg and follow up levels in 6 months.     HTN is under good control and patient will continue to monitor with home BP cuff. No side effects from medications. Will continue current regimen of Lisinopril and HCTZ. Will follow up in 6 months        Orders Placed This Encounter   Procedures   • XR Chest PA & Lateral     Order Specific Question:   Reason for Exam:     Answer:   Right lower crackles, h/o BOOP about 18 months s/p drains   • POC Influenza A / B       Outpatient Encounter Prescriptions as of 10/1/2018   Medication Sig Dispense Refill   • albuterol (PROVENTIL HFA;VENTOLIN HFA) 108 (90 Base) MCG/ACT inhaler Inhale 2 puffs Every 4 (Four) Hours As Needed for Wheezing or Shortness of Air. 1 inhaler 6   • albuterol (PROVENTIL) (2.5 MG/3ML) 0.083% nebulizer solution Take 2.5 mg by nebulization Every 4 (Four) Hours As Needed for Wheezing. 100 vial 1   • budesonide-formoterol (SYMBICORT) 160-4.5 MCG/ACT inhaler Inhale 2 puffs 2 (Two) Times a Day. 10.2 g 6   •  calcium polycarbophil (FIBERCON) 625 MG tablet Take 625 mg by mouth Daily.     • clonazePAM (KlonoPIN) 0.5 MG tablet TAKE ONE TABLET BY MOUTH ONCE NIGHTLY AS NEEDED FOR SEIZURES 90 tablet 0   • cyclobenzaprine (FLEXERIL) 10 MG tablet TAKE ONE TABLET BY MOUTH THREE TIMES A DAY AS NEEDED FOR MUSCLE SPASMS 90 tablet 0   • escitalopram (LEXAPRO) 20 MG tablet Take 1 tablet by mouth Daily. 90 tablet 1   • esomeprazole (NexIUM) 20 MG capsule Take 40 mg by mouth every morning before breakfast.     • guaifenesin-codeine (GUAIFENESIN AC) 100-10 MG/5ML liquid Take 5 mL by mouth 3 (Three) Times a Day As Needed for Cough. 118 mL 0   • hydrochlorothiazide (HYDRODIURIL) 25 MG tablet TAKE ONE TABLET BY MOUTH DAILY 30 tablet 2   • levothyroxine (SYNTHROID, LEVOTHROID) 88 MCG tablet TAKE ONE TABLET BY MOUTH DAILY 90 tablet 0   • lisinopril (PRINIVIL,ZESTRIL) 20 MG tablet Take 1 tablet by mouth Daily. 90 tablet 1   • meloxicam (MOBIC) 15 MG tablet Take 1 tablet by mouth Daily. 30 tablet 5   • montelukast (SINGULAIR) 10 MG tablet Take 1 tablet by mouth Every Night. 30 tablet 6   • Multiple Vitamins-Minerals (MULTIVITAMIN ADULT PO) Take 1 tablet by mouth Daily.     • polyethylene glycol (MIRALAX) packet Take 17 g by mouth Daily. 30 packet 2   • Probiotic Product (PROBIOTIC-10 PO) Take  by mouth.     • senna-docusate (PERICOLACE) 8.6-50 MG per tablet Take 2 tablets by mouth Daily As Needed for Constipation. 30 tablet 1   • simvastatin (ZOCOR) 10 MG tablet TAKE ONE TABLET BY MOUTH DAILY 90 tablet 1   • Vitamin Mixture (ROCKY-C PO) Take 500 mg by mouth Daily.     • [DISCONTINUED] predniSONE (DELTASONE) 10 MG tablet 50mg PO Qam x 2 days, 40mg PO Qam x 2 days, 30mg PO Qam x 2 days, 20mg PO Qam x 2 days, 10mg PO Qam x 2 days, 30 tablet 0     No facility-administered encounter medications on file as of 10/1/2018.        Reviewed use of high risk medication in the elderly: yes  Reviewed for potential of harmful drug interactions in the elderly:  yes    Follow Up:  Return in about 6 months (around 4/1/2019) for Recheck, call with CXR results and decide about  f/u and antibiotics.     An After Visit Summary and PPPS with all of these plans were given to the patient.

## 2018-10-01 NOTE — PROGRESS NOTES
Left voicemail on cell phone regarding management. Sent in antibiotics for her and needs to take Mucinex. F/u with me on Thursday and if worsening, needs to go to ER.     Patient called back and discussed with her. F/u scheduled for 2pm on 10/4

## 2018-10-01 NOTE — TELEPHONE ENCOUNTER
----- Message from Jeanine Blunt MD sent at 10/1/2018  4:26 PM EDT -----  Left voicemail on cell phone regarding management. Sent in antibiotics for her and needs to take Mucinex. F/u with me on Thursday and if worsening, needs to go to ER.     Patient called back and discussed with her. F/u scheduled for 2pm on 10/4

## 2018-10-02 LAB
APPEARANCE UR: CLEAR
BACTERIA #/AREA URNS HPF: ABNORMAL /HPF
BACTERIA UR CULT: NORMAL
BACTERIA UR CULT: NORMAL
BILIRUB UR QL STRIP: NEGATIVE
COLOR UR: YELLOW
EPI CELLS #/AREA URNS HPF: ABNORMAL /HPF
GLUCOSE UR QL: NEGATIVE
HGB UR QL STRIP: NEGATIVE
KETONES UR QL STRIP: NEGATIVE
LEUKOCYTE ESTERASE UR QL STRIP: ABNORMAL
MICRO URNS: ABNORMAL
MUCOUS THREADS URNS QL MICRO: PRESENT /HPF
NITRITE UR QL STRIP: NEGATIVE
PH UR STRIP: 6 [PH] (ref 5–7.5)
PROT UR QL STRIP: NEGATIVE
RBC #/AREA URNS HPF: ABNORMAL /HPF
SP GR UR: 1.02 (ref 1–1.03)
URINALYSIS REFLEX: ABNORMAL
UROBILINOGEN UR STRIP-MCNC: 0.2 MG/DL (ref 0.2–1)
WBC #/AREA URNS HPF: ABNORMAL /HPF

## 2018-10-03 ENCOUNTER — APPOINTMENT (OUTPATIENT)
Dept: GENERAL RADIOLOGY | Facility: HOSPITAL | Age: 73
End: 2018-10-03

## 2018-10-03 ENCOUNTER — HOSPITAL ENCOUNTER (INPATIENT)
Facility: HOSPITAL | Age: 73
LOS: 2 days | Discharge: HOME | End: 2018-10-05
Attending: EMERGENCY MEDICINE | Admitting: INTERNAL MEDICINE

## 2018-10-03 ENCOUNTER — TELEPHONE (OUTPATIENT)
Dept: INTERNAL MEDICINE | Facility: CLINIC | Age: 73
End: 2018-10-03

## 2018-10-03 ENCOUNTER — APPOINTMENT (OUTPATIENT)
Dept: CT IMAGING | Facility: HOSPITAL | Age: 73
End: 2018-10-03

## 2018-10-03 DIAGNOSIS — J18.9 COMMUNITY ACQUIRED PNEUMONIA, UNSPECIFIED LATERALITY: ICD-10-CM

## 2018-10-03 DIAGNOSIS — Z78.9 FAILURE OF OUTPATIENT TREATMENT: Primary | ICD-10-CM

## 2018-10-03 LAB
ALBUMIN SERPL-MCNC: 4.1 G/DL (ref 3.5–5.2)
ALBUMIN/GLOB SERPL: 1.3 G/DL
ALP SERPL-CCNC: 68 U/L (ref 39–117)
ALT SERPL W P-5'-P-CCNC: 17 U/L (ref 1–33)
ANION GAP SERPL CALCULATED.3IONS-SCNC: 14.7 MMOL/L
AST SERPL-CCNC: 18 U/L (ref 1–32)
B PERT DNA SPEC QL NAA+PROBE: NOT DETECTED
BASOPHILS # BLD AUTO: 0.01 10*3/MM3 (ref 0–0.2)
BASOPHILS NFR BLD AUTO: 0.2 % (ref 0–1.5)
BILIRUB SERPL-MCNC: 0.4 MG/DL (ref 0.1–1.2)
BUN BLD-MCNC: 18 MG/DL (ref 8–23)
BUN/CREAT SERPL: 15.9 (ref 7–25)
C PNEUM DNA NPH QL NAA+NON-PROBE: NOT DETECTED
CALCIUM SPEC-SCNC: 9.2 MG/DL (ref 8.6–10.5)
CHLORIDE SERPL-SCNC: 100 MMOL/L (ref 98–107)
CO2 SERPL-SCNC: 24.3 MMOL/L (ref 22–29)
CREAT BLD-MCNC: 1.13 MG/DL (ref 0.57–1)
D-LACTATE SERPL-SCNC: 1.4 MMOL/L (ref 0.5–2)
DEPRECATED RDW RBC AUTO: 48.6 FL (ref 37–54)
EOSINOPHIL # BLD AUTO: 0.02 10*3/MM3 (ref 0–0.7)
EOSINOPHIL NFR BLD AUTO: 0.4 % (ref 0.3–6.2)
ERYTHROCYTE [DISTWIDTH] IN BLOOD BY AUTOMATED COUNT: 14.3 % (ref 11.7–13)
FLUAV H1 2009 PAND RNA NPH QL NAA+PROBE: NOT DETECTED
FLUAV H1 HA GENE NPH QL NAA+PROBE: NOT DETECTED
FLUAV H3 RNA NPH QL NAA+PROBE: NOT DETECTED
FLUAV SUBTYP SPEC NAA+PROBE: NOT DETECTED
FLUBV RNA ISLT QL NAA+PROBE: NOT DETECTED
GFR SERPL CREATININE-BSD FRML MDRD: 48 ML/MIN/1.73
GLOBULIN UR ELPH-MCNC: 3.1 GM/DL
GLUCOSE BLD-MCNC: 91 MG/DL (ref 65–99)
GLUCOSE BLDC GLUCOMTR-MCNC: 129 MG/DL (ref 70–130)
HADV DNA SPEC NAA+PROBE: NOT DETECTED
HCOV 229E RNA SPEC QL NAA+PROBE: NOT DETECTED
HCOV HKU1 RNA SPEC QL NAA+PROBE: NOT DETECTED
HCOV NL63 RNA SPEC QL NAA+PROBE: NOT DETECTED
HCOV OC43 RNA SPEC QL NAA+PROBE: NOT DETECTED
HCT VFR BLD AUTO: 37.6 % (ref 35.6–45.5)
HGB BLD-MCNC: 11.5 G/DL (ref 11.9–15.5)
HMPV RNA NPH QL NAA+NON-PROBE: NOT DETECTED
HOLD SPECIMEN: NORMAL
HOLD SPECIMEN: NORMAL
HPIV1 RNA SPEC QL NAA+PROBE: NOT DETECTED
HPIV2 RNA SPEC QL NAA+PROBE: NOT DETECTED
HPIV3 RNA NPH QL NAA+PROBE: NOT DETECTED
HPIV4 P GENE NPH QL NAA+PROBE: NOT DETECTED
IMM GRANULOCYTES # BLD: 0 10*3/MM3 (ref 0–0.03)
IMM GRANULOCYTES NFR BLD: 0 % (ref 0–0.5)
LYMPHOCYTES # BLD AUTO: 1.34 10*3/MM3 (ref 0.9–4.8)
LYMPHOCYTES NFR BLD AUTO: 25.4 % (ref 19.6–45.3)
M PNEUMO IGG SER IA-ACNC: NOT DETECTED
MCH RBC QN AUTO: 28.6 PG (ref 26.9–32)
MCHC RBC AUTO-ENTMCNC: 30.6 G/DL (ref 32.4–36.3)
MCV RBC AUTO: 93.5 FL (ref 80.5–98.2)
MONOCYTES # BLD AUTO: 0.61 10*3/MM3 (ref 0.2–1.2)
MONOCYTES NFR BLD AUTO: 11.6 % (ref 5–12)
NEUTROPHILS # BLD AUTO: 3.3 10*3/MM3 (ref 1.9–8.1)
NEUTROPHILS NFR BLD AUTO: 62.4 % (ref 42.7–76)
PLATELET # BLD AUTO: 293 10*3/MM3 (ref 140–500)
PMV BLD AUTO: 9.8 FL (ref 6–12)
POTASSIUM BLD-SCNC: 4.2 MMOL/L (ref 3.5–5.2)
PROCALCITONIN SERPL-MCNC: 0.07 NG/ML (ref 0.1–0.25)
PROT SERPL-MCNC: 7.2 G/DL (ref 6–8.5)
RBC # BLD AUTO: 4.02 10*6/MM3 (ref 3.9–5.2)
RHINOVIRUS RNA SPEC NAA+PROBE: NOT DETECTED
RSV RNA NPH QL NAA+NON-PROBE: NOT DETECTED
SODIUM BLD-SCNC: 139 MMOL/L (ref 136–145)
WBC NRBC COR # BLD: 5.28 10*3/MM3 (ref 4.5–10.7)
WHOLE BLOOD HOLD SPECIMEN: NORMAL
WHOLE BLOOD HOLD SPECIMEN: NORMAL

## 2018-10-03 PROCEDURE — 94640 AIRWAY INHALATION TREATMENT: CPT

## 2018-10-03 PROCEDURE — 87581 M.PNEUMON DNA AMP PROBE: CPT | Performed by: EMERGENCY MEDICINE

## 2018-10-03 PROCEDURE — 87798 DETECT AGENT NOS DNA AMP: CPT | Performed by: EMERGENCY MEDICINE

## 2018-10-03 PROCEDURE — 87486 CHLMYD PNEUM DNA AMP PROBE: CPT | Performed by: EMERGENCY MEDICINE

## 2018-10-03 PROCEDURE — 71045 X-RAY EXAM CHEST 1 VIEW: CPT

## 2018-10-03 PROCEDURE — 82962 GLUCOSE BLOOD TEST: CPT

## 2018-10-03 PROCEDURE — 87633 RESP VIRUS 12-25 TARGETS: CPT | Performed by: EMERGENCY MEDICINE

## 2018-10-03 PROCEDURE — 94799 UNLISTED PULMONARY SVC/PX: CPT

## 2018-10-03 PROCEDURE — 25010000002 CEFTRIAXONE PER 250 MG: Performed by: EMERGENCY MEDICINE

## 2018-10-03 PROCEDURE — 87040 BLOOD CULTURE FOR BACTERIA: CPT | Performed by: EMERGENCY MEDICINE

## 2018-10-03 PROCEDURE — 25010000002 AZITHROMYCIN PER 500 MG: Performed by: EMERGENCY MEDICINE

## 2018-10-03 PROCEDURE — 71250 CT THORAX DX C-: CPT

## 2018-10-03 PROCEDURE — 84145 PROCALCITONIN (PCT): CPT | Performed by: EMERGENCY MEDICINE

## 2018-10-03 PROCEDURE — 80053 COMPREHEN METABOLIC PANEL: CPT | Performed by: EMERGENCY MEDICINE

## 2018-10-03 PROCEDURE — 85025 COMPLETE CBC W/AUTO DIFF WBC: CPT | Performed by: EMERGENCY MEDICINE

## 2018-10-03 PROCEDURE — 83605 ASSAY OF LACTIC ACID: CPT | Performed by: EMERGENCY MEDICINE

## 2018-10-03 PROCEDURE — 99284 EMERGENCY DEPT VISIT MOD MDM: CPT

## 2018-10-03 RX ORDER — HYDROCHLOROTHIAZIDE 25 MG/1
25 TABLET ORAL DAILY
COMMUNITY
End: 2018-10-07 | Stop reason: SDUPTHER

## 2018-10-03 RX ORDER — CYCLOBENZAPRINE HCL 10 MG
10 TABLET ORAL 3 TIMES DAILY PRN
Status: DISCONTINUED | OUTPATIENT
Start: 2018-10-03 | End: 2018-10-05 | Stop reason: HOSPADM

## 2018-10-03 RX ORDER — ATORVASTATIN CALCIUM 10 MG/1
10 TABLET, FILM COATED ORAL DAILY
Status: DISCONTINUED | OUTPATIENT
Start: 2018-10-03 | End: 2018-10-05 | Stop reason: HOSPADM

## 2018-10-03 RX ORDER — LEVOTHYROXINE SODIUM 88 UG/1
88 TABLET ORAL DAILY
COMMUNITY
End: 2018-12-09 | Stop reason: SDUPTHER

## 2018-10-03 RX ORDER — PANTOPRAZOLE SODIUM 40 MG/1
40 TABLET, DELAYED RELEASE ORAL EVERY MORNING
Status: DISCONTINUED | OUTPATIENT
Start: 2018-10-04 | End: 2018-10-05 | Stop reason: HOSPADM

## 2018-10-03 RX ORDER — IPRATROPIUM BROMIDE AND ALBUTEROL SULFATE 2.5; .5 MG/3ML; MG/3ML
3 SOLUTION RESPIRATORY (INHALATION)
Status: DISCONTINUED | OUTPATIENT
Start: 2018-10-03 | End: 2018-10-05 | Stop reason: HOSPADM

## 2018-10-03 RX ORDER — CLONAZEPAM 0.5 MG/1
0.5 TABLET ORAL NIGHTLY PRN
Status: DISCONTINUED | OUTPATIENT
Start: 2018-10-03 | End: 2018-10-05 | Stop reason: HOSPADM

## 2018-10-03 RX ORDER — METHYLDOPA 250 MG
1000 TABLET ORAL DAILY
COMMUNITY

## 2018-10-03 RX ORDER — SODIUM CHLORIDE 0.9 % (FLUSH) 0.9 %
3 SYRINGE (ML) INJECTION EVERY 12 HOURS SCHEDULED
Status: DISCONTINUED | OUTPATIENT
Start: 2018-10-03 | End: 2018-10-05 | Stop reason: HOSPADM

## 2018-10-03 RX ORDER — CEFTRIAXONE SODIUM 1 G/50ML
1 INJECTION, SOLUTION INTRAVENOUS ONCE
Status: COMPLETED | OUTPATIENT
Start: 2018-10-03 | End: 2018-10-03

## 2018-10-03 RX ORDER — CEFTRIAXONE SODIUM 1 G/50ML
1 INJECTION, SOLUTION INTRAVENOUS EVERY 24 HOURS
Status: DISCONTINUED | OUTPATIENT
Start: 2018-10-04 | End: 2018-10-05 | Stop reason: HOSPADM

## 2018-10-03 RX ORDER — LISINOPRIL 20 MG/1
20 TABLET ORAL DAILY
Status: DISCONTINUED | OUTPATIENT
Start: 2018-10-03 | End: 2018-10-05 | Stop reason: HOSPADM

## 2018-10-03 RX ORDER — MULTIPLE VITAMINS W/ MINERALS TAB 9MG-400MCG
1 TAB ORAL DAILY
COMMUNITY

## 2018-10-03 RX ORDER — LEVOTHYROXINE SODIUM 88 UG/1
88 TABLET ORAL DAILY
Status: DISCONTINUED | OUTPATIENT
Start: 2018-10-03 | End: 2018-10-05 | Stop reason: HOSPADM

## 2018-10-03 RX ORDER — MULTIPLE VITAMINS W/ MINERALS TAB 9MG-400MCG
1 TAB ORAL DAILY
Status: DISCONTINUED | OUTPATIENT
Start: 2018-10-03 | End: 2018-10-05 | Stop reason: HOSPADM

## 2018-10-03 RX ORDER — CYCLOBENZAPRINE HCL 10 MG
10 TABLET ORAL 3 TIMES DAILY PRN
COMMUNITY
End: 2018-11-09 | Stop reason: SDUPTHER

## 2018-10-03 RX ORDER — HYDROCHLOROTHIAZIDE 25 MG/1
25 TABLET ORAL DAILY
Status: DISCONTINUED | OUTPATIENT
Start: 2018-10-03 | End: 2018-10-05 | Stop reason: HOSPADM

## 2018-10-03 RX ORDER — ESOMEPRAZOLE MAGNESIUM 40 MG/1
40 CAPSULE, DELAYED RELEASE ORAL DAILY
COMMUNITY

## 2018-10-03 RX ORDER — SODIUM CHLORIDE 0.9 % (FLUSH) 0.9 %
10 SYRINGE (ML) INJECTION AS NEEDED
Status: DISCONTINUED | OUTPATIENT
Start: 2018-10-03 | End: 2018-10-05 | Stop reason: HOSPADM

## 2018-10-03 RX ORDER — SIMVASTATIN 10 MG
10 TABLET ORAL DAILY
COMMUNITY
End: 2019-05-07 | Stop reason: SDUPTHER

## 2018-10-03 RX ORDER — SODIUM CHLORIDE 0.9 % (FLUSH) 0.9 %
3-10 SYRINGE (ML) INJECTION AS NEEDED
Status: DISCONTINUED | OUTPATIENT
Start: 2018-10-03 | End: 2018-10-05 | Stop reason: HOSPADM

## 2018-10-03 RX ORDER — CLONAZEPAM 0.5 MG/1
0.5 TABLET ORAL NIGHTLY PRN
COMMUNITY
End: 2019-06-18 | Stop reason: SDUPTHER

## 2018-10-03 RX ORDER — ESCITALOPRAM OXALATE 20 MG/1
20 TABLET ORAL DAILY
Status: DISCONTINUED | OUTPATIENT
Start: 2018-10-03 | End: 2018-10-05 | Stop reason: HOSPADM

## 2018-10-03 RX ORDER — ACETAMINOPHEN 500 MG
1000 TABLET ORAL ONCE
Status: COMPLETED | OUTPATIENT
Start: 2018-10-03 | End: 2018-10-03

## 2018-10-03 RX ORDER — MONTELUKAST SODIUM 10 MG/1
10 TABLET ORAL NIGHTLY
Status: DISCONTINUED | OUTPATIENT
Start: 2018-10-03 | End: 2018-10-05 | Stop reason: HOSPADM

## 2018-10-03 RX ADMIN — MONTELUKAST SODIUM 10 MG: 10 TABLET, FILM COATED ORAL at 20:55

## 2018-10-03 RX ADMIN — AZITHROMYCIN MONOHYDRATE 500 MG: 500 INJECTION, POWDER, LYOPHILIZED, FOR SOLUTION INTRAVENOUS at 16:46

## 2018-10-03 RX ADMIN — ACETAMINOPHEN 1000 MG: 500 TABLET, FILM COATED ORAL at 16:11

## 2018-10-03 RX ADMIN — CEFTRIAXONE SODIUM 1 G: 1 INJECTION, SOLUTION INTRAVENOUS at 16:09

## 2018-10-03 RX ADMIN — ESCITALOPRAM 20 MG: 20 TABLET, FILM COATED ORAL at 20:55

## 2018-10-03 RX ADMIN — IPRATROPIUM BROMIDE AND ALBUTEROL SULFATE 3 ML: .5; 3 SOLUTION RESPIRATORY (INHALATION) at 20:02

## 2018-10-03 RX ADMIN — SODIUM CHLORIDE 1000 ML: 9 INJECTION, SOLUTION INTRAVENOUS at 16:02

## 2018-10-03 RX ADMIN — MULTIPLE VITAMINS W/ MINERALS TAB 1 TABLET: TAB at 20:55

## 2018-10-03 RX ADMIN — Medication 3 ML: at 20:55

## 2018-10-03 NOTE — PROGRESS NOTES
Clinical Pharmacy Services: Medication History    Maria Ines Zhang is a 72 y.o. female presenting to [facility] for   Chief Complaint   Patient presents with   • Shortness of Breath     pt dx with pneumonia two days ago, c/o worsening SOA       She  has a past medical history of Anxiety; Asthma; Benign essential hypertension; Benign liver cyst (2/15/2018); Colitis; Cryptogenic stroke (CMS/HCC) (8/3/2017); DDD (degenerative disc disease), lumbar (12/10/2012); DDD (degenerative disc disease), lumbosacral; Depression; Diverticulosis (2/15/2018); Fatigue; GERD (gastroesophageal reflux disease); H/O acute respiratory failure (02/25/2016); Hyperlipidemia; Hypertension; Hypothyroidism; IFG (impaired fasting glucose) (8/7/2017); Mitral valve insufficiency; MRSA infection within last 3 months (05/2017); Obesity; Obstructive sleep apnea; Osteoarthritis; Patent foramen ovale; Pulmonary embolism (CMS/HCC); Pulmonary hypertension (CMS/HCC); SBO (small bowel obstruction) (CMS/HCC); Sciatica; and Venous stasis.    Allergies as of 10/03/2018 - Reviewed 10/03/2018   Allergen Reaction Noted   • Fish allergy Nausea And Vomiting 12/10/2015   • Iodine Hives 02/07/2018   • Shellfish allergy Nausea And Vomiting 12/10/2015   • Sulfa antibiotics Rash 07/16/2017       Medication information was obtained from: Patient, medication list  Pharmacy and Phone Number: [facility] [phone]    Prior to Admission Medications     Prescriptions Last Dose Informant Patient Reported? Taking?    albuterol (PROVENTIL HFA;VENTOLIN HFA) 108 (90 Base) MCG/ACT inhaler  Self No Yes    Inhale 2 puffs Every 4 (Four) Hours As Needed for Wheezing or Shortness of Air.    albuterol (PROVENTIL) (2.5 MG/3ML) 0.083% nebulizer solution  Self No Yes    Take 2.5 mg by nebulization Every 4 (Four) Hours As Needed for Wheezing.    Bioflavonoid Products (ROCKY-C) tablet   Yes Yes    Take 1,000 mg by mouth Daily.    budesonide-formoterol (SYMBICORT) 160-4.5  MCG/ACT inhaler   No Yes    Inhale 2 puffs 2 (Two) Times a Day.    calcium polycarbophil (FIBERCON) 625 MG tablet   Yes Yes    Take 1,250 mg by mouth Daily.    clonazePAM (KlonoPIN) 0.5 MG tablet   Yes Yes    Take 0.5 mg by mouth At Night As Needed.    cyclobenzaprine (FLEXERIL) 10 MG tablet   Yes Yes    Take 10 mg by mouth 3 (Three) Times a Day As Needed for Muscle Spasms.    escitalopram (LEXAPRO) 20 MG tablet   No Yes    Take 1 tablet by mouth Daily.    esomeprazole (nexIUM) 40 MG capsule   Yes Yes    Take 40 mg by mouth Daily.    hydrochlorothiazide (HYDRODIURIL) 25 MG tablet   Yes Yes    Take 25 mg by mouth Daily.    levothyroxine (SYNTHROID, LEVOTHROID) 88 MCG tablet   Yes Yes    Take 88 mcg by mouth Daily.    lisinopril (PRINIVIL,ZESTRIL) 20 MG tablet   No Yes    Take 1 tablet by mouth Daily.    meloxicam (MOBIC) 15 MG tablet   No Yes    Take 1 tablet by mouth Daily.    montelukast (SINGULAIR) 10 MG tablet   No Yes    Take 1 tablet by mouth Every Night.    moxifloxacin (AVELOX) 400 MG tablet   No Yes    Take 1 tablet by mouth Daily for 7 days.    Multiple Vitamins-Minerals (MULTIVITAMIN WITH MINERALS) tablet tablet   Yes Yes    Take 1 tablet by mouth Daily.    Probiotic Product (PROBIOTIC-10) capsule   Yes Yes    Take 1 capsule by mouth Daily.    senna-docusate (PERICOLACE) 8.6-50 MG per tablet   No Yes    Take 2 tablets by mouth Daily As Needed for Constipation.    simvastatin (ZOCOR) 10 MG tablet   Yes Yes    Take 10 mg by mouth Daily.            Medication notes: Removed, therapy complete per patient: Guaifenesin AC, Miralax    This medication list is complete to the best of my knowledge as of 10/3/2018    Please call if questions.    Joanne Benavidez, Medication History Technician  10/3/2018 5:16 PM

## 2018-10-03 NOTE — H&P
Group: [facility]         H/p  NOTE    Patient Identification:  Maria Ines Zhang  72 y.o.  female  1945  [id number]                CC:     History of Present Illness:  72-year-old female, patient of my former partner Dr. Umang grossman forward of her asthma and sleep apnea with a history of Boop was on steroids in the past.  Patient presented to the emergency room shortness of breath for the last 2 days.  She did have fever up to 102 with persistent fever noted up to 101 edema emergency room.  She reports cough and associated wheezing.  She uses oxygen with sleep only.  In the emergency room currently requiring 2 L of oxygen nasal cannula.  Denies chest pain or aspiration.  No hemoptysis reported.  She says she has never smoked.  She has sleep apnea and reports being compliant with CPAP.  No nausea vomiting diarrhea reported.      Review of Systems  Constitutional: Positive for fever (tmax of 102).   HENT: Negative for sore throat.    Eyes: Negative.    Respiratory: Positive for cough (mild dry) and shortness of breath.    Cardiovascular: Negative for chest pain.   Gastrointestinal: Negative for abdominal pain, diarrhea and vomiting.   Genitourinary: Negative for dysuria.   Musculoskeletal: Negative for neck pain.   Skin: Negative for rash.   Allergic/Immunologic: Negative.    Neurological: Negative for weakness, numbness and headaches.   Hematological: Negative.    Psychiatric/Behavioral: Negative.    All other systems reviewed and are negative.     Past Medical History:  Past Medical History:   Diagnosis Date   • Anxiety    • Asthma    • Benign essential hypertension    • Benign liver cyst 2/15/2018    Founds incidentally on CT scan of abdomen. Not clinically significant in size or appearance.    • Colitis    • Cryptogenic stroke (CMS/HCC) 8/3/2017   • DDD (degenerative disc disease), lumbar 12/10/2012    W/ spondylolisthesis, Patient has had 2 epidurals previously-Dr. Micheal Marquez   • DDD  (degenerative disc disease), lumbosacral    • Depression    • Diverticulosis 2/15/2018   • Fatigue    • GERD (gastroesophageal reflux disease)    • H/O acute respiratory failure 02/25/2016    With bilateral pulmonary infiltrates-Dr. Jose Maria Funk   • Hyperlipidemia    • Hypertension    • Hypothyroidism    • IFG (impaired fasting glucose) 8/7/2017   • Mitral valve insufficiency     nild   • MRSA infection within last 3 months 05/2017    RIGHT FOOT   • Obesity    • Obstructive sleep apnea     USES CPAP   • Osteoarthritis    • Patent foramen ovale    • Pulmonary embolism (CMS/HCC)     3 SEPARATE OCCASIONS   • Pulmonary hypertension (CMS/HCC)    • SBO (small bowel obstruction) (CMS/HCC)    • Sciatica    • Venous stasis        Past Surgical History:  Past Surgical History:   Procedure Laterality Date   • BRONCHOSCOPY WITH ENDOSCOPY Right 02/25/2016    Exploratory bronchoscopy, exploratory RT video-assisted thoracoscopy and wedge resection of RT upper lobe and Subpleural pain catheter x2-Dr. Jose Maria Funk   • CATARACT EXTRACTION W/ INTRAOCULAR LENS IMPLANT Bilateral 2016   • COLONOSCOPY N/A 3/1/2018    Procedure: COLONOSCOPY INTO CECUM/ TERMINAL ILEUM WITH BIOPSIES AND CLIP X 1;  Surgeon: Ritchie Garland MD;  Location: [facility] ENDOSCOPY;  Service:    • COLONOSCOPY N/A 10/02/2008    Diverticulosis of the sigmoid colon-Dr. Salud Lowry   • HYSTERECTOMY     • LAPAROSCOPIC CHOLECYSTECTOMY N/A 10/01/2003    Dr. Nino Torres   • LUMBAR EPIDURAL INJECTION N/A 12/10/2012    Multiple lumbar epidural injections-Dr. Micheal Marquez   • LUMBAR FUSION N/A 08/2012   • SD TOTAL KNEE ARTHROPLASTY Right 7/6/2017    Procedure: TOTAL KNEE ARTHROPLASTY;  Surgeon: Dragan De La Vega MD;  Location: [facility] MAIN OR;  Service: Orthopedics   • TOE AMPUTATION Right 05/2017    SECOND TOE   • TOTAL KNEE ARTHROPLASTY Left 11/13/2006    Dr. Jerome Weber   • UPPER GASTROINTESTINAL ENDOSCOPY N/A 10/02/2008    Normal esophagus, gastric mucosal  "abnormality characterized by erythema, normal examined duodenum-Dr. Salud Lowry        Home Meds:    (Not in a hospital admission)  Reviewed  Allergies:  Allergies   Allergen Reactions   • Fish Allergy Nausea And Vomiting   • Iodine Hives     Iodine contrast. Pt states years ago had CT scan with resulting hives   • Shellfish Allergy Nausea And Vomiting   • Sulfa Antibiotics Rash       Social History:   Social History     Social History   • Marital status:      Spouse name: N/A   • Number of children: N/A   • Years of education: N/A     Occupational History   • Not on file.     Social History Main Topics   • Smoking status: Never Smoker   • Smokeless tobacco: Never Used   • Alcohol use No   • Drug use: No   • Sexual activity: Defer     Other Topics Concern   • Not on file     Social History Narrative   • No narrative on file       Family History:  Family History   Problem Relation Age of Onset   • Heart disease Mother 60   • Diabetes Mother    • Stroke Mother 83   • Melanoma Father    • Heart attack Brother    • Heart disease Brother         Valve problem    • Heart attack Maternal Grandmother    • Heart attack Maternal Grandfather    • No Known Problems Paternal Grandmother    • No Known Problems Paternal Grandfather    • Malig Hyperthermia Neg Hx    • Breast cancer Neg Hx        Physical Exam:  /70 (BP Location: Left arm, Patient Position: Sitting)   Pulse 77   Temp (!) 100.8 °F (38.2 °C) (Tympanic)   Resp 18   Ht 167.6 cm (66\")   Wt 104 kg (229 lb 8 oz)   SpO2 99%   BMI 37.04 kg/m²  Body mass index is 37.04 kg/m². 99% 104 kg (229 lb 8 oz)  Physical Exam  Elderly female resting comfortably no distress no labored breathing  Alert oriented ×3  Oral cavity moist mucous membrane and neck supple no bruit no adenopathy  Chest diminished breath sounds with rhonchi bilaterally on the bases  CVS regular rate and rhythm no murmurs  Abdomen is soft obese nontender no masses felt  Extremities trace edema no " sinuses no clubbing  CNS no deficits  No joint deformities no skin rashes    LABS:  Lab Results   Component Value Date    CALCIUM 9.2 10/03/2018    PHOS 2.7 01/14/2018     Results from last 7 days  Lab Units 10/03/18  1551 09/27/18  1005   SODIUM mmol/L 139 141   POTASSIUM mmol/L 4.2 4.5   CHLORIDE mmol/L 100 102   CO2 mmol/L 24.3  --    TOTAL CO2, ARTERIAL mmol/L  --  30.1*   BUN mg/dL 18 19   CREATININE mg/dL 1.13* 0.77   GLUCOSE mg/dL 91  --    CALCIUM mg/dL 9.2 9.1   WBC 10*3/mm3 5.28  --    HEMOGLOBIN g/dL 11.5* 11.3*   PLATELETS 10*3/mm3 293 302   ALT (SGPT) U/L 17 16   AST (SGOT) U/L 18 13   PROCALCITONIN ng/mL 0.07*  --    TOTAL PROTEIN g/dL  --  6.1     Lab Results   Component Value Date    TROPONINT <0.010 11/09/2016           Results from last 7 days  Lab Units 09/28/18  1151   URINE CULTURE  Final report       Results from last 7 days  Lab Units 10/03/18  1551   PROCALCITONIN ng/mL 0.07*   LACTATE mmol/L 1.4                   Results from last 7 days  Lab Units 09/28/18  1151   URINE CULTURE  Final report     Lab Results   Component Value Date    TSH 1.020 09/27/2018     Estimated Creatinine Clearance: 56.5 mL/min (A) (by C-G formula based on SCr of 1.13 mg/dL (H)).    Results from last 7 days  Lab Units 09/28/18  1151   NITRITE UA  Negative   BACTERIA UA /hpf None seen   URINE CULTURE  Final report        Imaging: I personally visualized the images of scans/x-rays performed within last 3 days.      Assessment:  Community acquired pneumonia  History of Boop  ALLYN  Asthma  Acute hypoxemia  Obesity    Recommendations:  At this time patient will be admitted to monitor bed  Start patient on community acquired pneumonia protocol with Rocephin and Zithromax  She is currently not actively wheezing and no clinical indication to suspect a recurrence of Boop  We'll treat with bronchodilators  Home CPAP  Wean down oxygen as tolerated  Ambulate  Discussed plan of care with the patient and her daughter  If no  improvement consider bronchoscopy to evaluate further            Jean Paul Oro MD  10/3/2018  5:19 PM      Much of this encounter note is an electronic transcription/translation of spoken language to printed text using Dragon Software.

## 2018-10-03 NOTE — PLAN OF CARE
Problem: Patient Care Overview  Goal: Plan of Care Review  Outcome: Ongoing (interventions implemented as appropriate)   10/03/18 7665   Coping/Psychosocial   Plan of Care Reviewed With patient   Plan of Care Review   Progress improving   OTHER   Outcome Summary VSS. Telemetry monitor, SR. Up with assist. Oxygen at 1 lpm via nc. Will continue to monitor.        Problem: Pneumonia (Adult)  Goal: Signs and Symptoms of Listed Potential Problems Will be Absent, Minimized or Managed (Pneumonia)  Outcome: Ongoing (interventions implemented as appropriate)      Problem: Fall Risk (Adult)  Goal: Identify Related Risk Factors and Signs and Symptoms  Outcome: Outcome(s) achieved Date Met: 10/03/18    Goal: Absence of Fall  Outcome: Ongoing (interventions implemented as appropriate)

## 2018-10-03 NOTE — ED PROVIDER NOTES
EMERGENCY DEPARTMENT ENCOUNTER    CHIEF COMPLAINT  Chief Complaint: Shortness of Air   History given by: Patient   History limited by: none  Room Number: 19/19  PMD: Jeanine Blunt MD      HPI:  Pt is a 72 y.o. female who presents with hx of BOP complaining of worsening SOA for the past 2 days. Pt confirms fever (tmax of 102) and mild cough. Pt states she was seen by PCP two days ago and had CXR performed which showed evidence of  In R[facility]L. Per pt, she was placed on abx upon discharge. Pt states she is on O2 at night.     Duration:  2 days   Onset: gradual   Timing: constant   Location: RLL  Radiation: none   Quality: SOA  Intensity/Severity: moderate   Progression: unchanged   Associated Symptoms: fever and cough   Aggravating Factors: pneumonia   Alleviating Factors: none  Previous Episodes: none  Treatment before arrival: Pt was placed on abx by PCP 2 days ago.     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   • Primary osteoarthritis of right knee 05/19/2016   • Adhesive capsulitis of right shoulder 05/19/2016   • Biceps tendinitis 08/31/2016   • Subacromial bursitis, right 12/06/2016   • Anxiety 08/03/2017   • Gastroesophageal reflux disease 08/03/2017   • Osteoarthritis of lumbar spine 08/03/2017   • Pulmonary embolism (CMS/HCC) 08/03/2017   • Inflammation of sacroiliac joint (CMS/HCC) 11/22/2013   • Sleep apnea 09/24/2012   • Spondylolisthesis 08/03/2017   • Venous stasis 08/03/2017   • Cryptogenic stroke (CMS/HCC) 08/03/2017   • Drug-induced constipation 08/03/2017   • IFG (impaired fasting glucose) 08/07/2017   • Chronic bilateral low back pain with sciatica 08/22/2017   • Asthma 09/01/2017   • Obesity 11/27/2017   • Renal artery aneurysm (CMS/HCC) 01/11/2018   •  Diverticulosis 02/15/2018   • Benign liver cyst 02/15/2018   • Diverticulosis of large intestine without hemorrhage 02/20/2018   • Lumbar radiculopathy 03/15/2018   • AC (acromioclavicular) joint arthritis 03/15/2018   • Primary osteoarthritis, right shoulder 03/15/2018   • Arthritis of right hip 05/24/2018   • Trochanteric bursitis of right hip 05/24/2018   • Stress fracture of right foot 08/23/2018     Resolved Ambulatory Problems     Diagnosis Date Noted   • Follow-up examination, following other surgery 03/21/2016   • Acute cystitis without hematuria 04/27/2016   • Edema 04/27/2016   • Laceration of right lower extremity 07/08/2016   • Hyperglycemia 07/08/2016   • Abrasion of arm, right 08/24/2016   • Cough 11/02/2016   • Bronchitis 11/02/2016   • Osteoarthritis, knee 07/06/2017   • Fever in adult 07/16/2017   • UTI (urinary tract infection) 07/16/2017   • Sepsis (CMS/HCC) 07/18/2017   • Acute blood loss anemia 07/18/2017   • Nausea 08/03/2017   • Colitis 01/10/2018     Past Medical History:   Diagnosis Date   • Anxiety    • Asthma    • Benign essential hypertension    • Benign liver cyst 2/15/2018   • Colitis    • Cryptogenic stroke (CMS/HCC) 8/3/2017   • DDD (degenerative disc disease), lumbar 12/10/2012   • DDD (degenerative disc disease), lumbosacral    • Depression    • Diverticulosis 2/15/2018   • Fatigue    • GERD (gastroesophageal reflux disease)    • H/O acute respiratory failure 02/25/2016   • Hyperlipidemia    • Hypertension    • Hypothyroidism    • IFG (impaired fasting glucose) 8/7/2017   • Mitral valve insufficiency    • MRSA infection within last 3 months 05/2017   • Obesity    • Obstructive sleep apnea    • Osteoarthritis    • Patent foramen ovale    • Pulmonary embolism (CMS/HCC)    • Pulmonary hypertension (CMS/HCC)    • SBO (small bowel obstruction) (CMS/HCC)    • Sciatica    • Venous stasis        PAST SURGICAL HISTORY  Past Surgical History:   Procedure Laterality Date   • BRONCHOSCOPY WITH  ENDOSCOPY Right 02/25/2016    Exploratory bronchoscopy, exploratory RT video-assisted thoracoscopy and wedge resection of RT upper lobe and Subpleural pain catheter x2-Dr. Jose Maria Funk   • CATARACT EXTRACTION W/ INTRAOCULAR LENS IMPLANT Bilateral 2016   • COLONOSCOPY N/A 3/1/2018    Procedure: COLONOSCOPY INTO CECUM/ TERMINAL ILEUM WITH BIOPSIES AND CLIP X 1;  Surgeon: Ritchie Garland MD;  Location:  [facility];  Service:    • COLONOSCOPY N/A 10/02/2008    Diverticulosis of the sigmoid colon-Dr. Salud Lowry   • HYSTERECTOMY     • LAPAROSCOPIC CHOLECYSTECTOMY N/A 10/01/2003    Dr. Nino Torres   • LUMBAR EPIDURAL INJECTION N/A 12/10/2012    Multiple lumbar epidural injections-Dr. Micheal Marquez   • LUMBAR FUSION N/A 08/2012   • MN TOTAL KNEE ARTHROPLASTY Right 7/6/2017    Procedure: TOTAL KNEE ARTHROPLASTY;  Surgeon: Dragan De La Vega MD;  Location: [facility] MAIN OR;  Service: Orthopedics   • TOE AMPUTATION Right 05/2017    SECOND TOE   • TOTAL KNEE ARTHROPLASTY Left 11/13/2006    Dr. Jerome Weber   • UPPER GASTROINTESTINAL ENDOSCOPY N/A 10/02/2008    Normal esophagus, gastric mucosal abnormality characterized by erythema, normal examined duodenum-Dr. Salud Lowry       FAMILY HISTORY  Family History   Problem Relation Age of Onset   • Heart disease Mother 60   • Diabetes Mother    • Stroke Mother 83   • Melanoma Father    • Heart attack Brother    • Heart disease Brother         Valve problem    • Heart attack Maternal Grandmother    • Heart attack Maternal Grandfather    • No Known Problems Paternal Grandmother    • No Known Problems Paternal Grandfather    • Malig Hyperthermia Neg Hx    • Breast cancer Neg Hx        SOCIAL HISTORY  Social History     Social History   • Marital status:      Spouse name: N/A   • Number of children: N/A   • Years of education: N/A     Occupational History   • Not on file.     Social History Main Topics   • Smoking status: Never Smoker   • Smokeless tobacco: Never  Used   • Alcohol use No   • Drug use: No   • Sexual activity: Defer     Other Topics Concern   • Not on file     Social History Narrative   • No narrative on file       ALLERGIES  Fish allergy; Iodine; Shellfish allergy; and Sulfa antibiotics    REVIEW OF SYSTEMS  Review of Systems   Constitutional: Positive for fever (tmax of 102).   HENT: Negative for sore throat.    Eyes: Negative.    Respiratory: Positive for cough (mild dry) and shortness of breath.    Cardiovascular: Negative for chest pain.   Gastrointestinal: Negative for abdominal pain, diarrhea and vomiting.   Genitourinary: Negative for dysuria.   Musculoskeletal: Negative for neck pain.   Skin: Negative for rash.   Allergic/Immunologic: Negative.    Neurological: Negative for weakness, numbness and headaches.   Hematological: Negative.    Psychiatric/Behavioral: Negative.    All other systems reviewed and are negative.      PHYSICAL EXAM  ED Triage Vitals [10/03/18 1534]   Temp Heart Rate Resp BP SpO2   (!) 100.8 °F (38.2 °C) 113 26 -- (!) 87 %      Temp src Heart Rate Source Patient Position BP Location FiO2 (%)   Tympanic Monitor -- -- --       Physical Exam   Constitutional: She is oriented to person, place, and time. No distress.   HENT:   Head: Normocephalic and atraumatic.   Eyes: Pupils are equal, round, and reactive to light. EOM are normal.   Neck: Normal range of motion. Neck supple.   Cardiovascular: Regular rhythm and normal heart sounds.  Tachycardia present.    Pulmonary/Chest: Effort normal. No respiratory distress. She has decreased breath sounds in the right upper field, the right middle field, the right lower field, the left upper field, the left middle field and the left lower field. She has rhonchi in the right upper field, the right middle field and the right lower field.   Pt has persistent dry cough.    Abdominal: Soft. There is no tenderness. There is no rebound and no guarding.   Musculoskeletal: Normal range of motion. She  exhibits no edema.   Neurological: She is alert and oriented to person, place, and time. She has normal sensation and normal strength.   Skin: Skin is warm and dry. No rash noted.   Psychiatric: Mood and affect normal.   Nursing note and vitals reviewed.      LAB RESULTS  Lab Results (last 24 hours)     Procedure Component Value Units Date/Time    CBC & Differential [[id number]] Collected:  10/03/18 1551    Specimen:  Blood Updated:  10/03/18 1622    Narrative:       The following orders were created for panel order CBC & Differential.  Procedure                               Abnormality         Status                     ---------                               -----------         ------                     CBC Auto Differential[[id number]]        Abnormal            Final result                 Please view results for these tests on the individual orders.    Comprehensive Metabolic Panel [[id number]]  (Abnormal) Collected:  10/03/18 1551    Specimen:  Blood Updated:  10/03/18 1635     Glucose 91 mg/dL      BUN 18 mg/dL      Creatinine 1.13 (H) mg/dL      Sodium 139 mmol/L      Potassium 4.2 mmol/L      Chloride 100 mmol/L      CO2 24.3 mmol/L      Calcium 9.2 mg/dL      Total Protein 7.2 g/dL      Albumin 4.10 g/dL      ALT (SGPT) 17 U/L      AST (SGOT) 18 U/L      Alkaline Phosphatase 68 U/L      Total Bilirubin 0.4 mg/dL      eGFR Non African Amer 48 (L) mL/min/1.73      Globulin 3.1 gm/dL      A/G Ratio 1.3 g/dL      BUN/Creatinine Ratio 15.9     Anion Gap 14.7 mmol/L     Lactic Acid, Plasma [[id number]]  (Normal) Collected:  10/03/18 1551    Specimen:  Blood Updated:  10/03/18 1630     Lactate 1.4 mmol/L     Blood Culture - Blood, [[id number]] Collected:  10/03/18 1551    Specimen:  Blood from Arm, Right Updated:  10/03/18 1609    CBC Auto Differential [[id number]]  (Abnormal) Collected:  10/03/18 1551    Specimen:  Blood Updated:  10/03/18 1622     WBC 5.28 10*3/mm3      RBC 4.02 10*6/mm3      Hemoglobin 11.5 (L)  "g/dL      Hematocrit 37.6 %      MCV 93.5 fL      MCH 28.6 pg      MCHC 30.6 (L) g/dL      RDW 14.3 (H) %      RDW-SD 48.6 fl      MPV 9.8 fL      Platelets 293 10*3/mm3      Neutrophil % 62.4 %      Lymphocyte % 25.4 %      Monocyte % 11.6 %      Eosinophil % 0.4 %      Basophil % 0.2 %      Immature Grans % 0.0 %      Neutrophils, Absolute 3.30 10*3/mm3      Lymphocytes, Absolute 1.34 10*3/mm3      Monocytes, Absolute 0.61 10*3/mm3      Eosinophils, Absolute 0.02 10*3/mm3      Basophils, Absolute 0.01 10*3/mm3      Immature Grans, Absolute 0.00 10*3/mm3     Procalcitonin [[id number]]  (Abnormal) Collected:  10/03/18 1551    Specimen:  Blood Updated:  10/03/18 1642     Procalcitonin 0.07 (L) ng/mL     Narrative:       As a Marker for Sepsis (Non-Neonates):   1. <0.5 ng/mL represents a low risk of severe sepsis and/or septic shock.  1. >2 ng/mL represents a high risk of severe sepsis and/or septic shock.    As a Marker for Lower Respiratory Tract Infections that require antibiotic therapy:  PCT on Admission     Antibiotic Therapy             6-12 Hrs later  > 0.5                Strongly Recommended            >0.25 - <0.5         Recommended  0.1 - 0.25           Discouraged                   Remeasure/reassess PCT  <0.1                 Strongly Discouraged          Remeasure/reassess PCT      As 28 day mortality risk marker: \"Change in Procalcitonin Result\" (> 80 % or <=80 %) if Day 0 (or Day 1) and Day 4 values are available. Refer to http://www.[employer]-pct-calculator.com/   Change in PCT <=80 %   A decrease of PCT levels below or equal to 80 % defines a positive change in PCT test result representing a higher risk for 28-day all-cause mortality of patients diagnosed with severe sepsis or septic shock.  Change in PCT > 80 %   A decrease of PCT levels of more than 80 % defines a negative change in PCT result representing a lower risk for 28-day all-cause mortality of patients diagnosed with severe sepsis or septic " shock.                Blood Culture - Blood, [[id number]] Collected:  10/03/18 1558    Specimen:  Blood from Wrist, Right Updated:  10/03/18 1608    Respiratory Panel, PCR - Swab, Nasopharynx [[id number]] Collected:  10/03/18 1613    Specimen:  Swab from Nasopharynx Updated:  10/03/18 1628          I ordered the above labs and reviewed the results    RADIOLOGY  XR Chest 1 View   Final Result   No acute cardiovascular or pulmonary process identified   CT Chest Without Contrast    (Results Pending)        I ordered the above noted radiological studies. Interpreted by radiologist. Discussed with radiologist (Ceci). Reviewed by me in PACS.     Procedures      PROGRESS AND CONSULTS     1535: Upon pt exam, discussed with pt the failed outpatient abx therapy and need for admission following workup for further abx treatment of pneumonia. Pt understands and agrees with plan, all questions addressed.      1542: Labs, CXR, and CT chest ordered for further evaluation. Tylenol, Rocephin, and Zithromax ordered for treatment of symptoms.     1635: Call placed to Pulmonology.     1645: Discussed pt's case with Dr. Foster (PUL) who agreed to admit pt to telemetry for further treatment of pneumonia.       MEDICAL DECISION MAKING  Results were reviewed/discussed with the patient and they were also made aware of online access. Pt also made aware that some labs, such as cultures, will not be resulted during ER visit and follow up with PMD is necessary.     MDM  Number of Diagnoses or Management Options  Community acquired pneumonia, unspecified laterality:   Failure of outpatient treatment:      Amount and/or Complexity of Data Reviewed  Clinical lab tests: ordered and reviewed (Procalcitonin - 0.07)  Tests in the radiology section of CPT®: ordered and reviewed (CXR - no acute pulmonary process  CT chest- evidence of ground glass pnemonia)  Discuss the patient with other providers: yes (Dr. Foster (Pul))           DIAGNOSIS  Final  diagnoses:   Failure of outpatient treatment   Community acquired pneumonia, unspecified laterality       DISPOSITION  ADMISSION    Discussed treatment plan and reason for admission with pt/family and admitting physician.  Pt/family voiced understanding of the plan for admission for further testing/treatment as needed.         Latest Documented Vital Signs:  As of 5:04 PM  BP- 142/70 HR- 77 Temp- (!) 100.8 °F (38.2 °C) (Tympanic) O2 sat- 99%    --  Documentation assistance provided by carrington Avila for Dr. Cruz.  Information recorded by the scribe was done at my direction and has been verified and validated by me.                Dior Avila  10/03/18 1700       Micheal Cruz MD  10/03/18 [id number]

## 2018-10-03 NOTE — TELEPHONE ENCOUNTER
There is an ER note on patient's file.    ----- Message from Tracy Grider sent at 10/3/2018  3:33 PM EDT -----  Regarding: ER   Contact: [phone]  Jose Raul pt    Patient called about 2:30 pm, to say that she is having trouble breathing unless she is laying down. I advised her to go straight to the ER and do not drive. She said that when she saw dr ponce yesterday, she told her to go to the ER if she felt worse.  She does have another appointment scheduled with dr ponce for tomorrow and she was trying to hold out for that. I stressed to her to go to the ER. She said that her daughter should be home about 3 and she would have her take her to the ER    tracy

## 2018-10-04 ENCOUNTER — EPISODE CHANGES (OUTPATIENT)
Dept: CASE MANAGEMENT | Facility: OTHER | Age: 73
End: 2018-10-04

## 2018-10-04 PROCEDURE — 94799 UNLISTED PULMONARY SVC/PX: CPT

## 2018-10-04 PROCEDURE — 25010000002 CEFTRIAXONE PER 250 MG: Performed by: INTERNAL MEDICINE

## 2018-10-04 PROCEDURE — 25010000002 AZITHROMYCIN PER 500 MG: Performed by: INTERNAL MEDICINE

## 2018-10-04 RX ORDER — ACETAMINOPHEN 325 MG/1
650 TABLET ORAL EVERY 6 HOURS PRN
Status: DISCONTINUED | OUTPATIENT
Start: 2018-10-04 | End: 2018-10-05 | Stop reason: HOSPADM

## 2018-10-04 RX ORDER — WHEAT DEXTRIN 3 G/4 G
1 POWDER IN PACKET (EA) ORAL DAILY
Status: DISCONTINUED | OUTPATIENT
Start: 2018-10-04 | End: 2018-10-05 | Stop reason: HOSPADM

## 2018-10-04 RX ADMIN — HYDROCODONE BITARTRATE AND HOMATROPINE METHYLBROMIDE 5 ML: 5; 1.5 SOLUTION ORAL at 21:51

## 2018-10-04 RX ADMIN — ACETAMINOPHEN 650 MG: 325 TABLET ORAL at 02:42

## 2018-10-04 RX ADMIN — ESCITALOPRAM 20 MG: 20 TABLET, FILM COATED ORAL at 07:57

## 2018-10-04 RX ADMIN — ATORVASTATIN CALCIUM 10 MG: 10 TABLET, FILM COATED ORAL at 07:57

## 2018-10-04 RX ADMIN — LEVOTHYROXINE SODIUM 88 MCG: 88 TABLET ORAL at 07:56

## 2018-10-04 RX ADMIN — MONTELUKAST SODIUM 10 MG: 10 TABLET, FILM COATED ORAL at 21:51

## 2018-10-04 RX ADMIN — IPRATROPIUM BROMIDE AND ALBUTEROL SULFATE 3 ML: .5; 3 SOLUTION RESPIRATORY (INHALATION) at 07:44

## 2018-10-04 RX ADMIN — HYDROCODONE BITARTRATE AND HOMATROPINE METHYLBROMIDE 5 ML: 5; 1.5 SOLUTION ORAL at 02:42

## 2018-10-04 RX ADMIN — ACETAMINOPHEN 650 MG: 325 TABLET ORAL at 23:24

## 2018-10-04 RX ADMIN — PANTOPRAZOLE SODIUM 40 MG: 40 TABLET, DELAYED RELEASE ORAL at 07:17

## 2018-10-04 RX ADMIN — IPRATROPIUM BROMIDE AND ALBUTEROL SULFATE 3 ML: .5; 3 SOLUTION RESPIRATORY (INHALATION) at 11:21

## 2018-10-04 RX ADMIN — HYDROCODONE BITARTRATE AND HOMATROPINE METHYLBROMIDE 5 ML: 5; 1.5 SOLUTION ORAL at 15:11

## 2018-10-04 RX ADMIN — IPRATROPIUM BROMIDE AND ALBUTEROL SULFATE 3 ML: .5; 3 SOLUTION RESPIRATORY (INHALATION) at 15:47

## 2018-10-04 RX ADMIN — HYDROCHLOROTHIAZIDE 25 MG: 25 TABLET ORAL at 07:57

## 2018-10-04 RX ADMIN — ACETAMINOPHEN 650 MG: 325 TABLET ORAL at 17:09

## 2018-10-04 RX ADMIN — Medication 3 ML: at 07:58

## 2018-10-04 RX ADMIN — IPRATROPIUM BROMIDE AND ALBUTEROL SULFATE 3 ML: .5; 3 SOLUTION RESPIRATORY (INHALATION) at 20:26

## 2018-10-04 RX ADMIN — CEFTRIAXONE SODIUM 1 G: 1 INJECTION, SOLUTION INTRAVENOUS at 15:05

## 2018-10-04 RX ADMIN — CLONAZEPAM 0.5 MG: 0.5 TABLET ORAL at 23:37

## 2018-10-04 RX ADMIN — MULTIPLE VITAMINS W/ MINERALS TAB 1 TABLET: TAB at 07:57

## 2018-10-04 RX ADMIN — AZITHROMYCIN MONOHYDRATE 500 MG: 500 INJECTION, POWDER, LYOPHILIZED, FOR SOLUTION INTRAVENOUS at 17:09

## 2018-10-04 RX ADMIN — Medication 3 ML: at 21:51

## 2018-10-04 RX ADMIN — Medication 1 EACH: at 17:09

## 2018-10-04 NOTE — PROGRESS NOTES
Discharge Planning Assessment  [facility]     Patient Name: Maria Ines Zhang  MRN: [id number]  Today's Date: 10/4/2018    Admit Date: 10/3/2018          Discharge Needs Assessment     Row Name 10/04/18 3599       Living Environment    Lives With spouse    Name(s) of Who Lives With Patient , Rob Zhang ([phone])    Current Living Arrangements home/apartment/condo    Primary Care Provided by self    Provides Primary Care For no one    Family Caregiver if Needed spouse    Quality of Family Relationships helpful;involved;supportive    Able to Return to Prior Arrangements yes       Resource/Environmental Concerns    Resource/Environmental Concerns none    Transportation Concerns car, none       Transition Planning    Patient/Family Anticipates Transition to home with family    Patient/Family Anticipated Services at Transition none    Transportation Anticipated family or friend will provide       Discharge Needs Assessment    Concerns to be Addressed no discharge needs identified;denies needs/concerns at this time    Equipment Currently Used at Home oxygen;bipap/cpap;nebulizer;cane, straight    Discharge Coordination/Progress Home            Discharge Plan     Row Name 10/04/18 6639       Plan    Plan Home with     Patient/Family in Agreement with Plan yes    Plan Comments IMM letter checked. CCP met with pt and  (Rob Zhang, [phone]) to discuss d/c planning. Facesheet verified. CCP role explained. Pt resides with her  in a single level home with one exterior step, and uses nebulizer, cpap and nocturnal O2 supplied by [employer]. Pt has a cane at home if needed. Pt reports past home health via [facility] and denies past sub-acute rehab. Pt's [place] [facility] [place], [place]. Pt denies known d/c needs at this time. CCP to follow to assist should d/c needs arise. [facility]        Destination     No service coordination in this encounter.      Durable Medical Equipment     No  service coordination in this encounter.      Dialysis/Infusion     No service coordination in this encounter.      Home Medical Care     No service coordination in this encounter.      Social Care     No service coordination in this encounter.                Demographic Summary     Row Name 10/04/18 1559       General Information    Admission Type inpatient    Arrived From home    Required Notices Provided Important Message from Medicare    Referral Source nursing;physician    Reason for Consult discharge planning    Preferred Language English            Functional Status     Row Name 10/04/18 1559       Functional Status    Usual Activity Tolerance good    Current Activity Tolerance good       Functional Status, IADL    Medications independent    Meal Preparation independent    Housekeeping independent    Laundry independent    Shopping independent       Mental Status Summary    Recent Changes in Mental Status/Cognitive Functioning no changes            Psychosocial    No documentation.           Abuse/Neglect    No documentation.           Legal    No documentation.           Substance Abuse    No documentation.           Patient Forms    No documentation.         Haritha Alejo LCSW

## 2018-10-04 NOTE — PLAN OF CARE
Problem: Patient Care Overview  Goal: Plan of Care Review  Outcome: Ongoing (interventions implemented as appropriate)   10/04/18 0647   Coping/Psychosocial   Plan of Care Reviewed With patient   Plan of Care Review   Progress improving   OTHER   Outcome Summary VSS; SR on monitor; c/o pain x1; soa w/exertion; CPAP w/3L O2 worn at night; 1L NC while awake; no acute distress noted; will continue to monitor       Problem: Pneumonia (Adult)  Goal: Signs and Symptoms of Listed Potential Problems Will be Absent, Minimized or Managed (Pneumonia)  Outcome: Ongoing (interventions implemented as appropriate)      Problem: Fall Risk (Adult)  Goal: Absence of Fall  Outcome: Ongoing (interventions implemented as appropriate)

## 2018-10-04 NOTE — PROGRESS NOTES
"      [facility]         Dr Reaves Sayied   LOS: 1 day   Patient Care Team:  Jeanine Blunt MD as PCP - General (Internal Medicine)  Jeanine Blunt MD as PCP - Ritchie Hawkins MD as Consulting Physician (General Surgery)  Dragan De La Vega MD as Consulting Physician (Orthopedic Surgery)  Andreina Heck MD as Consulting Physician (Pulmonary Disease)  Leanne Calhoun, RN as Care Coordinator (Population Health)    Chief Complaint: Community-acquired pneumonia with history of Boop and ALLYN and asthma    Interval History: Feels better today.  Still has some cough and congestion ongoing.    REVIEW OF SYSTEMS:   CARDIOVASCULAR: No chest pain, chest pressure or chest discomfort. No palpitations or edema.   RESPIRATORY: No shortness of breath, cough or sputum.   GASTROINTESTINAL: No anorexia, nausea, vomiting or diarrhea. No abdominal pain or blood.   HEMATOLOGIC: No bleeding or bruising.     Ventilator/Non-Invasive Ventilation Settings     None            Vital Signs  Temp:  [98.2 °F (36.8 °C)-100.8 °F (38.2 °C)] 99.5 °F (37.5 °C)  Heart Rate:  [] 82  Resp:  [16-26] 16  BP: (109-152)/(42-70) 118/45    Intake/Output Summary (Last 24 hours) at 10/04/18 1450  Last data filed at 10/03/18 1716   Gross per 24 hour   Intake             1050 ml   Output                0 ml   Net             1050 ml     Flowsheet Rows      First Filed Value   Admission Height  167.6 cm (66\") Documented at 10/03/2018 1541   Admission Weight  104 kg (229 lb 8 oz) Documented at 10/03/2018 1541          Physical Exam:   General Appearance:    Alert, cooperative, in no acute distress   Lungs:     Diminished breath sound with rhonchi on the bases     Heart:    Regular rhythm and normal rate, normal S1 and S2, no            murmur, no gallop, no rub, no click   Chest Wall:    No abnormalities observed   Abdomen:     Normal bowel sounds, no masses, no organomegaly, soft        non-tender, " non-distended, no guarding, no rebound                tenderness   Extremities:   Moves all extremities well, no edema, no cyanosis, no             redness     Results Review:          Results from last 7 days  Lab Units 10/03/18  1551   SODIUM mmol/L 139   POTASSIUM mmol/L 4.2   CHLORIDE mmol/L 100   CO2 mmol/L 24.3   BUN mg/dL 18   CREATININE mg/dL 1.13*   GLUCOSE mg/dL 91   CALCIUM mg/dL 9.2           Results from last 7 days  Lab Units 10/03/18  1551   WBC 10*3/mm3 5.28   HEMOGLOBIN g/dL 11.5*   HEMATOCRIT % 37.6   PLATELETS 10*3/mm3 293                           I reviewed the patient's new clinical results.  I personally viewed and interpreted the patient's CXR        Medication Review:     atorvastatin 10 mg Oral Daily   azithromycin 500 mg Intravenous Q24H   ceftriaxone 1 g Intravenous Q24H   escitalopram 20 mg Oral Daily   hydrochlorothiazide 25 mg Oral Daily   ipratropium-albuterol 3 mL Nebulization 4x Daily - RT   levothyroxine 88 mcg Oral Daily   lisinopril 20 mg Oral Daily   montelukast 10 mg Oral Nightly   multivitamin with minerals 1 tablet Oral Daily   pantoprazole 40 mg Oral QAM   sodium chloride 3 mL Intravenous Q12H   wheat dextrin 1 each Oral Daily            ASSESSMENT:   Community acquired pneumonia  History of Boop  ALLYN  Asthma  Acute hypoxemia  Obesity    PLAN:  Continue antibiotics Rocephin and Zithromax  Clinically patient is improved  Wean oxygen as tolerated  bronchodilators  no need for steroids  ambulate  discussed plan of care with patient and family    Jean Paul Oro MD  10/04/18  2:50 PM

## 2018-10-05 VITALS
RESPIRATION RATE: 20 BRPM | HEART RATE: 78 BPM | BODY MASS INDEX: 37.12 KG/M2 | WEIGHT: 231 LBS | HEIGHT: 66 IN | OXYGEN SATURATION: 94 % | SYSTOLIC BLOOD PRESSURE: 113 MMHG | DIASTOLIC BLOOD PRESSURE: 43 MMHG | TEMPERATURE: 99.3 F

## 2018-10-05 PROCEDURE — 94799 UNLISTED PULMONARY SVC/PX: CPT

## 2018-10-05 RX ORDER — PREDNISONE 20 MG/1
40 TABLET ORAL ONCE
Status: DISCONTINUED | OUTPATIENT
Start: 2018-10-05 | End: 2018-10-05 | Stop reason: HOSPADM

## 2018-10-05 RX ORDER — AZITHROMYCIN 250 MG/1
500 TABLET, FILM COATED ORAL
Status: DISCONTINUED | OUTPATIENT
Start: 2018-10-05 | End: 2018-10-05 | Stop reason: HOSPADM

## 2018-10-05 RX ORDER — PREDNISONE 10 MG/1
TABLET ORAL
Qty: 31 TABLET | Refills: 0 | Status: SHIPPED | OUTPATIENT
Start: 2018-10-05 | End: 2019-05-07

## 2018-10-05 RX ORDER — AZITHROMYCIN 250 MG/1
TABLET, FILM COATED ORAL
Qty: 4 TABLET | Refills: 0 | Status: SHIPPED | OUTPATIENT
Start: 2018-10-05 | End: 2018-10-12

## 2018-10-05 RX ADMIN — ATORVASTATIN CALCIUM 10 MG: 10 TABLET, FILM COATED ORAL at 09:26

## 2018-10-05 RX ADMIN — PANTOPRAZOLE SODIUM 40 MG: 40 TABLET, DELAYED RELEASE ORAL at 06:51

## 2018-10-05 RX ADMIN — ACETAMINOPHEN 650 MG: 325 TABLET ORAL at 12:01

## 2018-10-05 RX ADMIN — Medication 3 ML: at 11:21

## 2018-10-05 RX ADMIN — MULTIPLE VITAMINS W/ MINERALS TAB 1 TABLET: TAB at 09:26

## 2018-10-05 RX ADMIN — LISINOPRIL 20 MG: 20 TABLET ORAL at 09:26

## 2018-10-05 RX ADMIN — IPRATROPIUM BROMIDE AND ALBUTEROL SULFATE 3 ML: .5; 3 SOLUTION RESPIRATORY (INHALATION) at 08:22

## 2018-10-05 RX ADMIN — Medication 1 EACH: at 09:27

## 2018-10-05 RX ADMIN — LEVOTHYROXINE SODIUM 88 MCG: 88 TABLET ORAL at 09:26

## 2018-10-05 RX ADMIN — HYDROCHLOROTHIAZIDE 25 MG: 25 TABLET ORAL at 09:26

## 2018-10-05 NOTE — DISCHARGE SUMMARY
PHYSICIAN DISCHARGE SUMMARY                                                                        [facility]    Patient Identification:  Name: Maria Ines Zhang  Age: 72 y.o.  Sex: female  :  1945  MRN: [id number]  Primary Care Physician: Jeanine Blunt MD    Admit date: 10/3/2018  Discharge date and time: 10/5/2018  2:02 PM   Discharged Condition: stable    Discharge Diagnoses:Active Problems:    CAP (community acquired pneumonia)   Community acquired pneumonia  History of Boop  ALLYN  Asthma  Acute hypoxemia  Obesity    Hospital Course: Maria Ines Zhang presented to [facility]    Patient admitted with community-acquired pneumonia.  Please refer to history and physical.  There is no active wheezing to suggest boop or asthma exacerbation on admission.  She improved with IV antibiotics and bronchodilators.  She did have persistent cough and therefore steroids were started on the last day.  We plan to taper off steroids slowly.  She was switched to oral antibiotics and she is tolerating well.  Oxygenation is improved with treatment.  At this time she'll be discharged in a stable condition.  She'll follow-up with me in 3 weeks.  Med reconciliation was reviewed and it's accurate.  Hycodan cough syrup was given as needed.  Consults:   IP CONSULT TO PULMONOLOGY  IP CONSULT TO CASE MANAGEMENT     Significant Diagnostic Studies:   [unfilled]    Discharge Exam:  Alert and oriented x 4 NAD  Supple neck, midline trach  RRR, no m/r/g, no edema  Clear bilaterally, no wheezing, nonlabored  No clubbing or cyanosis     Disposition:  Home    Patient Instructions:   [unfilled]  Follow-up Information     Jeanine Blunt MD Follow up.    Specialties:  Internal Medicine, Pediatrics  Contact information:  [facility] [address] [facility] [address] [phone]             Jean Paul Oro MD  Follow up in 3 week(s).    Specialties:  Pulmonary Disease, Sleep Medicine  Contact information:  [facility]  [place] [address]  [phone]                 [unfilled]     Medication Reconciliation: Please see electronically completed Med Rec.    Total time spent discharging patient including evaluation, medication reconciliation, arranging follow up, and post hospitalization instructions and education total time exceeds 30 minutes.    Signed:  Jean Paul Oro MD  10/5/2018  3:21 PM

## 2018-10-05 NOTE — PLAN OF CARE
Problem: Patient Care Overview  Goal: Plan of Care Review  Outcome: Ongoing (interventions implemented as appropriate)   10/05/18 0621   Coping/Psychosocial   Plan of Care Reviewed With patient   Plan of Care Review   Progress improving   OTHER   Outcome Summary VSS, small fever (99.9) overnight treated w/Tylenol w/good results - back to normal; c/o HA throughout night; 3L O2, CPAP worn while asleep; IS being used; will continue to monitor       Problem: Pneumonia (Adult)  Goal: Signs and Symptoms of Listed Potential Problems Will be Absent, Minimized or Managed (Pneumonia)  Outcome: Ongoing (interventions implemented as appropriate)      Problem: Fall Risk (Adult)  Goal: Absence of Fall  Outcome: Ongoing (interventions implemented as appropriate)      Problem: Pain, Acute (Adult)  Goal: Identify Related Risk Factors and Signs and Symptoms  Outcome: Outcome(s) achieved Date Met: 10/05/18    Goal: Acceptable Pain Control/Comfort Level  Outcome: Ongoing (interventions implemented as appropriate)

## 2018-10-06 ENCOUNTER — READMISSION MANAGEMENT (OUTPATIENT)
Dept: CALL CENTER | Facility: HOSPITAL | Age: 73
End: 2018-10-06

## 2018-10-06 NOTE — OUTREACH NOTE
Prep Survey      Responses   Facility patient discharged from?  [place]   Is patient eligible?  Yes   Discharge diagnosis   Community acquired pneumonia   Does the patient have one of the following disease processes/diagnoses(primary or secondary)?  COPD/Pneumonia   Does the patient have Home health ordered?  No   Is there a DME ordered?  No   Prep survey completed?  Yes          Tabby Bynum RN

## 2018-10-08 ENCOUNTER — EPISODE CHANGES (OUTPATIENT)
Dept: CASE MANAGEMENT | Facility: OTHER | Age: 73
End: 2018-10-08

## 2018-10-08 LAB
BACTERIA SPEC AEROBE CULT: NORMAL
BACTERIA SPEC AEROBE CULT: NORMAL

## 2018-10-08 RX ORDER — HYDROCHLOROTHIAZIDE 25 MG/1
TABLET ORAL
Qty: 90 TABLET | Refills: 1 | Status: SHIPPED | OUTPATIENT
Start: 2018-10-08 | End: 2021-08-13

## 2018-10-08 RX ORDER — LISINOPRIL 20 MG/1
TABLET ORAL
Qty: 90 TABLET | Refills: 1 | Status: SHIPPED | OUTPATIENT
Start: 2018-10-08 | End: 2019-09-19 | Stop reason: SDUPTHER

## 2018-10-09 ENCOUNTER — READMISSION MANAGEMENT (OUTPATIENT)
Dept: CALL CENTER | Facility: HOSPITAL | Age: 73
End: 2018-10-09

## 2018-10-09 ENCOUNTER — TRANSITIONAL CARE MANAGEMENT TELEPHONE ENCOUNTER (OUTPATIENT)
Dept: INTERNAL MEDICINE | Facility: CLINIC | Age: 73
End: 2018-10-09

## 2018-10-09 NOTE — OUTREACH NOTE
COPD/PN Week 1 Survey      Responses   Facility patient discharged from?  [place]   Does the patient have one of the following disease processes/diagnoses(primary or secondary)?  COPD/Pneumonia   Is there a successful TCM telephone encounter documented?  Yes          Td Ya RN

## 2018-10-12 ENCOUNTER — OFFICE VISIT (OUTPATIENT)
Dept: INTERNAL MEDICINE | Facility: CLINIC | Age: 73
End: 2018-10-12

## 2018-10-12 VITALS
SYSTOLIC BLOOD PRESSURE: 110 MMHG | DIASTOLIC BLOOD PRESSURE: 58 MMHG | BODY MASS INDEX: 36.8 KG/M2 | WEIGHT: 229 LBS | RESPIRATION RATE: 16 BRPM | OXYGEN SATURATION: 99 % | HEIGHT: 66 IN | HEART RATE: 76 BPM | TEMPERATURE: 98.2 F

## 2018-10-12 DIAGNOSIS — J18.9 COMMUNITY ACQUIRED PNEUMONIA, UNSPECIFIED LATERALITY: ICD-10-CM

## 2018-10-12 DIAGNOSIS — B99.9 RECURRENT INFECTIONS: ICD-10-CM

## 2018-10-12 DIAGNOSIS — R06.02 SOB (SHORTNESS OF BREATH): ICD-10-CM

## 2018-10-12 DIAGNOSIS — Z09 HOSPITAL DISCHARGE FOLLOW-UP: Primary | ICD-10-CM

## 2018-10-12 PROCEDURE — 99496 TRANSJ CARE MGMT HIGH F2F 7D: CPT | Performed by: INTERNAL MEDICINE

## 2018-10-12 NOTE — PROGRESS NOTES
Transitional Care Follow Up Visit  Subjective     Maria Ines Zhang is a 72 y.o. female who presents for a transitional care management visit.    Within 48 business hours after discharge our office contacted her via telephone to coordinate her care and needs.      I reviewed and discussed the details of that call along with the discharge summary, hospital problems, inpatient lab results, inpatient diagnostic studies, and consultation reports with Maria Ines.     Current outpatient and discharge medications have been reconciled for the patient.  Reviewed by: Jeanine Blunt MD      Date of TCM Phone Call 1/19/2018 10/9/2018   [facility]    Date of Admission 1/10/2018 10/3/2018   Date of Discharge 1/18/2018 10/5/2018   Discharge Disposition Home or Self Care Home or Self Care     Risk for Readmission (LACE) Score: 10 (10/5/2018  6:00 AM)    History of Present Illness   Course During Hospital Stay: Patient admitted with community-acquired pneumonia.   She improved with IV antibiotics and bronchodilators.  She did have persistent cough and therefore steroids were started on the last day and was sent home with taper.  She was switched to oral antibiotics and she is tolerating well.  Oxygenation  improved with treatment. She is off antibiotics and has three more days of steroids.     She has been using oxygen intermittently.      The following portions of the patient's history were reviewed and updated as appropriate: allergies, current medications, past family history, past medical history, past social history, past surgical history and problem list.    Review of Systems   Constitutional: Positive for fatigue. Negative for chills and fever.   HENT: Negative for congestion.    Respiratory: Negative for cough, shortness of breath and wheezing.        Objective   Physical Exam   Constitutional: She is oriented to person, place, and time. She appears well-developed and well-nourished. No distress.    HENT:   Head: Normocephalic and atraumatic.   Right Ear: Hearing, tympanic membrane and external ear normal.   Left Ear: Hearing, tympanic membrane and external ear normal.   Nose: Nose normal.   Mouth/Throat: Oropharynx is clear and moist and mucous membranes are normal. No oropharyngeal exudate, posterior oropharyngeal edema or posterior oropharyngeal erythema.   Eyes: Conjunctivae are normal. Right eye exhibits no discharge. Left eye exhibits no discharge. No scleral icterus.   Neck: Neck supple.   Cardiovascular: Normal rate, regular rhythm and normal heart sounds.  Exam reveals no gallop and no friction rub.    No murmur heard.  Pulmonary/Chest: Effort normal and breath sounds normal. No respiratory distress. She has no wheezes. She has no rales.   Lymphadenopathy:     She has no cervical adenopathy.   Neurological: She is alert and oriented to person, place, and time.   Skin: Skin is warm. Capillary refill takes less than 2 seconds. No rash noted.   Psychiatric: She has a normal mood and affect. Her behavior is normal.   Nursing note and vitals reviewed.      Assessment/Plan   Maria Ines was seen today for follow-up, shortness of breath and pneumonia.    Diagnoses and all orders for this visit:    Hospital discharge follow-up    Community acquired pneumonia, unspecified laterality    SOB (shortness of breath)    Recurrent infections        Keep follow up with pulm in 2 weeks  Call if SOB or wheezing or fever   I do think that we need to pursue immunodeficiency workup since she has had so many issues with lung infections and sinus infections  Will call office and we will order Ig levels to evaluate

## 2018-10-17 ENCOUNTER — READMISSION MANAGEMENT (OUTPATIENT)
Dept: CALL CENTER | Facility: HOSPITAL | Age: 73
End: 2018-10-17

## 2018-10-17 NOTE — OUTREACH NOTE
COPD/PN Week 2 Survey      Responses   Facility patient discharged from?  [place]   Does the patient have one of the following disease processes/diagnoses(primary or secondary)?  COPD/Pneumonia   Was the primary reason for admission:  Pneumonia   Week 2 attempt successful?  No   Unsuccessful attempts  Attempt 1          Christy Mata RN

## 2018-10-18 ENCOUNTER — READMISSION MANAGEMENT (OUTPATIENT)
Dept: CALL CENTER | Facility: HOSPITAL | Age: 73
End: 2018-10-18

## 2018-10-18 NOTE — OUTREACH NOTE
COPD/PN Week 2 Survey      Responses   Facility patient discharged from?  [place]   Does the patient have one of the following disease processes/diagnoses(primary or secondary)?  COPD/Pneumonia   Was the primary reason for admission:  Pneumonia   Week 2 attempt successful?  Yes   Call start time  1441   Call end time  1443   Discharge diagnosis   Community acquired pneumonia   Meds reviewed with patient/caregiver?  Yes   Is the patient having any side effects they believe may be caused by any medication additions or changes?  No   Does the patient have all medications ordered at discharge?  Yes   Is the patient taking all medications as directed (includes completed medication regime)?  Yes   Medication comments  Completed ABX and steroids.   Does the patient have a primary care provider?   Yes   Does the patient have an appointment with their PCP or pulmonologist within 7 days of discharge?  Yes   Has the patient kept scheduled appointments due by today?  Yes   Has home health visited the patient within 72 hours of discharge?  N/A   Psychosocial issues?  No   Did the patient receive a copy of their discharge instructions?  Yes   Nursing interventions  Reviewed instructions with patient   What is the patient's perception of their health status since discharge?  Improving   Nursing Interventions  Nurse provided patient education   Are the patient's immunizations up to date?   Yes   Is the patient/caregiver able to teach back the hierarchy of who to call/visit for symptoms/problems? PCP, Specialist, Home health nurse, Urgent Care, ED, 911  Yes   Is the patient/caregiver able to teach back signs and symptoms of worsening condition:  Fever/chills, Shortness of breath, Chest pain   Is the patient/caregiver able to teach back importance of completing antibiotic course of treatment?  Yes   Week 2 call completed?  Yes          Russell Humphreys RN

## 2018-10-25 ENCOUNTER — READMISSION MANAGEMENT (OUTPATIENT)
Dept: CALL CENTER | Facility: HOSPITAL | Age: 73
End: 2018-10-25

## 2018-10-25 NOTE — OUTREACH NOTE
COPD/PN Week 3 Survey      Responses   Facility patient discharged from?  [place]   Does the patient have one of the following disease processes/diagnoses(primary or secondary)?  COPD/Pneumonia   Was the primary reason for admission:  Pneumonia   Week 3 attempt successful?  Yes   Call start time  1456   Revoke  Decline to participate   Call end time  1451          Dianna Yeboah RN

## 2018-10-26 ENCOUNTER — TRANSCRIBE ORDERS (OUTPATIENT)
Dept: ADMINISTRATIVE | Facility: HOSPITAL | Age: 73
End: 2018-10-26

## 2018-10-26 DIAGNOSIS — R06.00 DYSPNEA, UNSPECIFIED TYPE: Primary | ICD-10-CM

## 2018-10-31 ENCOUNTER — HOSPITAL ENCOUNTER (OUTPATIENT)
Dept: CARDIOLOGY | Facility: HOSPITAL | Age: 73
Discharge: HOME | End: 2018-10-31
Attending: INTERNAL MEDICINE | Admitting: INTERNAL MEDICINE

## 2018-10-31 ENCOUNTER — APPOINTMENT (OUTPATIENT)
Dept: CARDIOLOGY | Facility: HOSPITAL | Age: 73
End: 2018-10-31
Attending: INTERNAL MEDICINE

## 2018-10-31 VITALS
HEIGHT: 66 IN | HEART RATE: 86 BPM | OXYGEN SATURATION: 94 % | WEIGHT: 229 LBS | SYSTOLIC BLOOD PRESSURE: 133 MMHG | DIASTOLIC BLOOD PRESSURE: 57 MMHG | BODY MASS INDEX: 36.8 KG/M2

## 2018-10-31 DIAGNOSIS — R06.00 DYSPNEA, UNSPECIFIED TYPE: ICD-10-CM

## 2018-10-31 LAB
AORTIC DIMENSIONLESS INDEX: 0.8 (DI)
ASCENDING AORTA: 2.7 CM
BH CV ECHO MEAS - ACS: 1.5 CM
BH CV ECHO MEAS - AO MAX PG: 16.3 MMHG
BH CV ECHO MEAS - AO MEAN PG: 8 MMHG
BH CV ECHO MEAS - AO ROOT DIAM: 2.5 CM
BH CV ECHO MEAS - AO V2 MAX: 201.7 CM/SEC
BH CV ECHO MEAS - AO V2 VTI: 40.1 CM
BH CV ECHO MEAS - AVA(I,D): 2.4 CM2
BH CV ECHO MEAS - BSA(HAYCOCK): 2.2 M^2
BH CV ECHO MEAS - BSA: 2.1 M^2
BH CV ECHO MEAS - BZI_BMI: 37 KILOGRAMS/M^2
BH CV ECHO MEAS - BZI_METRIC_HEIGHT: 167.6 CM
BH CV ECHO MEAS - BZI_METRIC_WEIGHT: 103.9 KG
BH CV ECHO MEAS - EDV(MOD-SP2): 54 ML
BH CV ECHO MEAS - EDV(MOD-SP4): 38 ML
BH CV ECHO MEAS - EF(MOD-BP): 51 %
BH CV ECHO MEAS - EF(MOD-SP2): 52 %
BH CV ECHO MEAS - EF(MOD-SP4): 53 %
BH CV ECHO MEAS - ESV(MOD-SP2): 26 ML
BH CV ECHO MEAS - ESV(MOD-SP4): 18 ML
BH CV ECHO MEAS - IVSD: 0.8 CM
BH CV ECHO MEAS - LAT PEAK E' VEL: 7.6 CM/SEC
BH CV ECHO MEAS - LV MAX PG: 11.2 MMHG
BH CV ECHO MEAS - LV MEAN PG: 6 MMHG
BH CV ECHO MEAS - LV V1 MAX: 167.3 CM/SEC
BH CV ECHO MEAS - LV V1 VTI: 35 CM
BH CV ECHO MEAS - LVIDD: 4.5 CM
BH CV ECHO MEAS - LVIDS: 3.6 CM
BH CV ECHO MEAS - LVOT DIAM: 1.9 CM
BH CV ECHO MEAS - LVPWD: 1.2 CM
BH CV ECHO MEAS - MED PEAK E' VEL: 6.14 CM/SEC
BH CV ECHO MEAS - MR MAX VEL: 513 CM/SEC
BH CV ECHO MEAS - MV A DUR: 0.1 SEC
BH CV ECHO MEAS - MV A MAX VEL: 89.4 CM/SEC
BH CV ECHO MEAS - MV DEC TIME: 254 MSEC
BH CV ECHO MEAS - MV E MAX VEL: 71.8 CM/SEC
BH CV ECHO MEAS - MV E/A: 0.8
BH CV ECHO MEAS - MV MAX PG: 6 MMHG
BH CV ECHO MEAS - MV MEAN PG: 2 MMHG
BH CV ECHO MEAS - MV P1/2T: 48 MSEC
BH CV ECHO MEAS - MV V2 VTI: 25 CM
BH CV ECHO MEAS - MVA(P1/2T): 4.6 CM2
BH CV ECHO MEAS - PA ACC TIME: 1 SEC
BH CV ECHO MEAS - PA V2 MAX: 130 CM/SEC
BH CV ECHO MEAS - RAP SYSTOLE: 3 MMHG
BH CV ECHO MEAS - RV V1 MAX: 128 CM/SEC
BH CV ECHO MEAS - RV V1 VTI: 21 CM
BH CV ECHO MEAS - RVOT DIAM: 1.9 CM
BH CV ECHO MEAS - RVSP: 33 MMHG
BH CV ECHO MEAS - TAPSE (>1.6): 1.7 CM2
BH CV ECHO MEAS - TR MAX PG: 30
BH CV ECHO MEAS - TR MAX VEL: 274 CM/SEC
BH CV ECHO MEASUREMENTS AVERAGE E/E' RATIO: 10.45
BH CV VAS BP RIGHT ARM: NORMAL MMHG
BH CV XLRA - RV BASE: 3.2 CM
BH CV XLRA - TDI S': 19 CM/SEC
LEFT ATRIUM VOLUME INDEX: 14.6 ML/M2
LV EF 2D ECHO EST: 51 %
MAXIMAL PREDICTED HEART RATE: 150 BPM
STRESS TARGET HR: 128 BPM

## 2018-10-31 PROCEDURE — 93306 TTE W/DOPPLER COMPLETE: CPT | Performed by: INTERNAL MEDICINE

## 2018-10-31 PROCEDURE — 25010000002 PERFLUTREN (DEFINITY) 8.476 MG IN SODIUM CHLORIDE 0.9 % 10 ML INJECTION: Performed by: INTERNAL MEDICINE

## 2018-10-31 PROCEDURE — 93306 TTE W/DOPPLER COMPLETE: CPT

## 2018-10-31 RX ORDER — ESCITALOPRAM OXALATE 20 MG/1
TABLET ORAL
Qty: 90 TABLET | Refills: 0 | Status: SHIPPED | OUTPATIENT
Start: 2018-10-31 | End: 2019-01-28 | Stop reason: SDUPTHER

## 2018-10-31 RX ADMIN — SODIUM CHLORIDE 2 ML: 9 INJECTION INTRAMUSCULAR; INTRAVENOUS; SUBCUTANEOUS at 14:50

## 2018-11-01 ENCOUNTER — APPOINTMENT (OUTPATIENT)
Dept: CARDIOLOGY | Facility: HOSPITAL | Age: 73
End: 2018-11-01
Attending: INTERNAL MEDICINE

## 2018-11-02 ENCOUNTER — TELEPHONE (OUTPATIENT)
Dept: INTERNAL MEDICINE | Facility: CLINIC | Age: 73
End: 2018-11-02

## 2018-11-02 NOTE — TELEPHONE ENCOUNTER
Patient has been advised and voiced understanding.     ----- Message from Jeanine Blunt MD sent at 11/1/2018  6:39 PM EDT -----  Heart echo is all normal

## 2018-11-08 ENCOUNTER — OFFICE VISIT (OUTPATIENT)
Dept: INTERNAL MEDICINE | Facility: CLINIC | Age: 73
End: 2018-11-08

## 2018-11-08 VITALS
RESPIRATION RATE: 18 BRPM | HEART RATE: 95 BPM | DIASTOLIC BLOOD PRESSURE: 74 MMHG | TEMPERATURE: 98 F | SYSTOLIC BLOOD PRESSURE: 136 MMHG | WEIGHT: 234 LBS | BODY MASS INDEX: 37.61 KG/M2 | HEIGHT: 66 IN | OXYGEN SATURATION: 98 %

## 2018-11-08 DIAGNOSIS — B99.9 CHRONIC INFECTION: ICD-10-CM
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
## 2018-11-08 DIAGNOSIS — R07.89 ACUTE CHEST WALL PAIN: ICD-10-CM

## 2018-11-08 PROCEDURE — 99214 OFFICE O/P EST MOD 30 MIN: CPT | Performed by: INTERNAL MEDICINE

## 2018-11-08 PROCEDURE — G0009 ADMIN PNEUMOCOCCAL VACCINE: HCPCS | Performed by: INTERNAL MEDICINE

## 2018-11-08 PROCEDURE — 90732 PPSV23 VACC 2 YRS+ SUBQ/IM: CPT | Performed by: INTERNAL MEDICINE

## 2018-11-08 RX ORDER — CLONAZEPAM 0.5 MG/1
0.5 TABLET ORAL NIGHTLY PRN
Qty: 90 TABLET | Refills: 0 | Status: SHIPPED | OUTPATIENT
Start: 2018-11-08 | End: 2019-03-01 | Stop reason: SDUPTHER

## 2018-11-08 NOTE — PROGRESS NOTES
Maria Ines Zhang is a 72 y.o. female, who presents with a chief complaint of   Chief Complaint   Patient presents with   • Cough   • Fall     fell sunday, R side, rib pain        73 yo F here for acute visit. She fell on Sunday in her kitchen and lost her balance. It was mechanical fall. Fell on her right side and does have some pain in her right chest/rib area. No breathing issues. No SOB. No bruising of the ribs. No fever or chills.            The following portions of the patient's history were reviewed and updated as appropriate: allergies, current medications, past family history, past medical history, past social history, past surgical history and problem list.    Allergies: Fish allergy; Iodine; Shellfish allergy; and Sulfa antibiotics    Current Outpatient Prescriptions:   •  albuterol (PROVENTIL HFA;VENTOLIN HFA) 108 (90 Base) MCG/ACT inhaler, Inhale 2 puffs Every 4 (Four) Hours As Needed for Wheezing or Shortness of Air., Disp: 1 inhaler, Rfl: 6  •  albuterol (PROVENTIL) (2.5 MG/3ML) 0.083% nebulizer solution, Take 2.5 mg by nebulization Every 4 (Four) Hours As Needed for Wheezing., Disp: 100 vial, Rfl: 1  •  Bioflavonoid Products (ROCKY-C) tablet, Take 1,000 mg by mouth Daily., Disp: , Rfl:   •  budesonide-formoterol (SYMBICORT) 160-4.5 MCG/ACT inhaler, Inhale 2 puffs 2 (Two) Times a Day., Disp: 10.2 g, Rfl: 6  •  calcium polycarbophil (FIBERCON) 625 MG tablet, Take 1,250 mg by mouth Daily., Disp: , Rfl:   •  clonazePAM (KlonoPIN) 0.5 MG tablet, Take 0.5 mg by mouth At Night As Needed., Disp: , Rfl:   •  cyclobenzaprine (FLEXERIL) 10 MG tablet, Take 10 mg by mouth 3 (Three) Times a Day As Needed for Muscle Spasms., Disp: , Rfl:   •  escitalopram (LEXAPRO) 20 MG tablet, TAKE ONE TABLET BY MOUTH DAILY, Disp: 90 tablet, Rfl: 0  •  esomeprazole (nexIUM) 40 MG capsule, Take 40 mg by mouth Daily., Disp: , Rfl:   •  hydrochlorothiazide (HYDRODIURIL) 25 MG tablet, TAKE ONE TABLET BY MOUTH DAILY, Disp: 90  "tablet, Rfl: 1  •  levothyroxine (SYNTHROID, LEVOTHROID) 88 MCG tablet, Take 88 mcg by mouth Daily., Disp: , Rfl:   •  lisinopril (PRINIVIL,ZESTRIL) 20 MG tablet, TAKE ONE TABLET BY MOUTH DAILY, Disp: 90 tablet, Rfl: 1  •  meloxicam (MOBIC) 15 MG tablet, Take 1 tablet by mouth Daily., Disp: 30 tablet, Rfl: 5  •  montelukast (SINGULAIR) 10 MG tablet, Take 1 tablet by mouth Every Night., Disp: 30 tablet, Rfl: 6  •  Multiple Vitamins-Minerals (MULTIVITAMIN WITH MINERALS) tablet tablet, Take 1 tablet by mouth Daily., Disp: , Rfl:   •  predniSONE (DELTASONE) 10 MG tablet, Take 4 tabs daily x 3 days, then take 3 tabs daily x 3 days, then take 2 tabs daily x 3 days, then take 1 tab daily x 3 days, Disp: 31 tablet, Rfl: 0  •  Probiotic Product (PROBIOTIC-10) capsule, Take 1 capsule by mouth Daily., Disp: , Rfl:   •  senna-docusate (PERICOLACE) 8.6-50 MG per tablet, Take 2 tablets by mouth Daily As Needed for Constipation., Disp: 30 tablet, Rfl: 1  •  simvastatin (ZOCOR) 10 MG tablet, Take 10 mg by mouth Daily., Disp: , Rfl:   There are no discontinued medications.    Review of Systems   Constitutional: Negative for chills, fatigue and fever.   Respiratory: Positive for shortness of breath (at baseline ). Negative for cough.    Cardiovascular: Positive for chest pain (where she had fall four days ago ).             /74 (BP Location: Left arm, Patient Position: Sitting, Cuff Size: Large Adult)   Pulse 95   Temp 98 °F (36.7 °C) (Oral)   Resp 18   Ht 167.6 cm (65.98\")   Wt 106 kg (234 lb)   SpO2 98%   BMI 37.79 kg/m²       Physical Exam   Constitutional: She is oriented to person, place, and time. She appears well-developed and well-nourished. No distress.   HENT:   Head: Normocephalic and atraumatic.   Right Ear: External ear normal.   Left Ear: External ear normal.   Mouth/Throat: Oropharynx is clear and moist. No oropharyngeal exudate.   Eyes: Conjunctivae are normal. Right eye exhibits no discharge. Left eye " exhibits no discharge. No scleral icterus.   Neck: Neck supple.   Cardiovascular: Normal rate, regular rhythm and normal heart sounds.  Exam reveals no gallop and no friction rub.    No murmur heard.  Pulmonary/Chest: Effort normal and breath sounds normal. No respiratory distress. She has no wheezes. She has no rales. She exhibits bony tenderness (tenderness of her right lateral chest ).   Lymphadenopathy:     She has no cervical adenopathy.   Neurological: She is alert and oriented to person, place, and time.   Skin: Skin is warm. No rash noted.   Psychiatric: She has a normal mood and affect. Her behavior is normal.   Nursing note and vitals reviewed.          Results for orders placed or performed during the hospital encounter of 10/31/18   Adult Transthoracic Echo Complete W/ Cont if Necessary Per Protocol   Result Value Ref Range    BSA 2.1 m^2     CV ECHO RADHA - BZI_BMI 37.0 kilograms/m^2     CV ECHO RADHA - BSA(HAYCOCK) 2.2 m^2     CV ECHO RADHA - BZI_METRIC_WEIGHT 103.9 kg     CV ECHO RADHA - BZI_METRIC_HEIGHT 167.6 cm    Target HR (85%) 128 bpm    Max. Pred. HR (100%) 150 bpm     CV VAS BP RIGHT /57 mmHg    TDI S' 19.00 cm/sec    RV Base 3.20 cm    Ascending aorta 2.70 cm    Dimensionless Index 0.8 (DI)    LA Volume Index 14.6 mL/m2    Avg E/e' ratio 10.45     ACS 1.5 cm    Ao root diam 2.5 cm    Ao pk román 201.7 cm/sec    Ao V2 VTI 40.1 cm    ELSY(I,D) 2.4 cm2    EDV(MOD-sp2) 54.0 ml    EDV(MOD-sp4) 38.0 ml    EF(MOD-bp) 51.0 %    EF(MOD-sp2) 52.0 %    EF(MOD-sp4) 53.0 %    ESV(MOD-sp2) 26.0 ml    ESV(MOD-sp4) 18.0 ml    Lat Peak E' Román 7.6 cm/sec    LV V1 max PG 11.2 mmHg    LV V1 mean PG 6.0 mmHg    LV V1 max 167.3 cm/sec    LVPWd 1.2 cm    Med Peak E' Román 6.14 cm/sec    MV dec time 254 msec    MV P1/2t 48 msec    MV V2 VTI 25 cm    PA acc time 1 sec    PA V2 max 130 cm/sec    RAP systole 3 mmHg    RV V1 max 128.0 cm/sec    RV V1 VTI 21 cm    RVSP(TR) 33 mmHg    BH CV ECHO RADHA - TR MAX PG  30     Ao max PG 16.3 mmHg    Ao mean PG 8.0 mmHg    IVSd 0.8 cm    LV V1 VTI 35 cm    LVIDd 4.5 cm    LVIDs 3.6 cm    LVOT diam 1.9 cm    MV E/A 0.8     MV max PG 6 mmHg    MV mean PG 2 mmHg    MVA(P1/2t) 4.6 cm2    RVOT diam 1.9 cm    MR max darrell 513 cm/sec    MV A dur 0.1 sec    MV A max darrell 89.4 cm/sec    MV E max darrell 71.8 cm/sec    TR max darrell 274 cm/sec    TAPSE (>1.6) 1.70 cm2    Echo EF Estimated 51 %           Maria Ines was seen today for cough and fall.    Diagnoses and all orders for this visit:    Fall, initial encounter    Acute chest wall pain    Chronic infection  -     IgG  -     IgM  -     IgA  -     IgE    Other orders  -     Pneumococcal Polysaccharide Vaccine 23-Valent Greater Than or Equal To 3yo Subcutaneous / IM      I think that she has MSK pain from the fall  Take Tylenol as needed for pain   Breath deep to ensure that she is not going to get atelectasis/PNA   If fever or chills, needs to call   CXR is not useful even if this is a broken rib since her breath sounds are normal   Spent 50 %of 25 minutes in counseling regarding her chest wall pain and management     Check Immunoglobulins today since she has chronic infection. We have discussed before.     Return if symptoms worsen or fail to improve.    Jeanine Blunt MD  11/08/2018

## 2018-11-09 RX ORDER — CYCLOBENZAPRINE HCL 10 MG
TABLET ORAL
Qty: 90 TABLET | Refills: 0 | Status: SHIPPED | OUTPATIENT
Start: 2018-11-09 | End: 2019-02-13 | Stop reason: SDUPTHER

## 2018-11-09 RX ORDER — BUDESONIDE AND FORMOTEROL FUMARATE DIHYDRATE 160; 4.5 UG/1; UG/1
AEROSOL RESPIRATORY (INHALATION)
Qty: 3 INHALER | Refills: 1 | Status: SHIPPED | OUTPATIENT
Start: 2018-11-09 | End: 2019-07-04 | Stop reason: SDUPTHER

## 2018-11-11 LAB
IGA SERPL-MCNC: 194 MG/DL (ref 87–352)
IGE SERPL-ACNC: 21 IU/ML (ref 0–100)
IGG SERPL-MCNC: 666 MG/DL (ref 700–1600)
IGM SERPL-MCNC: 85 MG/DL (ref 26–217)

## 2018-11-21 ENCOUNTER — TELEPHONE (OUTPATIENT)
Dept: INTERNAL MEDICINE | Facility: CLINIC | Age: 73
End: 2018-11-21

## 2018-11-21 NOTE — TELEPHONE ENCOUNTER
Patient advised. When does she need to come back and see dr. Blunt based on these would you say?    ----- Message from Shaylee Stanley MD sent at 11/21/2018 11:57 AM EST -----  Call pt about labs.  IgG just a little low and rest of labs normal.

## 2018-12-10 RX ORDER — LEVOTHYROXINE SODIUM 88 UG/1
TABLET ORAL
Qty: 90 TABLET | Refills: 1 | Status: SHIPPED | OUTPATIENT
Start: 2018-12-10 | End: 2019-06-13 | Stop reason: SDUPTHER

## 2019-01-28 RX ORDER — ESCITALOPRAM OXALATE 20 MG/1
TABLET ORAL
Qty: 90 TABLET | Refills: 1 | Status: SHIPPED | OUTPATIENT
Start: 2019-01-28 | End: 2019-07-26 | Stop reason: SDUPTHER

## 2019-02-13 RX ORDER — CYCLOBENZAPRINE HCL 10 MG
TABLET ORAL
Qty: 90 TABLET | Refills: 0 | Status: SHIPPED | OUTPATIENT
Start: 2019-02-13 | End: 2019-05-15 | Stop reason: SDUPTHER

## 2019-02-28 RX ORDER — MONTELUKAST SODIUM 10 MG/1
TABLET ORAL
Qty: 90 TABLET | Refills: 1 | Status: SHIPPED | OUTPATIENT
Start: 2019-02-28 | End: 2019-05-24 | Stop reason: SDUPTHER

## 2019-03-01 RX ORDER — CLONAZEPAM 0.5 MG/1
TABLET ORAL
Qty: 90 TABLET | Refills: 0 | Status: SHIPPED | OUTPATIENT
Start: 2019-03-01 | End: 2019-05-07

## 2019-03-02 RX ORDER — MELOXICAM 15 MG/1
TABLET ORAL
Qty: 30 TABLET | Refills: 4 | Status: SHIPPED | OUTPATIENT
Start: 2019-03-02 | End: 2019-06-18

## 2019-05-01 ENCOUNTER — TELEPHONE (OUTPATIENT)
Dept: INTERNAL MEDICINE | Facility: CLINIC | Age: 74
End: 2019-05-01

## 2019-05-01 NOTE — TELEPHONE ENCOUNTER
I scheduled the lab for Friday, per the patient, and confirmed the appointment for 05/07.       ----- Message from Lolis Fernández CMA sent at 5/1/2019 12:35 PM EDT -----  Regarding: RE: WANTS DR RAMIREZ TO CALL HER BACK  Contact: [phone]  I scheduled patient for Tuesday 5/7/19 at 3, please call and see if this works for patient. Also set patient up for fasting labs tomorrow or Friday.     Thank you.       ----- Message -----  From: Lolis Fernández CMA  Sent: 5/1/2019  12:26 PM  To: Lolis Fernández CMA  Subject: FW: WANTS DR RAMIREZ TO CALL HER BACK                 ----- Message -----  From: Mere Grider  Sent: 5/1/2019  11:57 AM  [facility]  Subject: WANTS DR RAMIREZ TO CALL HER BACK                 [facility] PT    Patient calling to say that she wants to come in for a follow up. She went to urgent care and was diagnosed with UTI, because we could not get her in.  She said that she is still taking the medicine, but she wanted to schedule a follow up for next week. She said that she has not been here since November and she needs to get labs and to see dr ramirez.  I tried to offer her the next available appointment in June, and she would not take it. She wants to come in asap, and she wants dr ramirez to call her back, she is very upset.    Alternate phone- cell - [phone]    Thanks!  mere

## 2019-05-03 DIAGNOSIS — R73.01 IFG (IMPAIRED FASTING GLUCOSE): ICD-10-CM

## 2019-05-03 DIAGNOSIS — E78.2 MIXED HYPERLIPIDEMIA: ICD-10-CM

## 2019-05-03 DIAGNOSIS — I10 ESSENTIAL HYPERTENSION: Primary | ICD-10-CM

## 2019-05-03 DIAGNOSIS — D64.9 NORMOCYTIC ANEMIA: ICD-10-CM

## 2019-05-03 DIAGNOSIS — E03.9 ACQUIRED HYPOTHYROIDISM: ICD-10-CM

## 2019-05-07 ENCOUNTER — OFFICE VISIT (OUTPATIENT)
Dept: INTERNAL MEDICINE | Facility: CLINIC | Age: 74
End: 2019-05-07

## 2019-05-07 VITALS
HEIGHT: 66 IN | TEMPERATURE: 98.7 F | OXYGEN SATURATION: 98 % | WEIGHT: 224 LBS | HEART RATE: 90 BPM | DIASTOLIC BLOOD PRESSURE: 70 MMHG | BODY MASS INDEX: 36 KG/M2 | RESPIRATION RATE: 16 BRPM | SYSTOLIC BLOOD PRESSURE: 134 MMHG

## 2019-05-07 DIAGNOSIS — Z79.1 ENCOUNTER FOR MONITORING CHRONIC NSAID THERAPY: ICD-10-CM

## 2019-05-07 DIAGNOSIS — E78.5 HYPERLIPIDEMIA, UNSPECIFIED HYPERLIPIDEMIA TYPE: ICD-10-CM

## 2019-05-07 DIAGNOSIS — Z51.81 ENCOUNTER FOR MONITORING CHRONIC NSAID THERAPY: ICD-10-CM

## 2019-05-07 DIAGNOSIS — K59.00 CONSTIPATION, UNSPECIFIED CONSTIPATION TYPE: ICD-10-CM

## 2019-05-07 DIAGNOSIS — D64.9 ANEMIA, UNSPECIFIED TYPE: ICD-10-CM

## 2019-05-07 DIAGNOSIS — F32.A DEPRESSION, UNSPECIFIED DEPRESSION TYPE: ICD-10-CM

## 2019-05-07 DIAGNOSIS — R73.01 IFG (IMPAIRED FASTING GLUCOSE): ICD-10-CM

## 2019-05-07 DIAGNOSIS — I10 ESSENTIAL HYPERTENSION: Primary | ICD-10-CM

## 2019-05-07 DIAGNOSIS — M54.16 LUMBAR RADICULOPATHY: ICD-10-CM

## 2019-05-07 DIAGNOSIS — F41.9 ANXIETY: ICD-10-CM

## 2019-05-07 DIAGNOSIS — E03.9 ACQUIRED HYPOTHYROIDISM: ICD-10-CM

## 2019-05-07 LAB
ALBUMIN SERPL-MCNC: 4.2 G/DL (ref 3.5–5.2)
ALBUMIN/GLOB SERPL: 1.7 G/DL
ALP SERPL-CCNC: 86 U/L (ref 39–117)
ALT SERPL-CCNC: 13 U/L (ref 1–33)
APPEARANCE UR: CLEAR
AST SERPL-CCNC: 18 U/L (ref 1–32)
BACTERIA #/AREA URNS HPF: ABNORMAL /HPF
BACTERIA UR CULT: ABNORMAL
BACTERIA UR CULT: ABNORMAL
BASOPHILS # BLD AUTO: 0.01 10*3/MM3 (ref 0–0.2)
BASOPHILS NFR BLD AUTO: 0.3 % (ref 0–1.5)
BILIRUB SERPL-MCNC: 0.2 MG/DL (ref 0.2–1.2)
BILIRUB UR QL STRIP: NEGATIVE
BUN SERPL-MCNC: 26 MG/DL (ref 8–23)
BUN/CREAT SERPL: 26.3 (ref 7–25)
CALCIUM SERPL-MCNC: 9.5 MG/DL (ref 8.6–10.5)
CASTS URNS MICRO: ABNORMAL
CASTS URNS QL MICRO: PRESENT /LPF
CHLORIDE SERPL-SCNC: 103 MMOL/L (ref 98–107)
CHOLEST SERPL-MCNC: 250 MG/DL (ref 0–200)
CO2 SERPL-SCNC: 25.5 MMOL/L (ref 22–29)
COLOR UR: YELLOW
CREAT SERPL-MCNC: 0.99 MG/DL (ref 0.57–1)
CRYSTALS URNS MICRO: ABNORMAL
EOSINOPHIL # BLD AUTO: 0.04 10*3/MM3 (ref 0–0.4)
EOSINOPHIL NFR BLD AUTO: 1.1 % (ref 0.3–6.2)
EPI CELLS #/AREA URNS HPF: ABNORMAL /HPF
ERYTHROCYTE [DISTWIDTH] IN BLOOD BY AUTOMATED COUNT: 14.6 % (ref 12.3–15.4)
FOLATE SERPL-MCNC: >20 NG/ML (ref 4.78–24.2)
GLOBULIN SER CALC-MCNC: 2.5 GM/DL
GLUCOSE SERPL-MCNC: 95 MG/DL (ref 65–99)
GLUCOSE UR QL: NEGATIVE
HBA1C MFR BLD: 5.6 % (ref 4.8–5.6)
HCT VFR BLD AUTO: 33.5 % (ref 34–46.6)
HDLC SERPL-MCNC: 36 MG/DL (ref 40–60)
HGB BLD-MCNC: 10.3 G/DL (ref 12–15.9)
HGB UR QL STRIP: NEGATIVE
IMM GRANULOCYTES # BLD AUTO: 0.01 10*3/MM3 (ref 0–0.05)
IMM GRANULOCYTES NFR BLD AUTO: 0.3 % (ref 0–0.5)
KETONES UR QL STRIP: NEGATIVE
LDLC SERPL CALC-MCNC: 170 MG/DL (ref 0–100)
LDLC/HDLC SERPL: 4.72 {RATIO}
LEUKOCYTE ESTERASE UR QL STRIP: ABNORMAL
LYMPHOCYTES # BLD AUTO: 1.76 10*3/MM3 (ref 0.7–3.1)
LYMPHOCYTES NFR BLD AUTO: 46.7 % (ref 19.6–45.3)
MCH RBC QN AUTO: 28.7 PG (ref 26.6–33)
MCHC RBC AUTO-ENTMCNC: 30.7 G/DL (ref 31.5–35.7)
MCV RBC AUTO: 93.3 FL (ref 79–97)
MICRO URNS: ABNORMAL
MONOCYTES # BLD AUTO: 0.36 10*3/MM3 (ref 0.1–0.9)
MONOCYTES NFR BLD AUTO: 9.5 % (ref 5–12)
MUCOUS THREADS URNS QL MICRO: PRESENT /HPF
NEUTROPHILS # BLD AUTO: 1.59 10*3/MM3 (ref 1.7–7)
NEUTROPHILS NFR BLD AUTO: 42.1 % (ref 42.7–76)
NITRITE UR QL STRIP: NEGATIVE
NRBC BLD AUTO-RTO: 0 /100 WBC (ref 0–0.2)
OTHER ANTIBIOTIC SUSC ISLT: ABNORMAL
PH UR STRIP: 6.5 [PH] (ref 5–7.5)
PLATELET # BLD AUTO: 294 10*3/MM3 (ref 140–450)
POTASSIUM SERPL-SCNC: 4.7 MMOL/L (ref 3.5–5.2)
PROT SERPL-MCNC: 6.7 G/DL (ref 6–8.5)
PROT UR QL STRIP: NEGATIVE
RBC # BLD AUTO: 3.59 10*6/MM3 (ref 3.77–5.28)
RBC #/AREA URNS HPF: ABNORMAL /HPF
SODIUM SERPL-SCNC: 141 MMOL/L (ref 136–145)
SP GR UR: 1.02 (ref 1–1.03)
T4 FREE SERPL-MCNC: 1.29 NG/DL (ref 0.93–1.7)
TRIGL SERPL-MCNC: 221 MG/DL (ref 0–150)
TSH SERPL DL<=0.005 MIU/L-ACNC: 2.29 MIU/ML (ref 0.27–4.2)
UNIDENT CRYS URNS QL MICRO: PRESENT
URINALYSIS REFLEX: ABNORMAL
UROBILINOGEN UR STRIP-MCNC: 0.2 MG/DL (ref 0.2–1)
VIT B12 SERPL-MCNC: 358 PG/ML (ref 211–946)
VLDLC SERPL CALC-MCNC: 44.2 MG/DL
WBC # BLD AUTO: 3.77 10*3/MM3 (ref 3.4–10.8)
WBC #/AREA URNS HPF: ABNORMAL /HPF

## 2019-05-07 PROCEDURE — 99214 OFFICE O/P EST MOD 30 MIN: CPT | Performed by: INTERNAL MEDICINE

## 2019-05-07 RX ORDER — ALBUTEROL SULFATE 90 UG/1
2 AEROSOL, METERED RESPIRATORY (INHALATION) EVERY 4 HOURS PRN
Qty: 1 INHALER | Refills: 6 | Status: SHIPPED | OUTPATIENT
Start: 2019-05-07

## 2019-05-07 RX ORDER — SIMVASTATIN 10 MG
10 TABLET ORAL NIGHTLY
Qty: 30 TABLET | Refills: 6 | Status: SHIPPED | OUTPATIENT
Start: 2019-05-07 | End: 2019-11-10 | Stop reason: SDUPTHER

## 2019-05-07 RX ORDER — GABAPENTIN 300 MG/1
300 CAPSULE ORAL NIGHTLY
Qty: 30 CAPSULE | Refills: 0 | Status: SHIPPED | OUTPATIENT
Start: 2019-05-07 | End: 2019-06-02 | Stop reason: SDUPTHER

## 2019-05-07 RX ORDER — CEFDINIR 300 MG/1
CAPSULE ORAL
COMMUNITY
Start: 2019-04-25 | End: 2019-05-07

## 2019-05-07 RX ORDER — AMLODIPINE BESYLATE 5 MG/1
5 TABLET ORAL DAILY
COMMUNITY
End: 2021-08-13

## 2019-05-07 RX ORDER — FUROSEMIDE 40 MG/1
TABLET ORAL
COMMUNITY
Start: 2019-04-25 | End: 2021-08-13

## 2019-05-07 NOTE — PROGRESS NOTES
Maria Ines Zhang is a 73 y.o. female, who presents with a chief complaint of   Chief Complaint   Patient presents with   • Follow-up     6 x month f/u, lab results   • Hypertension   • Hyperlipidemia       72 yo F here for follow up and doing well other than arthritis and back pain.     She had a UTI two weeks ago. Seen at [facility] and placed on antibiotics and feeling better now. She has been off medication for a few days.     She has chronic HLD and is taking Zocor daily.  She takes 10mg per day.  She does like sweets and doesn't eat a lot of fatty foods. She has not lost weight.     She has been taking a lot of Tylenol and Aleve (2 per night) in addition to her Mobic. Started having pain around Thanksgiving in her back, knees and hands bilaterally. Having back issues and is seeing a back doctor and they are going to do injections soon due to her DDD and lumbar radiculopathy and sciatica.  Does feel tired and states that her stomach hurts sometimes. Daughters told her not to take as much Aleve. She has been doing this since 11/2018.     She has anxiety/depression and is on SSRI. Feels well on Lexapro and wants to continue. Sleep affected by pain.     She has HTN that is chronic and doing well on ACE and Norvasc. No CP or SOB or dizziness.     She is doing well on synthroid for hypothyroidism as well. No compliance issues. No cold or heat intolerance.              The following portions of the patient's history were reviewed and updated as appropriate: allergies, current medications, past family history, past medical history, past social history, past surgical history and problem list.    Allergies: Fish allergy; Iodine; Shellfish allergy; Shellfish-derived products; and Sulfa antibiotics    Current Outpatient Medications:   •  albuterol (PROVENTIL) (2.5 MG/3ML) 0.083% nebulizer solution, Take 2.5 mg by nebulization Every 4 (Four) Hours As Needed for Wheezing., Disp: 100 vial, Rfl: 1  •  albuterol sulfate   (90 Base) MCG/ACT inhaler, Inhale 2 puffs Every 4 (Four) Hours As Needed for Wheezing or Shortness of Air., Disp: 1 inhaler, Rfl: 6  •  amLODIPine (NORVASC) 5 MG tablet, Take 5 mg by mouth Daily., Disp: , Rfl:   •  Bioflavonoid Products (ROCKY-C) tablet, Take 1,000 mg by mouth Daily., Disp: , Rfl:   •  calcium polycarbophil (FIBERCON) 625 MG tablet, Take 1,250 mg by mouth Daily., Disp: , Rfl:   •  clonazePAM (KlonoPIN) 0.5 MG tablet, Take 0.5 mg by mouth At Night As Needed., Disp: , Rfl:   •  cyclobenzaprine (FLEXERIL) 10 MG tablet, TAKE ONE TABLET BY MOUTH THREE TIMES A DAY AS NEEDED FOR MUSCLE SPASMS, Disp: 90 tablet, Rfl: 0  •  escitalopram (LEXAPRO) 20 MG tablet, TAKE ONE TABLET BY MOUTH DAILY, Disp: 90 tablet, Rfl: 1  •  esomeprazole (nexIUM) 40 MG capsule, Take 40 mg by mouth Daily., Disp: , Rfl:   •  furosemide (LASIX) 40 MG tablet, , Disp: , Rfl:   •  hydrochlorothiazide (HYDRODIURIL) 25 MG tablet, TAKE ONE TABLET BY MOUTH DAILY, Disp: 90 tablet, Rfl: 1  •  levothyroxine (SYNTHROID, LEVOTHROID) 88 MCG tablet, TAKE ONE TABLET BY MOUTH DAILY, Disp: 90 tablet, Rfl: 1  •  linaclotide (LINZESS) 72 MCG capsule capsule, Take 72 mcg by mouth Every Morning Before Breakfast., Disp: , Rfl:   •  lisinopril (PRINIVIL,ZESTRIL) 20 MG tablet, TAKE ONE TABLET BY MOUTH DAILY, Disp: 90 tablet, Rfl: 1  •  meloxicam (MOBIC) 15 MG tablet, TAKE ONE TABLET BY MOUTH DAILY, Disp: 30 tablet, Rfl: 4  •  montelukast (SINGULAIR) 10 MG tablet, TAKE ONE TABLET BY MOUTH ONCE NIGHTLY, Disp: 90 tablet, Rfl: 1  •  Multiple Vitamins-Minerals (MULTIVITAMIN WITH MINERALS) tablet tablet, Take 1 tablet by mouth Daily., Disp: , Rfl:   •  Probiotic Product (PROBIOTIC-10) capsule, Take 1 capsule by mouth Daily., Disp: , Rfl:   •  senna-docusate (PERICOLACE) 8.6-50 MG per tablet, Take 2 tablets by mouth Daily As Needed for Constipation., Disp: 30 tablet, Rfl: 1  •  simvastatin (ZOCOR) 10 MG tablet, Take 1 tablet by mouth Every Night., Disp: 30  "tablet, Rfl: 6  •  SYMBICORT 160-4.5 MCG/ACT inhaler, INHALE TWO PUFFS BY MOUTH TWICE A DAY, Disp: 3 inhaler, Rfl: 1  •  gabapentin (NEURONTIN) 300 MG capsule, Take 1 capsule by mouth Every Night., Disp: 30 capsule, Rfl: 0  Medications Discontinued During This Encounter   Medication Reason   • predniSONE (DELTASONE) 10 MG tablet *Therapy completed   • cefdinir (OMNICEF) 300 MG capsule *Therapy completed   • clonazePAM (KlonoPIN) 0.5 MG tablet *Therapy completed   • simvastatin (ZOCOR) 10 MG tablet Reorder       Review of Systems   Constitutional: Negative for chills and fatigue.   HENT: Negative for congestion.    Respiratory: Negative for cough and shortness of breath.    Cardiovascular: Negative for chest pain and palpitations.   Gastrointestinal: Positive for abdominal pain (discomfort in epigastric region ) and constipation. Negative for diarrhea, nausea and vomiting.   Genitourinary: Negative for difficulty urinating and dysuria.   Musculoskeletal: Positive for arthralgias, back pain and myalgias.   Neurological: Negative for dizziness and headaches.             /70 (BP Location: Left arm, Patient Position: Sitting, Cuff Size: Large Adult)   Pulse 90   Temp 98.7 °F (37.1 °C) (Oral)   Resp 16   Ht 167.6 cm (66\")   Wt 102 kg (224 lb)   SpO2 98%   BMI 36.15 kg/m²       Physical Exam   Constitutional: She is oriented to person, place, and time. She appears well-developed and well-nourished. No distress.   HENT:   Head: Normocephalic and atraumatic.   Right Ear: External ear normal.   Left Ear: External ear normal.   Mouth/Throat: Oropharynx is clear and moist. No oropharyngeal exudate.   Eyes: Conjunctivae are normal. Right eye exhibits no discharge. Left eye exhibits no discharge. No scleral icterus.   Neck: Neck supple.   Cardiovascular: Normal rate, regular rhythm and normal heart sounds. Exam reveals no gallop and no friction rub.   No murmur heard.  Pulmonary/Chest: Effort normal and breath sounds " normal. No respiratory distress. She has no wheezes. She has no rales.   Abdominal: Soft. Bowel sounds are normal. She exhibits no distension and no mass. There is no tenderness. There is no guarding.   Neurological: She is alert and oriented to person, place, and time.   Skin: Skin is warm. No rash noted.   Psychiatric: She has a normal mood and affect. Her behavior is normal.   Nursing note and vitals reviewed.        Results for orders placed or performed in visit on 05/03/19   Urine culture, Comprehensive - ,   Result Value Ref Range    Urine Culture Final report (A)     Result 1 Comment (A)     Susceptibility Testing Comment    Comprehensive metabolic panel   Result Value Ref Range    Glucose 95 65 - 99 mg/dL    BUN 26 (H) 8 - 23 mg/dL    Creatinine 0.99 0.57 - 1.00 mg/dL    eGFR Non African Am 55 (L) >60 mL/min/1.73    eGFR African Am 67 >60 mL/min/1.73    BUN/Creatinine Ratio 26.3 (H) 7.0 - 25.0    Sodium 141 136 - 145 mmol/L    Potassium 4.7 3.5 - 5.2 mmol/L    Chloride 103 98 - 107 mmol/L    Total CO2 25.5 22.0 - 29.0 mmol/L    Calcium 9.5 8.6 - 10.5 mg/dL    Total Protein 6.7 6.0 - 8.5 g/dL    Albumin 4.20 3.50 - 5.20 g/dL    Globulin 2.5 gm/dL    A/G Ratio 1.7 g/dL    Total Bilirubin 0.2 0.2 - 1.2 mg/dL    Alkaline Phosphatase 86 39 - 117 U/L    AST (SGOT) 18 1 - 32 U/L    ALT (SGPT) 13 1 - 33 U/L   Lipid Panel With LDL / HDL Ratio   Result Value Ref Range    Total Cholesterol 250 (H) 0 - 200 mg/dL    Triglycerides 221 (H) 0 - 150 mg/dL    HDL Cholesterol 36 (L) 40 - 60 mg/dL    VLDL Cholesterol 44.2 mg/dL    LDL Cholesterol  170 (H) 0 - 100 mg/dL    LDL/HDL Ratio 4.72    Hemoglobin A1c   Result Value Ref Range    Hemoglobin A1C 5.60 4.80 - 5.60 %   TSH   Result Value Ref Range    TSH 2.290 0.270 - 4.200 mIU/mL   T4, free   Result Value Ref Range    Free T4 1.29 0.93 - 1.70 ng/dL   Urinalysis With Culture If Indicated - Urine, Clean Catch   Result Value Ref Range    Specific Gravity, UA 1.022 1.005 -  1.030    pH, UA 6.5 5.0 - 7.5    Color, UA Yellow Yellow    Appearance, UA Clear Clear    Leukocytes, UA Trace (A) Negative    Protein Negative Negative/Trace    Glucose, UA Negative Negative    Ketones Negative Negative    Blood, UA Negative Negative    Bilirubin, UA Negative Negative    Urobilinogen, UA 0.2 0.2 - 1.0 mg/dL    Nitrite, UA Negative Negative    Microscopic Examination See below:     Urinalysis Reflex Comment    Vitamin B12   Result Value Ref Range    Vitamin B-12 358 211 - 946 pg/mL   Folate   Result Value Ref Range    Folate >20.00 4.78 - 24.20 ng/mL   Microscopic Examination -   Result Value Ref Range    WBC, UA 0-5 0 - 5 /hpf    RBC, UA 0-2 0 - 2 /hpf    Epithelial Cells (non renal) 0-10 0 - 10 /hpf    Casts Present (A) None seen /lpf    Cast Type Hyaline casts N/A    Crystals, UA Present (A) N/A    Crystal Type Amorphous Sediment N/A    Mucus, UA Present Not Estab. /hpf    Bacteria, UA Few None seen/Few /hpf   CBC & Differential   Result Value Ref Range    WBC 3.77 3.40 - 10.80 10*3/mm3    RBC 3.59 (L) 3.77 - 5.28 10*6/mm3    Hemoglobin 10.3 (L) 12.0 - 15.9 g/dL    Hematocrit 33.5 (L) 34.0 - 46.6 %    MCV 93.3 79.0 - 97.0 fL    MCH 28.7 26.6 - 33.0 pg    MCHC 30.7 (L) 31.5 - 35.7 g/dL    RDW 14.6 12.3 - 15.4 %    Platelets 294 140 - 450 10*3/mm3    Neutrophil Rel % 42.1 (L) 42.7 - 76.0 %    Lymphocyte Rel % 46.7 (H) 19.6 - 45.3 %    Monocyte Rel % 9.5 5.0 - 12.0 %    Eosinophil Rel % 1.1 0.3 - 6.2 %    Basophil Rel % 0.3 0.0 - 1.5 %    Neutrophils Absolute 1.59 (L) 1.70 - 7.00 10*3/mm3    Lymphocytes Absolute 1.76 0.70 - 3.10 10*3/mm3    Monocytes Absolute 0.36 0.10 - 0.90 10*3/mm3    Eosinophils Absolute 0.04 0.00 - 0.40 10*3/mm3    Basophils Absolute 0.01 0.00 - 0.20 10*3/mm3    Immature Granulocyte Rel % 0.3 0.0 - 0.5 %    Immature Grans Absolute 0.01 0.00 - 0.05 10*3/mm3    nRBC 0.0 0.0 - 0.2 /100 WBC           Maria Ines was seen today for follow-up, hypertension and  hyperlipidemia.    Diagnoses and all orders for this visit:    Essential hypertension    Hyperlipidemia, unspecified hyperlipidemia type    Acquired hypothyroidism    IFG (impaired fasting glucose)    Lumbar radiculopathy    Depression, unspecified depression type    Anxiety    Anemia, unspecified type  -     Ferritin  -     Iron Profile  -     Methylmalonic Acid, Serum    Encounter for monitoring chronic NSAID therapy    Constipation, unspecified constipation type    Other orders  -     simvastatin (ZOCOR) 10 MG tablet; Take 1 tablet by mouth Every Night.  -     gabapentin (NEURONTIN) 300 MG capsule; Take 1 capsule by mouth Every Night.      HTN is under good control and patient will continue to monitor with home BP cuff. No side effects from medications. Will continue current regimen of Lisinopril, Norvasc, HCTZ and Lasix. Will follow up in 6 months      Patient's cholesterol is not under good control. Will continue current regimen of Zocor and increase to 20mg. No side effects from medications reported. Will follow up in 3 months to monitor levels.     Her pain is not under good control. She is seeing pain specialist for this. Encouraged her to continue to see him. She is taking too many NSAID's OTC in addition to her Mobic and has anemia that I am worried about. Likely has gastritis and possible ulcer from this. Needs to stop Aleve and can take Tylenol. Start Gabapentin at night for pain and can increase if we need to. May consider Ultram or Norco if not doing better and injections do not help. Stay on Nexium for now to protect stomach. If she has bleeding or other acute issues, knows to call. EGD and c-scope are up to date with Dr. Garland.     Check iron panel and ferritin due to anemia. Folate normal. B12 low end of normal and checking MMA.     TSH has been stable and patient is doing well. Will continue current regimen of levothyroxine 88 mcg and follow up levels in 6 months.     Anxiety is stable on Lexapro  and will continue at current dose. See back in 6 months.   Return in about 6 weeks (around 6/18/2019) for Recheck of pain .    Jeanine Blunt MD  05/07/2019

## 2019-05-09 LAB
FERRITIN SERPL-MCNC: 36 NG/ML (ref 13–150)
IRON SATN MFR SERPL: 17 % (ref 20–50)
IRON SERPL-MCNC: 72 MCG/DL (ref 37–145)
Lab: ABNORMAL
METHYLMALONATE SERPL-SCNC: 502 NMOL/L (ref 0–378)
TIBC SERPL-MCNC: 424 MCG/DL
UIBC SERPL-MCNC: 352 MCG/DL (ref 112–346)

## 2019-05-10 ENCOUNTER — TELEPHONE (OUTPATIENT)
Dept: INTERNAL MEDICINE | Facility: CLINIC | Age: 74
End: 2019-05-10

## 2019-05-10 NOTE — PROGRESS NOTES
Repeat testing shows that B12 is actually lower. If on B12, needs to increase. If she is not on this, needs to start 1000-2000mcg per day

## 2019-05-15 RX ORDER — CYCLOBENZAPRINE HCL 10 MG
TABLET ORAL
Qty: 90 TABLET | Refills: 0 | Status: SHIPPED | OUTPATIENT
Start: 2019-05-15 | End: 2019-08-07 | Stop reason: SDUPTHER

## 2019-05-24 RX ORDER — MONTELUKAST SODIUM 10 MG/1
10 TABLET ORAL NIGHTLY
Qty: 90 TABLET | Refills: 1 | Status: SHIPPED | OUTPATIENT
Start: 2019-05-24 | End: 2020-02-13

## 2019-06-04 RX ORDER — GABAPENTIN 300 MG/1
CAPSULE ORAL
Qty: 30 CAPSULE | Refills: 5 | Status: SHIPPED | OUTPATIENT
Start: 2019-06-04 | End: 2019-12-03 | Stop reason: SDUPTHER

## 2019-06-11 RX ORDER — CLONAZEPAM 0.5 MG/1
TABLET ORAL
Qty: 90 TABLET | Refills: 0 | Status: SHIPPED | OUTPATIENT
Start: 2019-06-11 | End: 2019-09-17 | Stop reason: SDUPTHER

## 2019-06-13 RX ORDER — LEVOTHYROXINE SODIUM 88 UG/1
TABLET ORAL
Qty: 90 TABLET | Refills: 1 | Status: SHIPPED | OUTPATIENT
Start: 2019-06-13 | End: 2019-12-15 | Stop reason: SDUPTHER

## 2019-06-18 ENCOUNTER — OFFICE VISIT (OUTPATIENT)
Dept: INTERNAL MEDICINE | Facility: CLINIC | Age: 74
End: 2019-06-18

## 2019-06-18 VITALS
SYSTOLIC BLOOD PRESSURE: 150 MMHG | OXYGEN SATURATION: 98 % | RESPIRATION RATE: 16 BRPM | HEIGHT: 66 IN | DIASTOLIC BLOOD PRESSURE: 60 MMHG | BODY MASS INDEX: 36.16 KG/M2 | HEART RATE: 68 BPM | TEMPERATURE: 98.8 F | WEIGHT: 225 LBS

## 2019-06-18 DIAGNOSIS — G89.4 CHRONIC PAIN SYNDROME: Primary | ICD-10-CM

## 2019-06-18 DIAGNOSIS — E53.8 B12 DEFICIENCY: ICD-10-CM

## 2019-06-18 DIAGNOSIS — B37.31 VAGINAL YEAST INFECTION: ICD-10-CM

## 2019-06-18 DIAGNOSIS — Z51.81 ENCOUNTER FOR MONITORING CHRONIC NSAID THERAPY: ICD-10-CM

## 2019-06-18 DIAGNOSIS — M47.26 OSTEOARTHRITIS OF SPINE WITH RADICULOPATHY, LUMBAR REGION: ICD-10-CM

## 2019-06-18 DIAGNOSIS — Z79.1 ENCOUNTER FOR MONITORING CHRONIC NSAID THERAPY: ICD-10-CM

## 2019-06-18 DIAGNOSIS — M19.049 HAND ARTHRITIS: ICD-10-CM

## 2019-06-18 PROCEDURE — 99214 OFFICE O/P EST MOD 30 MIN: CPT | Performed by: INTERNAL MEDICINE

## 2019-06-18 RX ORDER — TRAMADOL HYDROCHLORIDE 50 MG/1
50 TABLET ORAL 2 TIMES DAILY PRN
Qty: 60 TABLET | Refills: 2 | Status: SHIPPED | OUTPATIENT
Start: 2019-06-18

## 2019-06-18 RX ORDER — AMOXICILLIN 250 MG
CAPSULE ORAL AS NEEDED
COMMUNITY
Start: 2018-05-22 | End: 2021-08-13

## 2019-06-18 RX ORDER — FLUCONAZOLE 100 MG/1
100 TABLET ORAL DAILY
Qty: 7 TABLET | Refills: 0 | Status: SHIPPED | OUTPATIENT
Start: 2019-06-18 | End: 2019-06-25

## 2019-06-18 NOTE — PROGRESS NOTES
"      Maria Ines Zhang is a 73 y.o. female, who presents with a chief complaint of   Chief Complaint   Patient presents with   • Follow-up     6 x week f/u, lab results   • Hypertension   • Hyperlipidemia   • Pain   • Vaginitis       74 yo F with diffuse arthritis and chronic pain here for follow up and doing well. She is feeling well on Gabapentin. She feels that it helps her pain in her back that goes down her leg. No tingling or numbness. Sleeping better.     She has been having vaginal itchiness for the last 6 months that has been treating with OTC medications. Gets better and then comes back. Has not seen anyone for this and has not taken oral medications. She gets \"red and irritated\" with \"discharge and swelling\".    She has been taking Mobic for years for her hands and back. Helps with her pain. She does take Tylenol during the day. Pain doctor has given her injections that have helped.     B12 was low and MMA was high on her last lab draw. Started B12 orally and she is tolerating.          The following portions of the patient's history were reviewed and updated as appropriate: allergies, current medications, past family history, past medical history, past social history, past surgical history and problem list.    Allergies: Fish allergy; Iodine; Shellfish allergy; Shellfish-derived products; and Sulfa antibiotics    Current Outpatient Medications:   •  albuterol (PROVENTIL) (2.5 MG/3ML) 0.083% nebulizer solution, Take 2.5 mg by nebulization Every 4 (Four) Hours As Needed for Wheezing., Disp: 100 vial, Rfl: 1  •  albuterol sulfate  (90 Base) MCG/ACT inhaler, Inhale 2 puffs Every 4 (Four) Hours As Needed for Wheezing or Shortness of Air., Disp: 1 inhaler, Rfl: 6  •  amLODIPine (NORVASC) 5 MG tablet, Take 5 mg by mouth Daily., Disp: , Rfl:   •  Bioflavonoid Products (ROCKY-C) tablet, Take 1,000 mg by mouth Daily., Disp: , Rfl:   •  calcium polycarbophil (FIBERCON) 625 MG tablet, Take 1,250 mg by mouth " Daily., Disp: , Rfl:   •  clonazePAM (KlonoPIN) 0.5 MG tablet, TAKE ONE TABLET BY MOUTH ONCE NIGHTLY AS NEEDED FOR ANXIETY, Disp: 90 tablet, Rfl: 0  •  cyclobenzaprine (FLEXERIL) 10 MG tablet, TAKE ONE TABLET BY MOUTH THREE TIMES A DAY AS NEEDED FOR MUSCLE SPASMS, Disp: 90 tablet, Rfl: 0  •  escitalopram (LEXAPRO) 20 MG tablet, TAKE ONE TABLET BY MOUTH DAILY, Disp: 90 tablet, Rfl: 1  •  esomeprazole (nexIUM) 40 MG capsule, Take 40 mg by mouth Daily., Disp: , Rfl:   •  furosemide (LASIX) 40 MG tablet, , Disp: , Rfl:   •  gabapentin (NEURONTIN) 300 MG capsule, TAKE ONE CAPSULE BY MOUTH EVERY NIGHT, Disp: 30 capsule, Rfl: 5  •  hydrochlorothiazide (HYDRODIURIL) 25 MG tablet, TAKE ONE TABLET BY MOUTH DAILY, Disp: 90 tablet, Rfl: 1  •  levothyroxine (SYNTHROID, LEVOTHROID) 88 MCG tablet, TAKE ONE TABLET BY MOUTH DAILY, Disp: 90 tablet, Rfl: 1  •  linaclotide (LINZESS) 72 MCG capsule capsule, Take 72 mcg by mouth Every Morning Before Breakfast., Disp: , Rfl:   •  lisinopril (PRINIVIL,ZESTRIL) 20 MG tablet, TAKE ONE TABLET BY MOUTH DAILY, Disp: 90 tablet, Rfl: 1  •  montelukast (SINGULAIR) 10 MG tablet, Take 1 tablet by mouth Every Night., Disp: 90 tablet, Rfl: 1  •  Multiple Vitamins-Minerals (MULTIVITAMIN WITH MINERALS) tablet tablet, Take 1 tablet by mouth Daily., Disp: , Rfl:   •  Probiotic Product (PROBIOTIC-10) capsule, Take 1 capsule by mouth Daily., Disp: , Rfl:   •  senna-docusate (PERICOLACE) 8.6-50 MG per tablet, Take  by mouth As Needed., Disp: , Rfl:   •  simvastatin (ZOCOR) 10 MG tablet, Take 1 tablet by mouth Every Night., Disp: 30 tablet, Rfl: 6  •  SYMBICORT 160-4.5 MCG/ACT inhaler, INHALE TWO PUFFS BY MOUTH TWICE A DAY, Disp: 3 inhaler, Rfl: 1  •  fluconazole (DIFLUCAN) 100 MG tablet, Take 1 tablet by mouth Daily for 7 days., Disp: 7 tablet, Rfl: 0  •  traMADol (ULTRAM) 50 MG tablet, Take 1 tablet by mouth 2 (Two) Times a Day As Needed for Moderate Pain  or Severe Pain ., Disp: 60 tablet, Rfl:  "2  Medications Discontinued During This Encounter   Medication Reason   • clonazePAM (KlonoPIN) 0.5 MG tablet Duplicate order   • meloxicam (MOBIC) 15 MG tablet *Therapy completed   • meloxicam (MOBIC) 15 MG tablet *Therapy completed       Review of Systems   Constitutional: Negative for chills, fatigue and fever.   HENT: Negative for congestion and rhinorrhea.    Respiratory: Negative for cough and shortness of breath.    Cardiovascular: Negative for chest pain and palpitations.   Gastrointestinal: Negative for abdominal pain, constipation, diarrhea and nausea.   Genitourinary: Positive for vaginal discharge and vaginal pain (redness ). Negative for dysuria and hematuria.   Musculoskeletal: Positive for arthralgias.             /60 (BP Location: Left arm, Patient Position: Sitting, Cuff Size: Large Adult)   Pulse 68   Temp 98.8 °F (37.1 °C) (Oral)   Resp 16   Ht 167.6 cm (66\")   Wt 102 kg (225 lb)   SpO2 98%   BMI 36.32 kg/m²       Physical Exam   Constitutional: She is oriented to person, place, and time. She appears well-developed and well-nourished. No distress.   HENT:   Head: Normocephalic and atraumatic.   Right Ear: External ear normal.   Left Ear: External ear normal.   Mouth/Throat: Oropharynx is clear and moist. No oropharyngeal exudate.   Eyes: Conjunctivae are normal. Right eye exhibits no discharge. Left eye exhibits no discharge. No scleral icterus.   Neck: Neck supple.   Cardiovascular: Normal rate, regular rhythm and normal heart sounds. Exam reveals no gallop and no friction rub.   No murmur heard.  Pulmonary/Chest: Effort normal and breath sounds normal. No respiratory distress. She has no wheezes. She has no rales.   Lymphadenopathy:     She has no cervical adenopathy.   Neurological: She is alert and oriented to person, place, and time.   Skin: Skin is warm. No rash noted.   Psychiatric: She has a normal mood and affect. Her behavior is normal.   Nursing note and vitals " reviewed.        Results for orders placed or performed in visit on 05/07/19   Ferritin   Result Value Ref Range    Ferritin 36.00 13.00 - 150.00 ng/mL   Iron Profile   Result Value Ref Range    TIBC 424 mcg/dL    UIBC 352 (H) 112 - 346 mcg/dL    Iron 72 37 - 145 mcg/dL    Iron Saturation 17 (L) 20 - 50 %   Methylmalonic Acid, Serum   Result Value Ref Range    Methylmalonic Acid 502 (H) 0 - 378 nmol/L    Disclaimer: Comment            Maria Ines was seen today for follow-up, hypertension, hyperlipidemia, pain and vaginitis.    Diagnoses and all orders for this visit:    Chronic pain syndrome    Encounter for monitoring chronic NSAID therapy    Osteoarthritis of spine with radiculopathy, lumbar region    Hand arthritis    Vaginal yeast infection    B12 deficiency    Other orders  -     traMADol (ULTRAM) 50 MG tablet; Take 1 tablet by mouth 2 (Two) Times a Day As Needed for Moderate Pain  or Severe Pain .  -     fluconazole (DIFLUCAN) 100 MG tablet; Take 1 tablet by mouth Daily for 7 days.         Patient's cholesterol is not under good control and looking back, she realized that she might not be taking it. Will continue current regimen of Zocor. No side effects from medications reported. Will follow up in 3 months to monitor levels.      Her pain under better control. She is seeing pain specialist for this. Encouraged her to continue to see him. Continue Gabapentin at night for pain and can increase if we need to. Ultram given to her to take at night instead of Mobic since she is getting up and having to take Tylenol in addition to this. Stay on Nexium for now to protect stomach.      B12/MMA next time she has labs to see this is doing on supplement.     Treat yeast infection with 7 day course of Diflucan and encouraged her to rinse or brush teeth after using Symbicort. If still has issues in one week, will call and we can do swab.    Return in about 3 months (around 9/18/2019) for Recheck.    Jeanine Blunt,  MD  06/18/2019

## 2019-07-05 ENCOUNTER — TELEPHONE (OUTPATIENT)
Dept: INTERNAL MEDICINE | Facility: CLINIC | Age: 74
End: 2019-07-05

## 2019-07-05 RX ORDER — BUDESONIDE AND FORMOTEROL FUMARATE DIHYDRATE 160; 4.5 UG/1; UG/1
AEROSOL RESPIRATORY (INHALATION)
Qty: 10.2 G | Refills: 5 | Status: SHIPPED | OUTPATIENT
Start: 2019-07-05 | End: 2019-12-27 | Stop reason: SDUPTHER

## 2019-07-05 NOTE — TELEPHONE ENCOUNTER
Left message for pt        ----- Message from Jeanine Ponce MD sent at 7/5/2019  2:41 PM EDT -----  Regarding: RE: FEELS BAD ALL OVER  Contact: [phone]  I would take 2 of the Tramadol to equal 100mg and if not better, then can proceed to C if she feels that this is necessary. I can see her early next week   ----- Message -----  From: Fatoumata Gotti MA  Sent: 7/5/2019   1:36 PM  To: Jeanine Ponce MD  Subject: FW: FEELS BAD ALL OVER                               ----- Message -----  From: Tracy Grider  Sent: 7/5/2019   1:12 PM  To: Yohana Reynoso[address] [place] [address]  Subject: FEELS BAD ALL OVER                               [facility] PT    Patient called to say that dr ponce wanted her to call us back if she started feeling worse. She said that she feels bad all over, and her arthritis med that she got is not helping her at all.  She wants to know when she can come in, or should she go to urgent care?  Please call the number above or [phone]    Thanks!  Tracy

## 2019-07-17 ENCOUNTER — TRANSCRIBE ORDERS (OUTPATIENT)
Dept: INTERNAL MEDICINE | Facility: CLINIC | Age: 74
End: 2019-07-17

## 2019-07-17 DIAGNOSIS — Z12.39 SCREENING BREAST EXAMINATION: Primary | ICD-10-CM

## 2019-07-23 ENCOUNTER — OFFICE VISIT (OUTPATIENT)
Dept: INTERNAL MEDICINE | Facility: CLINIC | Age: 74
End: 2019-07-23

## 2019-07-23 VITALS
SYSTOLIC BLOOD PRESSURE: 136 MMHG | RESPIRATION RATE: 16 BRPM | TEMPERATURE: 98.5 F | OXYGEN SATURATION: 98 % | DIASTOLIC BLOOD PRESSURE: 62 MMHG | BODY MASS INDEX: 36.32 KG/M2 | HEART RATE: 70 BPM | WEIGHT: 226 LBS | HEIGHT: 66 IN

## 2019-07-23 DIAGNOSIS — R82.998 LEUKOCYTES IN URINE: ICD-10-CM

## 2019-07-23 DIAGNOSIS — N89.8 VAGINAL ITCHING: Primary | ICD-10-CM

## 2019-07-23 DIAGNOSIS — R39.15 URINARY URGENCY: ICD-10-CM

## 2019-07-23 LAB
BILIRUB BLD-MCNC: NEGATIVE MG/DL
CLARITY, POC: CLEAR
COLOR UR: YELLOW
GLUCOSE UR STRIP-MCNC: NEGATIVE MG/DL
KETONES UR QL: NEGATIVE
LEUKOCYTE EST, POC: ABNORMAL
NITRITE UR-MCNC: NEGATIVE MG/ML
PH UR: 5 [PH] (ref 5–8)
PROT UR STRIP-MCNC: NEGATIVE MG/DL
RBC # UR STRIP: NEGATIVE /UL
SP GR UR: 1.01 (ref 1–1.03)
UROBILINOGEN UR QL: NORMAL

## 2019-07-23 PROCEDURE — 81003 URINALYSIS AUTO W/O SCOPE: CPT | Performed by: INTERNAL MEDICINE

## 2019-07-23 PROCEDURE — 99214 OFFICE O/P EST MOD 30 MIN: CPT | Performed by: INTERNAL MEDICINE

## 2019-07-23 RX ORDER — FLUCONAZOLE 100 MG/1
100 TABLET ORAL DAILY
Qty: 14 TABLET | Refills: 0 | Status: SHIPPED | OUTPATIENT
Start: 2019-07-23 | End: 2019-08-06

## 2019-07-23 NOTE — PROGRESS NOTES
"      Maria Ines Zhang is a 73 y.o. female, who presents with a chief complaint of   Chief Complaint   Patient presents with   • Follow-up   • Vaginal Itching   • Urinary Frequency       74 yo F he has been having vaginal itchiness for the last 6 months or more that has been treating with OTC medications. I gave her 7 day course of Diflucan in mid June and got better, but then returned. Now using Vagisil again. Gets better and then comes back each time. Treated for UTI last week at [facility], bu still has some urgency. She gets \"red and irritated\" with \"discharge and swelling\".                  The following portions of the patient's history were reviewed and updated as appropriate: allergies, current medications, past family history, past medical history, past social history, past surgical history and problem list.    Allergies: Fish allergy; Iodine; Shellfish allergy; Shellfish-derived products; and Sulfa antibiotics    Current Outpatient Medications:   •  albuterol (PROVENTIL) (2.5 MG/3ML) 0.083% nebulizer solution, Take 2.5 mg by nebulization Every 4 (Four) Hours As Needed for Wheezing., Disp: 100 vial, Rfl: 1  •  albuterol sulfate  (90 Base) MCG/ACT inhaler, Inhale 2 puffs Every 4 (Four) Hours As Needed for Wheezing or Shortness of Air., Disp: 1 inhaler, Rfl: 6  •  amLODIPine (NORVASC) 5 MG tablet, Take 5 mg by mouth Daily., Disp: , Rfl:   •  Bioflavonoid Products (ROCKY-C) tablet, Take 1,000 mg by mouth Daily., Disp: , Rfl:   •  calcium polycarbophil (FIBERCON) 625 MG tablet, Take 1,250 mg by mouth Daily., Disp: , Rfl:   •  clonazePAM (KlonoPIN) 0.5 MG tablet, TAKE ONE TABLET BY MOUTH ONCE NIGHTLY AS NEEDED FOR ANXIETY, Disp: 90 tablet, Rfl: 0  •  cyclobenzaprine (FLEXERIL) 10 MG tablet, TAKE ONE TABLET BY MOUTH THREE TIMES A DAY AS NEEDED FOR MUSCLE SPASMS, Disp: 90 tablet, Rfl: 0  •  escitalopram (LEXAPRO) 20 MG tablet, TAKE ONE TABLET BY MOUTH DAILY, Disp: 90 tablet, Rfl: 1  •  esomeprazole (nexIUM) 40 MG " "capsule, Take 40 mg by mouth Daily., Disp: , Rfl:   •  furosemide (LASIX) 40 MG tablet, , Disp: , Rfl:   •  gabapentin (NEURONTIN) 300 MG capsule, TAKE ONE CAPSULE BY MOUTH EVERY NIGHT, Disp: 30 capsule, Rfl: 5  •  hydrochlorothiazide (HYDRODIURIL) 25 MG tablet, TAKE ONE TABLET BY MOUTH DAILY, Disp: 90 tablet, Rfl: 1  •  levothyroxine (SYNTHROID, LEVOTHROID) 88 MCG tablet, TAKE ONE TABLET BY MOUTH DAILY, Disp: 90 tablet, Rfl: 1  •  linaclotide (LINZESS) 72 MCG capsule capsule, Take 72 mcg by mouth Every Morning Before Breakfast., Disp: , Rfl:   •  lisinopril (PRINIVIL,ZESTRIL) 20 MG tablet, TAKE ONE TABLET BY MOUTH DAILY, Disp: 90 tablet, Rfl: 1  •  montelukast (SINGULAIR) 10 MG tablet, Take 1 tablet by mouth Every Night., Disp: 90 tablet, Rfl: 1  •  Multiple Vitamins-Minerals (MULTIVITAMIN WITH MINERALS) tablet tablet, Take 1 tablet by mouth Daily., Disp: , Rfl:   •  Probiotic Product (PROBIOTIC-10) capsule, Take 1 capsule by mouth Daily., Disp: , Rfl:   •  senna-docusate (PERICOLACE) 8.6-50 MG per tablet, Take  by mouth As Needed., Disp: , Rfl:   •  simvastatin (ZOCOR) 10 MG tablet, Take 1 tablet by mouth Every Night., Disp: 30 tablet, Rfl: 6  •  SYMBICORT 160-4.5 MCG/ACT inhaler, INHALE TWO PUFFS BY MOUTH TWICE A DAY, Disp: 10.2 g, Rfl: 5  •  traMADol (ULTRAM) 50 MG tablet, Take 1 tablet by mouth 2 (Two) Times a Day As Needed for Moderate Pain  or Severe Pain ., Disp: 60 tablet, Rfl: 2  •  fluconazole (DIFLUCAN) 100 MG tablet, Take 1 tablet by mouth Daily for 14 days., Disp: 14 tablet, Rfl: 0  There are no discontinued medications.    Review of Systems   Constitutional: Negative for chills, fatigue and fever.   Genitourinary: Positive for urgency and vaginal pain. Negative for difficulty urinating, dysuria and vaginal discharge.             /62 (BP Location: Left arm, Patient Position: Sitting, Cuff Size: Adult)   Pulse 70   Temp 98.5 °F (36.9 °C) (Oral)   Resp 16   Ht 167.6 cm (66\")   Wt 103 kg (226 " lb)   SpO2 98%   BMI 36.48 kg/m²       Physical Exam   Constitutional: She is oriented to person, place, and time. She appears well-developed and well-nourished. No distress.   HENT:   Head: Normocephalic and atraumatic.   Right Ear: External ear normal.   Left Ear: External ear normal.   Mouth/Throat: Oropharynx is clear and moist. No oropharyngeal exudate.   Eyes: Conjunctivae are normal. Right eye exhibits no discharge. Left eye exhibits no discharge. No scleral icterus.   Pulmonary/Chest: Effort normal.   Abdominal: There is no guarding.   Genitourinary: Pelvic exam was performed with patient supine. There is tenderness and lesion (large labia minora lesion of the right that is red and excoriated, scarrng posteriorly ) on the right labia.   Neurological: She is alert and oriented to person, place, and time.   Skin: Skin is warm. No rash noted.   Psychiatric: She has a normal mood and affect. Her behavior is normal.   Nursing note and vitals reviewed.        Results for orders placed or performed in visit on 07/23/19   Urine Culture - Urine, Urine, Clean Catch   Result Value Ref Range    Urine Culture Final report     Result 1 Comment    POCT urinalysis dipstick, automated   Result Value Ref Range    Color Yellow Yellow, Straw, Dark Yellow, Meryl    Clarity, UA Clear Clear    Specific Gravity  1.010 1.005 - 1.030    pH, Urine 5.0 5.0 - 8.0    Leukocytes Trace (A) Negative    Nitrite, UA Negative Negative    Protein, POC Negative Negative mg/dL    Glucose, UA Negative Negative, 1000 mg/dL (3+) mg/dL    Ketones, UA Negative Negative    Urobilinogen, UA Normal Normal    Bilirubin Negative Negative    Blood, UA Negative Negative           Maria Ines was seen today for follow-up, vaginal itching and urinary frequency.    Diagnoses and all orders for this visit:    Vaginal itching  -     POCT urinalysis dipstick, automated  -     NuSwab VG+ & HSV    Urinary urgency  -     Urine Culture - Urine, Urine, Clean  Catch    Leukocytes in urine  -     Urine Culture - Urine, Urine, Clean Catch    Other orders  -     fluconazole (DIFLUCAN) 100 MG tablet; Take 1 tablet by mouth Daily for 14 days.        Will give 14 days of Diflucan and did NuSwab. If negative, will send to OB/Gyn due to chronicity and start her on Clobetasol for LS and let them follow her up.     Will send urine for culture, but only leuks and likely that UTI has cleared.   Return for Next scheduled follow up.    Jeanine Blunt MD  07/23/2019

## 2019-07-25 ENCOUNTER — OFFICE VISIT (OUTPATIENT)
Dept: ORTHOPEDIC SURGERY | Facility: CLINIC | Age: 74
End: 2019-07-25

## 2019-07-25 ENCOUNTER — HOSPITAL ENCOUNTER (OUTPATIENT)
Dept: MAMMOGRAPHY | Facility: HOSPITAL | Age: 74
Discharge: HOME | End: 2019-07-25
Admitting: INTERNAL MEDICINE

## 2019-07-25 VITALS — WEIGHT: 226 LBS | HEIGHT: 66 IN | BODY MASS INDEX: 36.32 KG/M2

## 2019-07-25 DIAGNOSIS — R52 PAIN: Primary | ICD-10-CM

## 2019-07-25 DIAGNOSIS — M19.011 PRIMARY OSTEOARTHRITIS, RIGHT SHOULDER: ICD-10-CM

## 2019-07-25 DIAGNOSIS — M75.50 SUBACROMIAL BURSITIS: ICD-10-CM

## 2019-07-25 DIAGNOSIS — Z12.39 SCREENING BREAST EXAMINATION: ICD-10-CM

## 2019-07-25 LAB
BACTERIA UR CULT: NORMAL
BACTERIA UR CULT: NORMAL

## 2019-07-25 PROCEDURE — 77063 BREAST TOMOSYNTHESIS BI: CPT

## 2019-07-25 PROCEDURE — 20610 DRAIN/INJ JOINT/BURSA W/O US: CPT | Performed by: NURSE PRACTITIONER

## 2019-07-25 PROCEDURE — 77067 SCR MAMMO BI INCL CAD: CPT

## 2019-07-25 PROCEDURE — 73030 X-RAY EXAM OF SHOULDER: CPT | Performed by: NURSE PRACTITIONER

## 2019-07-25 PROCEDURE — 99212 OFFICE O/P EST SF 10 MIN: CPT | Performed by: NURSE PRACTITIONER

## 2019-07-25 RX ADMIN — TRIAMCINOLONE ACETONIDE 80 MG: 40 INJECTION, SUSPENSION INTRA-ARTICULAR; INTRAMUSCULAR at 11:50

## 2019-07-25 RX ADMIN — LIDOCAINE HYDROCHLORIDE 4 ML: 10 INJECTION, SOLUTION EPIDURAL; INFILTRATION; INTRACAUDAL; PERINEURAL at 11:50

## 2019-07-25 NOTE — PROGRESS NOTES
Subjective:     Patient ID: Maria Ines Zhang is a 73 y.o. female.    Chief Complaint:  Follow-up primary osteoarthritis right shoulder  History of Present Illness  Maria Ines Zhang presents back to clinic for follow-up osteoarthritis right shoulder.  She was last seen with APRN 8/22/2018 received corticosteroid injection which significantly helped with symptom relief.  Maximal tenderness today at the lateral acromion at the right shoulder.  Continues to experience limited range of motion including reaching up above head and reaching up to side which is been baseline for her.  Rates discomfort at a 7 to an 8 out of a 10 aching throbbing in nature.  Denies presence of numbness or tingling radiating down the right upper extremity.  Again she is unable to proceed with any surgical intervention at this time secondary to caring for her spouse.  Denies all other concerns that she has at this time.    Social History     Occupational History   • Not on file   Tobacco Use   • Smoking status: Never Smoker   • Smokeless tobacco: Never Used   Substance and Sexual Activity   • Alcohol use: No   • Drug use: No   • Sexual activity: Defer      Past Medical History:   Diagnosis Date   • Anxiety    • Asthma    • Benign essential hypertension    • Benign liver cyst 2/15/2018    Founds incidentally on CT scan of abdomen. Not clinically significant in size or appearance.    • Colitis    • Cryptogenic stroke (CMS/HCC) 8/3/2017   • DDD (degenerative disc disease), lumbar 12/10/2012    W/ spondylolisthesis, Patient has had 2 epidurals previously-Dr. Micheal Marquez   • DDD (degenerative disc disease), lumbosacral    • Depression    • Diverticulosis 2/15/2018   • Fatigue    • GERD (gastroesophageal reflux disease)    • H/O acute respiratory failure 02/25/2016    With bilateral pulmonary infiltrates-Dr. Jose Maria Funk   • Hyperlipidemia    • Hypertension    • Hypothyroidism    • IFG (impaired fasting glucose) 8/7/2017   • Mitral valve  insufficiency     nild   • MRSA infection within last 3 months 05/2017    RIGHT FOOT   • Obesity    • Obstructive sleep apnea     USES CPAP   • Osteoarthritis    • Patent foramen ovale    • Pulmonary embolism (CMS/HCC)     3 SEPARATE OCCASIONS   • Pulmonary hypertension (CMS/HCC)    • SBO (small bowel obstruction) (CMS/HCC)    • Sciatica    • Venous stasis      Past Surgical History:   Procedure Laterality Date   • BRONCHOSCOPY WITH ENDOSCOPY Right 02/25/2016    Exploratory bronchoscopy, exploratory RT video-assisted thoracoscopy and wedge resection of RT upper lobe and Subpleural pain catheter x2-Dr. Jose Maria Funk   • CATARACT EXTRACTION W/ INTRAOCULAR LENS IMPLANT Bilateral 2016   • COLONOSCOPY N/A 3/1/2018    Procedure: COLONOSCOPY INTO CECUM/ TERMINAL ILEUM WITH BIOPSIES AND CLIP X 1;  Surgeon: Ritchie Garland MD;  [facility];  Service:    • COLONOSCOPY N/A 10/02/2008    Diverticulosis of the sigmoid colon-Dr. Salud Lowry   • HYSTERECTOMY     • LAPAROSCOPIC CHOLECYSTECTOMY N/A 10/01/2003    Dr. Nino Torres   • LUMBAR EPIDURAL INJECTION N/A 12/10/2012    Multiple lumbar epidural injections-Dr. Micheal Marquez   • LUMBAR FUSION N/A 08/2012   • UT TOTAL KNEE ARTHROPLASTY Right 7/6/2017    Procedure: TOTAL KNEE ARTHROPLASTY;  Surgeon: Dragan De La Vega MD;  [facility] MAIN OR;  Service: Orthopedics   • TOE AMPUTATION Right 05/2017    SECOND TOE   • TOTAL KNEE ARTHROPLASTY Left 11/13/2006    Dr. Jerome Weber   • UPPER GASTROINTESTINAL ENDOSCOPY N/A 10/02/2008    Normal esophagus, gastric mucosal abnormality characterized by erythema, normal examined duodenum-Dr. Salud Lowry       Family History   Problem Relation Age of Onset   • Heart disease Mother 60   • Diabetes Mother    • Stroke Mother 83   • Melanoma Father    • Heart attack Brother    • Heart disease Brother         Valve problem    • Heart attack Maternal Grandmother    • Heart attack Maternal Grandfather    • No Known Problems  "Paternal Grandmother    • No Known Problems Paternal Grandfather    • Malig Hyperthermia Neg Hx    • Breast cancer Neg Hx          Review of Systems   Constitutional: Negative for chills, diaphoresis, fever and unexpected weight change.   HENT: Negative for hearing loss, nosebleeds, sore throat and tinnitus.    Eyes: Negative for pain and visual disturbance.   Respiratory: Negative for cough, shortness of breath and wheezing.    Cardiovascular: Negative for chest pain and palpitations.   Gastrointestinal: Negative for abdominal pain, diarrhea, nausea and vomiting.   Endocrine: Negative for cold intolerance, heat intolerance and polydipsia.   Genitourinary: Negative for difficulty urinating, dysuria and hematuria.   Musculoskeletal: Positive for arthralgias and myalgias. Negative for joint swelling.   Skin: Negative for rash and wound.   Allergic/Immunologic: Negative for environmental allergies.   Neurological: Negative for dizziness, syncope and numbness.   Hematological: Does not bruise/bleed easily.   Psychiatric/Behavioral: Negative for dysphoric mood and sleep disturbance. The patient is not nervous/anxious.            Objective:  Physical Exam     General: No acute distress.  Eyes: conjunctiva clear; pupils equally round and reactive  ENT: external ears and nose atraumatic; oropharynx clear  CV: no peripheral edema  Resp: normal respiratory effort  Skin: no rashes or wounds; normal turgor  Psych: mood and affect appropriate; recent and remote memory intact    Vitals:    07/25/19 1058   Weight: 103 kg (226 lb)   Height: 167.6 cm (66\")         07/25/19  1058   Weight: 103 kg (226 lb)     Body mass index is 36.48 kg/m².      Ortho Exam     Right Shoulder Exam      Tenderness   The patient is experiencing tenderness in the acromion.     Range of Motion   Forward Flexion: 90   External Rotation: 30      Other   Sensation: normal  Pulse: present  Limited exam secondary to limited range of motion right shoulder at " baseline    Imaging:  Right Shoulder X-Ray  Indication: Pain  AP Internal and External Rotation views    Findings:  No fracture  No bony lesion  Normal soft tissues  Advanced glenohumeral joint osteoarthritis    Prior studies were available for comparison.    Assessment:        1. Pain    2. Primary osteoarthritis, right shoulder    3. Adhesive capsulitis of right shoulder    4. Subacromial bursitis, right           Plan:  1. 1.  Discussed plan of care with patient.  Wishes to proceed with corticosteroid injection subacromial right shoulder.  Due to 's elbow she is unable to undergo any surgical procedure at the shoulder at this time.  Large Joint Arthrocentesis: R glenohumeral  Date/Time: 7/25/2019 11:50 AM  Consent given by: patient  Site marked: site marked  Timeout: Immediately prior to procedure a time out was called to verify the correct patient, procedure, equipment, support staff and site/side marked as required   Supporting Documentation  Indications: pain   Procedure Details  Location: shoulder - R glenohumeral  Preparation: Patient was prepped and draped in the usual sterile fashion  Needle size: 22 G  Medications administered: 80 mg triamcinolone acetonide 40 MG/ML; 4 mL lidocaine PF 1% 1 %  Patient tolerance: patient tolerated the procedure well with no immediate complications        Orders:  Orders Placed This Encounter   Procedures   • Large Joint Arthrocentesis: R glenohumeral   • XR Shoulder 2+ View Right       I ordered and reviewed the ROMLE today.     Dictated utilizing Dragon dictation

## 2019-07-26 RX ORDER — LIDOCAINE HYDROCHLORIDE 10 MG/ML
4 INJECTION, SOLUTION EPIDURAL; INFILTRATION; INTRACAUDAL; PERINEURAL
Status: COMPLETED | OUTPATIENT
Start: 2019-07-25 | End: 2019-07-25

## 2019-07-26 RX ORDER — TRIAMCINOLONE ACETONIDE 40 MG/ML
80 INJECTION, SUSPENSION INTRA-ARTICULAR; INTRAMUSCULAR
Status: COMPLETED | OUTPATIENT
Start: 2019-07-25 | End: 2019-07-25

## 2019-07-26 RX ORDER — ESCITALOPRAM OXALATE 20 MG/1
TABLET ORAL
Qty: 90 TABLET | Refills: 1 | Status: SHIPPED | OUTPATIENT
Start: 2019-07-26

## 2019-07-29 DIAGNOSIS — N89.8 VAGINAL ITCHING: Primary | ICD-10-CM

## 2019-07-29 LAB
A VAGINAE DNA VAG QL NAA+PROBE: NORMAL SCORE
BVAB2 DNA VAG QL NAA+PROBE: NORMAL SCORE
C ALBICANS DNA VAG QL NAA+PROBE: NEGATIVE
C GLABRATA DNA VAG QL NAA+PROBE: NEGATIVE
C TRACH RRNA SPEC QL NAA+PROBE: NEGATIVE
HSV1 DNA SPEC QL NAA+PROBE: NEGATIVE
HSV2 DNA SPEC QL NAA+PROBE: NEGATIVE
MEGA1 DNA VAG QL NAA+PROBE: NORMAL SCORE
N GONORRHOEA RRNA SPEC QL NAA+PROBE: NEGATIVE
T VAGINALIS RRNA SPEC QL NAA+PROBE: NEGATIVE

## 2019-07-29 RX ORDER — CLOBETASOL PROPIONATE 0.5 MG/G
OINTMENT TOPICAL NIGHTLY
Qty: 45 G | Refills: 0 | Status: SHIPPED | OUTPATIENT
Start: 2019-07-29

## 2019-07-29 NOTE — PROGRESS NOTES
Swab is all negative for yeast. I want her to start clobetasol 0.05% ointment every night to vaginal area and follow up with Ob/Gyn downstairs. Please send in script and place referral.

## 2019-07-30 ENCOUNTER — TELEPHONE (OUTPATIENT)
Dept: INTERNAL MEDICINE | Facility: CLINIC | Age: 74
End: 2019-07-30

## 2019-07-30 DIAGNOSIS — M19.049 HAND ARTHRITIS: ICD-10-CM

## 2019-07-30 DIAGNOSIS — N89.8 VAGINAL ITCHING: Primary | ICD-10-CM

## 2019-07-30 DIAGNOSIS — B37.31 VAGINAL YEAST INFECTION: ICD-10-CM

## 2019-07-30 NOTE — TELEPHONE ENCOUNTER
----- Message from Jeanine Blunt MD sent at 7/29/2019  2:46 PM EDT -----  Swab is all negative for yeast. I want her to start clobetasol 0.05% ointment every night to vaginal area and follow up with Ob/Gyn downstairs. Please send in script and place referral.

## 2019-07-31 ENCOUNTER — TELEPHONE (OUTPATIENT)
Dept: OBSTETRICS AND GYNECOLOGY | Facility: CLINIC | Age: 74
End: 2019-07-31

## 2019-08-08 RX ORDER — CYCLOBENZAPRINE HCL 10 MG
TABLET ORAL
Qty: 90 TABLET | Refills: 0 | Status: SHIPPED | OUTPATIENT
Start: 2019-08-08 | End: 2019-11-04 | Stop reason: SDUPTHER

## 2019-08-23 ENCOUNTER — TELEPHONE (OUTPATIENT)
Dept: INTERNAL MEDICINE | Facility: CLINIC | Age: 74
End: 2019-08-23

## 2019-08-23 NOTE — TELEPHONE ENCOUNTER
They both should have their Shingrix that they can get at local pharmacy     They both should have both pneumonia shots. I think that they had had shots at a local pharmacy and we asked them to get records. Lolis called the pharmacy that wife told us that she had received, but there was no record. We can schedule for pneumonia shots if they are appropriate to give. We just need to look in health maintenance. I give Prevnar first and then pneumcoccal 23 one year later. They are [facility] patients and we did not have records from office of what they had received there.      can have Hep B series. He needs to get at local pharmacy since he is medicare. Would inquire about cost before hand though.

## 2019-08-23 NOTE — TELEPHONE ENCOUNTER
----- Message from Jose Owen MA sent at 8/22/2019  4:41 PM EDT -----  Contact: vaccine question     ----- Message -----  From: Dior Guzman  Sent: 8/22/2019  12:34 PM  To: [facility] [place]    ASHLEY    Patient calls stating they were instructed to talk to pharmacy (I don't know by who)  She and her  were told about some vaccines they should ask their pcp about getting.  Checking to see what vaccines Dr. Blunt thinks she should have:    SHINGLES  PNEUMONIA VAC    HEP B   DUE TO CHRONIC KIDNEY FAILURE

## 2019-09-11 DIAGNOSIS — D64.9 ANEMIA, UNSPECIFIED TYPE: ICD-10-CM

## 2019-09-11 DIAGNOSIS — E78.5 HYPERLIPIDEMIA, UNSPECIFIED HYPERLIPIDEMIA TYPE: ICD-10-CM

## 2019-09-11 DIAGNOSIS — R73.01 IFG (IMPAIRED FASTING GLUCOSE): ICD-10-CM

## 2019-09-11 DIAGNOSIS — E53.8 B12 DEFICIENCY: ICD-10-CM

## 2019-09-11 DIAGNOSIS — E03.9 ACQUIRED HYPOTHYROIDISM: ICD-10-CM

## 2019-09-11 DIAGNOSIS — I10 ESSENTIAL HYPERTENSION: Primary | ICD-10-CM

## 2019-09-17 RX ORDER — CLONAZEPAM 0.5 MG/1
TABLET ORAL
Qty: 90 TABLET | Refills: 0 | Status: SHIPPED | OUTPATIENT
Start: 2019-09-17 | End: 2019-12-18

## 2019-09-18 LAB
ALBUMIN SERPL-MCNC: 4.4 G/DL (ref 3.5–5.2)
ALBUMIN/GLOB SERPL: 1.8 G/DL
ALP SERPL-CCNC: 86 U/L (ref 39–117)
ALT SERPL-CCNC: 14 U/L (ref 1–33)
AST SERPL-CCNC: 24 U/L (ref 1–32)
BASOPHILS # BLD AUTO: 0.02 10*3/MM3 (ref 0–0.2)
BASOPHILS NFR BLD AUTO: 0.5 % (ref 0–1.5)
BILIRUB SERPL-MCNC: 0.4 MG/DL (ref 0.2–1.2)
BUN SERPL-MCNC: 14 MG/DL (ref 8–23)
BUN/CREAT SERPL: 17.5 (ref 7–25)
CALCIUM SERPL-MCNC: 9.4 MG/DL (ref 8.6–10.5)
CHLORIDE SERPL-SCNC: 102 MMOL/L (ref 98–107)
CHOLEST SERPL-MCNC: 188 MG/DL (ref 0–200)
CO2 SERPL-SCNC: 27.2 MMOL/L (ref 22–29)
CREAT SERPL-MCNC: 0.8 MG/DL (ref 0.57–1)
EOSINOPHIL # BLD AUTO: 0.03 10*3/MM3 (ref 0–0.4)
EOSINOPHIL NFR BLD AUTO: 0.7 % (ref 0.3–6.2)
ERYTHROCYTE [DISTWIDTH] IN BLOOD BY AUTOMATED COUNT: 15 % (ref 12.3–15.4)
GLOBULIN SER CALC-MCNC: 2.5 GM/DL
GLUCOSE SERPL-MCNC: 92 MG/DL (ref 65–99)
HBA1C MFR BLD: 5.6 % (ref 4.8–5.6)
HCT VFR BLD AUTO: 36.3 % (ref 34–46.6)
HDLC SERPL-MCNC: 40 MG/DL (ref 40–60)
HGB BLD-MCNC: 11 G/DL (ref 12–15.9)
IMM GRANULOCYTES # BLD AUTO: 0.01 10*3/MM3 (ref 0–0.05)
IMM GRANULOCYTES NFR BLD AUTO: 0.2 % (ref 0–0.5)
LDLC SERPL CALC-MCNC: 113 MG/DL (ref 0–100)
LDLC/HDLC SERPL: 2.84 {RATIO}
LYMPHOCYTES # BLD AUTO: 1.51 10*3/MM3 (ref 0.7–3.1)
LYMPHOCYTES NFR BLD AUTO: 35.3 % (ref 19.6–45.3)
Lab: NORMAL
MCH RBC QN AUTO: 29.3 PG (ref 26.6–33)
MCHC RBC AUTO-ENTMCNC: 30.3 G/DL (ref 31.5–35.7)
MCV RBC AUTO: 96.5 FL (ref 79–97)
METHYLMALONATE SERPL-SCNC: 238 NMOL/L (ref 0–378)
MONOCYTES # BLD AUTO: 0.43 10*3/MM3 (ref 0.1–0.9)
MONOCYTES NFR BLD AUTO: 10 % (ref 5–12)
NEUTROPHILS # BLD AUTO: 2.28 10*3/MM3 (ref 1.7–7)
NEUTROPHILS NFR BLD AUTO: 53.3 % (ref 42.7–76)
NRBC BLD AUTO-RTO: 0 /100 WBC (ref 0–0.2)
PLATELET # BLD AUTO: 343 10*3/MM3 (ref 140–450)
POTASSIUM SERPL-SCNC: 4.3 MMOL/L (ref 3.5–5.2)
PROT SERPL-MCNC: 6.9 G/DL (ref 6–8.5)
RBC # BLD AUTO: 3.76 10*6/MM3 (ref 3.77–5.28)
SODIUM SERPL-SCNC: 143 MMOL/L (ref 136–145)
T4 FREE SERPL-MCNC: 1.45 NG/DL (ref 0.93–1.7)
TRIGL SERPL-MCNC: 173 MG/DL (ref 0–150)
TSH SERPL DL<=0.005 MIU/L-ACNC: 1.28 UIU/ML (ref 0.27–4.2)
VIT B12 SERPL-MCNC: 605 PG/ML (ref 211–946)
VLDLC SERPL CALC-MCNC: 34.6 MG/DL
WBC # BLD AUTO: 4.28 10*3/MM3 (ref 3.4–10.8)

## 2019-09-19 ENCOUNTER — OFFICE VISIT (OUTPATIENT)
Dept: INTERNAL MEDICINE | Facility: CLINIC | Age: 74
End: 2019-09-19

## 2019-09-19 VITALS
HEART RATE: 79 BPM | RESPIRATION RATE: 16 BRPM | WEIGHT: 223 LBS | TEMPERATURE: 98.1 F | DIASTOLIC BLOOD PRESSURE: 68 MMHG | BODY MASS INDEX: 35.84 KG/M2 | HEIGHT: 66 IN | OXYGEN SATURATION: 98 % | SYSTOLIC BLOOD PRESSURE: 138 MMHG

## 2019-09-19 DIAGNOSIS — I10 ESSENTIAL HYPERTENSION: Primary | ICD-10-CM

## 2019-09-19 DIAGNOSIS — M25.372 LEFT ANKLE GIVES OUT: ICD-10-CM

## 2019-09-19 DIAGNOSIS — G89.4 CHRONIC PAIN SYNDROME: ICD-10-CM

## 2019-09-19 DIAGNOSIS — F41.9 ANXIETY: ICD-10-CM

## 2019-09-19 DIAGNOSIS — R39.15 URINARY URGENCY: ICD-10-CM

## 2019-09-19 DIAGNOSIS — E78.5 HYPERLIPIDEMIA, UNSPECIFIED HYPERLIPIDEMIA TYPE: ICD-10-CM

## 2019-09-19 DIAGNOSIS — E03.9 ACQUIRED HYPOTHYROIDISM: ICD-10-CM

## 2019-09-19 PROCEDURE — 99214 OFFICE O/P EST MOD 30 MIN: CPT | Performed by: INTERNAL MEDICINE

## 2019-09-19 RX ORDER — MELOXICAM 15 MG/1
15 TABLET ORAL DAILY PRN
Qty: 90 TABLET | Refills: 1 | Status: SHIPPED | OUTPATIENT
Start: 2019-09-19 | End: 2020-05-27

## 2019-09-19 RX ORDER — LISINOPRIL 20 MG/1
20 TABLET ORAL DAILY
Qty: 90 TABLET | Refills: 1 | Status: SHIPPED | OUTPATIENT
Start: 2019-09-19

## 2019-09-19 NOTE — PROGRESS NOTES
"      Maria Ines Zhang is a 73 y.o. female, who presents with a chief complaint of   Chief Complaint   Patient presents with   • Follow-up     3 x month f/u, lab results   • Hypertension   • Hyperlipidemia       74 yo F with HTN and HLD, hypothyroidism and anxiety and is doing well. She is struggling with arthritis lately. Her hands are hurting her. Tramadol helps, but keeps her up. Feels that NSAID's have helped.     She has chronic HNT and is doing well on HCTZ, Lisinopril and Lasix. No CP or SOB.     She is on Lexapro for her mood and feels well on this. No sleep issues.    She gets up in the middle of the night because she feels that her left ankle \"pops\" out. She has to walk and bear weight to get it back in place. She did sprain it years ago.              The following portions of the patient's history were reviewed and updated as appropriate: allergies, current medications, past family history, past medical history, past social history, past surgical history and problem list.    Allergies: Fish allergy; Iodine; Shellfish allergy; Shellfish-derived products; and Sulfa antibiotics    Current Outpatient Medications:   •  albuterol (PROVENTIL) (2.5 MG/3ML) 0.083% nebulizer solution, Take 2.5 mg by nebulization Every 4 (Four) Hours As Needed for Wheezing., Disp: 100 vial, Rfl: 1  •  albuterol sulfate  (90 Base) MCG/ACT inhaler, Inhale 2 puffs Every 4 (Four) Hours As Needed for Wheezing or Shortness of Air., Disp: 1 inhaler, Rfl: 6  •  amLODIPine (NORVASC) 5 MG tablet, Take 5 mg by mouth Daily., Disp: , Rfl:   •  Bioflavonoid Products (ROCKY-C) tablet, Take 1,000 mg by mouth Daily., Disp: , Rfl:   •  calcium polycarbophil (FIBERCON) 625 MG tablet, Take 1,250 mg by mouth Daily., Disp: , Rfl:   •  clobetasol (TEMOVATE) 0.05 % ointment, Apply  topically to the appropriate area as directed Every Night., Disp: 45 g, Rfl: 0  •  clonazePAM (KlonoPIN) 0.5 MG tablet, TAKE ONE TABLET BY MOUTH ONCE NIGHTLY AS NEEDED " FOR ANXIETY, Disp: 90 tablet, Rfl: 0  •  cyclobenzaprine (FLEXERIL) 10 MG tablet, TAKE ONE TABLET BY MOUTH THREE TIMES A DAY AS NEEDED FOR MUSCLE SPASMS, Disp: 90 tablet, Rfl: 0  •  escitalopram (LEXAPRO) 20 MG tablet, TAKE ONE TABLET BY MOUTH DAILY, Disp: 90 tablet, Rfl: 1  •  esomeprazole (nexIUM) 40 MG capsule, Take 40 mg by mouth Daily., Disp: , Rfl:   •  furosemide (LASIX) 40 MG tablet, , Disp: , Rfl:   •  gabapentin (NEURONTIN) 300 MG capsule, TAKE ONE CAPSULE BY MOUTH EVERY NIGHT, Disp: 30 capsule, Rfl: 5  •  hydrochlorothiazide (HYDRODIURIL) 25 MG tablet, TAKE ONE TABLET BY MOUTH DAILY, Disp: 90 tablet, Rfl: 1  •  levothyroxine (SYNTHROID, LEVOTHROID) 88 MCG tablet, TAKE ONE TABLET BY MOUTH DAILY, Disp: 90 tablet, Rfl: 1  •  linaclotide (LINZESS) 72 MCG capsule capsule, Take 72 mcg by mouth Every Morning Before Breakfast., Disp: , Rfl:   •  lisinopril (PRINIVIL,ZESTRIL) 20 MG tablet, Take 1 tablet by mouth Daily., Disp: 90 tablet, Rfl: 1  •  meloxicam (MOBIC) 15 MG tablet, Take 1 tablet by mouth Daily As Needed for Mild Pain  or Moderate Pain ., Disp: 90 tablet, Rfl: 1  •  montelukast (SINGULAIR) 10 MG tablet, Take 1 tablet by mouth Every Night., Disp: 90 tablet, Rfl: 1  •  Multiple Vitamins-Minerals (MULTIVITAMIN WITH MINERALS) tablet tablet, Take 1 tablet by mouth Daily., Disp: , Rfl:   •  Probiotic Product (PROBIOTIC-10) capsule, Take 1 capsule by mouth Daily., Disp: , Rfl:   •  senna-docusate (PERICOLACE) 8.6-50 MG per tablet, Take  by mouth As Needed., Disp: , Rfl:   •  simvastatin (ZOCOR) 10 MG tablet, Take 1 tablet by mouth Every Night., Disp: 30 tablet, Rfl: 6  •  SYMBICORT 160-4.5 MCG/ACT inhaler, INHALE TWO PUFFS BY MOUTH TWICE A DAY, Disp: 10.2 g, Rfl: 5  •  traMADol (ULTRAM) 50 MG tablet, Take 1 tablet by mouth 2 (Two) Times a Day As Needed for Moderate Pain  or Severe Pain ., Disp: 60 tablet, Rfl: 2  Medications Discontinued During This Encounter   Medication Reason   • lisinopril  "(PRINIVIL,ZESTRIL) 20 MG tablet Reorder       Review of Systems   Constitutional: Negative for chills, fatigue and fever.   HENT: Negative for congestion.    Respiratory: Negative for cough and shortness of breath.    Gastrointestinal: Negative for abdominal pain, constipation and diarrhea.   Genitourinary: Negative for difficulty urinating and dysuria.   Neurological: Negative for dizziness and headaches.             /68 (BP Location: Left arm, Patient Position: Sitting, Cuff Size: Large Adult)   Pulse 79   Temp 98.1 °F (36.7 °C) (Oral)   Resp 16   Ht 167.6 cm (66\")   Wt 101 kg (223 lb)   SpO2 98%   BMI 35.99 kg/m²       Physical Exam   Constitutional: She is oriented to person, place, and time. She appears well-developed and well-nourished. No distress.   HENT:   Head: Normocephalic and atraumatic.   Right Ear: External ear normal.   Left Ear: External ear normal.   Mouth/Throat: Oropharynx is clear and moist. No oropharyngeal exudate.   Eyes: Conjunctivae are normal. Right eye exhibits no discharge. Left eye exhibits no discharge. No scleral icterus.   Neck: Neck supple.   Cardiovascular: Normal rate, regular rhythm and normal heart sounds. Exam reveals no gallop and no friction rub.   No murmur heard.  Pulmonary/Chest: Effort normal and breath sounds normal. No respiratory distress. She has no wheezes. She has no rales.   Abdominal: Soft. Bowel sounds are normal. She exhibits no distension and no mass. There is no tenderness. There is no guarding.   Musculoskeletal:        Left ankle: Normal.   Lymphadenopathy:     She has no cervical adenopathy.   Neurological: She is alert and oriented to person, place, and time.   Skin: Skin is warm. No rash noted.   Psychiatric: She has a normal mood and affect. Her behavior is normal.   Nursing note and vitals reviewed.          Results for orders placed or performed in visit on 09/11/19   Comprehensive metabolic panel   Result Value Ref Range    Glucose 92 65 " - 99 mg/dL    BUN 14 8 - 23 mg/dL    Creatinine 0.80 0.57 - 1.00 mg/dL    eGFR Non African Am 71 >60 mL/min/1.73    eGFR African Am 86 >60 mL/min/1.73    BUN/Creatinine Ratio 17.5 7.0 - 25.0    Sodium 143 136 - 145 mmol/L    Potassium 4.3 3.5 - 5.2 mmol/L    Chloride 102 98 - 107 mmol/L    Total CO2 27.2 22.0 - 29.0 mmol/L    Calcium 9.4 8.6 - 10.5 mg/dL    Total Protein 6.9 6.0 - 8.5 g/dL    Albumin 4.40 3.50 - 5.20 g/dL    Globulin 2.5 gm/dL    A/G Ratio 1.8 g/dL    Total Bilirubin 0.4 0.2 - 1.2 mg/dL    Alkaline Phosphatase 86 39 - 117 U/L    AST (SGOT) 24 1 - 32 U/L    ALT (SGPT) 14 1 - 33 U/L   Lipid Panel With LDL / HDL Ratio   Result Value Ref Range    Total Cholesterol 188 0 - 200 mg/dL    Triglycerides 173 (H) 0 - 150 mg/dL    HDL Cholesterol 40 40 - 60 mg/dL    VLDL Cholesterol 34.6 mg/dL    LDL Cholesterol  113 (H) 0 - 100 mg/dL    LDL/HDL Ratio 2.84    Vitamin B12   Result Value Ref Range    Vitamin B-12 605 211 - 946 pg/mL   Methylmalonic Acid, Serum   Result Value Ref Range    Methylmalonic Acid 238 0 - 378 nmol/L    Disclaimer: Comment    TSH   Result Value Ref Range    TSH 1.280 0.270 - 4.200 uIU/mL   T4, free   Result Value Ref Range    Free T4 1.45 0.93 - 1.70 ng/dL   Hemoglobin A1c   Result Value Ref Range    Hemoglobin A1C 5.60 4.80 - 5.60 %   CBC & Differential   Result Value Ref Range    WBC 4.28 3.40 - 10.80 10*3/mm3    RBC 3.76 (L) 3.77 - 5.28 10*6/mm3    Hemoglobin 11.0 (L) 12.0 - 15.9 g/dL    Hematocrit 36.3 34.0 - 46.6 %    MCV 96.5 79.0 - 97.0 fL    MCH 29.3 26.6 - 33.0 pg    MCHC 30.3 (L) 31.5 - 35.7 g/dL    RDW 15.0 12.3 - 15.4 %    Platelets 343 140 - 450 10*3/mm3    Neutrophil Rel % 53.3 42.7 - 76.0 %    Lymphocyte Rel % 35.3 19.6 - 45.3 %    Monocyte Rel % 10.0 5.0 - 12.0 %    Eosinophil Rel % 0.7 0.3 - 6.2 %    Basophil Rel % 0.5 0.0 - 1.5 %    Neutrophils Absolute 2.28 1.70 - 7.00 10*3/mm3    Lymphocytes Absolute 1.51 0.70 - 3.10 10*3/mm3    Monocytes Absolute 0.43 0.10 - 0.90  10*3/mm3    Eosinophils Absolute 0.03 0.00 - 0.40 10*3/mm3    Basophils Absolute 0.02 0.00 - 0.20 10*3/mm3    Immature Granulocyte Rel % 0.2 0.0 - 0.5 %    Immature Grans Absolute 0.01 0.00 - 0.05 10*3/mm3    nRBC 0.0 0.0 - 0.2 /100 WBC           Maria Ines was seen today for follow-up, hypertension and hyperlipidemia.    Diagnoses and all orders for this visit:    Essential hypertension  -     Urinalysis With Culture If Indicated -    Hyperlipidemia, unspecified hyperlipidemia type    Acquired hypothyroidism    Chronic pain syndrome    Anxiety    Left ankle gives out    Urinary urgency  -     Urinalysis With Culture If Indicated -    Other orders  -     lisinopril (PRINIVIL,ZESTRIL) 20 MG tablet; Take 1 tablet by mouth Daily.  -     meloxicam (MOBIC) 15 MG tablet; Take 1 tablet by mouth Daily As Needed for Mild Pain  or Moderate Pain .        HTN is under good control and patient will continue to monitor with home BP cuff. No side effects from medications. Will continue current regimen of Lisinopril, HCTZ and Lasix. Will follow up in 6 months      Patient's cholesterol is under good control. Will continue current regimen of Zocor. No side effects from medications reported. Will follow up in 6 months to monitor levels.     TSH has been stable and patient is doing well. Will continue current regimen of levothyroxine 88 mcg and follow up levels in 6 months.     Her breathing is stable. Will continue her current medications.     Mobic as needed for arthritis. Needs to have kidney function followed closely while on this.   Return in about 6 months (around 3/19/2020) for Recheck.    Jeanine Blunt MD  09/19/2019

## 2019-09-21 LAB
APPEARANCE UR: CLEAR
BACTERIA #/AREA URNS HPF: ABNORMAL /HPF
BACTERIA UR CULT: NORMAL
BACTERIA UR CULT: NORMAL
BILIRUB UR QL STRIP: NEGATIVE
COLOR UR: YELLOW
EPI CELLS #/AREA URNS HPF: ABNORMAL /HPF
GLUCOSE UR QL: NEGATIVE
HGB UR QL STRIP: NEGATIVE
KETONES UR QL STRIP: NEGATIVE
LEUKOCYTE ESTERASE UR QL STRIP: ABNORMAL
MICRO URNS: ABNORMAL
MUCOUS THREADS URNS QL MICRO: PRESENT /HPF
NITRITE UR QL STRIP: NEGATIVE
PH UR STRIP: 6 [PH] (ref 5–7.5)
PROT UR QL STRIP: NEGATIVE
RBC #/AREA URNS HPF: ABNORMAL /HPF
SP GR UR: 1.01 (ref 1–1.03)
URINALYSIS REFLEX: ABNORMAL
UROBILINOGEN UR STRIP-MCNC: 0.2 MG/DL (ref 0.2–1)
WBC #/AREA URNS HPF: ABNORMAL /HPF

## 2019-09-23 ENCOUNTER — TELEPHONE (OUTPATIENT)
Dept: INTERNAL MEDICINE | Facility: CLINIC | Age: 74
End: 2019-09-23

## 2019-09-24 RX ORDER — METHYLPREDNISOLONE 4 MG/1
TABLET ORAL
Qty: 21 EACH | Refills: 0 | Status: SHIPPED | OUTPATIENT
Start: 2019-09-24 | End: 2019-10-10

## 2019-10-10 ENCOUNTER — OFFICE VISIT (OUTPATIENT)
Dept: INTERNAL MEDICINE | Facility: CLINIC | Age: 74
End: 2019-10-10

## 2019-10-10 VITALS
TEMPERATURE: 100 F | WEIGHT: 225 LBS | HEART RATE: 78 BPM | SYSTOLIC BLOOD PRESSURE: 150 MMHG | OXYGEN SATURATION: 99 % | HEIGHT: 66 IN | DIASTOLIC BLOOD PRESSURE: 70 MMHG | BODY MASS INDEX: 36.16 KG/M2 | RESPIRATION RATE: 16 BRPM

## 2019-10-10 DIAGNOSIS — J20.9 ACUTE BRONCHITIS, UNSPECIFIED ORGANISM: Primary | ICD-10-CM

## 2019-10-10 PROCEDURE — 99214 OFFICE O/P EST MOD 30 MIN: CPT | Performed by: INTERNAL MEDICINE

## 2019-10-10 RX ORDER — METHYLPREDNISOLONE 4 MG/1
TABLET ORAL
Qty: 21 EACH | Refills: 0 | Status: SHIPPED | OUTPATIENT
Start: 2019-10-10 | End: 2020-05-27

## 2019-10-10 RX ORDER — BENZONATATE 100 MG/1
100 CAPSULE ORAL 3 TIMES DAILY PRN
Qty: 30 CAPSULE | Refills: 0 | Status: SHIPPED | OUTPATIENT
Start: 2019-10-10 | End: 2021-08-13

## 2019-10-10 RX ORDER — CLARITHROMYCIN 500 MG/1
500 TABLET, COATED ORAL 2 TIMES DAILY
Qty: 14 TABLET | Refills: 0 | Status: SHIPPED | OUTPATIENT
Start: 2019-10-10 | End: 2019-10-17

## 2019-10-10 NOTE — PROGRESS NOTES
"      Maria Ines Zhang is a 73 y.o. 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 She started feeling bad on Monday with cough and congestion. She has hoarseness as well as. Low grade temperature. She is fatigued and doesn't feel well. She has been using nebulizer and MDI. She has some green/white phlegm. Using Symbicort as well. She feels that her sinus are \"congested\".          The following portions of the patient's history were reviewed and updated as appropriate: allergies, current medications, past family history, past medical history, past social history, past surgical history and problem list.    Allergies: Fish allergy; Iodine; Shellfish allergy; Shellfish-derived products; and Sulfa antibiotics    Current Outpatient Medications:   •  albuterol (PROVENTIL) (2.5 MG/3ML) 0.083% nebulizer solution, Take 2.5 mg by nebulization Every 4 (Four) Hours As Needed for Wheezing., Disp: 100 vial, Rfl: 1  •  albuterol sulfate  (90 Base) MCG/ACT inhaler, Inhale 2 puffs Every 4 (Four) Hours As Needed for Wheezing or Shortness of Air., Disp: 1 inhaler, Rfl: 6  •  amLODIPine (NORVASC) 5 MG tablet, Take 5 mg by mouth Daily., Disp: , Rfl:   •  Bioflavonoid Products (ROCKY-C) tablet, Take 1,000 mg by mouth Daily., Disp: , Rfl:   •  calcium polycarbophil (FIBERCON) 625 MG tablet, Take 1,250 mg by mouth Daily., Disp: , Rfl:   •  clobetasol (TEMOVATE) 0.05 % ointment, Apply  topically to the appropriate area as directed Every Night., Disp: 45 g, Rfl: 0  •  clonazePAM (KlonoPIN) 0.5 MG tablet, TAKE ONE TABLET BY MOUTH ONCE NIGHTLY AS NEEDED FOR ANXIETY, Disp: 90 tablet, Rfl: 0  •  cyclobenzaprine (FLEXERIL) 10 MG tablet, TAKE ONE TABLET BY MOUTH THREE TIMES A DAY AS NEEDED FOR MUSCLE SPASMS, Disp: 90 tablet, Rfl: 0  •  escitalopram (LEXAPRO) 20 MG tablet, " TAKE ONE TABLET BY MOUTH DAILY, Disp: 90 tablet, Rfl: 1  •  esomeprazole (nexIUM) 40 MG capsule, Take 40 mg by mouth Daily., Disp: , Rfl:   •  furosemide (LASIX) 40 MG tablet, , Disp: , Rfl:   •  gabapentin (NEURONTIN) 300 MG capsule, TAKE ONE CAPSULE BY MOUTH EVERY NIGHT, Disp: 30 capsule, Rfl: 5  •  hydrochlorothiazide (HYDRODIURIL) 25 MG tablet, TAKE ONE TABLET BY MOUTH DAILY, Disp: 90 tablet, Rfl: 1  •  levothyroxine (SYNTHROID, LEVOTHROID) 88 MCG tablet, TAKE ONE TABLET BY MOUTH DAILY, Disp: 90 tablet, Rfl: 1  •  linaclotide (LINZESS) 72 MCG capsule capsule, Take 72 mcg by mouth Every Morning Before Breakfast., Disp: , Rfl:   •  lisinopril (PRINIVIL,ZESTRIL) 20 MG tablet, Take 1 tablet by mouth Daily., Disp: 90 tablet, Rfl: 1  •  meloxicam (MOBIC) 15 MG tablet, Take 1 tablet by mouth Daily As Needed for Mild Pain  or Moderate Pain ., Disp: 90 tablet, Rfl: 1  •  montelukast (SINGULAIR) 10 MG tablet, Take 1 tablet by mouth Every Night., Disp: 90 tablet, Rfl: 1  •  Multiple Vitamins-Minerals (MULTIVITAMIN WITH MINERALS) tablet tablet, Take 1 tablet by mouth Daily., Disp: , Rfl:   •  Probiotic Product (PROBIOTIC-10) capsule, Take 1 capsule by mouth Daily., Disp: , Rfl:   •  senna-docusate (PERICOLACE) 8.6-50 MG per tablet, Take  by mouth As Needed., Disp: , Rfl:   •  simvastatin (ZOCOR) 10 MG tablet, Take 1 tablet by mouth Every Night., Disp: 30 tablet, Rfl: 6  •  SYMBICORT 160-4.5 MCG/ACT inhaler, INHALE TWO PUFFS BY MOUTH TWICE A DAY, Disp: 10.2 g, Rfl: 5  •  traMADol (ULTRAM) 50 MG tablet, Take 1 tablet by mouth 2 (Two) Times a Day As Needed for Moderate Pain  or Severe Pain ., Disp: 60 tablet, Rfl: 2  •  benzonatate (TESSALON PERLES) 100 MG capsule, Take 1 capsule by mouth 3 (Three) Times a Day As Needed for Cough., Disp: 30 capsule, Rfl: 0  •  clarithromycin (BIAXIN) 500 MG tablet, Take 1 tablet by mouth 2 (Two) Times a Day for 7 days., Disp: 14 tablet, Rfl: 0  •  HYDROcodone-homatropine (HYCODAN) 5-1.5 MG/5ML  "syrup, Take 5 mL by mouth Every 6 (Six) Hours As Needed for Cough., Disp: 120 mL, Rfl: 0  •  methylPREDNISolone (MEDROL, ELISE,) 4 MG tablet, Take as directed on package instructions., Disp: 21 each, Rfl: 0  Medications Discontinued During This Encounter   Medication Reason   • methylPREDNISolone (MEDROL, ELISE,) 4 MG tablet *Therapy completed       Review of Systems   Constitutional: Positive for chills, fatigue and fever.   HENT: Positive for congestion and sinus pressure.    Respiratory: Positive for cough, shortness of breath and wheezing.              /70 (BP Location: Left arm, Patient Position: Sitting, Cuff Size: Large Adult)   Pulse 78   Temp 100 °F (37.8 °C) (Oral)   Resp 16   Ht 167.6 cm (66\")   Wt 102 kg (225 lb)   SpO2 99%   BMI 36.32 kg/m²       Physical Exam   Constitutional: She is oriented to person, place, and time. She appears well-developed and well-nourished. No distress.   HENT:   Head: Normocephalic and atraumatic.   Right Ear: Hearing, tympanic membrane, external ear and ear canal normal.   Left Ear: Hearing, tympanic membrane, external ear and ear canal normal.   Nose: Nose normal.   Mouth/Throat: Uvula is midline and mucous membranes are normal. Posterior oropharyngeal edema and posterior oropharyngeal erythema present. No oropharyngeal exudate. Tonsils are 0 on the right. Tonsils are 0 on the left. No tonsillar exudate.   Eyes: Conjunctivae are normal. Right eye exhibits no discharge. Left eye exhibits no discharge. No scleral icterus.   Neck: Neck supple.   Cardiovascular: Normal rate, regular rhythm and normal heart sounds. Exam reveals no gallop and no friction rub.   No murmur heard.  Pulmonary/Chest: Effort normal and breath sounds normal. No respiratory distress. She has no wheezes. She has no rales.   Abdominal: Soft. Bowel sounds are normal. She exhibits no distension and no mass. There is no tenderness. There is no guarding.   Lymphadenopathy:     She has no cervical " adenopathy.   Neurological: She is alert and oriented to person, place, and time.   Skin: Skin is warm. No rash noted.   Psychiatric: She has a normal mood and affect. Her behavior is normal.   Nursing note and vitals reviewed.          Results for orders placed or performed in visit on 09/19/19   Urine culture, Comprehensive - ,   Result Value Ref Range    Urine Culture Final report     Result 1 Comment    Urinalysis With Culture If Indicated -   Result Value Ref Range    Specific Gravity, UA 1.012 1.005 - 1.030    pH, UA 6.0 5.0 - 7.5    Color, UA Yellow Yellow    Appearance, UA Clear Clear    Leukocytes, UA 2+ (A) Negative    Protein Negative Negative/Trace    Glucose, UA Negative Negative    Ketones Negative Negative    Blood, UA Negative Negative    Bilirubin, UA Negative Negative    Urobilinogen, UA 0.2 0.2 - 1.0 mg/dL    Nitrite, UA Negative Negative    Microscopic Examination See below:     Urinalysis Reflex Comment    Microscopic Examination -   Result Value Ref Range    WBC, UA 6-10 (A) 0 - 5 /hpf    RBC, UA 0-2 0 - 2 /hpf    Epithelial Cells (non renal) 0-10 0 - 10 /hpf    Mucus, UA Present Not Estab. /hpf    Bacteria, UA None seen None seen/Few /hpf           Maria Ines was seen today for cough, nasal congestion and chills.    Diagnoses and all orders for this visit:    Acute bronchitis, unspecified organism    Other orders  -     HYDROcodone-homatropine (HYCODAN) 5-1.5 MG/5ML syrup; Take 5 mL by mouth Every 6 (Six) Hours As Needed for Cough.  -     benzonatate (TESSALON PERLES) 100 MG capsule; Take 1 capsule by mouth 3 (Three) Times a Day As Needed for Cough.  -     clarithromycin (BIAXIN) 500 MG tablet; Take 1 tablet by mouth 2 (Two) Times a Day for 7 days.  -     methylPREDNISolone (MEDROL, ELISE,) 4 MG tablet; Take as directed on package instructions.      Will start antibiotics, steroids, and cough syrup   Keep using albuterol as needed   Will call if not better or has fever, chills or more SOB          Return if symptoms worsen or fail to improve.    Jeanine Blunt MD  10/10/2019

## 2019-10-18 ENCOUNTER — TELEPHONE (OUTPATIENT)
Dept: INTERNAL MEDICINE | Facility: CLINIC | Age: 74
End: 2019-10-18

## 2019-10-18 NOTE — TELEPHONE ENCOUNTER
----- Message from Yeny Mercado sent at 10/18/2019 12:05 PM EDT -----  Contact: PATIENT  PATIENT SAYS SHE HAS BAD INDIGESTION AND SHE THINKS IT IS DUE TO THE ANTIBIOTIC. ANY SUGGESTIONS FOR PATIENT?    PLEASE CALL HER CELL # [phone].

## 2019-10-18 NOTE — TELEPHONE ENCOUNTER
I would increase her Nexium to BID for the next week and take Pepcid up to twice per day as needed for breakthrough. It's probably the steroids causing this and will go away once she stop them.

## 2019-10-19 ENCOUNTER — HOSPITAL ENCOUNTER (EMERGENCY)
Facility: HOSPITAL | Age: 74
Discharge: HOME | End: 2019-10-19
Attending: EMERGENCY MEDICINE | Admitting: EMERGENCY MEDICINE

## 2019-10-19 ENCOUNTER — APPOINTMENT (OUTPATIENT)
Dept: GENERAL RADIOLOGY | Facility: HOSPITAL | Age: 74
End: 2019-10-19

## 2019-10-19 VITALS
BODY MASS INDEX: 37.12 KG/M2 | TEMPERATURE: 96.5 F | OXYGEN SATURATION: 99 % | DIASTOLIC BLOOD PRESSURE: 60 MMHG | SYSTOLIC BLOOD PRESSURE: 120 MMHG | HEART RATE: 70 BPM | RESPIRATION RATE: 18 BRPM | HEIGHT: 66 IN | WEIGHT: 231 LBS

## 2019-10-19 DIAGNOSIS — B34.8 RHINOVIRUS: Primary | ICD-10-CM

## 2019-10-19 LAB
ALBUMIN SERPL-MCNC: 3.8 G/DL (ref 3.5–5.2)
ALBUMIN/GLOB SERPL: 1.2 G/DL
ALP SERPL-CCNC: 85 U/L (ref 39–117)
ALT SERPL W P-5'-P-CCNC: 12 U/L (ref 1–33)
ANION GAP SERPL CALCULATED.3IONS-SCNC: 12 MMOL/L (ref 5–15)
AST SERPL-CCNC: 11 U/L (ref 1–32)
B PARAPERT DNA SPEC QL NAA+PROBE: NOT DETECTED
B PERT DNA SPEC QL NAA+PROBE: NOT DETECTED
BASOPHILS # BLD AUTO: 0.01 10*3/MM3 (ref 0–0.2)
BASOPHILS NFR BLD AUTO: 0.1 % (ref 0–1.5)
BILIRUB SERPL-MCNC: 0.3 MG/DL (ref 0.2–1.2)
BUN BLD-MCNC: 15 MG/DL (ref 8–23)
BUN/CREAT SERPL: 17.9 (ref 7–25)
C PNEUM DNA NPH QL NAA+NON-PROBE: NOT DETECTED
CALCIUM SPEC-SCNC: 8.8 MG/DL (ref 8.6–10.5)
CHLORIDE SERPL-SCNC: 96 MMOL/L (ref 98–107)
CO2 SERPL-SCNC: 29 MMOL/L (ref 22–29)
CREAT BLD-MCNC: 0.84 MG/DL (ref 0.57–1)
DEPRECATED RDW RBC AUTO: 46.3 FL (ref 37–54)
EOSINOPHIL # BLD AUTO: 0.07 10*3/MM3 (ref 0–0.4)
EOSINOPHIL NFR BLD AUTO: 0.6 % (ref 0.3–6.2)
ERYTHROCYTE [DISTWIDTH] IN BLOOD BY AUTOMATED COUNT: 13.8 % (ref 12.3–15.4)
FLUAV H1 2009 PAND RNA NPH QL NAA+PROBE: NOT DETECTED
FLUAV H1 HA GENE NPH QL NAA+PROBE: NOT DETECTED
FLUAV H3 RNA NPH QL NAA+PROBE: NOT DETECTED
FLUAV SUBTYP SPEC NAA+PROBE: NOT DETECTED
FLUBV RNA ISLT QL NAA+PROBE: NOT DETECTED
GFR SERPL CREATININE-BSD FRML MDRD: 67 ML/MIN/1.73
GLOBULIN UR ELPH-MCNC: 3.2 GM/DL
GLUCOSE BLD-MCNC: 85 MG/DL (ref 65–99)
HADV DNA SPEC NAA+PROBE: NOT DETECTED
HCOV 229E RNA SPEC QL NAA+PROBE: NOT DETECTED
HCOV HKU1 RNA SPEC QL NAA+PROBE: NOT DETECTED
HCOV NL63 RNA SPEC QL NAA+PROBE: NOT DETECTED
HCOV OC43 RNA SPEC QL NAA+PROBE: NOT DETECTED
HCT VFR BLD AUTO: 34.4 % (ref 34–46.6)
HGB BLD-MCNC: 11.2 G/DL (ref 12–15.9)
HMPV RNA NPH QL NAA+NON-PROBE: NOT DETECTED
HPIV1 RNA SPEC QL NAA+PROBE: NOT DETECTED
HPIV2 RNA SPEC QL NAA+PROBE: NOT DETECTED
HPIV3 RNA NPH QL NAA+PROBE: NOT DETECTED
HPIV4 P GENE NPH QL NAA+PROBE: NOT DETECTED
IMM GRANULOCYTES # BLD AUTO: 0.04 10*3/MM3 (ref 0–0.05)
IMM GRANULOCYTES NFR BLD AUTO: 0.4 % (ref 0–0.5)
LYMPHOCYTES # BLD AUTO: 3.13 10*3/MM3 (ref 0.7–3.1)
LYMPHOCYTES NFR BLD AUTO: 28.8 % (ref 19.6–45.3)
M PNEUMO IGG SER IA-ACNC: NOT DETECTED
MCH RBC QN AUTO: 29.6 PG (ref 26.6–33)
MCHC RBC AUTO-ENTMCNC: 32.6 G/DL (ref 31.5–35.7)
MCV RBC AUTO: 90.8 FL (ref 79–97)
MONOCYTES # BLD AUTO: 0.76 10*3/MM3 (ref 0.1–0.9)
MONOCYTES NFR BLD AUTO: 7 % (ref 5–12)
NEUTROPHILS # BLD AUTO: 6.86 10*3/MM3 (ref 1.7–7)
NEUTROPHILS NFR BLD AUTO: 63.1 % (ref 42.7–76)
NRBC BLD AUTO-RTO: 0 /100 WBC (ref 0–0.2)
PLATELET # BLD AUTO: 338 10*3/MM3 (ref 140–450)
PMV BLD AUTO: 9.9 FL (ref 6–12)
POTASSIUM BLD-SCNC: 4 MMOL/L (ref 3.5–5.2)
PROT SERPL-MCNC: 7 G/DL (ref 6–8.5)
RBC # BLD AUTO: 3.79 10*6/MM3 (ref 3.77–5.28)
RHINOVIRUS RNA SPEC NAA+PROBE: DETECTED
RSV RNA NPH QL NAA+NON-PROBE: NOT DETECTED
SODIUM BLD-SCNC: 137 MMOL/L (ref 136–145)
TROPONIN T SERPL-MCNC: <0.01 NG/ML (ref 0–0.03)
WBC NRBC COR # BLD: 10.87 10*3/MM3 (ref 3.4–10.8)

## 2019-10-19 PROCEDURE — 85025 COMPLETE CBC W/AUTO DIFF WBC: CPT | Performed by: EMERGENCY MEDICINE

## 2019-10-19 PROCEDURE — 99284 EMERGENCY DEPT VISIT MOD MDM: CPT

## 2019-10-19 PROCEDURE — 93005 ELECTROCARDIOGRAM TRACING: CPT | Performed by: EMERGENCY MEDICINE

## 2019-10-19 PROCEDURE — 84484 ASSAY OF TROPONIN QUANT: CPT | Performed by: EMERGENCY MEDICINE

## 2019-10-19 PROCEDURE — 71046 X-RAY EXAM CHEST 2 VIEWS: CPT

## 2019-10-19 PROCEDURE — 94640 AIRWAY INHALATION TREATMENT: CPT

## 2019-10-19 PROCEDURE — 80053 COMPREHEN METABOLIC PANEL: CPT | Performed by: EMERGENCY MEDICINE

## 2019-10-19 PROCEDURE — 94799 UNLISTED PULMONARY SVC/PX: CPT

## 2019-10-19 PROCEDURE — 96374 THER/PROPH/DIAG INJ IV PUSH: CPT

## 2019-10-19 PROCEDURE — 25010000002 METHYLPREDNISOLONE PER 125 MG: Performed by: EMERGENCY MEDICINE

## 2019-10-19 PROCEDURE — 0100U HC BIOFIRE FILMARRAY RESP PANEL 2: CPT | Performed by: EMERGENCY MEDICINE

## 2019-10-19 PROCEDURE — 93005 ELECTROCARDIOGRAM TRACING: CPT

## 2019-10-19 RX ORDER — METHYLPREDNISOLONE SODIUM SUCCINATE 125 MG/2ML
125 INJECTION, POWDER, LYOPHILIZED, FOR SOLUTION INTRAMUSCULAR; INTRAVENOUS ONCE
Status: COMPLETED | OUTPATIENT
Start: 2019-10-19 | End: 2019-10-19

## 2019-10-19 RX ORDER — IPRATROPIUM BROMIDE AND ALBUTEROL SULFATE 2.5; .5 MG/3ML; MG/3ML
3 SOLUTION RESPIRATORY (INHALATION) ONCE
Status: COMPLETED | OUTPATIENT
Start: 2019-10-19 | End: 2019-10-19

## 2019-10-19 RX ADMIN — HYDROCODONE POLISTIREX AND CHLORPHENIRAMINE POLISITREX 5 ML: 10; 8 SUSPENSION, EXTENDED RELEASE ORAL at 15:55

## 2019-10-19 RX ADMIN — IPRATROPIUM BROMIDE AND ALBUTEROL SULFATE 3 ML: 2.5; .5 SOLUTION RESPIRATORY (INHALATION) at 15:48

## 2019-10-19 RX ADMIN — METHYLPREDNISOLONE SODIUM SUCCINATE 125 MG: 125 INJECTION, POWDER, FOR SOLUTION INTRAMUSCULAR; INTRAVENOUS at 15:52

## 2019-10-19 NOTE — ED PROVIDER NOTES
" EMERGENCY DEPARTMENT ENCOUNTER    CHIEF COMPLAINT  Chief Complaint: cough  History given by: patient  History limited by: none  Room Number: 15/15  PMD: Jeanine Blunt MD      HPI:  Pt is a 73 y.o. female who presents complaining of dry cough and subject fever \"like a 100\" for several days. Pt states she saw her PCP 9 days ago for similar episodes, prescribed bronchitis and finished abx/steroids 3 days ago. Pt states that heartburn has improved since doubling her nexium. Pt has hx of asthma and PNA. Pt states that she uses inhalers and nebulizer at baseline. Family at bedside.     Duration:  Several days  Onset: gradual  Timing: constant  Location: chest  Quality: dry  Intensity/Severity: moderate  Progression: unchanged  Associated Symptoms: subjective fever, \"heartburn\"  Previous Episodes: hx of asthma and PNA  Treatment before arrival: Pt saw her PCP and finished abx/steriods without relief.     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   • Primary osteoarthritis of right knee 05/19/2016   • Adhesive capsulitis of right shoulder 05/19/2016   • Biceps tendinitis 08/31/2016   • Subacromial bursitis, right 12/06/2016   • Anxiety 08/03/2017   • Gastroesophageal reflux disease 08/03/2017   • Osteoarthritis of lumbar spine 08/03/2017   • Pulmonary embolism (CMS/HCC) 08/03/2017   • Sacroiliitis (CMS/HCC) 11/22/2013   • Sleep apnea 09/24/2012   • Spondylolisthesis 08/03/2017   • Venous stasis 08/03/2017   • Cryptogenic stroke (CMS/HCC) 08/03/2017   • Drug-induced constipation 08/03/2017   • IFG (impaired fasting glucose) 08/07/2017   • Chronic bilateral low back pain with sciatica 08/22/2017   • Asthma 09/01/2017   • Obesity 11/27/2017   • Renal artery aneurysm (CMS/HCC) " 01/11/2018   • Diverticulosis 02/15/2018   • Benign liver cyst 02/15/2018   • Diverticulosis of large intestine without hemorrhage 02/20/2018   • Lumbar radiculopathy 03/15/2018   • AC (acromioclavicular) joint arthritis 03/15/2018   • Primary osteoarthritis, right shoulder 03/15/2018   • Arthritis of right hip 05/24/2018   • Trochanteric bursitis of right hip 05/24/2018   • Stress fracture of right foot 08/23/2018   • CAP (community acquired pneumonia) 10/03/2018   • Anemia 05/07/2019   • Encounter for monitoring chronic NSAID therapy 05/07/2019   • B12 deficiency 06/18/2019   • Hand arthritis 06/18/2019   • Chronic pain syndrome 06/18/2019     Resolved Ambulatory Problems     Diagnosis Date Noted   • Follow-up examination, following other surgery 03/21/2016   • Acute cystitis without hematuria 04/27/2016   • Edema 04/27/2016   • Laceration of right lower extremity 07/08/2016   • Hyperglycemia 07/08/2016   • Abrasion of arm, right 08/24/2016   • Cough 11/02/2016   • Bronchitis 11/02/2016   • Osteoarthritis, knee 07/06/2017   • Fever in adult 07/16/2017   • UTI (urinary tract infection) 07/16/2017   • Sepsis (CMS/HCC) 07/18/2017   • Acute blood loss anemia 07/18/2017   • Nausea 08/03/2017   • Colitis 01/10/2018     Past Medical History:   Diagnosis Date   • Anxiety    • Asthma    • Benign essential hypertension    • Benign liver cyst 2/15/2018   • Colitis    • Cryptogenic stroke (CMS/HCC) 8/3/2017   • DDD (degenerative disc disease), lumbar 12/10/2012   • DDD (degenerative disc disease), lumbosacral    • Depression    • Diverticulosis 2/15/2018   • Fatigue    • GERD (gastroesophageal reflux disease)    • H/O acute respiratory failure 02/25/2016   • Hyperlipidemia    • Hypertension    • Hypothyroidism    • IFG (impaired fasting glucose) 8/7/2017   • Mitral valve insufficiency    • MRSA infection within last 3 months 05/2017   • Obesity    • Obstructive sleep apnea    • Osteoarthritis    • Patent foramen ovale    •  Pulmonary embolism (CMS/HCC)    • Pulmonary hypertension (CMS/HCC)    • SBO (small bowel obstruction) (CMS/HCC)    • Sciatica    • Venous stasis        PAST SURGICAL HISTORY  Past Surgical History:   Procedure Laterality Date   • BRONCHOSCOPY WITH ENDOSCOPY Right 02/25/2016    Exploratory bronchoscopy, exploratory RT video-assisted thoracoscopy and wedge resection of RT upper lobe and Subpleural pain catheter x2-Dr. Jose Maria Funk   • CATARACT EXTRACTION W/ INTRAOCULAR LENS IMPLANT Bilateral 2016   • COLONOSCOPY N/A 3/1/2018    Procedure: COLONOSCOPY INTO CECUM/ TERMINAL ILEUM WITH BIOPSIES AND CLIP X 1;  Surgeon: Ritchie Garland MD;  Location: [facility] ENDOSCOPY;  Service:    • COLONOSCOPY N/A 10/02/2008    Diverticulosis of the sigmoid colon-Dr. Salud Lowry   • HYSTERECTOMY     • LAPAROSCOPIC CHOLECYSTECTOMY N/A 10/01/2003    Dr. Nino Torres   • LUMBAR EPIDURAL INJECTION N/A 12/10/2012    Multiple lumbar epidural injections-Dr. Micheal Marquez   • LUMBAR FUSION N/A 08/2012   • MA TOTAL KNEE ARTHROPLASTY Right 7/6/2017    Procedure: TOTAL KNEE ARTHROPLASTY;  Surgeon: Dragan De La Vega MD;  Location: [facility] MAIN OR;  Service: Orthopedics   • TOE AMPUTATION Right 05/2017    SECOND TOE   • TOTAL KNEE ARTHROPLASTY Left 11/13/2006    Dr. Jerome Weber   • UPPER GASTROINTESTINAL ENDOSCOPY N/A 10/02/2008    Normal esophagus, gastric mucosal abnormality characterized by erythema, normal examined duodenum-Dr. Salud Lowry       FAMILY HISTORY  Family History   Problem Relation Age of Onset   • Heart disease Mother 60   • Diabetes Mother    • Stroke Mother 83   • Melanoma Father    • Heart attack Brother    • Heart disease Brother         Valve problem    • Heart attack Maternal Grandmother    • Heart attack Maternal Grandfather    • No Known Problems Paternal Grandmother    • No Known Problems Paternal Grandfather    • Malig Hyperthermia Neg Hx    • Breast cancer Neg Hx        SOCIAL HISTORY  Social History  "    Socioeconomic History   • Marital status:      Spouse name: Not on file   • Number of children: Not on file   • Years of education: Not on file   • Highest education level: Not on file   Tobacco Use   • Smoking status: Never Smoker   • Smokeless tobacco: Never Used   Substance and Sexual Activity   • Alcohol use: No   • Drug use: No   • Sexual activity: Defer       ALLERGIES  Fish allergy; Iodine; Shellfish allergy; Shellfish-derived products; and Sulfa antibiotics    REVIEW OF SYSTEMS  Review of Systems   Constitutional: Positive for fever (subjective).   HENT: Negative for sore throat.    Eyes: Negative.    Respiratory: Positive for cough (dry). Negative for shortness of breath.    Cardiovascular: Negative for chest pain.   Gastrointestinal: Negative for abdominal pain, diarrhea and vomiting.        + \"heartburn\"   Genitourinary: Negative for dysuria.   Musculoskeletal: Negative for neck pain.   Skin: Negative for rash.   Allergic/Immunologic: Negative.    Neurological: Negative for weakness, numbness and headaches.   Hematological: Negative.    Psychiatric/Behavioral: Negative.    All other systems reviewed and are negative.      PHYSICAL EXAM  ED Triage Vitals [10/19/19 1515]   Temp Heart Rate Resp BP SpO2   97.9 °F (36.6 °C) 108 20 -- 98 %      Temp src Heart Rate Source Patient Position BP Location FiO2 (%)   Tympanic Monitor -- -- --       Physical Exam   Constitutional: She is oriented to person, place, and time. No distress.   HENT:   Head: Normocephalic and atraumatic.   moist mucous membranes   Eyes: EOM are normal. Pupils are equal, round, and reactive to light.   Neck: Normal range of motion. Neck supple.   Cardiovascular: Normal rate, regular rhythm and normal heart sounds.   Pulmonary/Chest: Effort normal. No respiratory distress. She has wheezes (fiant bilateral bases).   Dry cough present on exam   Abdominal: Soft. Bowel sounds are normal. She exhibits no distension. There is no " tenderness. There is no rebound and no guarding.   Musculoskeletal: Normal range of motion. She exhibits no edema (BLE) or tenderness (calf).   Neurological: She is alert and oriented to person, place, and time. She has normal sensation and normal strength.   Normal neuro exam   Skin: Skin is warm and dry. No rash noted.   Psychiatric: Mood and affect normal.   Nursing note and vitals reviewed.      LAB RESULTS  Lab Results (last 24 hours)     Procedure Component Value Units Date/Time    CBC & Differential [[id number]] Collected:  10/19/19 1550    Specimen:  Blood Updated:  10/19/19 1558    Narrative:       The following orders were created for panel order CBC & Differential.  Procedure                               Abnormality         Status                     ---------                               -----------         ------                     CBC Auto Differential[[id number]]        Abnormal            Final result                 Please view results for these tests on the individual orders.    Comprehensive Metabolic Panel [[id number]]  (Abnormal) Collected:  10/19/19 1550    Specimen:  Blood Updated:  10/19/19 1617     Glucose 85 mg/dL      BUN 15 mg/dL      Creatinine 0.84 mg/dL      Sodium 137 mmol/L      Potassium 4.0 mmol/L      Chloride 96 mmol/L      CO2 29.0 mmol/L      Calcium 8.8 mg/dL      Total Protein 7.0 g/dL      Albumin 3.80 g/dL      ALT (SGPT) 12 U/L      AST (SGOT) 11 U/L      Alkaline Phosphatase 85 U/L      Total Bilirubin 0.3 mg/dL      eGFR Non African Amer 67 mL/min/1.73      Globulin 3.2 gm/dL      A/G Ratio 1.2 g/dL      BUN/Creatinine Ratio 17.9     Anion Gap 12.0 mmol/L     Narrative:       GFR Normal >60  Chronic Kidney Disease <60  Kidney Failure <15    Troponin [[id number]]  (Normal) Collected:  10/19/19 1550    Specimen:  Blood Updated:  10/19/19 1617     Troponin T <0.010 ng/mL     Narrative:       Troponin T Reference Range:  <= 0.03 ng/mL-   Negative for AMI  >0.03 ng/mL-      Abnormal for myocardial necrosis.  Clinicians would have to utilize clinical acumen, EKG, Troponin and serial changes to determine if it is an Acute Myocardial Infarction or myocardial injury due to an underlying chronic condition.     CBC Auto Differential [[id number]]  (Abnormal) Collected:  10/19/19 1550    Specimen:  Blood Updated:  10/19/19 1558     WBC 10.87 10*3/mm3      RBC 3.79 10*6/mm3      Hemoglobin 11.2 g/dL      Hematocrit 34.4 %      MCV 90.8 fL      MCH 29.6 pg      MCHC 32.6 g/dL      RDW 13.8 %      RDW-SD 46.3 fl      MPV 9.9 fL      Platelets 338 10*3/mm3      Neutrophil % 63.1 %      Lymphocyte % 28.8 %      Monocyte % 7.0 %      Eosinophil % 0.6 %      Basophil % 0.1 %      Immature Grans % 0.4 %      Neutrophils, Absolute 6.86 10*3/mm3      Lymphocytes, Absolute 3.13 10*3/mm3      Monocytes, Absolute 0.76 10*3/mm3      Eosinophils, Absolute 0.07 10*3/mm3      Basophils, Absolute 0.01 10*3/mm3      Immature Grans, Absolute 0.04 10*3/mm3      nRBC 0.0 /100 WBC     Respiratory Panel, PCR - Swab, Nasopharynx [[id number]]  (Abnormal) Collected:  10/19/19 1556    Specimen:  Swab from Nasopharynx Updated:  10/19/19 1656     ADENOVIRUS, PCR Not Detected     Coronavirus 229E Not Detected     Coronavirus HKU1 Not Detected     Coronavirus NL63 Not Detected     Coronavirus OC43 Not Detected     Human Metapneumovirus Not Detected     Human Rhinovirus/Enterovirus Detected     Influenza B PCR Not Detected     Parainfluenza Virus 1 Not Detected     Parainfluenza Virus 2 Not Detected     Parainfluenza Virus 3 Not Detected     Parainfluenza Virus 4 Not Detected     Bordetella pertussis pcr Not Detected     Influenza A H1 2009 PCR Not Detected     Chlamydophila pneumoniae PCR Not Detected     Mycoplasma pneumo by PCR Not Detected     Influenza A PCR Not Detected     Influenza A H3 Not Detected     Influenza A H1 Not Detected     RSV, PCR Not Detected     Bordetella parapertussis PCR Not Detected          I ordered  the above labs and reviewed the results    RADIOLOGY  XR Chest 2 View   Negative acute        I ordered the above noted radiological studies. Interpreted by radiologist. Reviewed by me in PACS.       PROCEDURES  Procedures    EKG    EKG time: 1518  Rhythm/Rate: NSR 81 BPM  No Acute Ischemia  Non-Specific ST-T changes    unchanged compared to prior on 3/31/17    Interpreted Contemporaneously by me.  Independently viewed by me    PROGRESS AND CONSULTS     1535-Ordered lab work and CXR for further evaluation. Ordered Duo-neb and solu-medrol for wheezes and tussionex pennkinetic for cough.     1658-Rechecked pt. Pt is resting comfortably. Notified pt of CXR-negative acute, no PNA, and lab work which is unremarkable except for respiratory panel which is positive for rhinovirus. Informed pt that since this is viral in nature that abx are not indicated. Discussed the plan to discharge the pt home with symptomatic treatment. I instructed the pt to keep using her inhalers at home. Pt states she has seen  (pulmonology) in the past. Informed pt that she can f/u with her PCP and pulmonology for further care/treatment. Pt understands and agrees with the plan, all questions answered.      MEDICAL DECISION MAKING  Results were reviewed/discussed with the patient and they were also made aware of online access. Pt also made aware that some labs, such as cultures, will not be resulted during ER visit and follow up with PMD is necessary.     MDM  Number of Diagnoses or Management Options  Rhinovirus:      Amount and/or Complexity of Data Reviewed  Clinical lab tests: reviewed and ordered (Respiratory panel-rhinovirus  Troponin <0.010  WBC-WNL)  Tests in the radiology section of CPT®: reviewed and ordered (CXR-negative acute)  Tests in the medicine section of CPT®: reviewed (See EKG procedure note. )  Decide to obtain previous medical records or to obtain history from someone other than the patient: yes  Review and summarize  past medical records: yes (Pt was seen to  (internist) on 10/10/19 for cough, nasal congestion, and chills. Pt was diagnosed with bronchitis and prescribed hydrocodone, tessalon perles, medrol pack, and clarithromycin. Pt contacted her PCP yesterday for indigestion which they felt was caused by abx and steroids and instructed her to increase her Nexium. )  Independent visualization of images, tracings, or specimens: yes           DIAGNOSIS  Final diagnoses:   Rhinovirus       DISPOSITION  DISCHARGE    Patient discharged in stable condition.    Reviewed implications of results, diagnosis, meds, responsibility to follow up, warning signs and symptoms of possible worsening, potential complications and reasons to return to ER.    Patient/Family voiced understanding of above instructions.    Discussed plan for discharge, as there is no emergent indication for admission. Patient referred to primary care provider for BP management due to today's BP. Pt/family is agreeable and understands need for follow up and repeat testing.  Pt is aware that discharge does not mean that nothing is wrong but it indicates no emergency is present that requires admission and they must continue care with follow-up as given below or physician of their choice.     FOLLOW-UP  Manan So MD  [address] [address]  [phone]    In 1 week  If symptoms worsen         Medication List      New Prescriptions    HYDROcod Polst-CPM Polst ER 10-8 MG/5ML ER suspension  Commonly known as:  TUSSIONEX PENNKINETIC ER  Take 5 mL by mouth Every 12 (Twelve) Hours As Needed for Cough.              Latest Documented Vital Signs:  As of 8:57 PM  BP- 120/60 HR- 70 Temp- 96.5 °F (35.8 °C) (Tympanic) O2 sat- 99%    --  Documentation assistance provided by carrington Ordonez for MD Issac.  Information recorded by the scrib[place]usta was done at my direction and has been verified and validated by me.     Angelica Ordonez  10/19/19  1707       Josh Hubbard MD  10/19/19 2052

## 2019-10-25 RX ORDER — PREDNISONE 10 MG/1
TABLET ORAL
Qty: 30 TABLET | Refills: 0 | Status: SHIPPED | OUTPATIENT
Start: 2019-10-25 | End: 2020-05-27

## 2019-11-05 RX ORDER — CYCLOBENZAPRINE HCL 10 MG
TABLET ORAL
Qty: 90 TABLET | Refills: 0 | Status: SHIPPED | OUTPATIENT
Start: 2019-11-05

## 2019-11-08 ENCOUNTER — OFFICE VISIT (OUTPATIENT)
Dept: INTERNAL MEDICINE | Facility: CLINIC | Age: 74
End: 2019-11-08

## 2019-11-08 VITALS
SYSTOLIC BLOOD PRESSURE: 130 MMHG | RESPIRATION RATE: 20 BRPM | WEIGHT: 233 LBS | OXYGEN SATURATION: 99 % | BODY MASS INDEX: 37.45 KG/M2 | HEIGHT: 66 IN | DIASTOLIC BLOOD PRESSURE: 68 MMHG | TEMPERATURE: 98.3 F | HEART RATE: 90 BPM

## 2019-11-08 DIAGNOSIS — J30.9 ALLERGIC RHINITIS, UNSPECIFIED SEASONALITY, UNSPECIFIED TRIGGER: ICD-10-CM

## 2019-11-08 DIAGNOSIS — R06.02 SOB (SHORTNESS OF BREATH) ON EXERTION: ICD-10-CM

## 2019-11-08 DIAGNOSIS — J32.1 CHRONIC FRONTAL SINUSITIS: Primary | ICD-10-CM

## 2019-11-08 DIAGNOSIS — B34.9 ACUTE VIRAL SYNDROME: ICD-10-CM

## 2019-11-08 PROCEDURE — 99214 OFFICE O/P EST MOD 30 MIN: CPT | Performed by: INTERNAL MEDICINE

## 2019-11-08 RX ORDER — CEFDINIR 300 MG/1
300 CAPSULE ORAL 2 TIMES DAILY
Qty: 14 CAPSULE | Refills: 0 | Status: SHIPPED | OUTPATIENT
Start: 2019-11-08 | End: 2019-11-15

## 2019-11-08 NOTE — PROGRESS NOTES
Maria Ines Zhang is a 73 y.o. female, who presents with a chief complaint of   Chief Complaint   Patient presents with   • Cough   • Shortness of Breath     [place], pt had pulse ox on and dropped 87       74 yo F with history of BOOP and allergies and recent return of cough and sinus issues. Saw me on 10/10 and 10/19. Stopped antibiotics about one week ago. Has a lot pressure in her face and eyes. Started having sinus issues on Monday. She has not been on allergy shots for 10 years. No fevers. Feels that she has subjective fever. No ear pain or sore throat. Taking Mucinex that has not help. She is coughing some phlegm up. When walking around, she feels SOB.          The following portions of the patient's history were reviewed and updated as appropriate: allergies, current medications, past family history, past medical history, past social history, past surgical history and problem list.    Allergies: Fish allergy; Iodine; Shellfish allergy; Shellfish-derived products; and Sulfa antibiotics    Current Outpatient Medications:   •  albuterol (PROVENTIL) (2.5 MG/3ML) 0.083% nebulizer solution, Take 2.5 mg by nebulization Every 4 (Four) Hours As Needed for Wheezing., Disp: 100 vial, Rfl: 1  •  albuterol sulfate  (90 Base) MCG/ACT inhaler, Inhale 2 puffs Every 4 (Four) Hours As Needed for Wheezing or Shortness of Air., Disp: 1 inhaler, Rfl: 6  •  amLODIPine (NORVASC) 5 MG tablet, Take 5 mg by mouth Daily., Disp: , Rfl:   •  benzonatate (TESSALON PERLES) 100 MG capsule, Take 1 capsule by mouth 3 (Three) Times a Day As Needed for Cough., Disp: 30 capsule, Rfl: 0  •  Bioflavonoid Products (ROCKY-C) tablet, Take 1,000 mg by mouth Daily., Disp: , Rfl:   •  calcium polycarbophil (FIBERCON) 625 MG tablet, Take 1,250 mg by mouth Daily., Disp: , Rfl:   •  cefdinir (OMNICEF) 300 MG capsule, Take 1 capsule by mouth 2 (Two) Times a Day for 7 days., Disp: 14 capsule, Rfl: 0  •  clobetasol (TEMOVATE)  0.05 % ointment, Apply  topically to the appropriate area as directed Every Night., Disp: 45 g, Rfl: 0  •  clonazePAM (KlonoPIN) 0.5 MG tablet, TAKE ONE TABLET BY MOUTH ONCE NIGHTLY AS NEEDED FOR ANXIETY, Disp: 90 tablet, Rfl: 0  •  cyclobenzaprine (FLEXERIL) 10 MG tablet, TAKE ONE TABLET BY MOUTH THREE TIMES A DAY AS NEEDED FOR MUSCLE SPASMS, Disp: 90 tablet, Rfl: 0  •  escitalopram (LEXAPRO) 20 MG tablet, TAKE ONE TABLET BY MOUTH DAILY, Disp: 90 tablet, Rfl: 1  •  esomeprazole (nexIUM) 40 MG capsule, Take 40 mg by mouth Daily., Disp: , Rfl:   •  furosemide (LASIX) 40 MG tablet, , Disp: , Rfl:   •  gabapentin (NEURONTIN) 300 MG capsule, TAKE ONE CAPSULE BY MOUTH EVERY NIGHT, Disp: 30 capsule, Rfl: 5  •  hydrochlorothiazide (HYDRODIURIL) 25 MG tablet, TAKE ONE TABLET BY MOUTH DAILY, Disp: 90 tablet, Rfl: 1  •  HYDROcod Polst-CPM Polst ER (TUSSIONEX PENNKINETIC ER) 10-8 MG/5ML ER suspension, Take 5 mL by mouth Every 12 (Twelve) Hours As Needed for Cough., Disp: 115 mL, Rfl: 0  •  HYDROcodone-homatropine (HYCODAN) 5-1.5 MG/5ML syrup, Take 5 mL by mouth Every 6 (Six) Hours As Needed for Cough., Disp: 120 mL, Rfl: 0  •  levothyroxine (SYNTHROID, LEVOTHROID) 88 MCG tablet, TAKE ONE TABLET BY MOUTH DAILY, Disp: 90 tablet, Rfl: 1  •  linaclotide (LINZESS) 72 MCG capsule capsule, Take 72 mcg by mouth Every Morning Before Breakfast., Disp: , Rfl:   •  lisinopril (PRINIVIL,ZESTRIL) 20 MG tablet, Take 1 tablet by mouth Daily., Disp: 90 tablet, Rfl: 1  •  meloxicam (MOBIC) 15 MG tablet, Take 1 tablet by mouth Daily As Needed for Mild Pain  or Moderate Pain ., Disp: 90 tablet, Rfl: 1  •  methylPREDNISolone (MEDROL, ELISE,) 4 MG tablet, Take as directed on package instructions., Disp: 21 each, Rfl: 0  •  montelukast (SINGULAIR) 10 MG tablet, Take 1 tablet by mouth Every Night., Disp: 90 tablet, Rfl: 1  •  Multiple Vitamins-Minerals (MULTIVITAMIN WITH MINERALS) tablet tablet, Take 1 tablet by mouth Daily., Disp: , Rfl:   •   "predniSONE (DELTASONE) 10 MG tablet, 50mg PO Qam x 2 days, 40mg PO Qam x 2 days, 30mg PO Qam x 2 days, 20mg PO Qam x 2 days, 10mg PO Qam x 2 days,, Disp: 30 tablet, Rfl: 0  •  Probiotic Product (PROBIOTIC-10) capsule, Take 1 capsule by mouth Daily., Disp: , Rfl:   •  senna-docusate (PERICOLACE) 8.6-50 MG per tablet, Take  by mouth As Needed., Disp: , Rfl:   •  simvastatin (ZOCOR) 10 MG tablet, Take 1 tablet by mouth Every Night., Disp: 30 tablet, Rfl: 6  •  SYMBICORT 160-4.5 MCG/ACT inhaler, INHALE TWO PUFFS BY MOUTH TWICE A DAY, Disp: 10.2 g, Rfl: 5  •  traMADol (ULTRAM) 50 MG tablet, Take 1 tablet by mouth 2 (Two) Times a Day As Needed for Moderate Pain  or Severe Pain ., Disp: 60 tablet, Rfl: 2  There are no discontinued medications.    Review of Systems   Constitutional: Positive for chills and fatigue.   HENT: Positive for sinus pressure and sinus pain. Negative for congestion and rhinorrhea.    Respiratory: Positive for cough and shortness of breath. Negative for wheezing.              /68 (BP Location: Left arm, Patient Position: Sitting, Cuff Size: Large Adult)   Pulse 90   Temp 98.3 °F (36.8 °C) (Oral)   Resp 20   Ht 167.6 cm (66\")   Wt 106 kg (233 lb)   SpO2 99% Comment: [place] pulseox on and dropped 87  BMI 37.61 kg/m²       Physical Exam   Constitutional: She is oriented to person, place, and time. She appears well-developed and well-nourished. No distress.   HENT:   Head: Normocephalic and atraumatic.   Right Ear: Hearing, tympanic membrane, external ear and ear canal normal.   Left Ear: Hearing, tympanic membrane, external ear and ear canal normal.   Nose: Mucosal edema, rhinorrhea and sinus tenderness present.   Mouth/Throat: Uvula is midline and mucous membranes are normal. Oral lesions (petechia of throat ) present. Posterior oropharyngeal edema and posterior oropharyngeal erythema present. No oropharyngeal exudate. Tonsils are 0 on the right. Tonsils are 0 " on the left. No tonsillar exudate.   Eyes: Conjunctivae are normal. Right eye exhibits no discharge. Left eye exhibits no discharge. No scleral icterus.   Neck: Neck supple.   Cardiovascular: Normal rate, regular rhythm and normal heart sounds. Exam reveals no gallop and no friction rub.   No murmur heard.  Pulmonary/Chest: Effort normal and breath sounds normal. No respiratory distress. She has no wheezes. She has no rales.   Lymphadenopathy:     She has no cervical adenopathy.   Neurological: She is alert and oriented to person, place, and time.   Skin: Skin is warm. No rash noted.   Psychiatric: She has a normal mood and affect. Her behavior is normal.   Nursing note and vitals reviewed.        Results for orders placed or performed during the hospital encounter of 10/19/19   Respiratory Panel, PCR - Swab, Nasopharynx   Result Value Ref Range    ADENOVIRUS, PCR Not Detected Not Detected    Coronavirus 229E Not Detected Not Detected    Coronavirus HKU1 Not Detected Not Detected    Coronavirus NL63 Not Detected Not Detected    Coronavirus OC43 Not Detected Not Detected    Human Metapneumovirus Not Detected Not Detected    Human Rhinovirus/Enterovirus Detected (A) Not Detected    Influenza B PCR Not Detected Not Detected    Parainfluenza Virus 1 Not Detected Not Detected    Parainfluenza Virus 2 Not Detected Not Detected    Parainfluenza Virus 3 Not Detected Not Detected    Parainfluenza Virus 4 Not Detected Not Detected    Bordetella pertussis pcr Not Detected Not Detected    Influenza A H1 2009 PCR Not Detected Not Detected    Chlamydophila pneumoniae PCR Not Detected Not Detected    Mycoplasma pneumo by PCR Not Detected Not Detected    Influenza A PCR Not Detected Not Detected    Influenza A H3 Not Detected Not Detected    Influenza A H1 Not Detected Not Detected    RSV, PCR Not Detected Not Detected    Bordetella parapertussis PCR Not Detected Not Detected   Comprehensive Metabolic Panel   Result Value Ref Range     Glucose 85 65 - 99 mg/dL    BUN 15 8 - 23 mg/dL    Creatinine 0.84 0.57 - 1.00 mg/dL    Sodium 137 136 - 145 mmol/L    Potassium 4.0 3.5 - 5.2 mmol/L    Chloride 96 (L) 98 - 107 mmol/L    CO2 29.0 22.0 - 29.0 mmol/L    Calcium 8.8 8.6 - 10.5 mg/dL    Total Protein 7.0 6.0 - 8.5 g/dL    Albumin 3.80 3.50 - 5.20 g/dL    ALT (SGPT) 12 1 - 33 U/L    AST (SGOT) 11 1 - 32 U/L    Alkaline Phosphatase 85 39 - 117 U/L    Total Bilirubin 0.3 0.2 - 1.2 mg/dL    eGFR Non African Amer 67 >60 mL/min/1.73    Globulin 3.2 gm/dL    A/G Ratio 1.2 g/dL    BUN/Creatinine Ratio 17.9 7.0 - 25.0    Anion Gap 12.0 5.0 - 15.0 mmol/L   Troponin   Result Value Ref Range    Troponin T <0.010 0.000 - 0.030 ng/mL   CBC Auto Differential   Result Value Ref Range    WBC 10.87 (H) 3.40 - 10.80 10*3/mm3    RBC 3.79 3.77 - 5.28 10*6/mm3    Hemoglobin 11.2 (L) 12.0 - 15.9 g/dL    Hematocrit 34.4 34.0 - 46.6 %    MCV 90.8 79.0 - 97.0 fL    MCH 29.6 26.6 - 33.0 pg    MCHC 32.6 31.5 - 35.7 g/dL    RDW 13.8 12.3 - 15.4 %    RDW-SD 46.3 37.0 - 54.0 fl    MPV 9.9 6.0 - 12.0 fL    Platelets 338 140 - 450 10*3/mm3    Neutrophil % 63.1 42.7 - 76.0 %    Lymphocyte % 28.8 19.6 - 45.3 %    Monocyte % 7.0 5.0 - 12.0 %    Eosinophil % 0.6 0.3 - 6.2 %    Basophil % 0.1 0.0 - 1.5 %    Immature Grans % 0.4 0.0 - 0.5 %    Neutrophils, Absolute 6.86 1.70 - 7.00 10*3/mm3    Lymphocytes, Absolute 3.13 (H) 0.70 - 3.10 10*3/mm3    Monocytes, Absolute 0.76 0.10 - 0.90 10*3/mm3    Eosinophils, Absolute 0.07 0.00 - 0.40 10*3/mm3    Basophils, Absolute 0.01 0.00 - 0.20 10*3/mm3    Immature Grans, Absolute 0.04 0.00 - 0.05 10*3/mm3    nRBC 0.0 0.0 - 0.2 /100 WBC           Maria Ines was seen today for cough and shortness of breath.    Diagnoses and all orders for this visit:    Chronic frontal sinusitis  -     CT Sinus Without Contrast  -     Ambulatory Referral to Allergy    SOB (shortness of breath) on exertion  -     Full Pulmonary Function Test With Bronchodilator    Acute  viral syndrome    Allergic rhinitis, unspecified seasonality, unspecified trigger  -     Ambulatory Referral to Allergy    Other orders  -     cefdinir (OMNICEF) 300 MG capsule; Take 1 capsule by mouth 2 (Two) Times a Day for 7 days.      I am worried about her today. Seems to get sick more than she should.  Rhinovirus diagnosed on 10/19. Immunoglobulin levels normal around 11/2018. I am going to treat for sinus infection and check CT scan of her sinuses and send her to allergy. May need to see ENT in the future.     Needs to have repeat PFT's due to SOB.     Will follow up on results and call her. Will call me if she is not getting better.         Return if symptoms worsen or fail to improve.    Jeanine Blunt MD  11/08/2019

## 2019-11-11 RX ORDER — SIMVASTATIN 10 MG
TABLET ORAL
Qty: 30 TABLET | Refills: 5 | Status: SHIPPED | OUTPATIENT
Start: 2019-11-11 | End: 2021-08-13

## 2019-11-11 RX ORDER — ALBUTEROL SULFATE 2.5 MG/3ML
SOLUTION RESPIRATORY (INHALATION)
Qty: 180 VIAL | Refills: 0 | Status: SHIPPED | OUTPATIENT
Start: 2019-11-11

## 2019-11-20 ENCOUNTER — HOSPITAL ENCOUNTER (OUTPATIENT)
Dept: CT IMAGING | Facility: HOSPITAL | Age: 74
Discharge: HOME | End: 2019-11-20
Admitting: INTERNAL MEDICINE

## 2019-11-20 ENCOUNTER — HOSPITAL ENCOUNTER (OUTPATIENT)
Dept: PULMONOLOGY | Facility: HOSPITAL | Age: 74
Discharge: HOME | End: 2019-11-20

## 2019-11-20 VITALS — RESPIRATION RATE: 16 BRPM | OXYGEN SATURATION: 98 % | HEART RATE: 69 BPM

## 2019-11-20 PROCEDURE — 94726 PLETHYSMOGRAPHY LUNG VOLUMES: CPT

## 2019-11-20 PROCEDURE — 94060 EVALUATION OF WHEEZING: CPT

## 2019-11-20 PROCEDURE — 70486 CT MAXILLOFACIAL W/O DYE: CPT

## 2019-11-20 PROCEDURE — 94729 DIFFUSING CAPACITY: CPT

## 2019-11-20 PROCEDURE — 94799 UNLISTED PULMONARY SVC/PX: CPT

## 2019-11-20 RX ORDER — ALBUTEROL SULFATE 2.5 MG/3ML
2.5 SOLUTION RESPIRATORY (INHALATION) ONCE
Status: COMPLETED | OUTPATIENT
Start: 2019-11-20 | End: 2019-11-20

## 2019-11-20 RX ADMIN — ALBUTEROL SULFATE 2.5 MG: 2.5 SOLUTION RESPIRATORY (INHALATION) at 14:01

## 2019-11-20 NOTE — PROGRESS NOTES
CT scan of her sinuses is all normal. No sinusitis. I would also suggest allergy referral which I think that I already placed, but if not, can we please do this.

## 2019-11-22 ENCOUNTER — TRANSCRIBE ORDERS (OUTPATIENT)
Dept: ADMINISTRATIVE | Facility: HOSPITAL | Age: 74
End: 2019-11-22

## 2019-11-22 ENCOUNTER — LAB (OUTPATIENT)
Dept: LAB | Facility: HOSPITAL | Age: 74
End: 2019-11-22

## 2019-11-22 DIAGNOSIS — J30.89 OTHER ALLERGIC RHINITIS: ICD-10-CM

## 2019-11-22 DIAGNOSIS — R05.9 COUGH: ICD-10-CM

## 2019-11-22 DIAGNOSIS — J30.1 ALLERGIC RHINITIS DUE TO POLLEN, UNSPECIFIED SEASONALITY: ICD-10-CM

## 2019-11-22 DIAGNOSIS — T50.995A ADVERSE EFFECT OF OTHER DRUGS, MEDICAMENTS AND BIOLOGICAL SUBSTANCES, INITIAL ENCOUNTER: ICD-10-CM

## 2019-11-22 DIAGNOSIS — K21.00 GASTRO-ESOPHAGEAL REFLUX DISEASE WITH ESOPHAGITIS: ICD-10-CM

## 2019-11-22 DIAGNOSIS — J98.8 INFECTION, RESPIRATORY: ICD-10-CM

## 2019-11-22 DIAGNOSIS — T50.995A ADVERSE EFFECT OF OTHER DRUGS, MEDICAMENTS AND BIOLOGICAL SUBSTANCES, INITIAL ENCOUNTER: Primary | ICD-10-CM

## 2019-11-22 LAB
BASOPHILS # BLD AUTO: 0.02 10*3/MM3 (ref 0–0.2)
BASOPHILS NFR BLD AUTO: 0.4 % (ref 0–1.5)
DEPRECATED RDW RBC AUTO: 43.9 FL (ref 37–54)
EOSINOPHIL # BLD AUTO: 0.04 10*3/MM3 (ref 0–0.4)
EOSINOPHIL NFR BLD AUTO: 0.9 % (ref 0.3–6.2)
ERYTHROCYTE [DISTWIDTH] IN BLOOD BY AUTOMATED COUNT: 13.3 % (ref 12.3–15.4)
HCT VFR BLD AUTO: 33.9 % (ref 34–46.6)
HGB BLD-MCNC: 10.9 G/DL (ref 12–15.9)
IGA1 MFR SER: 210 MG/DL (ref 70–400)
IGG1 SER-MCNC: 646 MG/DL (ref 700–1600)
IGM SERPL-MCNC: 61 MG/DL (ref 40–230)
IMM GRANULOCYTES # BLD AUTO: 0.01 10*3/MM3 (ref 0–0.05)
IMM GRANULOCYTES NFR BLD AUTO: 0.2 % (ref 0–0.5)
LYMPHOCYTES # BLD AUTO: 2.12 10*3/MM3 (ref 0.7–3.1)
LYMPHOCYTES NFR BLD AUTO: 46.1 % (ref 19.6–45.3)
MCH RBC QN AUTO: 29.1 PG (ref 26.6–33)
MCHC RBC AUTO-ENTMCNC: 32.2 G/DL (ref 31.5–35.7)
MCV RBC AUTO: 90.4 FL (ref 79–97)
MONOCYTES # BLD AUTO: 0.42 10*3/MM3 (ref 0.1–0.9)
MONOCYTES NFR BLD AUTO: 9.1 % (ref 5–12)
NEUTROPHILS # BLD AUTO: 1.99 10*3/MM3 (ref 1.7–7)
NEUTROPHILS NFR BLD AUTO: 43.3 % (ref 42.7–76)
NRBC BLD AUTO-RTO: 0 /100 WBC (ref 0–0.2)
PLATELET # BLD AUTO: 378 10*3/MM3 (ref 140–450)
PMV BLD AUTO: 10.4 FL (ref 6–12)
RBC # BLD AUTO: 3.75 10*6/MM3 (ref 3.77–5.28)
WBC NRBC COR # BLD: 4.6 10*3/MM3 (ref 3.4–10.8)

## 2019-11-22 PROCEDURE — 86317 IMMUNOASSAY INFECTIOUS AGENT: CPT

## 2019-11-22 PROCEDURE — 36415 COLL VENOUS BLD VENIPUNCTURE: CPT

## 2019-11-22 PROCEDURE — 85025 COMPLETE CBC W/AUTO DIFF WBC: CPT

## 2019-11-22 PROCEDURE — 83520 IMMUNOASSAY QUANT NOS NONAB: CPT

## 2019-11-22 PROCEDURE — 86160 COMPLEMENT ANTIGEN: CPT

## 2019-11-22 PROCEDURE — 82784 ASSAY IGA/IGD/IGG/IGM EACH: CPT

## 2019-11-22 PROCEDURE — 86162 COMPLEMENT TOTAL (CH50): CPT

## 2019-11-22 PROCEDURE — 82787 IGG 1 2 3 OR 4 EACH: CPT

## 2019-11-23 LAB
C3 SERPL-MCNC: 155 MG/DL (ref 82–167)
C4 SERPL-MCNC: 28 MG/DL (ref 14–44)
IGG SERPL-MCNC: 659 MG/DL (ref 700–1600)
IGG1 SER-MCNC: 324 MG/DL (ref 248–810)
IGG2 SER-MCNC: 228 MG/DL (ref 130–555)
IGG3 SER-MCNC: 20 MG/DL (ref 15–102)
IGG4 SER-MCNC: 21 MG/DL (ref 2–96)

## 2019-11-25 LAB
CH50 SERPL-ACNC: >60 U/ML (ref 42–999999)
HAEM INFLU B IGG SER IA-MCNC: 0.3 UG/ML

## 2019-11-26 LAB
C DIPHTHERIAE AB SER IA-ACNC: <0.1 IU/ML
C TETANI IGG SER QL: 0.39 IU/ML
C TETANI IGG SER QL: 0.42 IU/ML

## 2019-11-27 ENCOUNTER — TELEPHONE (OUTPATIENT)
Dept: INTERNAL MEDICINE | Facility: CLINIC | Age: 74
End: 2019-11-27

## 2019-11-27 DIAGNOSIS — R06.02 SOB (SHORTNESS OF BREATH) ON EXERTION: Primary | ICD-10-CM

## 2019-11-27 NOTE — TELEPHONE ENCOUNTER
----- Message from Jeanine Blunt MD sent at 11/25/2019 12:44 PM EST -----  Her breathing test are slightly abnormal, but it looks as though she didn’t really do well in terms of the effort. Pulmonologist is suggesting that we repeat the test because he feels they are not valid. May attempt to do this in 1-2 months if she is okay with this. Nothing urgent, but it doesn’t help us decide what to do.

## 2019-11-30 LAB
DEPRECATED S PNEUM 1 IGG SER-MCNC: 1.9 UG/ML
DEPRECATED S PNEUM12 IGG SER-MCNC: 0.5 UG/ML
DEPRECATED S PNEUM14 IGG SER-MCNC: 9.8 UG/ML
DEPRECATED S PNEUM19 IGG SER-MCNC: 3.4 UG/ML
DEPRECATED S PNEUM23 IGG SER-MCNC: 11.2 UG/ML
DEPRECATED S PNEUM3 IGG SER-MCNC: 2.4 UG/ML
DEPRECATED S PNEUM4 IGG SER-MCNC: 0.4 UG/ML
DEPRECATED S PNEUM8 IGG SER-MCNC: 0.6 UG/ML
DEPRECATED S PNEUM9 IGG SER-MCNC: 1.1 UG/ML
PNEUMO AB TYPE 17 (17F)*: 10 UG/ML
PNEUMO AB TYPE 2*: 2.1 UG/ML
PNEUMO AB TYPE 20*: 5.4 UG/ML
PNEUMO AB TYPE 22 (22F)*: 0.7 UG/ML
PNEUMO AB TYPE 34 (10A)*: 8.4 UG/ML
PNEUMO AB TYPE 43 (11A)*: 0.9 UG/ML
PNEUMO AB TYPE 5*: 6.6 UG/ML
PNEUMO AB TYPE 54 (15B)*: 1.5 UG/ML
PNEUMO AB TYPE 70 (33F)*: 0.7 UG/ML
S PNEUM DA 18C IGG SER-MCNC: 1.9 UG/ML
S PNEUM DA 19A IGG SER-MCNC: 1.6 UG/ML
S PNEUM DA 6B IGG SER-MCNC: 7.2 UG/ML
S PNEUM DA 7F IGG SER-MCNC: 1.1 UG/ML
S PNEUM DA 9V IGG SER-MCNC: 0.7 UG/ML

## 2019-12-02 LAB — MANNOSE BINDING LECTIN (MBL): 309 NG/ML

## 2019-12-03 RX ORDER — GABAPENTIN 300 MG/1
CAPSULE ORAL
Qty: 30 CAPSULE | Refills: 4 | Status: SHIPPED | OUTPATIENT
Start: 2019-12-03 | End: 2021-08-13

## 2019-12-06 ENCOUNTER — APPOINTMENT (OUTPATIENT)
Dept: RESPIRATORY THERAPY | Facility: HOSPITAL | Age: 74
End: 2019-12-06

## 2019-12-16 RX ORDER — LEVOTHYROXINE SODIUM 88 UG/1
TABLET ORAL
Qty: 90 TABLET | Refills: 0 | Status: SHIPPED | OUTPATIENT
Start: 2019-12-16 | End: 2020-03-11

## 2019-12-18 DIAGNOSIS — F41.9 ANXIETY: Primary | ICD-10-CM

## 2019-12-18 RX ORDER — CLONAZEPAM 0.5 MG/1
TABLET ORAL
Qty: 90 TABLET | Refills: 0 | Status: SHIPPED | OUTPATIENT
Start: 2019-12-18

## 2019-12-27 RX ORDER — BUDESONIDE AND FORMOTEROL FUMARATE DIHYDRATE 160; 4.5 UG/1; UG/1
2 AEROSOL RESPIRATORY (INHALATION) 2 TIMES DAILY
Qty: 10.2 G | Refills: 3 | Status: SHIPPED | OUTPATIENT
Start: 2019-12-27

## 2020-01-21 RX ORDER — ESCITALOPRAM OXALATE 20 MG/1
TABLET ORAL
Qty: 90 TABLET | Refills: 0 | OUTPATIENT
Start: 2020-01-21

## 2020-01-28 ENCOUNTER — TRANSCRIBE ORDERS (OUTPATIENT)
Dept: ADMINISTRATIVE | Facility: HOSPITAL | Age: 75
End: 2020-01-28

## 2020-01-28 DIAGNOSIS — R05.9 COUGH: Primary | ICD-10-CM

## 2020-01-31 ENCOUNTER — HOSPITAL ENCOUNTER (OUTPATIENT)
Dept: CT IMAGING | Facility: HOSPITAL | Age: 75
Discharge: HOME | End: 2020-01-31
Admitting: INTERNAL MEDICINE

## 2020-01-31 ENCOUNTER — APPOINTMENT (OUTPATIENT)
Dept: CT IMAGING | Facility: HOSPITAL | Age: 75
End: 2020-01-31

## 2020-01-31 DIAGNOSIS — R05.9 COUGH: ICD-10-CM

## 2020-01-31 PROCEDURE — 71250 CT THORAX DX C-: CPT

## 2020-02-03 RX ORDER — CYCLOBENZAPRINE HCL 10 MG
TABLET ORAL
Qty: 90 TABLET | Refills: 0 | OUTPATIENT
Start: 2020-02-03

## 2020-02-11 RX ORDER — CYCLOBENZAPRINE HCL 10 MG
TABLET ORAL
Qty: 90 TABLET | Refills: 0 | OUTPATIENT
Start: 2020-02-11

## 2020-02-13 RX ORDER — MONTELUKAST SODIUM 10 MG/1
TABLET ORAL
Qty: 90 TABLET | Refills: 0 | Status: SHIPPED | OUTPATIENT
Start: 2020-02-13 | End: 2020-05-11

## 2020-03-11 RX ORDER — LEVOTHYROXINE SODIUM 88 UG/1
TABLET ORAL
Qty: 90 TABLET | Refills: 0 | Status: SHIPPED | OUTPATIENT
Start: 2020-03-11 | End: 2020-07-24

## 2020-03-16 DIAGNOSIS — F41.9 ANXIETY: ICD-10-CM

## 2020-03-16 RX ORDER — CLONAZEPAM 0.5 MG/1
TABLET ORAL
Qty: 90 TABLET | Refills: 0 | OUTPATIENT
Start: 2020-03-16

## 2020-03-17 RX ORDER — LISINOPRIL 20 MG/1
TABLET ORAL
Qty: 90 TABLET | Refills: 0 | OUTPATIENT
Start: 2020-03-17

## 2020-04-01 RX ORDER — MELOXICAM 15 MG/1
TABLET ORAL
Qty: 90 TABLET | Refills: 0 | OUTPATIENT
Start: 2020-04-01

## 2020-04-20 RX ORDER — LISINOPRIL 20 MG/1
TABLET ORAL
Qty: 90 TABLET | Refills: 0 | OUTPATIENT
Start: 2020-04-20

## 2020-04-20 RX ORDER — LISINOPRIL 20 MG/1
TABLET ORAL
Qty: 30 TABLET | Refills: 0 | OUTPATIENT
Start: 2020-04-20

## 2020-04-27 RX ORDER — BUDESONIDE AND FORMOTEROL FUMARATE DIHYDRATE 160; 4.5 UG/1; UG/1
AEROSOL RESPIRATORY (INHALATION)
Qty: 10.2 G | Refills: 2 | OUTPATIENT
Start: 2020-04-27

## 2020-05-08 RX ORDER — GABAPENTIN 300 MG/1
CAPSULE ORAL
Qty: 30 CAPSULE | Refills: 3 | OUTPATIENT
Start: 2020-05-08

## 2020-05-11 RX ORDER — MONTELUKAST SODIUM 10 MG/1
TABLET ORAL
Qty: 90 TABLET | Refills: 0 | Status: SHIPPED | OUTPATIENT
Start: 2020-05-11 | End: 2021-08-13

## 2020-05-26 RX ORDER — GABAPENTIN 300 MG/1
CAPSULE ORAL
Qty: 30 CAPSULE | Refills: 3 | OUTPATIENT
Start: 2020-05-26

## 2020-05-27 ENCOUNTER — OFFICE VISIT (OUTPATIENT)
Dept: ORTHOPEDIC SURGERY | Facility: CLINIC | Age: 75
End: 2020-05-27

## 2020-05-27 VITALS — BODY MASS INDEX: 37.28 KG/M2 | HEIGHT: 66 IN | WEIGHT: 232 LBS

## 2020-05-27 DIAGNOSIS — M19.011 PRIMARY OSTEOARTHRITIS, RIGHT SHOULDER: Primary | ICD-10-CM

## 2020-05-27 PROCEDURE — 20610 DRAIN/INJ JOINT/BURSA W/O US: CPT | Performed by: NURSE PRACTITIONER

## 2020-05-27 PROCEDURE — 99212 OFFICE O/P EST SF 10 MIN: CPT | Performed by: NURSE PRACTITIONER

## 2020-05-27 RX ORDER — CELECOXIB 200 MG/1
200 CAPSULE ORAL DAILY
Qty: 30 CAPSULE | Refills: 0 | Status: SHIPPED | OUTPATIENT
Start: 2020-05-27 | End: 2020-06-23 | Stop reason: SDUPTHER

## 2020-05-27 RX ADMIN — TRIAMCINOLONE ACETONIDE 80 MG: 40 INJECTION, SUSPENSION INTRA-ARTICULAR; INTRAMUSCULAR at 14:42

## 2020-05-27 RX ADMIN — LIDOCAINE HYDROCHLORIDE 4 ML: 10 INJECTION, SOLUTION EPIDURAL; INFILTRATION; INTRACAUDAL; PERINEURAL at 14:42

## 2020-05-27 NOTE — PROGRESS NOTES
Subjective:     Patient ID: Maria Ines Zhang is a 74 y.o. female.    Chief Complaint:  Follow-up DJD right shoulder glenohumeral joint  History of Present Illness  Maria Ines Zhang returns to clinic for follow-up right shoulder.  Was last seen in July 2019 received corticosteroid injection which helped for several months.  She is able to complete better range of motion more activity with the right upper extremity after receiving corticosteroid injections however symptoms do return within the first few months.  She does get significant relief with the corticosteroid injection again which does allow her to do more activities.  Maximal tenderness present today the anterior aspect of the shoulder as well as the lateral aspect of the acromion.  Denies presence of numbness or tingling radiating down the right upper extremity.  Again she does not wish to proceed with any surgical intervention at this time.  She is aware that reverse total shoulder is likely in her future again is not ready to proceed with any surgical intervention.  Increased pain noted and reaching out in front she does have significantly limited range of motion, reaching out to side and reaching back behind her back.  She is able to complete activities of daily living with the reduced range of motion.  She is right-hand dominant.  Continues to rate discomfort 7-8 out of a 10 mainly aching and throbbing in nature decreased pain noted with use.  She is experiencing pain at night the lateral aspect of the acromion.  Denies other concerns for this time.    Social History     Occupational History   • Not on file   Tobacco Use   • Smoking status: Never Smoker   • Smokeless tobacco: Never Used   Substance and Sexual Activity   • Alcohol use: Defer   • Drug use: Defer   • Sexual activity: Defer      Past Medical History:   Diagnosis Date   • Anxiety    • Asthma    • Benign essential hypertension    • Benign liver cyst 2/15/2018    Founds incidentally on CT  scan of abdomen. Not clinically significant in size or appearance.    • Colitis    • Cryptogenic stroke (CMS/HCC) 8/3/2017   • DDD (degenerative disc disease), lumbar 12/10/2012    W/ spondylolisthesis, Patient has had 2 epidurals previously-Dr. Micheal Marquez   • DDD (degenerative disc disease), lumbosacral    • Depression    • Diverticulosis 2/15/2018   • Fatigue    • GERD (gastroesophageal reflux disease)    • H/O acute respiratory failure 02/25/2016    With bilateral pulmonary infiltrates-Dr. Jose Maria Funk   • Hyperlipidemia    • Hypertension    • Hypothyroidism    • IFG (impaired fasting glucose) 8/7/2017   • Mitral valve insufficiency     nild   • MRSA infection within last 3 months 05/2017    RIGHT FOOT   • Obesity    • Obstructive sleep apnea     USES CPAP   • Osteoarthritis    • Patent foramen ovale    • Pulmonary embolism (CMS/HCC)     3 SEPARATE OCCASIONS   • Pulmonary hypertension (CMS/HCC)    • SBO (small bowel obstruction) (CMS/HCC)    • Sciatica    • Venous stasis      Past Surgical History:   Procedure Laterality Date   • BRONCHOSCOPY WITH ENDOSCOPY Right 02/25/2016    Exploratory bronchoscopy, exploratory RT video-assisted thoracoscopy and wedge resection of RT upper lobe and Subpleural pain catheter x2-Dr. Jose Maria Funk   • CATARACT EXTRACTION W/ INTRAOCULAR LENS IMPLANT Bilateral 2016   • COLONOSCOPY N/A 3/1/2018    Procedure: COLONOSCOPY INTO CECUM/ TERMINAL ILEUM WITH BIOPSIES AND CLIP X 1;  Surgeon: Ritchie Garland MD;  Location: [facility] ENDOSCOPY;  Service:    • COLONOSCOPY N/A 10/02/2008    Diverticulosis of the sigmoid colon-Dr. Salud Lowry   • HYSTERECTOMY     • LAPAROSCOPIC CHOLECYSTECTOMY N/A 10/01/2003    Dr. Nino Torres   • LUMBAR EPIDURAL INJECTION N/A 12/10/2012    Multiple lumbar epidural injections-Dr. Micheal Marquez   • LUMBAR FUSION N/A 08/2012   • CT TOTAL KNEE ARTHROPLASTY Right 7/6/2017    Procedure: TOTAL KNEE ARTHROPLASTY;  Surgeon: Dragan De La Vega MD;  Location:   [facility];  Service: Orthopedics   • TOE AMPUTATION Right 05/2017    SECOND TOE   • TOTAL KNEE ARTHROPLASTY Left 11/13/2006    Dr. Jerome Weber   • UPPER GASTROINTESTINAL ENDOSCOPY N/A 10/02/2008    Normal esophagus, gastric mucosal abnormality characterized by erythema, normal examined duodenum-Dr. Salud Lowry       Family History   Problem Relation Age of Onset   • Heart disease Mother 60   • Diabetes Mother    • Stroke Mother 83   • Melanoma Father    • Heart attack Brother    • Heart disease Brother         Valve problem    • Heart attack Maternal Grandmother    • Heart attack Maternal Grandfather    • No Known Problems Paternal Grandmother    • No Known Problems Paternal Grandfather    • Malig Hyperthermia Neg Hx    • Breast cancer Neg Hx          Review of Systems   Constitutional: Negative for chills, diaphoresis, fever and unexpected weight change.   HENT: Negative for hearing loss, nosebleeds, sore throat and tinnitus.    Eyes: Negative for pain and visual disturbance.   Respiratory: Negative for cough, shortness of breath and wheezing.    Cardiovascular: Negative for chest pain and palpitations.   Gastrointestinal: Negative for abdominal pain, diarrhea, nausea and vomiting.   Endocrine: Negative for cold intolerance, heat intolerance and polydipsia.   Genitourinary: Negative for difficulty urinating, dysuria and hematuria.   Musculoskeletal: Positive for arthralgias and myalgias. Negative for joint swelling.   Skin: Negative for rash and wound.   Allergic/Immunologic: Negative for environmental allergies.   Neurological: Negative for dizziness, syncope and numbness.   Hematological: Does not bruise/bleed easily.   Psychiatric/Behavioral: Negative for dysphoric mood and sleep disturbance. The patient is not nervous/anxious.            Objective:  Physical Exam  General: No acute distress.  Eyes: conjunctiva clear; pupils equally round and reactive  ENT: external ears and nose atraumatic; oropharynx  "clear  CV: no peripheral edema  Resp: normal respiratory effort  Skin: no rashes or wounds; normal turgor  Psych: mood and affect appropriate; recent and remote memory intact    Vitals:    05/27/20 1414   Weight: 105 kg (232 lb)   Height: 167.6 cm (66\")         05/27/20  1414   Weight: 105 kg (232 lb)     Body mass index is 37.45 kg/m².      Right Shoulder Exam     Range of Motion   External rotation: 40   Forward flexion: 60     Other   Erythema: absent  Sensation: normal  Pulse: present    Comments:  Internal rotation to side  Negative tenderness AC joint  Maximal tenderness lateral acromion anterior joint line glenohumeral joint  Limited exam secondary to decreased range of motion  Internal rotation strength 4 months out of 5  External rotation strength 4 months out of 5  Supraspinatus strength 4-15  Subscapular strength 4 months out of 5  Biceps strength 4- out of 5           Assessment:        1. Primary osteoarthritis, right shoulder           Plan:  1.  Discussed plan of care with patient.  Again does not wish to proceed with any surgical intervention did briefly discussed the option of physical therapy however to the osteoarthritis discussed the range of motion will likely not be returned until surgical procedure.  Can give her home strengthening exercises that she can complete.  Again she has been pleased with the symptom relief with the corticosteroid injections almost a year since she was last seen.  She wishes to proceed with corticosteroid injection again in the right shoulder.  Will gladly see her back again in 3 months as needed.  She verbalized understanding of all information agrees with plan of care.  Denies other concerns present this time.  Large Joint Arthrocentesis: R glenohumeral  Date/Time: 5/27/2020 2:42 PM  Consent given by: patient  Site marked: site marked  Timeout: Immediately prior to procedure a time out was called to verify the correct patient, procedure, equipment, support staff and " site/side marked as required   Supporting Documentation  Indications: pain   Procedure Details  Location: shoulder - R glenohumeral  Preparation: Patient was prepped and draped in the usual sterile fashion  Needle size: 22 G  Approach: anterior  Medications administered: 4 mL lidocaine PF 1% 1 %; 80 mg triamcinolone acetonide 40 MG/ML  Patient tolerance: patient tolerated the procedure well with no immediate complications          Orders:  Orders Placed This Encounter   Procedures   • Large Joint Arthrocentesis: R glenohumeral       Medications:  New Medications Ordered This Visit   Medications   • celecoxib (CeleBREX) 200 MG capsule     Sig: Take 1 capsule by mouth Daily.     Dispense:  30 capsule     Refill:  0       Followup:  No follow-ups on file.    Maria Ines was seen today for follow-up and pain.    Diagnoses and all orders for this visit:    Primary osteoarthritis, right shoulder    Other orders  -     celecoxib (CeleBREX) 200 MG capsule; Take 1 capsule by mouth Daily.  -     Large Joint Arthrocentesis: R glenohumeral      Dictated utilizing Dragon dictation

## 2020-05-28 RX ORDER — LIDOCAINE HYDROCHLORIDE 10 MG/ML
4 INJECTION, SOLUTION EPIDURAL; INFILTRATION; INTRACAUDAL; PERINEURAL
Status: COMPLETED | OUTPATIENT
Start: 2020-05-27 | End: 2020-05-27

## 2020-05-28 RX ORDER — TRIAMCINOLONE ACETONIDE 40 MG/ML
80 INJECTION, SUSPENSION INTRA-ARTICULAR; INTRAMUSCULAR
Status: COMPLETED | OUTPATIENT
Start: 2020-05-27 | End: 2020-05-27

## 2020-05-28 RX ORDER — GABAPENTIN 300 MG/1
CAPSULE ORAL
Qty: 30 CAPSULE | Refills: 3 | OUTPATIENT
Start: 2020-05-28

## 2020-06-08 RX ORDER — LEVOTHYROXINE SODIUM 88 UG/1
TABLET ORAL
Qty: 90 TABLET | Refills: 0 | OUTPATIENT
Start: 2020-06-08

## 2020-06-08 RX ORDER — SIMVASTATIN 10 MG
TABLET ORAL
Qty: 90 TABLET | Refills: 4 | OUTPATIENT
Start: 2020-06-08

## 2020-06-15 RX ORDER — SIMVASTATIN 10 MG
TABLET ORAL
Qty: 90 TABLET | Refills: 4 | OUTPATIENT
Start: 2020-06-15

## 2020-06-18 RX ORDER — SIMVASTATIN 10 MG
TABLET ORAL
Qty: 30 TABLET | Refills: 5 | OUTPATIENT
Start: 2020-06-18

## 2020-06-23 RX ORDER — CELECOXIB 200 MG/1
200 CAPSULE ORAL DAILY
Qty: 30 CAPSULE | Refills: 0 | Status: SHIPPED | OUTPATIENT
Start: 2020-06-23 | End: 2020-07-24 | Stop reason: SDUPTHER

## 2020-06-23 RX ORDER — BUDESONIDE AND FORMOTEROL FUMARATE DIHYDRATE 160; 4.5 UG/1; UG/1
AEROSOL RESPIRATORY (INHALATION)
Qty: 10.2 G | Refills: 2 | OUTPATIENT
Start: 2020-06-23

## 2020-07-01 RX ORDER — SIMVASTATIN 10 MG
TABLET ORAL
Qty: 30 TABLET | Refills: 5 | OUTPATIENT
Start: 2020-07-01

## 2020-07-24 RX ORDER — LEVOTHYROXINE SODIUM 88 UG/1
TABLET ORAL
Qty: 90 TABLET | Refills: 0 | Status: SHIPPED | OUTPATIENT
Start: 2020-07-24 | End: 2020-10-22

## 2020-07-27 RX ORDER — CELECOXIB 200 MG/1
200 CAPSULE ORAL DAILY
Qty: 30 CAPSULE | Refills: 0 | Status: SHIPPED | OUTPATIENT
Start: 2020-07-27 | End: 2020-08-24 | Stop reason: SDUPTHER

## 2020-08-10 RX ORDER — MONTELUKAST SODIUM 10 MG/1
TABLET ORAL
Qty: 90 TABLET | Refills: 0 | OUTPATIENT
Start: 2020-08-10

## 2020-08-24 RX ORDER — CELECOXIB 200 MG/1
200 CAPSULE ORAL DAILY
Qty: 30 CAPSULE | Refills: 0 | Status: SHIPPED | OUTPATIENT
Start: 2020-08-24 | End: 2020-10-09 | Stop reason: SDUPTHER

## 2020-09-08 ENCOUNTER — TRANSCRIBE ORDERS (OUTPATIENT)
Dept: ADMINISTRATIVE | Facility: HOSPITAL | Age: 75
End: 2020-09-08

## 2020-09-08 DIAGNOSIS — Z12.31 VISIT FOR SCREENING MAMMOGRAM: Primary | ICD-10-CM

## 2020-09-15 ENCOUNTER — OFFICE VISIT (OUTPATIENT)
Dept: ORTHOPEDIC SURGERY | Facility: CLINIC | Age: 75
End: 2020-09-15

## 2020-09-15 DIAGNOSIS — M19.011 PRIMARY OSTEOARTHRITIS, RIGHT SHOULDER: Primary | ICD-10-CM

## 2020-09-15 DIAGNOSIS — M75.51 SUBACROMIAL BURSITIS OF RIGHT SHOULDER JOINT: ICD-10-CM

## 2020-09-15 PROCEDURE — 99213 OFFICE O/P EST LOW 20 MIN: CPT | Performed by: NURSE PRACTITIONER

## 2020-09-15 PROCEDURE — 20610 DRAIN/INJ JOINT/BURSA W/O US: CPT | Performed by: NURSE PRACTITIONER

## 2020-09-15 RX ORDER — LIDOCAINE HYDROCHLORIDE 10 MG/ML
4 INJECTION, SOLUTION EPIDURAL; INFILTRATION; INTRACAUDAL; PERINEURAL
Status: COMPLETED | OUTPATIENT
Start: 2020-09-15 | End: 2020-09-15

## 2020-09-15 RX ORDER — TRIAMCINOLONE ACETONIDE 40 MG/ML
80 INJECTION, SUSPENSION INTRA-ARTICULAR; INTRAMUSCULAR
Status: COMPLETED | OUTPATIENT
Start: 2020-09-15 | End: 2020-09-15

## 2020-09-15 RX ADMIN — LIDOCAINE HYDROCHLORIDE 4 ML: 10 INJECTION, SOLUTION EPIDURAL; INFILTRATION; INTRACAUDAL; PERINEURAL at 11:52

## 2020-09-15 RX ADMIN — TRIAMCINOLONE ACETONIDE 80 MG: 40 INJECTION, SUSPENSION INTRA-ARTICULAR; INTRAMUSCULAR at 11:52

## 2020-09-15 NOTE — PROGRESS NOTES
Subjective:     Patient ID: Maria Ines Zhang is a 74 y.o. female.    Chief Complaint:  Follow-up DJD right shoulder  Subacromial bursitis, right shoulder  History of Present Illness  Maria Ines Zhang returns to clinic for follow-up right shoulder.  Maximal tenderness present today the lateral, posterior lateral aspect of the shoulder.  Increased pain noted when attempting to reach above head, reach out to the side and retrying her back.  She has had to lift, pull, push with the right upper extremity on her spouse to help care for him.  She is also recently had an injury at the left foot which is required her to use bilateral upper extremities with walker.  Use of the walker has exacerbated her symptoms.  She received corticosteroid injection last visit glenohumeral joint 5/27/2020 which did significantly help reduce pain.  Again she is not ready to proceed with total joint replacement surgery.  Denies presence of numbness or tingling right upper extremity.  Denies other concerns present time.     Social History     Occupational History   • Not on file   Tobacco Use   • Smoking status: Never Smoker   • Smokeless tobacco: Never Used   Substance and Sexual Activity   • Alcohol use: Defer   • Drug use: Defer   • Sexual activity: Defer      Past Medical History:   Diagnosis Date   • Anxiety    • Asthma    • Benign essential hypertension    • Benign liver cyst 2/15/2018    Founds incidentally on CT scan of abdomen. Not clinically significant in size or appearance.    • Colitis    • Cryptogenic stroke (CMS/HCC) 8/3/2017   • DDD (degenerative disc disease), lumbar 12/10/2012    W/ spondylolisthesis, Patient has had 2 epidurals previously-Dr. Micheal Marquez   • DDD (degenerative disc disease), lumbosacral    • Depression    • Diverticulosis 2/15/2018   • Fatigue    • GERD (gastroesophageal reflux disease)    • H/O acute respiratory failure 02/25/2016    With bilateral pulmonary infiltrates-Dr. Jose Maria Funk   •  Hyperlipidemia    • Hypertension    • Hypothyroidism    • IFG (impaired fasting glucose) 8/7/2017   • Mitral valve insufficiency     nild   • MRSA infection within last 3 months 05/2017    RIGHT FOOT   • Obesity    • Obstructive sleep apnea     USES CPAP   • Osteoarthritis    • Patent foramen ovale    • Pulmonary embolism (CMS/HCC)     3 SEPARATE OCCASIONS   • Pulmonary hypertension (CMS/HCC)    • SBO (small bowel obstruction) (CMS/HCC)    • Sciatica    • Venous stasis      Past Surgical History:   Procedure Laterality Date   • BRONCHOSCOPY WITH ENDOSCOPY Right 02/25/2016    Exploratory bronchoscopy, exploratory RT video-assisted thoracoscopy and wedge resection of RT upper lobe and Subpleural pain catheter x2-Dr. Jose Maria Funk   • CATARACT EXTRACTION W/ INTRAOCULAR LENS IMPLANT Bilateral 2016   • COLONOSCOPY N/A 3/1/2018    Procedure: COLONOSCOPY INTO CECUM/ TERMINAL ILEUM WITH BIOPSIES AND CLIP X 1;  Surgeon: Ritchie Garland MD;  Location: [facility] ENDOSCOPY;  Service:    • COLONOSCOPY N/A 10/02/2008    Diverticulosis of the sigmoid colon-Dr. Salud Lowry   • HYSTERECTOMY     • LAPAROSCOPIC CHOLECYSTECTOMY N/A 10/01/2003    Dr. Nino Torres   • LUMBAR EPIDURAL INJECTION N/A 12/10/2012    Multiple lumbar epidural injections-Dr. Micheal Marquez   • LUMBAR FUSION N/A 08/2012   • NY TOTAL KNEE ARTHROPLASTY Right 7/6/2017    Procedure: TOTAL KNEE ARTHROPLASTY;  Surgeon: Dragan De La Vega MD;  Location: [facility] MAIN OR;  Service: Orthopedics   • TOE AMPUTATION Right 05/2017    SECOND TOE   • TOTAL KNEE ARTHROPLASTY Left 11/13/2006    Dr. Jerome Weber   • UPPER GASTROINTESTINAL ENDOSCOPY N/A 10/02/2008    Normal esophagus, gastric mucosal abnormality characterized by erythema, normal examined duodenum-Dr. Salud Lowry       Family History   Problem Relation Age of Onset   • Heart disease Mother 60   • Diabetes Mother    • Stroke Mother 83   • Melanoma Father    • Heart attack Brother    • Heart disease Brother          Valve problem    • Heart attack Maternal Grandmother    • Heart attack Maternal Grandfather    • No Known Problems Paternal Grandmother    • No Known Problems Paternal Grandfather    • Malig Hyperthermia Neg Hx    • Breast cancer Neg Hx          Review of Systems   Constitutional: Negative for chills, diaphoresis, fever and unexpected weight change.   HENT: Negative for hearing loss, nosebleeds, sore throat and tinnitus.    Eyes: Negative for pain and visual disturbance.   Respiratory: Negative for cough, shortness of breath and wheezing.    Cardiovascular: Negative for chest pain and palpitations.   Gastrointestinal: Negative for abdominal pain, diarrhea, nausea and vomiting.   Endocrine: Negative for cold intolerance, heat intolerance and polydipsia.   Genitourinary: Negative for difficulty urinating, dysuria and hematuria.   Musculoskeletal: Positive for arthralgias. Negative for joint swelling and myalgias.   Skin: Negative for rash and wound.   Allergic/Immunologic: Negative for environmental allergies.   Neurological: Negative for dizziness, syncope and numbness.   Hematological: Does not bruise/bleed easily.   Psychiatric/Behavioral: Negative for dysphoric mood and sleep disturbance. The patient is not nervous/anxious.            Objective:  Physical Exam  General: No acute distress.  Eyes: conjunctiva clear; pupils equally round and reactive  ENT: external ears and nose atraumatic; oropharynx clear  CV: no peripheral edema  Resp: normal respiratory effort  Skin: no rashes or wounds; normal turgor  Psych: mood and affect appropriate; recent and remote memory intact    There were no vitals filed for this visit.  There were no vitals filed for this visit.  There is no height or weight on file to calculate BMI.      Ortho Exam       Right Shoulder Exam      Range of Motion   External rotation: 40   Forward flexion: 60      Other   Erythema: absent  Sensation: normal  Pulse: present     Comments:  Internal  rotation to side  Negative tenderness AC joint  Maximal tenderness lateral acromion anterior joint line glenohumeral joint  Limited exam secondary to decreased range of motion  Internal rotation strength 4 months out of 5  External rotation strength 4 months out of 5  Supraspinatus strength 4-15  Subscapular strength 4 months out of 5  Biceps strength 4- out of 5  Assessment:        1. Primary osteoarthritis, right shoulder    2. Subacromial bursitis of right shoulder joint           Plan:  1.  Discussed plan of care with patient.  Wish to proceed corticosteroid injection subacromial bursa of the right shoulder.  We will plan to see her back in clinic 4 to 6 weeks if not improving discuss glenohumeral joint injection at that time the right shoulder.  She verbalized worsening of all information agrees plan of care.  Denies other concerns present time.  Large Joint Arthrocentesis: R subacromial bursa  Date/Time: 9/15/2020 11:52 AM  Consent given by: patient  Site marked: site marked  Timeout: Immediately prior to procedure a time out was called to verify the correct patient, procedure, equipment, support staff and site/side marked as required   Supporting Documentation  Indications: pain   Procedure Details  Location: shoulder - R subacromial bursa  Preparation: Patient was prepped and draped in the usual sterile fashion  Needle size: 22 G  Approach: posterior  Medications administered: 4 mL lidocaine PF 1% 1 %; 80 mg triamcinolone acetonide 40 MG/ML  Patient tolerance: patient tolerated the procedure well with no immediate complications          Orders:  Orders Placed This Encounter   Procedures   • Large Joint Arthrocentesis: R subacromial bursa       Medications:  No orders of the defined types were placed in this encounter.      Followup:  No follow-ups on file.    Maria Ines was seen today for follow-up and pain.    Diagnoses and all orders for this visit:    Primary osteoarthritis, right shoulder  -     Cancel: XR  Shoulder 2+ View Right    Subacromial bursitis of right shoulder joint  -     Large Joint Arthrocentesis: R subacromial bursa      Dictated utilizing Dragon dictation

## 2020-10-06 ENCOUNTER — APPOINTMENT (OUTPATIENT)
Dept: MAMMOGRAPHY | Facility: HOSPITAL | Age: 75
End: 2020-10-06

## 2020-10-09 RX ORDER — CELECOXIB 200 MG/1
200 CAPSULE ORAL DAILY
Qty: 30 CAPSULE | Refills: 0 | Status: SHIPPED | OUTPATIENT
Start: 2020-10-09 | End: 2020-11-11 | Stop reason: SDUPTHER

## 2020-10-16 RX ORDER — MONTELUKAST SODIUM 10 MG/1
TABLET ORAL
Qty: 90 TABLET | Refills: 0 | OUTPATIENT
Start: 2020-10-16

## 2020-10-22 RX ORDER — LEVOTHYROXINE SODIUM 88 UG/1
TABLET ORAL
Qty: 90 TABLET | Refills: 0 | Status: SHIPPED | OUTPATIENT
Start: 2020-10-22 | End: 2021-02-03

## 2020-10-23 ENCOUNTER — OFFICE VISIT (OUTPATIENT)
Dept: SLEEP MEDICINE | Facility: HOSPITAL | Age: 75
End: 2020-10-23

## 2020-10-23 VITALS
DIASTOLIC BLOOD PRESSURE: 78 MMHG | HEIGHT: 66 IN | SYSTOLIC BLOOD PRESSURE: 179 MMHG | BODY MASS INDEX: 38.25 KG/M2 | WEIGHT: 238 LBS | HEART RATE: 67 BPM

## 2020-10-23 DIAGNOSIS — E66.9 CLASS 2 OBESITY: ICD-10-CM

## 2020-10-23 DIAGNOSIS — G47.33 OSA ON CPAP: Primary | ICD-10-CM

## 2020-10-23 DIAGNOSIS — Z99.89 OSA ON CPAP: Primary | ICD-10-CM

## 2020-10-23 PROBLEM — E66.812 CLASS 2 OBESITY: Status: ACTIVE | Noted: 2020-10-23

## 2020-10-23 PROCEDURE — 99213 OFFICE O/P EST LOW 20 MIN: CPT | Performed by: INTERNAL MEDICINE

## 2020-10-23 PROCEDURE — G0463 HOSPITAL OUTPT CLINIC VISIT: HCPCS

## 2020-10-23 NOTE — PROGRESS NOTES
[facility]  [address] [address]  Phone   Fax       SLEEP CLINIC FOLLOW UP PROGRESS NOTE.    Maria Ines Coleell  1945  74 y.o.  female      PCP: Yeny Koch APRN      Date of visit: 10/23/2020    Chief Complaint   Patient presents with   • Sleep Apnea   • Follow-up       INTERM HISTORY:  This is a 74 y.o. years old patient who has a history of sleep apnea and is on CPAP.  Patient reports good compliance with the device.  Patient is well-known to me.  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  She also uses oxygen at nighttime.  She has not seen me for 1 or 2 years after I left [facility].  She reports that recently she was in the hospital with community-acquired pneumonia and was treated.    Sleep schedule  Normally goes to bed at 12 midnight  Wakes up at 8 AM  Do you feel refreshed after waking up ?:  Yes      The Smart card downloaded on 10/23/2020 has been reviewed by me and shows the following..  Compliance; 100%  > 4 hr use, 100%  Average use of the device 9 hours and 28-minute per night  Residual AHI: 1.8 /hr (normal less than 5)  Mask type: Full facemask  DME: [employer]      Past Medical History:   Diagnosis Date   • Anxiety    • Asthma    • Benign essential hypertension    • Benign liver cyst 2/15/2018    Founds incidentally on CT scan of abdomen. Not clinically significant in size or appearance.    • Colitis    • Cryptogenic stroke (CMS/HCC) 8/3/2017   • DDD (degenerative disc disease), lumbar 12/10/2012    W/ spondylolisthesis, Patient has had 2 epidurals previously-Dr. Micheal Marquez   • DDD (degenerative disc disease), lumbosacral    • Depression    • Diverticulosis 2/15/2018   • Fatigue    • GERD (gastroesophageal reflux disease)    • H/O acute respiratory failure 02/25/2016    With bilateral pulmonary infiltrates-Dr. Jose Maria Funk   • Hyperlipidemia    •  Hypertension    • Hypothyroidism    • IFG (impaired fasting glucose) 8/7/2017   • Mitral valve insufficiency     nild   • MRSA infection within last 3 months 05/2017    RIGHT FOOT   • Obesity    • Obstructive sleep apnea     USES CPAP   • Osteoarthritis    • Patent foramen ovale    • Pulmonary embolism (CMS/HCC)     3 SEPARATE OCCASIONS   • Pulmonary hypertension (CMS/HCC)    • SBO (small bowel obstruction) (CMS/HCC)    • Sciatica    • Venous stasis        MEDICATIONS: reviewed by me    Current Outpatient Medications:   •  albuterol (PROVENTIL) (2.5 MG/3ML) 0.083% nebulizer solution, USE ONE VIAL VIA NEBULIZATION BY MOUTH EVERY 4 HOURS AS NEEDED FOR WHEEZING, Disp: 180 vial, Rfl: 0  •  albuterol sulfate  (90 Base) MCG/ACT inhaler, Inhale 2 puffs Every 4 (Four) Hours As Needed for Wheezing or Shortness of Air., Disp: 1 inhaler, Rfl: 6  •  amLODIPine (NORVASC) 5 MG tablet, Take 5 mg by mouth Daily., Disp: , Rfl:   •  benzonatate (TESSALON PERLES) 100 MG capsule, Take 1 capsule by mouth 3 (Three) Times a Day As Needed for Cough., Disp: 30 capsule, Rfl: 0  •  Bioflavonoid Products (ROCKY-C) tablet, Take 1,000 mg by mouth Daily., Disp: , Rfl:   •  budesonide-formoterol (SYMBICORT) 160-4.5 MCG/ACT inhaler, Inhale 2 puffs 2 (Two) Times a Day., Disp: 10.2 g, Rfl: 3  •  calcium polycarbophil (FIBERCON) 625 MG tablet, Take 1,250 mg by mouth Daily., Disp: , Rfl:   •  celecoxib (CeleBREX) 200 MG capsule, Take 1 capsule by mouth Daily., Disp: 30 capsule, Rfl: 0  •  clobetasol (TEMOVATE) 0.05 % ointment, Apply  topically to the appropriate area as directed Every Night., Disp: 45 g, Rfl: 0  •  clonazePAM (KlonoPIN) 0.5 MG tablet, TAKE ONE TABLET BY MOUTH ONCE NIGHTLY AS NEEDED FOR ANXIETY, Disp: 90 tablet, Rfl: 0  •  cyclobenzaprine (FLEXERIL) 10 MG tablet, TAKE ONE TABLET BY MOUTH THREE TIMES A DAY AS NEEDED FOR MUSCLE SPASMS, Disp: 90 tablet, Rfl: 0  •  escitalopram (LEXAPRO) 20 MG tablet, TAKE ONE TABLET BY MOUTH DAILY,  Disp: 90 tablet, Rfl: 1  •  esomeprazole (nexIUM) 40 MG capsule, Take 40 mg by mouth Daily., Disp: , Rfl:   •  furosemide (LASIX) 40 MG tablet, , Disp: , Rfl:   •  gabapentin (NEURONTIN) 300 MG capsule, TAKE ONE CAPSULE BY MOUTH ONCE NIGHTLY, Disp: 30 capsule, Rfl: 4  •  hydrochlorothiazide (HYDRODIURIL) 25 MG tablet, TAKE ONE TABLET BY MOUTH DAILY, Disp: 90 tablet, Rfl: 1  •  HYDROcod Polst-CPM Polst ER (TUSSIONEX PENNKINETIC ER) 10-8 MG/5ML ER suspension, Take 5 mL by mouth Every 12 (Twelve) Hours As Needed for Cough., Disp: 115 mL, Rfl: 0  •  HYDROcodone-homatropine (HYCODAN) 5-1.5 MG/5ML syrup, Take 5 mL by mouth Every 6 (Six) Hours As Needed for Cough., Disp: 120 mL, Rfl: 0  •  levothyroxine (SYNTHROID, LEVOTHROID) 88 MCG tablet, TAKE ONE TABLET BY MOUTH DAILY, Disp: 90 tablet, Rfl: 0  •  linaclotide (LINZESS) 72 MCG capsule capsule, Take 72 mcg by mouth Every Morning Before Breakfast., Disp: , Rfl:   •  lisinopril (PRINIVIL,ZESTRIL) 20 MG tablet, Take 1 tablet by mouth Daily., Disp: 90 tablet, Rfl: 1  •  montelukast (SINGULAIR) 10 MG tablet, TAKE ONE TABLET BY MOUTH ONCE NIGHTLY, Disp: 90 tablet, Rfl: 0  •  Multiple Vitamins-Minerals (MULTIVITAMIN WITH MINERALS) tablet tablet, Take 1 tablet by mouth Daily., Disp: , Rfl:   •  Probiotic Product (PROBIOTIC-10) capsule, Take 1 capsule by mouth Daily., Disp: , Rfl:   •  senna-docusate (PERICOLACE) 8.6-50 MG per tablet, Take  by mouth As Needed., Disp: , Rfl:   •  simvastatin (ZOCOR) 10 MG tablet, TAKE ONE TABLET BY MOUTH EVERY NIGHT, Disp: 30 tablet, Rfl: 5  •  traMADol (ULTRAM) 50 MG tablet, Take 1 tablet by mouth 2 (Two) Times a Day As Needed for Moderate Pain  or Severe Pain ., Disp: 60 tablet, Rfl: 2    Allergies   Allergen Reactions   • Fish Allergy Nausea And Vomiting   • Iodine Hives     Iodine contrast. Pt states years ago had CT scan with resulting hives   • Shellfish Allergy Nausea And Vomiting   • Shellfish-Derived Products Nausea And Vomiting   • Sulfa  "Antibiotics Rash    reviewed by me    SOCIAL, FAMILY HISTORY: Medical records are reviewed and noted by me.  History of smoking no  History of alcohol use no  Caffeine use 3    REVIEW OF SYSTEMS:   Gary Sleepiness Scale :Total score: 7   Snoring: Resolved  Morning headache: No  Nasal congestion: No  Leg movements: No  Number of times you wake up once asleep: 1  Heart burn no    PHYSICAL EXAMINATION:  Vitals:    10/23/20 0900   BP: 179/78   Pulse: 67   Weight: 108 kg (238 lb)   Height: 167.6 cm (66\")    Body mass index is 38.41 kg/m².    HEENT: pupils are round equal and reacting to light and accommodation, nasal passage is clear, no nasal polyps, no lymphadenopathy, throat is clear, oral airway Mallampati class 3  RESPRATORY SYSTEM: Breath sounds are equal on both sides and are normal, no wheezes or crackles  CARDIOVASULAR SYSTEM: Heart rate is regular without murmur  ABDOMEN: Soft, no ascites, no hepatosplenomegaly.  EXTREMITIES: No cyanosis, clubbing or edema       ASSESSMENT AND PLAN:  · Obstructive sleep apnea, patient is using the device with good compliance for treatment of sleep apnea.  I have reviewed the smart card down load and discussed with the patient the download data and encouarged the patient to continue to use the device.  The symptoms have improved and the residual AHI is acceptable.  The device is benefiting the patient and the device is medically necessary. The patient is also instructed to get the supplies from the [employer] and a prescription for supplies has been sent to the [employer] company.    · Obesity, patient's BMI is Body mass index is 38.41 kg/m²..  I have discussed weight reduction and the health benefits.  I have also discuss the relationship between the weight and sleep apnea and encouraged the patient to lose weight which is beneficial in treating sleep apnea. Discussed diet and exercise with the patient.  · Return to clinic in 12 months for follow-up        Andreina Heck, " MD  Sleep Medicine.(Board-certified)  [facility]  Medical Director for [facility]  10/23/2020

## 2020-11-05 ENCOUNTER — APPOINTMENT (OUTPATIENT)
Dept: MAMMOGRAPHY | Facility: HOSPITAL | Age: 75
End: 2020-11-05

## 2020-11-11 RX ORDER — CELECOXIB 200 MG/1
200 CAPSULE ORAL DAILY
Qty: 30 CAPSULE | Refills: 0 | Status: SHIPPED | OUTPATIENT
Start: 2020-11-11 | End: 2020-12-09 | Stop reason: SDUPTHER

## 2020-12-08 ENCOUNTER — HOSPITAL ENCOUNTER (OUTPATIENT)
Dept: MAMMOGRAPHY | Facility: HOSPITAL | Age: 75
Discharge: HOME | End: 2020-12-08
Admitting: NURSE PRACTITIONER

## 2020-12-08 DIAGNOSIS — Z12.31 VISIT FOR SCREENING MAMMOGRAM: ICD-10-CM

## 2020-12-08 PROCEDURE — 77063 BREAST TOMOSYNTHESIS BI: CPT

## 2020-12-08 PROCEDURE — 77067 SCR MAMMO BI INCL CAD: CPT

## 2020-12-09 RX ORDER — CELECOXIB 200 MG/1
200 CAPSULE ORAL DAILY
Qty: 30 CAPSULE | Refills: 2 | Status: SHIPPED | OUTPATIENT
Start: 2020-12-09 | End: 2021-03-08

## 2020-12-09 RX ORDER — CELECOXIB 200 MG/1
CAPSULE ORAL
Qty: 30 CAPSULE | Refills: 0 | OUTPATIENT
Start: 2020-12-09

## 2021-01-14 ENCOUNTER — TELEPHONE (OUTPATIENT)
Dept: ORTHOPEDICS | Facility: OTHER | Age: 76
End: 2021-01-14

## 2021-01-14 NOTE — TELEPHONE ENCOUNTER
Called patient to make appt. No answer. She just needs to call back and make a followup appt, next available with denis

## 2021-01-14 NOTE — TELEPHONE ENCOUNTER
"Provider: MAJO CRUZ  Caller: LEA HARDY \"BIRDIE\"  Relationship to Patient: SELF  Phone Number:[phone]  Reason for Call: TO SCHEDULE  4 mL lidocaine PF 1% 1 %; 80 mg triamcinolone acetonide 40 MG/ML  (KENALOG)  HUB CANNOT SCHEDULE  MADE APPOINTMENT IN ERROR, CANCELLED AND ADVISED PATIENT OF CANCELLATION    LAST SEEN 09/15/2020  "

## 2021-01-18 RX ORDER — LEVOTHYROXINE SODIUM 88 UG/1
TABLET ORAL
Qty: 90 TABLET | Refills: 0 | OUTPATIENT
Start: 2021-01-18

## 2021-01-22 ENCOUNTER — OFFICE VISIT (OUTPATIENT)
Dept: ORTHOPEDIC SURGERY | Facility: CLINIC | Age: 76
End: 2021-01-22

## 2021-01-22 VITALS — BODY MASS INDEX: 38.25 KG/M2 | WEIGHT: 238 LBS | HEIGHT: 66 IN

## 2021-01-22 DIAGNOSIS — M25.511 CHRONIC RIGHT SHOULDER PAIN: Primary | ICD-10-CM

## 2021-01-22 DIAGNOSIS — G89.29 CHRONIC RIGHT SHOULDER PAIN: Primary | ICD-10-CM

## 2021-01-22 DIAGNOSIS — M75.51 SUBACROMIAL BURSITIS OF RIGHT SHOULDER JOINT: ICD-10-CM

## 2021-01-22 DIAGNOSIS — M19.011 PRIMARY OSTEOARTHRITIS, RIGHT SHOULDER: ICD-10-CM

## 2021-01-22 PROBLEM — E03.9 HYPOTHYROIDISM: Status: ACTIVE | Noted: 2020-08-30

## 2021-01-22 PROBLEM — S91.105A: Status: ACTIVE | Noted: 2020-08-30

## 2021-01-22 PROBLEM — S93.105A: Status: ACTIVE | Noted: 2020-08-30

## 2021-01-22 PROBLEM — R55 SYNCOPE AND COLLAPSE: Status: ACTIVE | Noted: 2020-08-29

## 2021-01-22 PROBLEM — J45.909 ASTHMA: Status: ACTIVE | Noted: 2020-08-30

## 2021-01-22 PROCEDURE — 20610 DRAIN/INJ JOINT/BURSA W/O US: CPT | Performed by: NURSE PRACTITIONER

## 2021-01-22 PROCEDURE — 99213 OFFICE O/P EST LOW 20 MIN: CPT | Performed by: NURSE PRACTITIONER

## 2021-01-22 RX ORDER — LIDOCAINE HYDROCHLORIDE 10 MG/ML
4 INJECTION, SOLUTION EPIDURAL; INFILTRATION; INTRACAUDAL; PERINEURAL
Status: COMPLETED | OUTPATIENT
Start: 2021-01-22 | End: 2021-01-22

## 2021-01-22 RX ORDER — TRIAMCINOLONE ACETONIDE 40 MG/ML
80 INJECTION, SUSPENSION INTRA-ARTICULAR; INTRAMUSCULAR
Status: COMPLETED | OUTPATIENT
Start: 2021-01-22 | End: 2021-01-22

## 2021-01-22 RX ADMIN — TRIAMCINOLONE ACETONIDE 80 MG: 40 INJECTION, SUSPENSION INTRA-ARTICULAR; INTRAMUSCULAR at 14:42

## 2021-01-22 RX ADMIN — LIDOCAINE HYDROCHLORIDE 4 ML: 10 INJECTION, SOLUTION EPIDURAL; INFILTRATION; INTRACAUDAL; PERINEURAL at 14:42

## 2021-01-22 NOTE — PROGRESS NOTES
Subjective:     Patient ID: Maria Ines Zhang is a 75 y.o. female.    Chief Complaint:  Follow-up DJD right shoulder  Subacromial bursitis, right shoulder  History of Present Illness  Maria Ines Zhang returns to clinic for follow-up of her right shoulder.  Last received corticosteroid injection September 2020 at the right shoulder, subacromial aspect and prior to that May 2020 glenohumeral has done fairly well ever since.  Has continued with active range of motion as tolerated.  Very pleased with symptom relief with the prior corticosteroid injection she is received.  Does not wish to proceed with any surgical intervention.  Denies presence of numbness or tingling the right upper extremity.  Denies other concerns present time.     Social History     Occupational History   • Not on file   Tobacco Use   • Smoking status: Never Smoker   • Smokeless tobacco: Never Used   Substance and Sexual Activity   • Alcohol use: Defer   • Drug use: Defer   • Sexual activity: Defer      Past Medical History:   Diagnosis Date   • Anxiety    • Asthma    • Benign essential hypertension    • Benign liver cyst 2/15/2018    Founds incidentally on CT scan of abdomen. Not clinically significant in size or appearance.    • Colitis    • Cryptogenic stroke (CMS/HCC) 8/3/2017   • DDD (degenerative disc disease), lumbar 12/10/2012    W/ spondylolisthesis, Patient has had 2 epidurals previously-Dr. Micheal Marquez   • DDD (degenerative disc disease), lumbosacral    • Depression    • Diverticulosis 2/15/2018   • Fatigue    • GERD (gastroesophageal reflux disease)    • H/O acute respiratory failure 02/25/2016    With bilateral pulmonary infiltrates-Dr. Jose Maria Funk   • Hyperlipidemia    • Hypertension    • Hypothyroidism    • IFG (impaired fasting glucose) 8/7/2017   • Mitral valve insufficiency     nild   • MRSA infection within last 3 months 05/2017    RIGHT FOOT   • Obesity    • Obstructive sleep apnea     USES CPAP   • Osteoarthritis    •  Patent foramen ovale    • Pulmonary embolism (CMS/HCC)     3 SEPARATE OCCASIONS   • Pulmonary hypertension (CMS/HCC)    • SBO (small bowel obstruction) (CMS/HCC)    • Sciatica    • Venous stasis      Past Surgical History:   Procedure Laterality Date   • BRONCHOSCOPY WITH ENDOSCOPY Right 02/25/2016    Exploratory bronchoscopy, exploratory RT video-assisted thoracoscopy and wedge resection of RT upper lobe and Subpleural pain catheter x2-Dr. Jose Maria Funk   • CATARACT EXTRACTION W/ INTRAOCULAR LENS IMPLANT Bilateral 2016   • COLONOSCOPY N/A 3/1/2018    Procedure: COLONOSCOPY INTO CECUM/ TERMINAL ILEUM WITH BIOPSIES AND CLIP X 1;  Surgeon: Ritchie Garland MD;  Location: [facility] ENDOSCOPY;  Service:    • COLONOSCOPY N/A 10/02/2008    Diverticulosis of the sigmoid colon-Dr. Salud Lowry   • HYSTERECTOMY     • LAPAROSCOPIC CHOLECYSTECTOMY N/A 10/01/2003    Dr. Nino Torres   • LUMBAR EPIDURAL INJECTION N/A 12/10/2012    Multiple lumbar epidural injections-Dr. Micheal Marquez   • LUMBAR FUSION N/A 08/2012   • [place] TOTAL KNEE ARTHROPLASTY Right 7/6/2017    Procedure: TOTAL KNEE ARTHROPLASTY;  Surgeon: Dragan De La Vega MD;  Location: [facility] MAIN OR;  Service: Orthopedics   • TOE AMPUTATION Right 05/2017    SECOND TOE   • TOTAL KNEE ARTHROPLASTY Left 11/13/2006    Dr. Jerome Weber   • UPPER GASTROINTESTINAL ENDOSCOPY N/A 10/02/2008    Normal esophagus, gastric mucosal abnormality characterized by erythema, normal examined duodenum-Dr. Salud Lowry       Family History   Problem Relation Age of Onset   • Heart disease Mother 60   • Diabetes Mother    • Stroke Mother 83   • Melanoma Father    • Heart attack Brother    • Heart disease Brother         Valve problem    • Heart attack Maternal Grandmother    • Heart attack Maternal Grandfather    • No Known Problems Paternal Grandmother    • No Known Problems Paternal Grandfather    • Malig Hyperthermia Neg Hx    • Breast cancer Neg Hx          Review of Systems  "  Constitutional: Negative for chills, diaphoresis, fever and unexpected weight change.   HENT: Negative for hearing loss, nosebleeds, sore throat and tinnitus.    Eyes: Negative for pain and visual disturbance.   Respiratory: Negative for cough, shortness of breath and wheezing.    Cardiovascular: Negative for chest pain and palpitations.   Gastrointestinal: Negative for abdominal pain, diarrhea, nausea and vomiting.   Endocrine: Negative for cold intolerance, heat intolerance and polydipsia.   Genitourinary: Negative for difficulty urinating, dysuria and hematuria.   Musculoskeletal: Positive for arthralgias and myalgias. Negative for joint swelling.   Skin: Negative for rash and wound.   Allergic/Immunologic: Negative for environmental allergies.   Neurological: Negative for dizziness, syncope and numbness.   Hematological: Does not bruise/bleed easily.   Psychiatric/Behavioral: Negative for dysphoric mood and sleep disturbance. The patient is not nervous/anxious.            Objective:  Physical Exam  General: No acute distress.  Eyes: conjunctiva clear; pupils equally round and reactive  ENT: external ears and nose atraumatic; oropharynx clear  CV: no peripheral edema  Resp: normal respiratory effort  Skin: no rashes or wounds; normal turgor  Psych: mood and affect appropriate; recent and remote memory intact    Vitals:    01/22/21 1431   Weight: 108 kg (238 lb)   Height: 167.6 cm (66\")         01/22/21  1431   Weight: 108 kg (238 lb)     Body mass index is 38.41 kg/m².      Ortho Exam     Passive forward flexion to 90 degrees, active forward flexion 70 degrees  External rotation, passive to 45 degrees, active to 40 degrees, external rotation strength 4- out of 5  Positive sensation light touch distributions of the right upper extremity  Flex extend all digits right hand  2+ distal radial pulse      Assessment:        1. Chronic right shoulder pain    2. Primary osteoarthritis, right shoulder    3. Subacromial " bursitis of right shoulder joint           Plan:  1. Discussed plan of with patient.  Wish to proceed corticosteroid injection glenohumeral joint of the right shoulder.  Plan to see her back in clinic in 6 weeks if not improving.  Discussed at that time can proceed with subacromial injection if necessary.  She verbalized understanding of all information agrees with plan of care.  Again does not wish to proceed with any surgical intervention at this time.  Denies other concerns present time.  Large Joint Arthrocentesis: R glenohumeral  Date/Time: 1/22/2021 2:42 PM  Consent given by: patient  Site marked: site marked  Timeout: Immediately prior to procedure a time out was called to verify the correct patient, procedure, equipment, support staff and site/side marked as required   Supporting Documentation  Indications: pain   Procedure Details  Location: shoulder - R glenohumeral  Preparation: Patient was prepped and draped in the usual sterile fashion  Needle size: 20 G  Approach: anterior  Medications administered: 80 mg triamcinolone acetonide 40 MG/ML; 4 mL lidocaine PF 1% 1 %  Patient tolerance: patient tolerated the procedure well with no immediate complications          Orders:  Orders Placed This Encounter   Procedures   • Large Joint Arthrocentesis       Medications:  No orders of the defined types were placed in this encounter.      Followup:  No follow-ups on file.    Diagnoses and all orders for this visit:    1. Chronic right shoulder pain (Primary)  -     Cancel: XR Shoulder 2+ View Right    2. Primary osteoarthritis, right shoulder    3. Subacromial bursitis of right shoulder joint    Other orders  -     Large Joint Arthrocentesis        Dictated utilizing Dragon dictation

## 2021-01-26 RX ORDER — LEVOTHYROXINE SODIUM 88 UG/1
TABLET ORAL
Qty: 90 TABLET | Refills: 0 | OUTPATIENT
Start: 2021-01-26

## 2021-01-28 ENCOUNTER — IMMUNIZATION (OUTPATIENT)
Dept: VACCINE CLINIC | Facility: HOSPITAL | Age: 76
End: 2021-01-28

## 2021-01-28 PROCEDURE — 0011A: CPT | Performed by: OBSTETRICS & GYNECOLOGY

## 2021-01-28 PROCEDURE — 91301 HC SARSCO02 VAC 100MCG/0.5ML IM: CPT | Performed by: OBSTETRICS & GYNECOLOGY

## 2021-01-29 RX ORDER — LEVOTHYROXINE SODIUM 88 UG/1
TABLET ORAL
Qty: 90 TABLET | Refills: 0 | OUTPATIENT
Start: 2021-01-29

## 2021-01-31 ENCOUNTER — APPOINTMENT (OUTPATIENT)
Dept: VACCINE CLINIC | Facility: HOSPITAL | Age: 76
End: 2021-01-31

## 2021-01-31 RX ORDER — LEVOTHYROXINE SODIUM 88 UG/1
TABLET ORAL
Qty: 90 TABLET | Refills: 0 | OUTPATIENT
Start: 2021-01-31

## 2021-02-03 RX ORDER — LEVOTHYROXINE SODIUM 88 UG/1
TABLET ORAL
Qty: 90 TABLET | Refills: 0 | Status: SHIPPED | OUTPATIENT
Start: 2021-02-03

## 2021-02-19 ENCOUNTER — TELEPHONE (OUTPATIENT)
Dept: SLEEP MEDICINE | Facility: HOSPITAL | Age: 76
End: 2021-02-19

## 2021-02-23 ENCOUNTER — TRANSCRIBE ORDERS (OUTPATIENT)
Dept: OBSTETRICS AND GYNECOLOGY | Facility: CLINIC | Age: 76
End: 2021-02-23

## 2021-02-23 DIAGNOSIS — Z01.818 OTHER SPECIFIED PRE-OPERATIVE EXAMINATION: Primary | ICD-10-CM

## 2021-02-23 DIAGNOSIS — G47.33 OSA (OBSTRUCTIVE SLEEP APNEA): Primary | ICD-10-CM

## 2021-02-23 DIAGNOSIS — G47.34 HYPOXIA, SLEEP RELATED: ICD-10-CM

## 2021-02-25 ENCOUNTER — IMMUNIZATION (OUTPATIENT)
Dept: VACCINE CLINIC | Facility: HOSPITAL | Age: 76
End: 2021-02-25

## 2021-02-25 PROCEDURE — 0012A: CPT | Performed by: OBSTETRICS & GYNECOLOGY

## 2021-02-25 PROCEDURE — 91301 HC SARSCO02 VAC 100MCG/0.5ML IM: CPT | Performed by: OBSTETRICS & GYNECOLOGY

## 2021-03-08 RX ORDER — CELECOXIB 200 MG/1
CAPSULE ORAL
Qty: 30 CAPSULE | Refills: 1 | Status: SHIPPED | OUTPATIENT
Start: 2021-03-08 | End: 2021-05-04

## 2021-03-09 ENCOUNTER — LAB (OUTPATIENT)
Dept: LAB | Facility: HOSPITAL | Age: 76
End: 2021-03-09

## 2021-03-09 ENCOUNTER — TELEPHONE (OUTPATIENT)
Dept: ORTHOPEDIC SURGERY | Facility: CLINIC | Age: 76
End: 2021-03-09

## 2021-03-09 DIAGNOSIS — Z01.818 OTHER SPECIFIED PRE-OPERATIVE EXAMINATION: ICD-10-CM

## 2021-03-09 NOTE — TELEPHONE ENCOUNTER
Caller: PATIENT    Relationship to patient: SELF      Best call back number:  [phone]     Chief complaint: RIGHT SHOULDER        Additional notes:PT. STATES THAT SHE GOT A CORTISONE INJECTION IN HER RIGHT SHOULDER ON 01/22/21. PT. STATES THAT SHE IS HAVING PAIN AGAIN. STATES THAT MAJO NANCY MENTIONED THAT SHE MIGHT BE ABLE TO GET THE ANOTHER CORTISONE INJECTION IN A DIFFERENT AREA OF HER SHOULDER .   PLEASE CALL TO ADVISE.

## 2021-03-11 ENCOUNTER — APPOINTMENT (OUTPATIENT)
Dept: SLEEP MEDICINE | Facility: HOSPITAL | Age: 76
End: 2021-03-11

## 2021-03-16 ENCOUNTER — OFFICE VISIT (OUTPATIENT)
Dept: ORTHOPEDIC SURGERY | Facility: CLINIC | Age: 76
End: 2021-03-16

## 2021-03-16 VITALS — WEIGHT: 238 LBS | BODY MASS INDEX: 38.25 KG/M2 | HEIGHT: 66 IN

## 2021-03-16 DIAGNOSIS — M25.511 ACUTE PAIN OF RIGHT SHOULDER: Primary | ICD-10-CM

## 2021-03-16 DIAGNOSIS — M19.011 PRIMARY OSTEOARTHRITIS, RIGHT SHOULDER: ICD-10-CM

## 2021-03-16 DIAGNOSIS — M75.51 SUBACROMIAL BURSITIS OF RIGHT SHOULDER JOINT: ICD-10-CM

## 2021-03-16 PROCEDURE — 99213 OFFICE O/P EST LOW 20 MIN: CPT | Performed by: NURSE PRACTITIONER

## 2021-03-16 PROCEDURE — 20610 DRAIN/INJ JOINT/BURSA W/O US: CPT | Performed by: NURSE PRACTITIONER

## 2021-03-16 PROCEDURE — 73030 X-RAY EXAM OF SHOULDER: CPT | Performed by: NURSE PRACTITIONER

## 2021-03-16 RX ADMIN — TRIAMCINOLONE ACETONIDE 80 MG: 40 INJECTION, SUSPENSION INTRA-ARTICULAR; INTRAMUSCULAR at 14:21

## 2021-03-16 RX ADMIN — LIDOCAINE HYDROCHLORIDE 4 ML: 10 INJECTION, SOLUTION EPIDURAL; INFILTRATION; INTRACAUDAL; PERINEURAL at 14:21

## 2021-03-16 NOTE — PROGRESS NOTES
Subjective:     Patient ID: Maria Ines Zhang is a 75 y.o. female.    Chief Complaint:  Follow-up DJD right shoulder  Subacromial bursitis, right shoulder  History of Present Illness  [facility] for [facility] of her right shoulder.  Continues to experience pain at the anterior and lateral aspect of the shoulder.  Received corticosteroid injection last visit 1/22/2021 glenohumeral joint with few weeks symptom relief.  She continues to attempt to active range of motion as tolerated.  Denies presence of numbness or tingling radiating down the right upper extremity.  Range of motion has not decreased over the last few years again is not ready for any surgical intervention at this time.  Denies other concerns present time.     Social History     Occupational History   • Not on file   Tobacco Use   • Smoking status: Never Smoker   • Smokeless tobacco: Never Used   Substance and Sexual Activity   • Alcohol use: Defer   • Drug use: Defer   • Sexual activity: Defer      Past Medical History:   Diagnosis Date   • Anxiety    • Asthma    • Benign essential hypertension    • Benign liver cyst 2/15/2018    Founds incidentally on CT scan of abdomen. Not clinically significant in size or appearance.    • Colitis    • Cryptogenic stroke (CMS/HCC) 8/3/2017   • DDD (degenerative disc disease), lumbar 12/10/2012    W/ spondylolisthesis, Patient has had 2 epidurals previously-Dr. Micheal Marquez   • DDD (degenerative disc disease), lumbosacral    • Depression    • Diverticulosis 2/15/2018   • Fatigue    • GERD (gastroesophageal reflux disease)    • H/O acute respiratory failure 02/25/2016    With bilateral pulmonary infiltrates-Dr. Jose Maria Funk   • Hyperlipidemia    • Hypertension    • Hypothyroidism    • IFG (impaired fasting glucose) 8/7/2017   • Mitral valve insufficiency     nild   • MRSA infection within last 3 months 05/2017    RIGHT FOOT   • Obesity    • Obstructive sleep apnea     USES CPAP   • Osteoarthritis     • Patent foramen ovale    • Pulmonary embolism (CMS/HCC)     3 SEPARATE OCCASIONS   • Pulmonary hypertension (CMS/HCC)    • SBO (small bowel obstruction) (CMS/HCC)    • Sciatica    • Venous stasis      Past Surgical History:   Procedure Laterality Date   • BRONCHOSCOPY WITH ENDOSCOPY Right 02/25/2016    Exploratory bronchoscopy, exploratory RT video-assisted thoracoscopy and wedge resection of RT upper lobe and Subpleural pain catheter x2-Dr. Jose Maria Funk   • CATARACT EXTRACTION W/ INTRAOCULAR LENS IMPLANT Bilateral 2016   • COLONOSCOPY N/A 3/1/2018    Procedure: COLONOSCOPY INTO CECUM/ TERMINAL ILEUM WITH BIOPSIES AND CLIP X 1;  Surgeon: Ritchie Garland MD;  Location: [facility] ENDOSCOPY;  Service:    • COLONOSCOPY N/A 10/02/2008    Diverticulosis of the sigmoid colon-Dr. Salud Lowry   • HYSTERECTOMY     • LAPAROSCOPIC CHOLECYSTECTOMY N/A 10/01/2003    Dr. Nino Torres   • LUMBAR EPIDURAL INJECTION N/A 12/10/2012    Multiple lumbar epidural injections-Dr. Micheal Marquez   • LUMBAR FUSION N/A 08/2012   • [place] TOTAL KNEE ARTHROPLASTY Right 7/6/2017    Procedure: TOTAL KNEE ARTHROPLASTY;  Surgeon: Dragan De La Vega MD;  Location: [facility] MAIN OR;  Service: Orthopedics   • TOE AMPUTATION Right 05/2017    SECOND TOE   • TOTAL KNEE ARTHROPLASTY Left 11/13/2006    Dr. Jerome Weber   • UPPER GASTROINTESTINAL ENDOSCOPY N/A 10/02/2008    Normal esophagus, gastric mucosal abnormality characterized by erythema, normal examined duodenum-Dr. Salud Lowry       Family History   Problem Relation Age of Onset   • Heart disease Mother 60   • Diabetes Mother    • Stroke Mother 83   • Melanoma Father    • Heart attack Brother    • Heart disease Brother         Valve problem    • Heart attack Maternal Grandmother    • Heart attack Maternal Grandfather    • No Known Problems Paternal Grandmother    • No Known Problems Paternal Grandfather    • Malig Hyperthermia Neg Hx    • Breast cancer Neg Hx            Objective:  Physical  "Exam    General: No acute distress.  Eyes: conjunctiva clear; pupils equally round and reactive  ENT: external ears and nose atraumatic; oropharynx clear  CV: no peripheral edema  Resp: normal respiratory effort  Skin: no rashes or wounds; normal turgor  Psych: mood and affect appropriate; recent and remote memory intact    Vitals:    03/16/21 1338   Weight: 108 kg (238 lb)   Height: 167.6 cm (66\")         03/16/21  1338   Weight: 108 kg (238 lb)     Body mass index is 38.41 kg/m².      Ortho Exam       Right shoulder exam:  Passive forward flexion to 90 degrees, active forward flexion 70 degrees  External rotation, passive to 45 degrees, active to 40 degrees, external rotation strength 4- out of 5  Positive sensation light touch distributions of the right upper extremity  Flex extend all digits right hand  2+ distal radial pulse    Assessment:        1. Acute pain of right shoulder    2. Primary osteoarthritis, right shoulder    3. Subacromial bursitis of right shoulder joint           Plan:  1.  Discussed plan of care with patient.  Wish to proceed corticosteroid injection subacromial bursa.  Can repeat glenohumeral injection in approximately a month.  Discussed if she ever does want to proceed with surgical intervention would be a reverse total shoulder right upper extremity discussed lack of rotator cuff right upper extremity she would be much better off with a reverse total shoulder replacement.  She verbalized understanding again does not wish to proceed with surgical intervention at this time.  Denies concerns present time.  Large Joint Arthrocentesis: R subacromial bursa  Date/Time: 3/16/2021 2:21 PM  Consent given by: patient  Site marked: site marked  Timeout: Immediately prior to procedure a time out was called to verify the correct patient, procedure, equipment, support staff and site/side marked as required   Supporting Documentation  Indications: pain   Procedure Details  Location: shoulder - R " subacromial bursa  Preparation: Patient was prepped and draped in the usual sterile fashion  Needle size: 22 G  Approach: lateral  Medications administered: 80 mg triamcinolone acetonide 40 MG/ML; 4 mL lidocaine PF 1% 1 %  Patient tolerance: patient tolerated the procedure well with no immediate complications        Orders:  Orders Placed This Encounter   Procedures   • Large Joint Arthrocentesis: R subacromial bursa   • XR Shoulder 2+ View Right       Medications:  No orders of the defined types were placed in this encounter.      Followup:  No follow-ups on file.    Diagnoses and all orders for this visit:    1. Acute pain of right shoulder (Primary)  -     XR Shoulder 2+ View Right    2. Primary osteoarthritis, right shoulder    3. Subacromial bursitis of right shoulder joint    Other orders  -     Large Joint Arthrocentesis: R subacromial bursa          Dictated utilizing Dragon dictation

## 2021-03-17 RX ORDER — LIDOCAINE HYDROCHLORIDE 10 MG/ML
4 INJECTION, SOLUTION EPIDURAL; INFILTRATION; INTRACAUDAL; PERINEURAL
Status: COMPLETED | OUTPATIENT
Start: 2021-03-16 | End: 2021-03-16

## 2021-03-17 RX ORDER — TRIAMCINOLONE ACETONIDE 40 MG/ML
80 INJECTION, SUSPENSION INTRA-ARTICULAR; INTRAMUSCULAR
Status: COMPLETED | OUTPATIENT
Start: 2021-03-16 | End: 2021-03-16

## 2021-03-29 ENCOUNTER — LAB (OUTPATIENT)
Dept: LAB | Facility: HOSPITAL | Age: 76
End: 2021-03-29

## 2021-05-04 RX ORDER — CELECOXIB 200 MG/1
CAPSULE ORAL
Qty: 30 CAPSULE | Refills: 0 | Status: SHIPPED | OUTPATIENT
Start: 2021-05-04 | End: 2021-06-07

## 2021-05-12 ENCOUNTER — TRANSCRIBE ORDERS (OUTPATIENT)
Dept: OBSTETRICS AND GYNECOLOGY | Facility: CLINIC | Age: 76
End: 2021-05-12

## 2021-05-12 DIAGNOSIS — Z01.818 OTHER SPECIFIED PRE-OPERATIVE EXAMINATION: Primary | ICD-10-CM

## 2021-05-19 ENCOUNTER — TRANSCRIBE ORDERS (OUTPATIENT)
Dept: SLEEP MEDICINE | Facility: HOSPITAL | Age: 76
End: 2021-05-19

## 2021-05-19 DIAGNOSIS — G47.33 OSA (OBSTRUCTIVE SLEEP APNEA): Primary | ICD-10-CM

## 2021-05-25 ENCOUNTER — LAB (OUTPATIENT)
Dept: LAB | Facility: HOSPITAL | Age: 76
End: 2021-05-25

## 2021-05-25 DIAGNOSIS — Z01.818 OTHER SPECIFIED PRE-OPERATIVE EXAMINATION: ICD-10-CM

## 2021-05-27 ENCOUNTER — APPOINTMENT (OUTPATIENT)
Dept: SLEEP MEDICINE | Facility: HOSPITAL | Age: 76
End: 2021-05-27

## 2021-06-01 ENCOUNTER — TRANSCRIBE ORDERS (OUTPATIENT)
Dept: OBSTETRICS AND GYNECOLOGY | Facility: CLINIC | Age: 76
End: 2021-06-01

## 2021-06-01 DIAGNOSIS — Z01.818 OTHER SPECIFIED PRE-OPERATIVE EXAMINATION: Primary | ICD-10-CM

## 2021-06-07 RX ORDER — CELECOXIB 200 MG/1
CAPSULE ORAL
Qty: 30 CAPSULE | Refills: 0 | Status: SHIPPED | OUTPATIENT
Start: 2021-06-07 | End: 2021-07-06

## 2021-06-16 ENCOUNTER — APPOINTMENT (OUTPATIENT)
Dept: SLEEP MEDICINE | Facility: HOSPITAL | Age: 76
End: 2021-06-16

## 2021-06-17 ENCOUNTER — OFFICE VISIT (OUTPATIENT)
Dept: ORTHOPEDIC SURGERY | Facility: CLINIC | Age: 76
End: 2021-06-17

## 2021-06-17 VITALS — BODY MASS INDEX: 38.25 KG/M2 | WEIGHT: 238 LBS | HEIGHT: 66 IN

## 2021-06-17 DIAGNOSIS — M19.011 PRIMARY OSTEOARTHRITIS, RIGHT SHOULDER: Primary | ICD-10-CM

## 2021-06-17 DIAGNOSIS — M75.51 SUBACROMIAL BURSITIS OF RIGHT SHOULDER JOINT: ICD-10-CM

## 2021-06-17 PROCEDURE — 20610 DRAIN/INJ JOINT/BURSA W/O US: CPT | Performed by: NURSE PRACTITIONER

## 2021-06-17 PROCEDURE — 99213 OFFICE O/P EST LOW 20 MIN: CPT | Performed by: NURSE PRACTITIONER

## 2021-06-17 RX ORDER — TRIAMCINOLONE ACETONIDE 40 MG/ML
80 INJECTION, SUSPENSION INTRA-ARTICULAR; INTRAMUSCULAR
Status: COMPLETED | OUTPATIENT
Start: 2021-06-17 | End: 2021-06-17

## 2021-06-17 RX ORDER — LOSARTAN POTASSIUM 50 MG/1
TABLET ORAL
COMMUNITY
Start: 2021-06-13

## 2021-06-17 RX ORDER — LIDOCAINE HYDROCHLORIDE 10 MG/ML
4 INJECTION, SOLUTION EPIDURAL; INFILTRATION; INTRACAUDAL; PERINEURAL
Status: COMPLETED | OUTPATIENT
Start: 2021-06-17 | End: 2021-06-17

## 2021-06-17 RX ADMIN — TRIAMCINOLONE ACETONIDE 80 MG: 40 INJECTION, SUSPENSION INTRA-ARTICULAR; INTRAMUSCULAR at 09:24

## 2021-06-17 RX ADMIN — LIDOCAINE HYDROCHLORIDE 4 ML: 10 INJECTION, SOLUTION EPIDURAL; INFILTRATION; INTRACAUDAL; PERINEURAL at 09:24

## 2021-06-17 NOTE — PROGRESS NOTES
Subjective:     Patient ID: Maria Ines Zhang is a 75 y.o. female.    Chief Complaint:  Follow-up DJD right shoulder  Subacromial bursitis, right shoulder  Corticosteroid injection 03/16/2021 right subacromial  History of Present Illness  Maria Ines Zhang returns to clinic today for corticosteroid injection of the right shoulder.  Maximal tenderness present the anterior aspect of the shoulder with pain radiating inferiorly down to the biceps muscle.  Increased pain noted reaching out to the side, reaching in front continues to experience significantly decreased range of motion.  Has held off on any surgical intervention given her 's health.  Has completed corticosteroid injections in the past.  Denies other concerns present time.     Social History     Occupational History   • Not on file   Tobacco Use   • Smoking status: Never Smoker   • Smokeless tobacco: Never Used   Substance and Sexual Activity   • Alcohol use: Defer   • Drug use: Defer   • Sexual activity: Defer      Past Medical History:   Diagnosis Date   • Anxiety    • Asthma    • Benign essential hypertension    • Benign liver cyst 2/15/2018    Founds incidentally on CT scan of abdomen. Not clinically significant in size or appearance.    • Colitis    • Cryptogenic stroke (CMS/HCC) 8/3/2017   • DDD (degenerative disc disease), lumbar 12/10/2012    W/ spondylolisthesis, Patient has had 2 epidurals previously-Dr. Micheal Marquez   • DDD (degenerative disc disease), lumbosacral    • Depression    • Diverticulosis 2/15/2018   • Fatigue    • GERD (gastroesophageal reflux disease)    • H/O acute respiratory failure 02/25/2016    With bilateral pulmonary infiltrates-Dr. Jose Maria Funk   • Hyperlipidemia    • Hypertension    • Hypothyroidism    • IFG (impaired fasting glucose) 8/7/2017   • Mitral valve insufficiency     nild   • MRSA infection within last 3 months 05/2017    RIGHT FOOT   • Obesity    • Obstructive sleep apnea     USES CPAP   • Osteoarthritis     • Patent foramen ovale    • Pulmonary embolism (CMS/HCC)     3 SEPARATE OCCASIONS   • Pulmonary hypertension (CMS/HCC)    • SBO (small bowel obstruction) (CMS/HCC)    • Sciatica    • Venous stasis      Past Surgical History:   Procedure Laterality Date   • BRONCHOSCOPY WITH ENDOSCOPY Right 02/25/2016    Exploratory bronchoscopy, exploratory RT video-assisted thoracoscopy and wedge resection of RT upper lobe and Subpleural pain catheter x2-Dr. Jose Maria Funk   • CATARACT EXTRACTION W/ INTRAOCULAR LENS IMPLANT Bilateral 2016   • COLONOSCOPY N/A 3/1/2018    Procedure: COLONOSCOPY INTO CECUM/ TERMINAL ILEUM WITH BIOPSIES AND CLIP X 1;  Surgeon: Ritchie Garland MD;  Location: [facility] ENDOSCOPY;  Service:    • COLONOSCOPY N/A 10/02/2008    Diverticulosis of the sigmoid colon-Dr. Salud Lowry   • HYSTERECTOMY     • LAPAROSCOPIC CHOLECYSTECTOMY N/A 10/01/2003    Dr. Nino Torres   • LUMBAR EPIDURAL INJECTION N/A 12/10/2012    Multiple lumbar epidural injections-Dr. Micheal Marquez   • LUMBAR FUSION N/A 08/2012   • [place] TOTAL KNEE ARTHROPLASTY Right 7/6/2017    Procedure: TOTAL KNEE ARTHROPLASTY;  Surgeon: Dragan De La Vega MD;  Location: [facility] MAIN OR;  Service: Orthopedics   • TOE AMPUTATION Right 05/2017    SECOND TOE   • TOTAL KNEE ARTHROPLASTY Left 11/13/2006    Dr. Jerome Weber   • UPPER GASTROINTESTINAL ENDOSCOPY N/A 10/02/2008    Normal esophagus, gastric mucosal abnormality characterized by erythema, normal examined duodenum-Dr. Salud Lowry       Family History   Problem Relation Age of Onset   • Heart disease Mother 60   • Diabetes Mother    • Stroke Mother 83   • Melanoma Father    • Heart attack Brother    • Heart disease Brother         Valve problem    • Heart attack Maternal Grandmother    • Heart attack Maternal Grandfather    • No Known Problems Paternal Grandmother    • No Known Problems Paternal Grandfather    • Malig Hyperthermia Neg Hx    • Breast cancer Neg Hx        Objective:  Physical  "Exam    General: No acute distress.  Eyes: conjunctiva clear; pupils equally round and reactive  ENT: external ears and nose atraumatic; oropharynx clear  CV: no peripheral edema  Resp: normal respiratory effort  Skin: no rashes or wounds; normal turgor  Psych: mood and affect appropriate; recent and remote memory intact    Vitals:    06/17/21 1303   Weight: 108 kg (238 lb)   Height: 167.6 cm (66\")         06/17/21  1303   Weight: 108 kg (238 lb)     Body mass index is 38.41 kg/m².      Right Shoulder Exam     Range of Motion   External rotation: 40   Forward flexion: 100     Muscle Strength   Internal rotation: 3/5   External rotation: 3/5   Supraspinatus: 3/5   Subscapularis: 3/5   Biceps: 3/5     Comments:  Positive sensation light touch distributions of the right upper extremity  Flex extend all digits right hand  2+ distal radial pulse               Assessment:        1. Primary osteoarthritis, right shoulder    2. Subacromial bursitis of right shoulder joint           Plan:  1.  Discussed plan of care with patient.  Discussed application of ice at injection site watch for any erythema, drainage or any other abnormality encouraged to call clinic with any concerns that she has or any other concerns that may arise.  Discussed total options including reverse total shoulder arthroplasty versus continue with corticosteroid injections.  Long discussion with procedure and limitations after surgery.  She verbalized understanding would like to hold off on any total joint replacement surgery at this time.  Discussed corticosteroid injections can be repeated in 3 months.  All questions answered.  All questions answered.  Large Joint Arthrocentesis: R subacromial bursa  Date/Time: 6/17/2021 9:24 AM  Consent given by: patient  Site marked: site marked  Timeout: Immediately prior to procedure a time out was called to verify the correct patient, procedure, equipment, support staff and site/side marked as required   Supporting " Documentation  Indications: pain   Procedure Details  Location: shoulder - R subacromial bursa  Preparation: Patient was prepped and draped in the usual sterile fashion  Needle size: 22 G  Approach: anterior  Medications administered: 80 mg triamcinolone acetonide 40 MG/ML; 4 mL lidocaine PF 1% 1 %  Patient tolerance: patient tolerated the procedure well with no immediate complications          Orders:  Orders Placed This Encounter   Procedures   • Large Joint Arthrocentesis: R glenohumeral       Medications:  No orders of the defined types were placed in this encounter.      Followup:  No follow-ups on file.    Diagnoses and all orders for this visit:    1. Primary osteoarthritis, right shoulder (Primary)  -     Large Joint Arthrocentesis: R glenohumeral    2. Subacromial bursitis of right shoulder joint  -     Large Joint Arthrocentesis: R glenohumeral        Dictated utilizing Dragon dictation

## 2021-06-28 ENCOUNTER — TRANSCRIBE ORDERS (OUTPATIENT)
Dept: OBSTETRICS AND GYNECOLOGY | Facility: CLINIC | Age: 76
End: 2021-06-28

## 2021-06-28 DIAGNOSIS — Z01.818 OTHER SPECIFIED PRE-OPERATIVE EXAMINATION: Primary | ICD-10-CM

## 2021-07-06 RX ORDER — CELECOXIB 200 MG/1
CAPSULE ORAL
Qty: 30 CAPSULE | Refills: 0 | Status: SHIPPED | OUTPATIENT
Start: 2021-07-06 | End: 2021-08-09

## 2021-07-10 ENCOUNTER — APPOINTMENT (OUTPATIENT)
Dept: LAB | Facility: HOSPITAL | Age: 76
End: 2021-07-10

## 2021-07-12 ENCOUNTER — LAB (OUTPATIENT)
Dept: LAB | Facility: HOSPITAL | Age: 76
End: 2021-07-12

## 2021-07-12 DIAGNOSIS — Z01.818 OTHER SPECIFIED PRE-OPERATIVE EXAMINATION: ICD-10-CM

## 2021-07-12 LAB — SARS-COV-2 RNA PNL SPEC NAA+PROBE: NOT DETECTED

## 2021-07-12 PROCEDURE — C9803 HOPD COVID-19 SPEC COLLECT: HCPCS | Performed by: OBSTETRICS & GYNECOLOGY

## 2021-07-12 PROCEDURE — 87635 SARS-COV-2 COVID-19 AMP PRB: CPT | Performed by: OBSTETRICS & GYNECOLOGY

## 2021-07-13 ENCOUNTER — HOSPITAL ENCOUNTER (OUTPATIENT)
Dept: SLEEP MEDICINE | Facility: HOSPITAL | Age: 76
Discharge: HOME | End: 2021-07-13
Admitting: INTERNAL MEDICINE

## 2021-07-13 DIAGNOSIS — G47.33 OSA (OBSTRUCTIVE SLEEP APNEA): ICD-10-CM

## 2021-07-13 PROCEDURE — 95811 POLYSOM 6/>YRS CPAP 4/> PARM: CPT

## 2021-07-21 ENCOUNTER — TELEPHONE (OUTPATIENT)
Dept: SLEEP MEDICINE | Facility: HOSPITAL | Age: 76
End: 2021-07-21

## 2021-08-09 RX ORDER — CELECOXIB 200 MG/1
CAPSULE ORAL
Qty: 30 CAPSULE | Refills: 0 | Status: SHIPPED | OUTPATIENT
Start: 2021-08-09 | End: 2021-09-07

## 2021-08-20 ENCOUNTER — APPOINTMENT (OUTPATIENT)
Dept: SLEEP MEDICINE | Facility: HOSPITAL | Age: 76
End: 2021-08-20

## 2021-09-07 RX ORDER — CELECOXIB 200 MG/1
CAPSULE ORAL
Qty: 30 CAPSULE | Refills: 0 | Status: SHIPPED | OUTPATIENT
Start: 2021-09-07 | End: 2021-10-06

## 2021-10-06 RX ORDER — CELECOXIB 200 MG/1
CAPSULE ORAL
Qty: 30 CAPSULE | Refills: 0 | Status: SHIPPED | OUTPATIENT
Start: 2021-10-06 | End: 2021-11-09

## 2021-10-08 ENCOUNTER — APPOINTMENT (OUTPATIENT)
Dept: SLEEP MEDICINE | Facility: HOSPITAL | Age: 76
End: 2021-10-08

## 2021-10-22 ENCOUNTER — APPOINTMENT (OUTPATIENT)
Dept: SLEEP MEDICINE | Facility: HOSPITAL | Age: 76
End: 2021-10-22

## 2021-11-09 RX ORDER — CELECOXIB 200 MG/1
CAPSULE ORAL
Qty: 30 CAPSULE | Refills: 0 | Status: SHIPPED | OUTPATIENT
Start: 2021-11-09 | End: 2021-12-06 | Stop reason: SDUPTHER

## 2021-12-06 RX ORDER — CELECOXIB 200 MG/1
200 CAPSULE ORAL DAILY
Qty: 30 CAPSULE | Refills: 0 | Status: SHIPPED | OUTPATIENT
Start: 2021-12-06 | End: 2022-01-03

## 2021-12-06 NOTE — TELEPHONE ENCOUNTER
Rx refill  Last refill 11.06.21  Last visit 06.17.21  [facility] patient  DX:  1. Primary osteoarthritis, right shoulder    2. Subacromial bursitis of right shoulder joint

## 2022-01-03 RX ORDER — CELECOXIB 200 MG/1
CAPSULE ORAL
Qty: 30 CAPSULE | Refills: 0 | Status: SHIPPED | OUTPATIENT
Start: 2022-01-03 | End: 2022-02-01 | Stop reason: SDUPTHER

## 2022-01-03 NOTE — TELEPHONE ENCOUNTER
RX Refill request.    Last refill 11.06.21  Last visit 06.17.21  [facility] patient  DX:  1. Primary osteoarthritis, right shoulder    2. Subacromial bursitis of right shoulder joint

## 2022-01-26 ENCOUNTER — TRANSCRIBE ORDERS (OUTPATIENT)
Dept: ADMINISTRATIVE | Facility: HOSPITAL | Age: 77
End: 2022-01-26

## 2022-01-26 DIAGNOSIS — Z12.31 VISIT FOR SCREENING MAMMOGRAM: Primary | ICD-10-CM

## 2022-02-01 RX ORDER — CELECOXIB 200 MG/1
200 CAPSULE ORAL DAILY
Qty: 30 CAPSULE | Refills: 4 | Status: SHIPPED | OUTPATIENT
Start: 2022-02-01 | End: 2022-06-13

## 2022-02-01 NOTE — TELEPHONE ENCOUNTER
Rx Refill Note  Requested Prescriptions     Pending Prescriptions Disp Refills   • celecoxib (CeleBREX) 200 MG capsule 30 capsule 0     Sig: Take 1 capsule by mouth Daily.      Last office visit with prescribing clinician: 6/17/2021      Next office visit with prescribing clinician: Visit date not found   Last Filled:01.03.2022        Primary osteoarthritis, right shoulder    2. Subacromial bursitis of right shoulder joint              Kassidy Brown MA  02/01/22, 15:37 EST    Previous RX pended for your approval, change or denial.     {TIP  Encounters:    {TIP  Please add Last Relevant Lab Date if appropriate:  {TIP  Is Refill Pharmacy correct?:

## 2022-02-07 ENCOUNTER — HOSPITAL ENCOUNTER (OUTPATIENT)
Dept: MAMMOGRAPHY | Facility: HOSPITAL | Age: 77
Discharge: HOME | End: 2022-02-07
Admitting: NURSE PRACTITIONER

## 2022-02-07 DIAGNOSIS — Z12.31 VISIT FOR SCREENING MAMMOGRAM: ICD-10-CM

## 2022-02-07 PROCEDURE — 77067 SCR MAMMO BI INCL CAD: CPT

## 2022-02-07 PROCEDURE — 77063 BREAST TOMOSYNTHESIS BI: CPT

## 2022-02-24 ENCOUNTER — OFFICE VISIT (OUTPATIENT)
Dept: ORTHOPEDIC SURGERY | Facility: CLINIC | Age: 77
End: 2022-02-24

## 2022-02-24 VITALS
WEIGHT: 220 LBS | BODY MASS INDEX: 35.36 KG/M2 | SYSTOLIC BLOOD PRESSURE: 161 MMHG | HEIGHT: 66 IN | HEART RATE: 77 BPM | DIASTOLIC BLOOD PRESSURE: 74 MMHG

## 2022-02-24 DIAGNOSIS — Z96.652 PAIN DUE TO TOTAL LEFT KNEE REPLACEMENT, INITIAL ENCOUNTER: ICD-10-CM

## 2022-02-24 DIAGNOSIS — T84.84XA PAIN DUE TO TOTAL LEFT KNEE REPLACEMENT, INITIAL ENCOUNTER: ICD-10-CM

## 2022-02-24 DIAGNOSIS — M25.562 LEFT KNEE PAIN, UNSPECIFIED CHRONICITY: Primary | ICD-10-CM

## 2022-02-24 PROCEDURE — 73562 X-RAY EXAM OF KNEE 3: CPT | Performed by: NURSE PRACTITIONER

## 2022-02-24 PROCEDURE — 99214 OFFICE O/P EST MOD 30 MIN: CPT | Performed by: NURSE PRACTITIONER

## 2022-02-24 RX ORDER — PREDNISONE 1 MG/1
TABLET ORAL
Qty: 21 TABLET | Refills: 0 | Status: SHIPPED | OUTPATIENT
Start: 2022-02-24

## 2022-03-03 PROBLEM — Z96.652 PAIN DUE TO TOTAL LEFT KNEE REPLACEMENT (HCC): Status: ACTIVE | Noted: 2022-03-03

## 2022-03-03 PROBLEM — T84.84XA PAIN DUE TO TOTAL LEFT KNEE REPLACEMENT (HCC): Status: ACTIVE | Noted: 2022-03-03

## 2022-06-13 RX ORDER — CELECOXIB 200 MG/1
CAPSULE ORAL
Qty: 30 CAPSULE | Refills: 4 | Status: SHIPPED | OUTPATIENT
Start: 2022-06-13 | End: 2022-12-19

## 2022-06-13 NOTE — TELEPHONE ENCOUNTER
Rx Refill Note  Requested Prescriptions     Pending Prescriptions Disp Refills    celecoxib (CeleBREX) 200 MG capsule [Pharmacy Med Name: CELECOXIB 200 MG CAPSULE] 30 capsule 4     Sig: TAKE ONE CAPSULE BY MOUTH DAILY      Last office visit with prescribing clinician: 2/24/2022      Next office visit with prescribing clinician: Visit date not found   Last Filled:02.01.2022    Dx:  1. Left knee pain, unspecified chronicity    2. Pain due to total left knee replacement, initial encounter ([facility])         Maggy Prajapati MA  06/13/22, 12:18 EDT    {TIP  Encounters:    {TIP  Please add Last Relevant Lab Date if appropriate:  {TIP  Is Refill Pharmacy correct?:

## 2022-12-19 RX ORDER — CELECOXIB 200 MG/1
CAPSULE ORAL
Qty: 30 CAPSULE | Refills: 4 | Status: SHIPPED | OUTPATIENT
Start: 2022-12-19

## 2022-12-19 NOTE — TELEPHONE ENCOUNTER
Rx Refill Note  Requested Prescriptions     Pending Prescriptions Disp Refills    celecoxib (CeleBREX) 200 MG capsule [Pharmacy Med Name: CELECOXIB 200 MG CAPSULE] 30 capsule 4     Sig: TAKE ONE CAPSULE BY MOUTH DAILY      Last office visit with prescribing clinician: 2/24/2022      Next office visit with prescribing clinician: Visit date not found   Last Filled:06.13.2022    DX:  1. Left knee pain, unspecified chronicity    2. Pain due to total left knee replacement, initial encounter ([facility])         Maggy Prajapati MA  12/19/22, 09:11 EST    {TIP  Encounters:    {TIP  Please add Last Relevant Lab Date if appropriate:  {TIP  Is Refill Pharmacy correct?:

## 2023-05-24 RX ORDER — CELECOXIB 200 MG/1
CAPSULE ORAL
Qty: 30 CAPSULE | Refills: 4 | Status: SHIPPED | OUTPATIENT
Start: 2023-05-24

## 2023-05-24 NOTE — TELEPHONE ENCOUNTER
Rx Refill Note  Requested Prescriptions     Pending Prescriptions Disp Refills    celecoxib (CeleBREX) 200 MG capsule [Pharmacy Med Name: CELECOXIB 200 MG CAPSULE] 30 capsule 4     Sig: TAKE ONE CAPSULE BY MOUTH DAILY      Last office visit with prescribing clinician: 2/24/2022      Next office visit with prescribing clinician: Visit date not found   Last Filled: 12.19.2022    DX:   1. Left knee pain, unspecified chronicity    2. Pain due to total left knee replacement, initial encounter ([facility])        Maggy Prajapati MA  05/24/23, 08:46 EDT    {TIP  Encounters:    {TIP  Please add Last Relevant Lab Date if appropriate:  {TIP  Is Refill Pharmacy correct?:

## 2023-12-27 ENCOUNTER — TELEPHONE (OUTPATIENT)
Dept: ORTHOPEDIC SURGERY | Facility: CLINIC | Age: 78
End: 2023-12-27
Payer: MEDICARE

## 2023-12-27 NOTE — TELEPHONE ENCOUNTER
Caller: PATIENT    Relationship to patient: SELF     Best call back number: [phone]    Chief complaint: LEFT KNEE PAIN AND HER KNEE IS GIVING OUT.    Type of visit: NEW PROBLEM    Requested date: ASAP    Additional notes: PATIENT WAS CALLING TO SCHEDULE AN APPT WITH [facility] TO HAVE HER LEFT KNEE EVALUATED. PATIENT STATED SHE IS HAVING AN ISSUE WITH HER LEFT KNEE BUT IS WANTING TO MAKE SURE THIS ISSUE IS IN FACT HER KNEE AND NOT HER SCIATIC NERVE. PATIENT HAD BACK SURGERY IN SEPT. 2023 AND IS WANTING TO MAKE SURE THIS ISSUE IS NOT RELATED TO HER SCIATIC NERVE. PATIENT HAS HAD SURGERY ON LEFT KNEE ABOUT 14 YEARS AGO BY DR. MOE. THE REF TOOL FOR [facility] STATES NON-OPERATIVE PATIENT'S SO I TOLD THE PATIENT I WOULD HAVE TO SEND A MESSAGE TO [facility] TO VERIFY THIS IS SOMETHING [facility] WOULD SEE. THANK YOU!

## 2024-03-11 NOTE — TELEPHONE ENCOUNTER
----- Message from Jeanine Blunt MD sent at 9/21/2019 11:03 AM EDT -----  Urine and culture all normal.   Vaccine Information Statement(s) or the Emergency Use Authorization was given today. This has been reviewed, questions answered, and verbal consent given by Patient for injection(s) and administration of Tetanus/Diphtheria/Pertussis (Tdap).        Patient tolerated without incident. See immunization grid for documentation.

## 2024-04-29 NOTE — DISCHARGE SUMMARY
Discharge Summary   Dragan De La Vega M.D.    NAME: Maria Ines Zhang ADMIT: 2017   : 1945  PCP: Joao Hardy MD    MRN: [id number] LOS: 2 days   AGE/SEX: 71 y.o. female  ROOM: P799/1       Date of Discharge:  17    Primary Discharge Diagnosis:  Osteoarthritis, knee [M17.9]    Secondary Discharge Diagnosis:    Problem List Items Addressed This Visit     None          Procedures Performed:  Right Total Knee Arthroplasty    Hospital Course:    Maria Ines Zhang is a 71 y.o.  female who underwent successful Right Total Knee Arthroplasty on 2017.  Maria Ines Zhang was started on Aspirin 325mg po daily immediately post-operatively for DVT prophylaxis.  On post-op day 1 the patients dressing was changed, drain removed and their incision was clean, with no signs of infection and their calf was soft, with no signs of DVT.  The patient progressed well with physical therapy and the patients hemoglobin remained stable. On post-operative day 2 the patient was felt ready for discharge.      Total Knee Joint Replacement Discharge Instructions:    I. ACTIVITIES:    1. Exercises:  ? Complete exercise program as taught by the hospital physical therapist 2 times per day  ? Exercise program will be advanced by the physical therapist  ? During the day be up ambulating every 2 hours (while awake) for short distances  ? Complete the ankle pump exercises at least 10 times per hour (while awake)  ? Elevate legs most of the day the first week post operatively and thereafter elevate legs when in bed and for at least 30 minutes during the day. Caution must be taken to avoid pillow placement under the bend of the knee as this can led to flexion contractures of the knee.  ? Use cold packs 20-30 minutes approximately 5 times per day. This should be done before and after completing your exercises and at any time you are experiencing pain/ stiffness in your operative extremity.    2. Activities of Daily Living:  ? No tub  baths, hot tubs, or swimming pools for 4 weeks  ? May shower and let water run over the incision on post-operative day #7 if no drainage. Do not scrub or rub the incision. Simply let the water run over the incision and pat dry.    II. Precautions:    ? Everyone that comes near you should wash their hands  ? No elective dental, genital-urinary, or colon procedures or surgical procedures for 12 weeks after surgery unless absolutely necessary.  ?  If dental work or surgical procedure is deemed absolutely necessary during the first 12 weeks, you will need to contact your surgeon as you will need to take antibiotics 1 hour prior to any dental work (including teeth cleanings).  ? Please discuss with your surgeon prophylactic antibiotics as the length of time this intervention will be necessary for you varies with each patient’s health history and situation.  ? Avoid sick people. If you must be around someone who is ill, they should wear a mask.  ? Avoid visits to the Emergency Room or Urgent Care unless you are having a life threatening event.     III. INCISION CARE:    ? Wash your hands prior to dressing changes  ? Change the dressing as needed to keep incision clean and dry. Utilize dry gauze and paper tape. Avoid touching the side of the gauze that goes against the incision with your hands.  ? No creams or ointments to the incision  ? May remove dressing once the incision is free of drainage  ? Do not touch or pick at the incision  ? Check incision every day and notify surgeon immediately if any of the following signs or symptoms are noted:  o Increase in redness  o Increase in swelling around the incision and of the entire extremity  o Increase in pain  o Drainage oozing from the incision  o Pulling apart of the edges of the incision  o Increase in overall body temperature (greater than 100.5 degrees)  ? Your surgeon will instruct you regarding suture or staple removal    IV. Medications:     1. Anticoagulants: You  will be discharged on an anticoagulant. This is a prophylactic medication that helps prevent blood clots during your post-operative period. The type and length of dosage varies based on your individual needs, procedure performed, and surgeon’s preference.    ? While taking the anticoagulant, you should avoid taking any additional aspirin, ibuprofen (Advil or Motrin), Aleve (Naprosyn) or other non-steroidal anti-inflammatory medications.   ? Notify surgeon immediately if any bo bleeding is noted in the urine, stool, emesis, or from the nose or the incision. Blood in the stool will often appear as black rather than red. Blood in urine may appear as pink. Blood in emesis may appear as brown/black like coffee grounds.  ? You will need to apply pressure for longer periods of time to any cuts or abrasions to stop bleeding  ? Avoid alcohol while taking anticoagulants    2. Stool Softeners: You will be at greater risk of constipation after surgery due to being less mobile and the pain medications.   ? Take stool softeners as instructed by your surgeon while on pain medications. Bran cereal is most effective. Over the counter Colace 100 mg 1-2 capsules twice daily.   ? Drink plenty of fluids, and eat fruits and vegetables during your recovery time    3. Pain Medications utilized after surgery are narcotics and the law requires that the following information be given to all patients that are prescribed narcotics:  ? CLASSIFICATION: Pain medications are called Opioids and are narcotics  ? LEGALITIES: It is illegal to share narcotics with others and to drive within 24 hours of taking narcotics  ? POTENTIAL SIDE EFFECTS: Potential side effects of opioids include: nausea, vomiting, itching, dizziness, drowsiness, dry mouth, constipation, and difficulty urinating.  ? POTENTIAL ADVERSE EFFECTS:   o Opioid tolerance can develop with use of pain medications and this simply means that it requires more and more of the medication to  control pain; however, this is seen more in patients that use opioids for longer periods of time.  o Opioid dependence can develop with use of Opioids and this simply means that to stop the medication can cause withdrawal symptoms; however, this is seen with patients that use Opioids for longer periods of time.  o Opioid addiction can develop with use of Opioids and the incidence of this is very unlikely in patients who take the medications as ordered and stop the medications as instructed.  o Opioid overdose can be dangerous, but is unlikely when the medication is taken as ordered and stopped when ordered. It is important not to mix opioids with alcohol or with and type of sedative such as Benadryl as this can lead to over sedation and respiratory difficulty.  ? DOSAGE:   o Pain medications will need to be taken consistently for the first week to decrease pain and promote adequate pain relief and participation in physical therapy.  o After the initial surgical pain begins to resolve, you may begin to decrease the pain medication. By the end of 6-8 weeks, you should be off of pain medications.  o Refills will not be given by the office during evening hours, on weekends, or after 6-8 weeks post-op.  o To seek refills on pain medications during the initial 6 week post-operative period, you must call the office 48 hours in advance to request the refill. The office will then notify you when to  the prescription. DO NOT wait until you are out of the medication to request a refill.    V. FOLLOW-UP VISITS:  ? You will need to follow up in the office with your surgeon in 3 weeks. Please call this number [phone] to schedule this appointment.  If you have any concerns or suspected complications prior to your follow up visit, please call your surgeons office. Do not wait until your appointment time if you suspect complications. These will need to be addressed in the office promptly.      Discharge Medications:      1) Percocet 5/325  1-2 po q 4-6 hours for pain control  2)  Eliquis 2.5 mg every 12 hours for 3 weeks then restart ASA.      Dragan De La Vega MD  7/8/2017  8:37 AM     T2DM: Home DM medications: metformin 500 mg BID, + glimepride 1 mg daily + lantus 35 units QHS+ admelog 25 units TIDAC  - Pt w/ hx of uncontrolled T2DM, a1c 11.9. At home, Basaglar 25 U QHS + Admelog 15U TID. Endocrine was consulted at [facility] prior to transfer. Was on  Lantus 20 + Admelog 14u TID + Mod SSI  - Endocrine consulted via email, recs appreciated  - Continue glargine 20 units qhs, lispro 18u TID qac, hold if NPO or eating < 50% of meals  - MISS FS tid qac qhs    - Discharge DM medications:   - Continue with metformin 500 mg BID  - STOP glimepride due to recurrent hypoglycemia at home   - Basal/bolus, dose will depend on insulin requirement and steroid plan   - Patient will follow up at  [facility]:  [address] [place] [address]. [address]. [place] [address]  Tel: [phone]

## 2025-01-07 NOTE — PLAN OF CARE
"Problem: Patient Care Overview (Adult)  Goal: Plan of Care Review  Outcome: Ongoing (interventions implemented as appropriate)    07/06/17 0949   Coping/Psychosocial Response Interventions   Plan Of Care Reviewed With patient   Patient Care Overview   Progress progress toward functional goals as expected       Goal: Adult Individualization and Mutuality  Outcome: Ongoing (interventions implemented as appropriate)    07/06/17 0949   Individualization   Patient Specific Preferences PT PREFERS TO BE CALLED \"BIRDIE\"   Patient Specific Goals TO BE RELAXED FOR SX. TODAY   Patient Specific Interventions TO GIVE RELAXING MED PER AA ORDER   Mutuality/Individual Preferences   What Anxieties, Fears or Concerns Do You Have About Your Health or Care? NONE AT THIS TIME.    What Questions Do You Have About Your Health or Care? NONE AT THIS TIME.    What Information Would Help Us Give You More Personalized Care? SEE ABOVE.         07/06/17 0949   Individualization   Patient Specific Preferences PT PREFERS TO BE CALLED \"BIRDIE\"   Patient Specific Goals TO BE RELAXED FOR SX. TODAY   Patient Specific Interventions TO GIVE RELAXING MED PER AA ORDER   Mutuality/Individual Preferences   What Anxieties, Fears or Concerns Do You Have About Your Health or Care? NONE AT THIS TIME.    What Questions Do You Have About Your Health or Care? NONE AT THIS TIME.    What Information Would Help Us Give You More Personalized Care? SEE ABOVE.       Goal: Discharge Needs Assessment  Outcome: Ongoing (interventions implemented as appropriate)    Problem: Perioperative Period (Adult)  Goal: Signs and Symptoms of Listed Potential Problems Will be Absent or Manageable (Perioperative Period)  Outcome: Ongoing (interventions implemented as appropriate)    07/06/17 0949   Perioperative Period   Problems Assessed (Perioperative Period) all   Problems Present (Perioperative Period) pain           "
Problem: Patient Care Overview (Adult)  Goal: Plan of Care Review    07/06/17 1623   Coping/Psychosocial Response Interventions   Plan Of Care Reviewed With patient   Outcome Evaluation   Outcome Summary/Follow up Plan Pt. will benefit from skilled inpt. P.T. to address her functional deficits and to assist pt. in regaining her independence with functional mobility.         Problem: Inpatient Physical Therapy  Goal: Bed Mobility Goal LTG- PT    07/06/17 1623   Bed Mobility PT LTG   Bed Mobility PT LTG, Date Established 07/06/17   Bed Mobility PT LTG, Time to Achieve 3 days   Bed Mobility PT LTG, Activity Type all bed mobility   Bed Mobility PT LTG, [place] Level independent       Goal: Transfer Training Goal 1 LTG- PT    07/06/17 1623   Transfer Training PT LTG   Transfer Training PT LTG, Date Established 07/06/17   Transfer Training PT LTG, Time to Achieve 3 days   Transfer Training PT LTG, Activity Type all transfers   Transfer Training PT LTG, [place] Level independent   Transfer Training PT LTG, Assist Device walker, rolling       Goal: Gait Training Goal LTG- PT    07/06/17 1623   Gait Training PT LTG   Gait Training Goal PT LTG, Date Established 07/06/17   Gait Training Goal PT LTG, Time to Achieve 3 days   Gait Training Goal PT LTG, [place] Level independent   Gait Training Goal PT LTG, Assist Device walker, rolling   Gait Training Goal PT LTG, Distance to Achieve 100 feet       Goal: Range of Motion Goal LTG- PT    07/06/17 1623   Range of Motion PT LTG   Range of Motion Goal PT LTG, Date Established 07/06/17   Range of Motion Goal PT LTG, Time to Achieve 3 days   Range fo Motion Goal PT LTG, Joint R knee   Range of Motion Goal PT LTG, AROM Measure (5, 85)           
Problem: Patient Care Overview (Adult)  Goal: Plan of Care Review    07/07/17 0918   Coping/Psychosocial Response Interventions   Plan Of Care Reviewed With patient   Patient Care Overview   Progress improving   Outcome Evaluation   Outcome Summary/Follow up Plan Improved tolerance to functional activity this AM with an increase in gait distance and decreased assist required for overall functional mobility. Pt. able to initiate swing through gait pattern with use of RWX this AM as well.           
Problem: Patient Care Overview (Adult)  Goal: Plan of Care Review  Outcome: Ongoing (interventions implemented as appropriate)    07/06/17 6837   Coping/Psychosocial Response Interventions   Plan Of Care Reviewed With patient   Patient Care Overview   Progress improving   Outcome Evaluation   Outcome Summary/Follow up Plan post op VSS. home Lisinopril resumed post-op. no s/s of HTN noted. DTV 2200. PT eval completed at bedside. up to chair with PT. right knee dressing CDI with HV present. Percocet initiated for pain. tolerating regular diet. o2 saturation stable on 2 liters o2. not requiring CPAP this shift.          Problem: Fall Risk (Adult)  Goal: Identify Related Risk Factors and Signs and Symptoms  Outcome: Outcome(s) achieved Date Met:  07/06/17  Goal: Absence of Falls  Outcome: Ongoing (interventions implemented as appropriate)    Problem: Knee Replacement, Total (Adult)  Goal: Signs and Symptoms of Listed Potential Problems Will be Absent or Manageable (Knee Replacement, Total)  Outcome: Ongoing (interventions implemented as appropriate)      
Problem: Patient Care Overview (Adult)  Goal: Plan of Care Review  Outcome: Ongoing (interventions implemented as appropriate)    07/07/17 0440   Coping/Psychosocial Response Interventions   Plan Of Care Reviewed With patient   Patient Care Overview   Progress improving   Outcome Evaluation   Outcome Summary/Follow up Plan Pt has been stable through the night. Voiding per BRP and is able to ambulate well with assistance and walker use. Pain well managed at this time with Q4 pain meds. VSS. Educated pt on management of HTN and monitoring BP with meds given.        Goal: Adult Individualization and Mutuality  Outcome: Ongoing (interventions implemented as appropriate)  Goal: Discharge Needs Assessment  Outcome: Ongoing (interventions implemented as appropriate)    Problem: Perioperative Period (Adult)  Goal: Signs and Symptoms of Listed Potential Problems Will be Absent or Manageable (Perioperative Period)  Outcome: Ongoing (interventions implemented as appropriate)    07/07/17 0440   Perioperative Period   Problems Assessed (Perioperative Period) all   Problems Present (Perioperative Period) pain         Problem: Fall Risk (Adult)  Goal: Absence of Falls  Outcome: Ongoing (interventions implemented as appropriate)    07/07/17 0440   Fall Risk (Adult)   Absence of Falls achieves outcome         Problem: Knee Replacement, Total (Adult)  Goal: Signs and Symptoms of Listed Potential Problems Will be Absent or Manageable (Knee Replacement, Total)  Outcome: Ongoing (interventions implemented as appropriate)    07/07/17 0440   Knee Replacement, Total   Problems Assessed (Total Knee Replacement) all   Problems Present (Total Knee Replacement) pain;decreased range of motion           
Problem: Patient Care Overview (Adult)  Goal: Plan of Care Review  Outcome: Ongoing (interventions implemented as appropriate)    07/07/17 9719   Coping/Psychosocial Response Interventions   Plan Of Care Reviewed With patient   Patient Care Overview   Progress progress toward functional goals as expected   Outcome Evaluation   Outcome Summary/Follow up Plan VSS, PAIN CONTROLLED WITH PO MEDS, AMBULATION WITH ASSIT PER BRP, EDUCATED ON BP MONITORING AND CPAP USE        Goal: Adult Individualization and Mutuality  Outcome: Ongoing (interventions implemented as appropriate)    Problem: Fall Risk (Adult)  Goal: Absence of Falls  Outcome: Ongoing (interventions implemented as appropriate)    Problem: Knee Replacement, Total (Adult)  Goal: Signs and Symptoms of Listed Potential Problems Will be Absent or Manageable (Knee Replacement, Total)  Outcome: Ongoing (interventions implemented as appropriate)      
Problem: Patient Care Overview (Adult)  Goal: Plan of Care Review  Outcome: Ongoing (interventions implemented as appropriate)    07/08/17 0228   Coping/Psychosocial Response Interventions   Plan Of Care Reviewed With patient   Patient Care Overview   Progress progress toward functional goals as expected   Outcome Evaluation   Outcome Summary/Follow up Plan PO PAIN MEDICATION CONTROLLING PAIN. AMBULATING WELL WITH 1 ASSIST. BRP. OXYGEN AT 3 LITER WITH C-PAP AT NIGHT. EDUCATION PROVIDED ON THE SIGNS AND SYMPTOMS OF DEPRESSION.       Goal: Adult Individualization and Mutuality  Outcome: Ongoing (interventions implemented as appropriate)  Goal: Discharge Needs Assessment  Outcome: Ongoing (interventions implemented as appropriate)    Problem: Fall Risk (Adult)  Goal: Absence of Falls  Outcome: Ongoing (interventions implemented as appropriate)    Problem: Knee Replacement, Total (Adult)  Goal: Signs and Symptoms of Listed Potential Problems Will be Absent or Manageable (Knee Replacement, Total)  Outcome: Ongoing (interventions implemented as appropriate)      
Problem: Patient Care Overview (Adult)  Goal: Plan of Care Review  Outcome: Ongoing (interventions implemented as appropriate)    07/08/17 1038   Coping/Psychosocial Response Interventions   Plan Of Care Reviewed With patient;family   Patient Care Overview   Progress improving   Outcome Evaluation   Outcome Summary/Follow up Plan Pt transferred sit to stand with CGA x 1 and RW. She ambulated 150 ft with RW and SBA.Pt returned to sit in chair and performed TKA therex protocol 2 x 10 reps.           
Problem: Patient Care Overview (Adult)  Goal: Plan of Care Review  Outcome: Ongoing (interventions implemented as appropriate)    07/08/17 1109   Coping/Psychosocial Response Interventions   Plan Of Care Reviewed With patient   Patient Care Overview   Progress improving   Outcome Evaluation   Outcome Summary/Follow up Plan pain tolerable on PO pain meds, ambulates in room and hallway with 1 assist and walker plans to dc home with home health today, vital signs stable educated pateint on self monitoring blood pressure at home, patient verblized understanding.         Problem: Fall Risk (Adult)  Goal: Absence of Falls  Outcome: Ongoing (interventions implemented as appropriate)    Problem: Knee Replacement, Total (Adult)  Goal: Signs and Symptoms of Listed Potential Problems Will be Absent or Manageable (Knee Replacement, Total)  Outcome: Ongoing (interventions implemented as appropriate)      
92
14-Nov-2019 03:33

## 2025-01-13 ENCOUNTER — TRANSCRIBE ORDERS (OUTPATIENT)
Dept: ADMINISTRATIVE | Facility: HOSPITAL | Age: 80
End: 2025-01-13
Payer: MEDICARE

## 2025-01-13 DIAGNOSIS — Z12.31 SCREENING MAMMOGRAM FOR BREAST CANCER: Primary | ICD-10-CM

## 2025-03-03 NOTE — PROGRESS NOTES
Subjective     Maria Ines Zhang is a 72 y.o. female, who presents with a chief complaint of   Chief Complaint   Patient presents with   • Follow-up     HOS f/u 1/10-1/18 record in chart    • GI Problem     patient states this week is the first week she has felt better, bm becoming more solid        HPI Comments: 71 yo F with h/o PE, IFG, GERD, HLD, HTN, BOOP, anxiety and depression here for hospital follow up. Admitted from 1/10-1/18 at [facility] for colitis on CT scan of her abdomen. She was having abdominal pain at that time. She was placed on IVF's and antibiotics and her nausea and diarrhea improved on day 1 and she was placed on a regular diet.  She then developed worsening abdominal pain, nausea and diarrhea.  She was placed again NPO and a CT scan of the abdomen and pelvis was performed that showed findings concerning of partial small bowel obstruction.  Colitis of the level of the splenic flexure improved at the time but she continued to have some thickening at the level of the transverse colon. She had NG tube and had a large amount of bilious drainage.  On day 3 her abdominal pain improved.  Her NG tube was removed on day 4.  She was started on clear liquid diet and had flatulence. She developed runny stools and C. difficile colitis was ruled out.       During hospitalization, she also had down trending hemoglobin as well as low calcium and potassium. 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 BEVERLEY on presentation that resolved.     Her appetite is not back to baseline. She completed her antibiotics about one week ago. Looser stools since discharge. No abdominal pain or blood in her stool. She is going to be scheduled for EGD and c-scope after she sees Dr. Garland on 2/23.     She has had multiple infections in the past and can't seem to get well per her. No fever or chills today.        The following portions of the patient's history were reviewed and updated as  appropriate: allergies, current medications, past family history, past medical history, past social history, past surgical history and problem list.    Allergies: Fish allergy; Shellfish allergy; and Sulfa antibiotics    Current Outpatient Prescriptions:   •  albuterol (PROVENTIL HFA;VENTOLIN HFA) 108 (90 Base) MCG/ACT inhaler, Inhale 2 puffs Every 4 (Four) Hours As Needed for Wheezing or Shortness of Air., Disp: 1 inhaler, Rfl: 6  •  albuterol (PROVENTIL) (2.5 MG/3ML) 0.083% nebulizer solution, Take 2.5 mg by nebulization Every 4 (Four) Hours As Needed for Wheezing., Disp: 100 vial, Rfl: 1  •  budesonide-formoterol (SYMBICORT) 160-4.5 MCG/ACT inhaler, Inhale 2 puffs 2 (Two) Times a Day., Disp: , Rfl:   •  clonazePAM (KlonoPIN) 0.5 MG tablet, Take 0.5 mg by mouth Every Night., Disp: , Rfl:   •  cyclobenzaprine (FLEXERIL) 10 MG tablet, Take 1 tablet by mouth 3 (Three) Times a Day As Needed for Muscle Spasms., Disp: 90 tablet, Rfl: 1  •  escitalopram (LEXAPRO) 20 MG tablet, Take 1 tablet by mouth Daily., Disp: 90 tablet, Rfl: 1  •  esomeprazole (NexIUM) 20 MG capsule, Take 40 mg by mouth every morning before breakfast., Disp: , Rfl:   •  hydrochlorothiazide (HYDRODIURIL) 25 MG tablet, Take 1 tablet by mouth Daily., Disp: 30 tablet, Rfl: 3  •  levothyroxine (SYNTHROID, LEVOTHROID) 88 MCG tablet, Take 1 tablet by mouth Daily., Disp: 90 tablet, Rfl: 1  •  lisinopril (PRINIVIL,ZESTRIL) 20 MG tablet, Take 1 tablet by mouth Daily., Disp: 30 tablet, Rfl: 5  •  simvastatin (ZOCOR) 10 MG tablet, Take 10 mg by mouth Every Night., Disp: , Rfl:   •  Vitamin Mixture (ROCKY-C PO), Take 500 mg by mouth Daily., Disp: , Rfl:   Medications Discontinued During This Encounter   Medication Reason   • benzonatate (TESSALON PERLES) 100 MG capsule Therapy completed   • conjugated estrogens (PREMARIN) 0.625 MG/GM vaginal cream Therapy completed   • Magnesium 250 MG tablet Therapy completed   • montelukast (SINGULAIR) 10 MG tablet Therapy  "completed   • nystatin (MYCOSTATIN) 561657 UNIT/GM cream Therapy completed   • ondansetron (ZOFRAN) 4 MG tablet Therapy completed       Review of Systems   Constitutional: Positive for fatigue. Negative for chills and fever.   HENT: Negative for congestion.    Respiratory: Negative for cough.    Cardiovascular: Negative for chest pain.   Gastrointestinal: Positive for diarrhea. Negative for nausea and vomiting.   Allergic/Immunologic: Positive for immunocompromised state (frequent infections ).       Objective     /64 (BP Location: Left arm, Patient Position: Sitting, Cuff Size: Adult)  Pulse 71  Temp 98.3 °F (36.8 °C) (Oral)   Resp 18  Ht 167.6 cm (65.98\")  Wt 97.8 kg (215 lb 8 oz)  SpO2 98%  BMI 34.8 kg/m2      Physical Exam   Constitutional: She is oriented to person, place, and time. She appears well-developed and well-nourished. No distress.   HENT:   Head: Normocephalic and atraumatic.   Right Ear: External ear normal.   Left Ear: External ear normal.   Mouth/Throat: Oropharynx is clear and moist. No oropharyngeal exudate.   Eyes: Conjunctivae are normal. Right eye exhibits no discharge. Left eye exhibits no discharge. No scleral icterus.   Neck: Neck supple.   Cardiovascular: Normal rate, regular rhythm and normal heart sounds.  Exam reveals no gallop and no friction rub.    No murmur heard.  Pulmonary/Chest: Effort normal and breath sounds normal. No respiratory distress. She has no wheezes. She has no rales.   Abdominal: Soft. Bowel sounds are normal. She exhibits no distension and no mass. There is no tenderness. There is no guarding.   Lymphadenopathy:     She has no cervical adenopathy.   Neurological: She is alert and oriented to person, place, and time.   Skin: Skin is warm. No rash noted.   Psychiatric: She has a normal mood and affect. Her behavior is normal.   Nursing note and vitals reviewed.        Results for orders placed or performed during the hospital encounter of 01/10/18 "   Stool Culture - Stool, Per Rectum   Result Value Ref Range    Stool Culture       No Salmonella, Shigella, Campylobacter or E coli O157:H7 isolated.   Clostridium Difficile Toxin, PCR - Stool, Per Rectum   Result Value Ref Range    C. Difficile Toxins by PCR Negative Negative   Comprehensive Metabolic Panel   Result Value Ref Range    Glucose 121 (H) 65 - 99 mg/dL    BUN 21 8 - 23 mg/dL    Creatinine 1.56 (H) 0.57 - 1.00 mg/dL    Sodium 141 136 - 145 mmol/L    Potassium 3.9 3.5 - 5.2 mmol/L    Chloride 99 98 - 107 mmol/L    CO2 21.8 (L) 22.0 - 29.0 mmol/L    Calcium 9.9 8.6 - 10.5 mg/dL    Total Protein 7.9 6.0 - 8.5 g/dL    Albumin 4.20 3.50 - 5.20 g/dL    ALT (SGPT) 18 1 - 33 U/L    AST (SGOT) 25 1 - 32 U/L    Alkaline Phosphatase 113 39 - 117 U/L    Total Bilirubin 0.5 0.1 - 1.2 mg/dL    eGFR Non African Amer 33 (L) >60 mL/min/1.73    Globulin 3.7 gm/dL    A/G Ratio 1.1 g/dL    BUN/Creatinine Ratio 13.5 7.0 - 25.0    Anion Gap 20.2 mmol/L   Lipase   Result Value Ref Range    Lipase 44 13 - 60 U/L   Urinalysis With / Culture If Indicated - Urine, Clean Catch   Result Value Ref Range    Color, UA Dark Yellow (A) Yellow, Straw    Appearance, UA Cloudy (A) Clear    pH, UA <=5.0 5.0 - 8.0    Specific Gravity, UA 1.022 1.005 - 1.030    Glucose, UA Negative Negative    Ketones, UA Trace (A) Negative    Bilirubin, UA Negative Negative    Blood, UA Negative Negative    Protein, UA 30 mg/dL (1+) (A) Negative    Leuk Esterase, UA Trace (A) Negative    Nitrite, UA Negative Negative    Urobilinogen, UA 1.0 E.U./dL 0.2 - 1.0 E.U./dL   CBC Auto Differential   Result Value Ref Range    WBC 19.56 (H) 4.50 - 10.70 10*3/mm3    RBC 4.54 3.90 - 5.20 10*6/mm3    Hemoglobin 13.3 11.9 - 15.5 g/dL    Hematocrit 42.1 35.6 - 45.5 %    MCV 92.7 80.5 - 98.2 fL    MCH 29.3 26.9 - 32.0 pg    MCHC 31.6 (L) 32.4 - 36.3 g/dL    RDW 15.6 (H) 11.7 - 13.0 %    RDW-SD 53.0 37.0 - 54.0 fl    MPV 10.5 6.0 - 12.0 fL    Platelets 343 140 - 500 10*3/mm3     Neutrophil % 81.8 (H) 42.7 - 76.0 %    Lymphocyte % 8.9 (L) 19.6 - 45.3 %    Monocyte % 8.7 5.0 - 12.0 %    Eosinophil % 0.1 (L) 0.3 - 6.2 %    Basophil % 0.1 0.0 - 1.5 %    Immature Grans % 0.4 0.0 - 0.5 %    Neutrophils, Absolute 16.01 (H) 1.90 - 8.10 10*3/mm3    Lymphocytes, Absolute 1.75 0.90 - 4.80 10*3/mm3    Monocytes, Absolute 1.70 (H) 0.20 - 1.20 10*3/mm3    Eosinophils, Absolute 0.01 0.00 - 0.70 10*3/mm3    Basophils, Absolute 0.02 0.00 - 0.20 10*3/mm3    Immature Grans, Absolute 0.07 (H) 0.00 - 0.03 10*3/mm3   Scan Slide   Result Value Ref Range    RBC Morphology Normal Normal    WBC Morphology Normal Normal    Platelet Morphology Normal Normal   Urinalysis, Microscopic Only - Urine, Clean Catch   Result Value Ref Range    RBC, UA 0-2 None Seen, 0-2 /HPF    WBC, UA 0-2 None Seen, 0-2 /HPF    Bacteria, UA None Seen None Seen /HPF    Squamous Epithelial Cells, UA 0-2 None Seen, 0-2 /HPF    Hyaline Casts, UA 7-12 None Seen /LPF    Methodology Automated Microscopy    Basic Metabolic Panel   Result Value Ref Range    Glucose 97 65 - 99 mg/dL    BUN 29 (H) 8 - 23 mg/dL    Creatinine 1.97 (H) 0.57 - 1.00 mg/dL    Sodium 133 (L) 136 - 145 mmol/L    Potassium 4.5 3.5 - 5.2 mmol/L    Chloride 97 (L) 98 - 107 mmol/L    CO2 23.6 22.0 - 29.0 mmol/L    Calcium 8.2 (L) 8.6 - 10.5 mg/dL    eGFR Non African Amer 25 (L) >60 mL/min/1.73    BUN/Creatinine Ratio 14.7 7.0 - 25.0    Anion Gap 12.4 mmol/L   CBC Auto Differential   Result Value Ref Range    WBC 16.18 (H) 4.50 - 10.70 10*3/mm3    RBC 3.53 (L) 3.90 - 5.20 10*6/mm3    Hemoglobin 10.0 (L) 11.9 - 15.5 g/dL    Hematocrit 32.9 (L) 35.6 - 45.5 %    MCV 93.2 80.5 - 98.2 fL    MCH 28.3 26.9 - 32.0 pg    MCHC 30.4 (L) 32.4 - 36.3 g/dL    RDW 16.7 (H) 11.7 - 13.0 %    RDW-SD 56.6 (H) 37.0 - 54.0 fl    MPV 10.5 6.0 - 12.0 fL    Platelets 260 140 - 500 10*3/mm3    Neutrophil % 72.9 42.7 - 76.0 %    Lymphocyte % 15.8 (L) 19.6 - 45.3 %    Monocyte % 10.9 5.0 - 12.0 %     Eosinophil % 0.1 (L) 0.3 - 6.2 %    Basophil % 0.1 0.0 - 1.5 %    Immature Grans % 0.2 0.0 - 0.5 %    Neutrophils, Absolute 11.82 (H) 1.90 - 8.10 10*3/mm3    Lymphocytes, Absolute 2.55 0.90 - 4.80 10*3/mm3    Monocytes, Absolute 1.76 (H) 0.20 - 1.20 10*3/mm3    Eosinophils, Absolute 0.01 0.00 - 0.70 10*3/mm3    Basophils, Absolute 0.01 0.00 - 0.20 10*3/mm3    Immature Grans, Absolute 0.03 0.00 - 0.03 10*3/mm3   CBC Auto Differential   Result Value Ref Range    WBC 11.09 (H) 4.50 - 10.70 10*3/mm3    RBC 3.42 (L) 3.90 - 5.20 10*6/mm3    Hemoglobin 9.7 (L) 11.9 - 15.5 g/dL    Hematocrit 31.3 (L) 35.6 - 45.5 %    MCV 91.5 80.5 - 98.2 fL    MCH 28.4 26.9 - 32.0 pg    MCHC 31.0 (L) 32.4 - 36.3 g/dL    RDW 16.4 (H) 11.7 - 13.0 %    RDW-SD 54.9 (H) 37.0 - 54.0 fl    MPV 10.3 6.0 - 12.0 fL    Platelets 236 140 - 500 10*3/mm3    Neutrophil % 82.2 (H) 42.7 - 76.0 %    Lymphocyte % 8.6 (L) 19.6 - 45.3 %    Monocyte % 9.0 5.0 - 12.0 %    Eosinophil % 0.2 (L) 0.3 - 6.2 %    Basophil % 0.0 0.0 - 1.5 %    Immature Grans % 0.0 0.0 - 0.5 %    Neutrophils, Absolute 9.12 (H) 1.90 - 8.10 10*3/mm3    Lymphocytes, Absolute 0.95 0.90 - 4.80 10*3/mm3    Monocytes, Absolute 1.00 0.20 - 1.20 10*3/mm3    Eosinophils, Absolute 0.02 0.00 - 0.70 10*3/mm3    Basophils, Absolute 0.00 0.00 - 0.20 10*3/mm3    Immature Grans, Absolute 0.00 0.00 - 0.03 10*3/mm3   Basic Metabolic Panel   Result Value Ref Range    Glucose 94 65 - 99 mg/dL    BUN 20 8 - 23 mg/dL    Creatinine 0.78 0.57 - 1.00 mg/dL    Sodium 136 136 - 145 mmol/L    Potassium 4.3 3.5 - 5.2 mmol/L    Chloride 98 98 - 107 mmol/L    CO2 26.3 22.0 - 29.0 mmol/L    Calcium 8.3 (L) 8.6 - 10.5 mg/dL    eGFR Non African Amer 73 >60 mL/min/1.73    BUN/Creatinine Ratio 25.6 (H) 7.0 - 25.0    Anion Gap 11.7 mmol/L   Magnesium   Result Value Ref Range    Magnesium 2.1 1.6 - 2.4 mg/dL   Phosphorus   Result Value Ref Range    Phosphorus 2.8 2.5 - 4.5 mg/dL   CBC Auto Differential   Result Value Ref  Range    WBC 6.28 4.50 - 10.70 10*3/mm3    RBC 3.33 (L) 3.90 - 5.20 10*6/mm3    Hemoglobin 9.3 (L) 11.9 - 15.5 g/dL    Hematocrit 30.4 (L) 35.6 - 45.5 %    MCV 91.3 80.5 - 98.2 fL    MCH 27.9 26.9 - 32.0 pg    MCHC 30.6 (L) 32.4 - 36.3 g/dL    RDW 16.5 (H) 11.7 - 13.0 %    RDW-SD 54.9 (H) 37.0 - 54.0 fl    MPV 10.6 6.0 - 12.0 fL    Platelets 253 140 - 500 10*3/mm3    Neutrophil % 65.0 42.7 - 76.0 %    Lymphocyte % 20.5 19.6 - 45.3 %    Monocyte % 12.9 (H) 5.0 - 12.0 %    Eosinophil % 1.4 0.3 - 6.2 %    Basophil % 0.2 0.0 - 1.5 %    Immature Grans % 0.0 0.0 - 0.5 %    Neutrophils, Absolute 4.08 1.90 - 8.10 10*3/mm3    Lymphocytes, Absolute 1.29 0.90 - 4.80 10*3/mm3    Monocytes, Absolute 0.81 0.20 - 1.20 10*3/mm3    Eosinophils, Absolute 0.09 0.00 - 0.70 10*3/mm3    Basophils, Absolute 0.01 0.00 - 0.20 10*3/mm3    Immature Grans, Absolute 0.00 0.00 - 0.03 10*3/mm3    nRBC 0.0 0.0 - 0.0 /100 WBC   Basic Metabolic Panel   Result Value Ref Range    Glucose 78 65 - 99 mg/dL    BUN 11 8 - 23 mg/dL    Creatinine 0.65 0.57 - 1.00 mg/dL    Sodium 139 136 - 145 mmol/L    Potassium 3.8 3.5 - 5.2 mmol/L    Chloride 100 98 - 107 mmol/L    CO2 24.4 22.0 - 29.0 mmol/L    Calcium 7.9 (L) 8.6 - 10.5 mg/dL    eGFR Non African Amer 90 >60 mL/min/1.73    BUN/Creatinine Ratio 16.9 7.0 - 25.0    Anion Gap 14.6 mmol/L   Magnesium   Result Value Ref Range    Magnesium 2.0 1.6 - 2.4 mg/dL   Phosphorus   Result Value Ref Range    Phosphorus 2.7 2.5 - 4.5 mg/dL   CBC Auto Differential   Result Value Ref Range    WBC 6.72 4.50 - 10.70 10*3/mm3    RBC 3.14 (L) 3.90 - 5.20 10*6/mm3    Hemoglobin 8.9 (L) 11.9 - 15.5 g/dL    Hematocrit 28.7 (L) 35.6 - 45.5 %    MCV 91.4 80.5 - 98.2 fL    MCH 28.3 26.9 - 32.0 pg    MCHC 31.0 (L) 32.4 - 36.3 g/dL    RDW 16.3 (H) 11.7 - 13.0 %    RDW-SD 54.5 (H) 37.0 - 54.0 fl    MPV 9.9 6.0 - 12.0 fL    Platelets 236 140 - 500 10*3/mm3    Neutrophil % 67.7 42.7 - 76.0 %    Lymphocyte % 16.2 (L) 19.6 - 45.3 %     Monocyte % 14.4 (H) 5.0 - 12.0 %    Eosinophil % 1.2 0.3 - 6.2 %    Basophil % 0.1 0.0 - 1.5 %    Immature Grans % 0.4 0.0 - 0.5 %    Neutrophils, Absolute 4.54 1.90 - 8.10 10*3/mm3    Lymphocytes, Absolute 1.09 0.90 - 4.80 10*3/mm3    Monocytes, Absolute 0.97 0.20 - 1.20 10*3/mm3    Eosinophils, Absolute 0.08 0.00 - 0.70 10*3/mm3    Basophils, Absolute 0.01 0.00 - 0.20 10*3/mm3    Immature Grans, Absolute 0.03 0.00 - 0.03 10*3/mm3   Basic Metabolic Panel   Result Value Ref Range    Glucose 116 (H) 65 - 99 mg/dL    BUN 8 8 - 23 mg/dL    Creatinine 0.62 0.57 - 1.00 mg/dL    Sodium 136 136 - 145 mmol/L    Potassium 3.0 (L) 3.5 - 5.2 mmol/L    Chloride 99 98 - 107 mmol/L    CO2 26.4 22.0 - 29.0 mmol/L    Calcium 7.7 (L) 8.6 - 10.5 mg/dL    eGFR Non African Amer 95 >60 mL/min/1.73    BUN/Creatinine Ratio 12.9 7.0 - 25.0    Anion Gap 10.6 mmol/L   Basic Metabolic Panel   Result Value Ref Range    Glucose 118 (H) 65 - 99 mg/dL    BUN 4 (L) 8 - 23 mg/dL    Creatinine 0.70 0.57 - 1.00 mg/dL    Sodium 133 (L) 136 - 145 mmol/L    Potassium 3.7 3.5 - 5.2 mmol/L    Chloride 97 (L) 98 - 107 mmol/L    CO2 27.3 22.0 - 29.0 mmol/L    Calcium 8.1 (L) 8.6 - 10.5 mg/dL    eGFR Non African Amer 83 >60 mL/min/1.73    BUN/Creatinine Ratio 5.7 (L) 7.0 - 25.0    Anion Gap 8.7 mmol/L   Basic Metabolic Panel   Result Value Ref Range    Glucose 89 65 - 99 mg/dL    BUN 3 (L) 8 - 23 mg/dL    Creatinine 0.64 0.57 - 1.00 mg/dL    Sodium 139 136 - 145 mmol/L    Potassium 3.3 (L) 3.5 - 5.2 mmol/L    Chloride 99 98 - 107 mmol/L    CO2 24.2 22.0 - 29.0 mmol/L    Calcium 8.4 (L) 8.6 - 10.5 mg/dL    eGFR Non African Amer 92 >60 mL/min/1.73    BUN/Creatinine Ratio 4.7 (L) 7.0 - 25.0    Anion Gap 15.8 mmol/L   CBC Auto Differential   Result Value Ref Range    WBC 6.87 4.50 - 10.70 10*3/mm3    RBC 3.38 (L) 3.90 - 5.20 10*6/mm3    Hemoglobin 9.4 (L) 11.9 - 15.5 g/dL    Hematocrit 30.2 (L) 35.6 - 45.5 %    MCV 89.3 80.5 - 98.2 fL    MCH 27.8 26.9 - 32.0 pg     MCHC 31.1 (L) 32.4 - 36.3 g/dL    RDW 16.6 (H) 11.7 - 13.0 %    RDW-SD 54.4 (H) 37.0 - 54.0 fl    MPV 9.7 6.0 - 12.0 fL    Platelets 475 140 - 500 10*3/mm3    Neutrophil % 58.9 42.7 - 76.0 %    Lymphocyte % 29.4 19.6 - 45.3 %    Monocyte % 9.6 5.0 - 12.0 %    Eosinophil % 0.9 0.3 - 6.2 %    Basophil % 0.3 0.0 - 1.5 %    Immature Grans % 0.9 (H) 0.0 - 0.5 %    Neutrophils, Absolute 4.05 1.90 - 8.10 10*3/mm3    Lymphocytes, Absolute 2.02 0.90 - 4.80 10*3/mm3    Monocytes, Absolute 0.66 0.20 - 1.20 10*3/mm3    Eosinophils, Absolute 0.06 0.00 - 0.70 10*3/mm3    Basophils, Absolute 0.02 0.00 - 0.20 10*3/mm3    Immature Grans, Absolute 0.06 (H) 0.00 - 0.03 10*3/mm3    nRBC 0.0 0.0 - 0.0 /100 WBC   Light Blue Top   Result Value Ref Range    Extra Tube hold for add-on    Green Top (Gel)   Result Value Ref Range    Extra Tube Hold for add-ons.    Lavender Top   Result Value Ref Range    Extra Tube hold for add-on    Gold Top - SST   Result Value Ref Range    Extra Tube Hold for add-ons.        Assessment/Plan   Maria Ines was seen today for follow-up and gi problem.    Diagnoses and all orders for this visit:    Hospital discharge follow-up    SBO (small bowel obstruction)    Colitis, acute    Normocytic anemia  -     CBC & Differential    Hypokalemia  -     Comprehensive Metabolic Panel    Hypocalcemia  -     Comprehensive Metabolic Panel    Renal artery aneurysm  -     CT angiogram abdomen pelvis w wo contrast    Frequent infections      Please see documentation of TCN and correspondence in chart per Lolis Fernández on 1/19. I have reconciled patient's medications and reviewed her discharge summary as well as relevant CT scans and labs. She is doing well today, still with some looser stools. No blood in her stool or pain. Follow up with Dr. Garland planned on 2/23 as well as follow up EGD and c-scope.     Will repeat labs to re-evaluate her anemia and electrolyte disturbance.     CT angio to further evaluate her renal artery  aneurysm.     She has had quite a few infections including her knee, influenza as well as colitis. This is likely normal for her age given her medical history, but we did discuss today checking immunoglobulins on the next blood draw if she has another ifnection. She is in agreement. She is going to eat good nutrition, rest and relax, and start a MV in the meantime. We will see back in 3 moths or sooner if she needs me.     Return in about 3 months (around 5/1/2018) for Medicare Wellness, Recheck.    Jeanine Blunt MD  02/01/2018     There are no Wet Read(s) to document.

## (undated) DEVICE — STPLR SKIN VISISTAT WD 35CT

## (undated) DEVICE — PREMIUM WET SKIN PREP TRAY: Brand: MEDLINE INDUSTRIES, INC.

## (undated) DEVICE — NDL SPINE 20G 3 1/2 YEL STRL 1P/U

## (undated) DEVICE — SOL NACL 0.9PCT 1000ML

## (undated) DEVICE — THE TORRENT IRRIGATION SCOPE CONNECTOR IS USED WITH THE TORRENT IRRIGATION TUBING TO PROVIDE IRRIGATION FLUIDS SUCH AS STERILE WATER DURING GASTROINTESTINAL ENDOSCOPIC PROCEDURES WHEN USED IN CONJUNCTION WITH AN IRRIGATION PUMP (OR ELECTROSURGICAL UNIT).: Brand: TORRENT

## (undated) DEVICE — TUBING, SUCTION, 1/4" X 10', STRAIGHT: Brand: MEDLINE

## (undated) DEVICE — DRSNG WND GZ CURAD OIL EMULSION 3X8IN LF STRL 1PK

## (undated) DEVICE — Device: Brand: DEFENDO AIR/WATER/SUCTION AND BIOPSY VALVE

## (undated) DEVICE — PK KN TOTL 40

## (undated) DEVICE — DRSNG ADAPTIC 3X8

## (undated) DEVICE — SINGLE-USE BIOPSY FORCEPS: Brand: RADIAL JAW 4

## (undated) DEVICE — KT DRN EVAC WND PVC PCH WTROC RND 10F400

## (undated) DEVICE — SYR LUERLOK 20CC

## (undated) DEVICE — GLV SURG TRIUMPH CLASSIC PF LTX 8.5 STRL

## (undated) DEVICE — BANDAGE,GAUZE,BULKEE II,4.5"X4.1YD,STRL: Brand: MEDLINE

## (undated) DEVICE — ENCORE® LATEX ORTHO SIZE 8.5, STERILE LATEX POWDER-FREE SURGICAL GLOVE: Brand: ENCORE

## (undated) DEVICE — DUAL CUT SAGITTAL BLADE

## (undated) DEVICE — APPL DURAPREP IODOPHOR APL 26ML

## (undated) DEVICE — BNDG ELAS ELITE V/CLOSE 6IN 5YD LF STRL

## (undated) DEVICE — GAUZE,SPONGE,FLUFF,6"X6.75",STRL,5/TRAY: Brand: MEDLINE

## (undated) DEVICE — ANTIBACTERIAL UNDYED BRAIDED (POLYGLACTIN 910), SYNTHETIC ABSORBABLE SUTURE: Brand: COATED VICRYL

## (undated) DEVICE — GAUZE,SPONGE,FLUFF,6"X6.75",STRL,10/TRAY: Brand: MEDLINE

## (undated) DEVICE — UNDERCAST PADDING: Brand: DEROYAL

## (undated) DEVICE — CANN NASL CO2 TRULINK W/O2 A/